# Patient Record
Sex: MALE | Race: WHITE | Employment: UNEMPLOYED | URBAN - METROPOLITAN AREA
[De-identification: names, ages, dates, MRNs, and addresses within clinical notes are randomized per-mention and may not be internally consistent; named-entity substitution may affect disease eponyms.]

---

## 2017-02-14 RX ORDER — METOLAZONE 5 MG/1
TABLET ORAL
Qty: 45 TABLET | Refills: 0 | Status: SHIPPED | OUTPATIENT
Start: 2017-02-14 | End: 2017-03-24 | Stop reason: SDUPTHER

## 2017-02-14 RX ORDER — HUMAN INSULIN 100 [IU]/ML
INJECTION, SUSPENSION SUBCUTANEOUS
Qty: 20 ML | Refills: 0 | Status: SHIPPED | OUTPATIENT
Start: 2017-02-14 | End: 2017-04-24 | Stop reason: SDUPTHER

## 2017-02-27 ENCOUNTER — TELEPHONE (OUTPATIENT)
Dept: INTERNAL MEDICINE | Age: 62
End: 2017-02-27

## 2017-03-24 RX ORDER — METOLAZONE 5 MG/1
TABLET ORAL
Qty: 45 TABLET | Refills: 0 | Status: SHIPPED | OUTPATIENT
Start: 2017-03-24 | End: 2017-07-16 | Stop reason: SDUPTHER

## 2017-04-24 RX ORDER — HUMAN INSULIN 100 [IU]/ML
INJECTION, SUSPENSION SUBCUTANEOUS
Qty: 20 ML | Refills: 0 | Status: SHIPPED | OUTPATIENT
Start: 2017-04-24 | End: 2017-05-22 | Stop reason: SDUPTHER

## 2017-04-26 RX ORDER — WARFARIN SODIUM 5 MG/1
TABLET ORAL
Qty: 360 TABLET | Refills: 0 | Status: SHIPPED | OUTPATIENT
Start: 2017-04-26 | End: 2017-08-17 | Stop reason: SDUPTHER

## 2017-05-11 ENCOUNTER — ANTI-COAG VISIT (OUTPATIENT)
Dept: INTERNAL MEDICINE | Age: 62
End: 2017-05-11

## 2017-05-23 RX ORDER — HUMAN INSULIN 100 [IU]/ML
INJECTION, SUSPENSION SUBCUTANEOUS
Qty: 20 ML | Refills: 0 | Status: SHIPPED | OUTPATIENT
Start: 2017-05-23 | End: 2017-06-19 | Stop reason: SDUPTHER

## 2017-05-26 ENCOUNTER — TELEPHONE (OUTPATIENT)
Dept: INTERNAL MEDICINE | Age: 62
End: 2017-05-26

## 2017-06-19 RX ORDER — HUMAN INSULIN 100 [IU]/ML
INJECTION, SUSPENSION SUBCUTANEOUS
Qty: 20 ML | Refills: 0 | Status: SHIPPED | OUTPATIENT
Start: 2017-06-19 | End: 2017-07-16 | Stop reason: SDUPTHER

## 2017-07-17 RX ORDER — METOLAZONE 5 MG/1
TABLET ORAL
Qty: 45 TABLET | Refills: 0 | Status: SHIPPED | OUTPATIENT
Start: 2017-07-17 | End: 2017-10-20 | Stop reason: SDUPTHER

## 2017-07-17 RX ORDER — HUMAN INSULIN 100 [IU]/ML
INJECTION, SUSPENSION SUBCUTANEOUS
Qty: 20 ML | Refills: 0 | Status: SHIPPED | OUTPATIENT
Start: 2017-07-17 | End: 2017-08-16 | Stop reason: SDUPTHER

## 2017-08-07 RX ORDER — POTASSIUM CHLORIDE 750 MG/1
TABLET, FILM COATED, EXTENDED RELEASE ORAL
Qty: 720 TABLET | Refills: 0 | Status: SHIPPED | OUTPATIENT
Start: 2017-08-07 | End: 2017-11-13 | Stop reason: SDUPTHER

## 2017-08-16 RX ORDER — HUMAN INSULIN 100 [IU]/ML
INJECTION, SUSPENSION SUBCUTANEOUS
Qty: 20 ML | Refills: 0 | Status: SHIPPED | OUTPATIENT
Start: 2017-08-16 | End: 2017-10-20 | Stop reason: SDUPTHER

## 2017-08-17 ENCOUNTER — TELEPHONE (OUTPATIENT)
Dept: INTERNAL MEDICINE | Age: 62
End: 2017-08-17

## 2017-08-17 DIAGNOSIS — G89.29 CHRONIC BILATERAL LOW BACK PAIN, WITH SCIATICA PRESENCE UNSPECIFIED: Primary | ICD-10-CM

## 2017-08-17 DIAGNOSIS — M54.5 CHRONIC BILATERAL LOW BACK PAIN, WITH SCIATICA PRESENCE UNSPECIFIED: Primary | ICD-10-CM

## 2017-08-17 RX ORDER — WARFARIN SODIUM 5 MG/1
TABLET ORAL
Qty: 228 TABLET | Refills: 0 | Status: SHIPPED | OUTPATIENT
Start: 2017-08-17 | End: 2017-11-13 | Stop reason: SDUPTHER

## 2017-10-18 ENCOUNTER — OFFICE VISIT (OUTPATIENT)
Dept: PAIN MANAGEMENT | Age: 62
End: 2017-10-18

## 2017-10-18 VITALS
SYSTOLIC BLOOD PRESSURE: 170 MMHG | BODY MASS INDEX: 39.17 KG/M2 | DIASTOLIC BLOOD PRESSURE: 123 MMHG | HEIGHT: 75 IN | WEIGHT: 315 LBS | HEART RATE: 108 BPM

## 2017-10-18 DIAGNOSIS — F11.20 UNCOMPLICATED OPIOID DEPENDENCE (HCC): ICD-10-CM

## 2017-10-18 DIAGNOSIS — M54.16 LUMBAR RADICULOPATHY: Primary | ICD-10-CM

## 2017-10-18 DIAGNOSIS — G89.4 CHRONIC PAIN SYNDROME: ICD-10-CM

## 2017-10-18 PROCEDURE — G8417 CALC BMI ABV UP PARAM F/U: HCPCS | Performed by: ANESTHESIOLOGY

## 2017-10-18 PROCEDURE — G8427 DOCREV CUR MEDS BY ELIG CLIN: HCPCS | Performed by: ANESTHESIOLOGY

## 2017-10-18 PROCEDURE — G8484 FLU IMMUNIZE NO ADMIN: HCPCS | Performed by: ANESTHESIOLOGY

## 2017-10-18 PROCEDURE — 3017F COLORECTAL CA SCREEN DOC REV: CPT | Performed by: ANESTHESIOLOGY

## 2017-10-18 PROCEDURE — 99203 OFFICE O/P NEW LOW 30 MIN: CPT | Performed by: ANESTHESIOLOGY

## 2017-10-18 PROCEDURE — 1036F TOBACCO NON-USER: CPT | Performed by: ANESTHESIOLOGY

## 2017-10-18 RX ORDER — METOCLOPRAMIDE 10 MG/1
10 TABLET ORAL 4 TIMES DAILY
COMMUNITY

## 2017-10-18 RX ORDER — VITAMIN E 268 MG
400 CAPSULE ORAL DAILY
COMMUNITY

## 2017-10-18 ASSESSMENT — PATIENT HEALTH QUESTIONNAIRE - PHQ9
SUM OF ALL RESPONSES TO PHQ9 QUESTIONS 1 & 2: 2
4. FEELING TIRED OR HAVING LITTLE ENERGY: 1
6. FEELING BAD ABOUT YOURSELF - OR THAT YOU ARE A FAILURE OR HAVE LET YOURSELF OR YOUR FAMILY DOWN: 0
7. TROUBLE CONCENTRATING ON THINGS, SUCH AS READING THE NEWSPAPER OR WATCHING TELEVISION: 0
3. TROUBLE FALLING OR STAYING ASLEEP: 1
1. LITTLE INTEREST OR PLEASURE IN DOING THINGS: 2
SUM OF ALL RESPONSES TO PHQ QUESTIONS 1-9: 5
9. THOUGHTS THAT YOU WOULD BE BETTER OFF DEAD, OR OF HURTING YOURSELF: 0
5. POOR APPETITE OR OVEREATING: 1
10. IF YOU CHECKED OFF ANY PROBLEMS, HOW DIFFICULT HAVE THESE PROBLEMS MADE IT FOR YOU TO DO YOUR WORK, TAKE CARE OF THINGS AT HOME, OR GET ALONG WITH OTHER PEOPLE: 1
2. FEELING DOWN, DEPRESSED OR HOPELESS: 0
8. MOVING OR SPEAKING SO SLOWLY THAT OTHER PEOPLE COULD HAVE NOTICED. OR THE OPPOSITE, BEING SO FIGETY OR RESTLESS THAT YOU HAVE BEEN MOVING AROUND A LOT MORE THAN USUAL: 0

## 2017-10-18 ASSESSMENT — ENCOUNTER SYMPTOMS
COUGH: 0
BACK PAIN: 1
HEARTBURN: 0
NAUSEA: 0
EYE REDNESS: 0
HEMOPTYSIS: 0
ORTHOPNEA: 0
EYE DISCHARGE: 0
SHORTNESS OF BREATH: 0
ABDOMINAL PAIN: 0
VOMITING: 0
WHEEZING: 0
CONSTIPATION: 0
EYE PAIN: 0
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
## 2017-10-18 NOTE — PATIENT INSTRUCTIONS
[email or web address] and sign in to your [employer] account. Enter L921 in the [place] box to learn more about \"Getting Back to Normal After Low Back Pain: Care Instructions. \"     If you do not have an account, please click on the \"Sign Up Now\" link. Current as of: March 21, 2017  Content Version: 11.3  © 1004-5147 [employer]. Care instructions adapted under license by [facility] ([facility]). If you have questions about a medical condition or this instruction, always ask your healthcare professional. [place] [address] any warranty or liability for your use of this information.

## 2017-10-20 ENCOUNTER — TELEPHONE (OUTPATIENT)
Dept: INTERNAL MEDICINE | Age: 62
End: 2017-10-20

## 2017-10-20 RX ORDER — METOLAZONE 5 MG/1
TABLET ORAL
Qty: 45 TABLET | Refills: 0 | Status: SHIPPED | OUTPATIENT
Start: 2017-10-20 | End: 2019-09-18 | Stop reason: SDUPTHER

## 2017-10-20 NOTE — TELEPHONE ENCOUNTER
Pt is asking for another recommendation on a pain management . Pt states who he was referred to did not work out for him. Pt aware DR is out of office. Pt states he really needs recommendation soon.  Please advise

## 2017-10-20 NOTE — TELEPHONE ENCOUNTER
Pt calling back to check status of NOVOLIN N 100 UNIT/ML injection vial has been pended for Mely Salazar MD   Aware that Mely Salazar MD out however message will be sent to PCP on duty   Pt states will be completely out  Pt can be reached at [phone]

## 2017-10-26 ENCOUNTER — TELEPHONE (OUTPATIENT)
Dept: INTERNAL MEDICINE | Age: 62
End: 2017-10-26

## 2017-10-26 NOTE — TELEPHONE ENCOUNTER
Pt was seen for the first time today with Dr. Tory Claros for pain management. The patient said that he will not agree to accept him as a new patient until he speaks directly to Dr. Hernandez Duncan. The patient is requesting that Dr. Hernandez Duncan call Dr. Renard Moya at [phone]. He is aware that  is not in the office this week. Please call.

## 2017-11-01 ENCOUNTER — HOSPITAL ENCOUNTER (OUTPATIENT)
Dept: OTHER | Age: 62
Discharge: HOME | End: 2017-11-30
Attending: INTERNAL MEDICINE | Admitting: INTERNAL MEDICINE

## 2017-11-02 ENCOUNTER — TELEPHONE (OUTPATIENT)
Dept: INTERNAL MEDICINE | Age: 62
End: 2017-11-02

## 2017-11-02 NOTE — TELEPHONE ENCOUNTER
Pt calling back again regarding seeing Dr. Altaf Sylvester for pain management     Pt said that this is time sensitive and he does not want to run out of his meds     Please advise

## 2017-11-03 NOTE — TELEPHONE ENCOUNTER
Spoke with Dr Trotter Sit office, he is not in the office until Tuesday 11/7/2017.  Information given to the office to return the call to Dr Dow President cell or office number

## 2017-11-09 ENCOUNTER — TELEPHONE (OUTPATIENT)
Dept: INTERNAL MEDICINE | Age: 62
End: 2017-11-09

## 2017-11-09 DIAGNOSIS — G89.29 OTHER CHRONIC PAIN: ICD-10-CM

## 2017-11-09 DIAGNOSIS — M54.50 CHRONIC BILATERAL LOW BACK PAIN WITHOUT SCIATICA: Primary | ICD-10-CM

## 2017-11-09 DIAGNOSIS — G89.29 CHRONIC BILATERAL LOW BACK PAIN WITHOUT SCIATICA: Primary | ICD-10-CM

## 2017-11-13 RX ORDER — POTASSIUM CHLORIDE 750 MG/1
TABLET, FILM COATED, EXTENDED RELEASE ORAL
Qty: 720 TABLET | Refills: 0 | Status: SHIPPED | OUTPATIENT
Start: 2017-11-13 | End: 2018-03-11 | Stop reason: SDUPTHER

## 2017-11-13 RX ORDER — WARFARIN SODIUM 5 MG/1
TABLET ORAL
Qty: 228 TABLET | Refills: 0 | Status: SHIPPED | OUTPATIENT
Start: 2017-11-13 | End: 2017-12-21 | Stop reason: SDUPTHER

## 2017-11-30 LAB
INR BLD: 2.33 (ref 0.85–1.15)
PROTHROMBIN TIME: 26.3 SEC (ref 9.6–13)

## 2017-12-16 DIAGNOSIS — Z79.4 TYPE 2 DIABETES MELLITUS WITH DIABETIC POLYNEUROPATHY, WITH LONG-TERM CURRENT USE OF INSULIN (HCC): Primary | ICD-10-CM

## 2017-12-16 DIAGNOSIS — E11.42 TYPE 2 DIABETES MELLITUS WITH DIABETIC POLYNEUROPATHY, WITH LONG-TERM CURRENT USE OF INSULIN (HCC): Primary | ICD-10-CM

## 2017-12-18 RX ORDER — HUMAN INSULIN 100 [IU]/ML
INJECTION, SUSPENSION SUBCUTANEOUS
Qty: 20 ML | Refills: 0 | Status: SHIPPED | OUTPATIENT
Start: 2017-12-18 | End: 2018-04-16

## 2017-12-21 RX ORDER — WARFARIN SODIUM 5 MG/1
TABLET ORAL
Qty: 228 TABLET | Refills: 0 | Status: SHIPPED | OUTPATIENT
Start: 2017-12-21 | End: 2018-02-04

## 2017-12-28 ENCOUNTER — TELEPHONE (OUTPATIENT)
Dept: INTERNAL MEDICINE | Age: 62
End: 2017-12-28

## 2017-12-28 ENCOUNTER — HOSPITAL ENCOUNTER (OUTPATIENT)
Dept: OTHER | Age: 62
Discharge: HOME | End: 2017-12-31
Attending: INTERNAL MEDICINE | Admitting: INTERNAL MEDICINE

## 2017-12-28 LAB
INR BLD: 2.66 (ref 0.85–1.15)
PROTHROMBIN TIME: 30.1 SEC (ref 9.6–13)

## 2017-12-29 NOTE — TELEPHONE ENCOUNTER
I know very little about pain pumps   If his pain is intractable to him I think he should get all details and possibly choose the pump if present pain is not being controlled and the pain doctors prescribe the medications.

## 2018-01-01 ENCOUNTER — HOSPITAL ENCOUNTER (OUTPATIENT)
Dept: OTHER | Age: 63
Discharge: HOME | End: 2018-01-31
Attending: INTERNAL MEDICINE | Admitting: INTERNAL MEDICINE

## 2018-01-25 LAB
INR BLD: 2.32 (ref 0.85–1.15)
PROTHROMBIN TIME: 26.2 SEC (ref 9.6–13)

## 2018-02-01 ENCOUNTER — HOSPITAL ENCOUNTER (OUTPATIENT)
Dept: OTHER | Age: 63
Discharge: HOME | End: 2018-02-28
Attending: INTERNAL MEDICINE | Admitting: INTERNAL MEDICINE

## 2018-02-04 PROBLEM — M62.81 MUSCLE WEAKNESS: Status: ACTIVE | Noted: 2018-02-04

## 2018-02-04 PROBLEM — R20.2 PARESTHESIA: Status: ACTIVE | Noted: 2018-02-04

## 2018-02-05 PROBLEM — R20.0 NUMBNESS AND TINGLING: Status: ACTIVE | Noted: 2018-02-04

## 2018-02-09 PROBLEM — I63.9 ACUTE ISCHEMIC STROKE (HCC): Status: ACTIVE | Noted: 2018-02-09

## 2018-02-09 PROBLEM — K85.10 GALLSTONE PANCREATITIS: Status: ACTIVE | Noted: 2018-02-09

## 2018-02-09 PROBLEM — R79.89 ELEVATED LFTS: Status: ACTIVE | Noted: 2018-02-09

## 2018-02-09 PROBLEM — K85.90 ACUTE PANCREATITIS: Status: ACTIVE | Noted: 2018-02-09

## 2018-02-09 PROBLEM — N39.0 UTI (URINARY TRACT INFECTION): Status: ACTIVE | Noted: 2018-02-09

## 2018-02-10 PROBLEM — K85.10 ACUTE GALLSTONE PANCREATITIS: Status: ACTIVE | Noted: 2018-02-09

## 2018-02-16 ENCOUNTER — TELEPHONE (OUTPATIENT)
Dept: INTERNAL MEDICINE | Age: 63
End: 2018-02-16

## 2018-02-16 NOTE — TELEPHONE ENCOUNTER
Pt stated had a stroke one week ago. Pt stated has gall stones. Pt stated needs DR. Luisa Winston approval to be off  warfarin (COUMADIN) 10 MG tablet 7 days before surgery. Pt has not schedule Gall stones surgery wants to talk to Dr. Luisa Winston.   Please call

## 2018-02-20 ENCOUNTER — TELEPHONE (OUTPATIENT)
Dept: INTERNAL MEDICINE | Age: 63
End: 2018-02-20

## 2018-02-20 NOTE — TELEPHONE ENCOUNTER
[address] would like to know if Dr. Marycarmen Elias will sign home care orders. In addition, when is the patient suppose to have his next PT INR is due? Please call.

## 2018-02-22 ENCOUNTER — TELEPHONE (OUTPATIENT)
Dept: INTERNAL MEDICINE | Age: 63
End: 2018-02-22

## 2018-02-26 ENCOUNTER — TELEPHONE (OUTPATIENT)
Dept: INTERNAL MEDICINE | Age: 63
End: 2018-02-26

## 2018-03-01 ENCOUNTER — TELEPHONE (OUTPATIENT)
Dept: SURGERY | Age: 63
End: 2018-03-01

## 2018-03-01 ENCOUNTER — TELEPHONE (OUTPATIENT)
Dept: INTERNAL MEDICINE | Age: 63
End: 2018-03-01

## 2018-03-01 ENCOUNTER — OFFICE VISIT (OUTPATIENT)
Dept: SURGERY | Age: 63
End: 2018-03-01

## 2018-03-01 VITALS — WEIGHT: 315 LBS | DIASTOLIC BLOOD PRESSURE: 98 MMHG | BODY MASS INDEX: 41.37 KG/M2 | SYSTOLIC BLOOD PRESSURE: 158 MMHG

## 2018-03-01 DIAGNOSIS — K85.10 GALLSTONE PANCREATITIS: Primary | ICD-10-CM

## 2018-03-01 PROCEDURE — G8598 ASA/ANTIPLAT THER USED: HCPCS | Performed by: SURGERY

## 2018-03-01 PROCEDURE — G8484 FLU IMMUNIZE NO ADMIN: HCPCS | Performed by: SURGERY

## 2018-03-01 PROCEDURE — G8417 CALC BMI ABV UP PARAM F/U: HCPCS | Performed by: SURGERY

## 2018-03-01 PROCEDURE — 3017F COLORECTAL CA SCREEN DOC REV: CPT | Performed by: SURGERY

## 2018-03-01 PROCEDURE — 1036F TOBACCO NON-USER: CPT | Performed by: SURGERY

## 2018-03-01 PROCEDURE — 1111F DSCHRG MED/CURRENT MED MERGE: CPT | Performed by: SURGERY

## 2018-03-01 PROCEDURE — G8427 DOCREV CUR MEDS BY ELIG CLIN: HCPCS | Performed by: SURGERY

## 2018-03-01 PROCEDURE — 99214 OFFICE O/P EST MOD 30 MIN: CPT | Performed by: SURGERY

## 2018-03-01 NOTE — TELEPHONE ENCOUNTER
His surgery is on the 9th so this is fine. If we have to reschedule his surgery per PCP then we will do that. Please call him back to let him know.  thanks

## 2018-03-01 NOTE — PROGRESS NOTES
benazepril (LOTENSIN) 10 MG tablet Take 1 tablet by mouth 2 times daily    metoprolol tartrate (LOPRESSOR) 50 MG tablet Take 1 tablet by mouth 2 times daily    metolazone (ZAROXOLYN) 5 MG tablet TAKE 1/2 TABLET BY MOUTH EVERY DAY    metoclopramide (REGLAN) 10 MG tablet Take 10 mg by mouth 4 times daily           Radiology Review  CT SCAN  . Findings most compatible with pancreatitis of the head of the pancreas. If this does not correlate with serum pancreatic enzyme levels, then   duodenitis would be a secondary consideration   2. Dilated gallbladder containing stones. Thaddeus Riling is no pericholecystic   stranding to indicate acute cholecystitis at this time.  Findings should be   correlated with any symptoms referable to the right upper quadrant.  There is   no evidence of biliary duct obstruction   3. Nonobstructing right nephrolithiasis   4. Chronic L4-L5 abnormality as discussed               Results for Ovidio Verdin (MRN [id number]) as of 3/1/2018 10:48   Ref.  Range 2/15/2018 06:54   Sodium Latest Ref Range: 136 - 145 mmol/L 137   Potassium Latest Ref Range: 3.5 - 5.1 mmol/L 3.6   Chloride Latest Ref Range: 99 - 110 mmol/L 102   CO2 Latest Ref Range: 21 - 32 mmol/L 24   BUN Latest Ref Range: 7 - 20 mg/dL 14   Creatinine Latest Ref Range: 0.8 - 1.3 mg/dL 0.6 (L)   Anion Gap Latest Ref Range: 3 - 16  11   GFR Non- Latest Ref Range: >60  >60   GFR  Latest Ref Range: >60  >60   Glucose Latest Ref Range: 70 - 99 mg/dL 99   Calcium Latest Ref Range: 8.3 - 10.6 mg/dL 8.1 (L)   Total Protein Latest Ref Range: 6.4 - 8.2 g/dL 6.0 (L)   Albumin Latest Ref Range: 3.4 - 5.0 g/dL 3.3 (L)   Globulin Latest Units: g/dL 2.7   Albumin/Globulin Ratio Latest Ref Range: 1.1 - 2.2  1.2   Alk Phos Latest Ref Range: 40 - 129 U/L 110   ALT Latest Ref Range: 10 - 40 U/L 70 (H)   AST Latest Ref Range: 15 - 37 U/L 29   Bilirubin Latest Ref Range: 0.0 - 1.0 mg/dL 0.7   Lipase Latest Ref Range: 13.0 - 60.0 U/L 45.0   WBC Latest Ref Range: 4.0 - 11.0 K/uL 9.5   RBC Latest Ref Range: 4.20 - 5.90 M/uL 4.32   Hemoglobin Quant Latest Ref Range: 13.5 - 17.5 g/dL 13.3 (L)   Hematocrit Latest Ref Range: 40.5 - 52.5 % 38.6 (L)   MCV Latest Ref Range: 80.0 - 100.0 fL 89.4   MCH Latest Ref Range: 26.0 - 34.0 pg 30.8   MCHC Latest Ref Range: 31.0 - 36.0 g/dL 34.4   MPV Latest Ref Range: 5.0 - 10.5 fL 7.8   RDW Latest Ref Range: 12.4 - 15.4 % 13.7   Platelet Count Latest Ref Range: 135 - 450 K/uL 294   Prothrombin Time Latest Ref Range: 9.6 - 13.0 sec 21.7 (H)   INR Latest Ref Range: 0.85 - 1.15  1.92 (H)     ASSESSMENT AND PLAN:  Gallstone pancreatitis    Plan LC, IOC  Able to hold Coumadin now and have surgery scheduled  Op, R/B/A reviewed, will proceed, all Q answered  Schedule at [facility]    Fifi Wilks

## 2018-03-02 ENCOUNTER — TELEPHONE (OUTPATIENT)
Dept: SURGERY | Age: 63
End: 2018-03-02

## 2018-03-07 ENCOUNTER — TELEPHONE (OUTPATIENT)
Dept: INTERNAL MEDICINE | Age: 63
End: 2018-03-07

## 2018-03-07 NOTE — TELEPHONE ENCOUNTER
lvm inquiring if the patient has any pre op paper work before coming intot he visit on tomorrow 3/8/2018

## 2018-03-08 ENCOUNTER — OFFICE VISIT (OUTPATIENT)
Dept: INTERNAL MEDICINE | Age: 63
End: 2018-03-08

## 2018-03-08 VITALS
SYSTOLIC BLOOD PRESSURE: 150 MMHG | HEIGHT: 75 IN | BODY MASS INDEX: 39.17 KG/M2 | DIASTOLIC BLOOD PRESSURE: 90 MMHG | WEIGHT: 315 LBS

## 2018-03-08 DIAGNOSIS — I63.9 ACUTE CEREBRAL INFARCTION (HCC): ICD-10-CM

## 2018-03-08 DIAGNOSIS — K80.64 CALCULUS OF GALLBLADDER AND BILE DUCT WITH CHRONIC CHOLECYSTITIS WITHOUT OBSTRUCTION: ICD-10-CM

## 2018-03-08 DIAGNOSIS — Z01.818 PREOP EXAM FOR INTERNAL MEDICINE: Primary | ICD-10-CM

## 2018-03-08 DIAGNOSIS — E11.42 TYPE 2 DIABETES MELLITUS WITH DIABETIC POLYNEUROPATHY, WITH LONG-TERM CURRENT USE OF INSULIN (HCC): ICD-10-CM

## 2018-03-08 DIAGNOSIS — Z79.4 TYPE 2 DIABETES MELLITUS WITH DIABETIC POLYNEUROPATHY, WITH LONG-TERM CURRENT USE OF INSULIN (HCC): ICD-10-CM

## 2018-03-08 PROCEDURE — 3044F HG A1C LEVEL LT 7.0%: CPT | Performed by: INTERNAL MEDICINE

## 2018-03-08 PROCEDURE — G8427 DOCREV CUR MEDS BY ELIG CLIN: HCPCS | Performed by: INTERNAL MEDICINE

## 2018-03-08 PROCEDURE — 1111F DSCHRG MED/CURRENT MED MERGE: CPT | Performed by: INTERNAL MEDICINE

## 2018-03-08 PROCEDURE — G8417 CALC BMI ABV UP PARAM F/U: HCPCS | Performed by: INTERNAL MEDICINE

## 2018-03-08 PROCEDURE — G8598 ASA/ANTIPLAT THER USED: HCPCS | Performed by: INTERNAL MEDICINE

## 2018-03-08 PROCEDURE — G8484 FLU IMMUNIZE NO ADMIN: HCPCS | Performed by: INTERNAL MEDICINE

## 2018-03-08 PROCEDURE — 99214 OFFICE O/P EST MOD 30 MIN: CPT | Performed by: INTERNAL MEDICINE

## 2018-03-08 PROCEDURE — 3017F COLORECTAL CA SCREEN DOC REV: CPT | Performed by: INTERNAL MEDICINE

## 2018-03-08 PROCEDURE — 1036F TOBACCO NON-USER: CPT | Performed by: INTERNAL MEDICINE

## 2018-03-08 NOTE — PROGRESS NOTES
Subjective:      Patient ID: Chauncey Tinsley is a 63 y.o. male.     HPI    Review of Systems    Objective:   Physical Exam    Assessment:      ***      Plan:      ***
by mouth daily      metoclopramide (REGLAN) 10 MG tablet Take 10 mg by mouth 4 times daily      ONE TOUCH ULTRA TEST strip USE TO TEST THREE TIMES DAILY 300 strip 3    Insulin Syringe-Needle U-100 (INSULIN SYRINGE .5CC/31GX5/16\") 31G X 5/16\" 0.5 ML MISC USE TWICE DAILY 300 Syringe 5    oxyCODONE (OXY-IR) 15 MG immediate release tablet Take 10 mg by mouth every 4 hours as needed for Pain .  ferrous sulfate 325 (65 FE) MG tablet TAKE 1 TABLET BY MOUTH TWICE DAILY 60 tablet 5    ONE TOUCH ULTRASOFT LANCETS MISC USE AS DIRECTED TO TEST THREE TIMES DAILY 272 each 1    Alcohol Swabs (B-D SINGLE USE SWABS REGULAR) PADS USE AS DIRECTED 200 each 0    ONE TOUCH ULTRASOFT LANCETS MISC FOLLOW PACKAGE DIRECTIONS TEST THREE TIMES DAILY 100 each 3     No current facility-administered medications for this visit. Allergies   Allergen Reactions    Adhesive Tape     Other      sts when he gets injectable steroids it makes everything worse       Social History   Substance Use Topics    Smoking status: Former Smoker    Smokeless tobacco: Never Used    Alcohol use No        Family History   Problem Relation Age of Onset    Cancer Father     Other Mother         Review Of Systems  No fever / chills / sweats. No weight loss with BMI of 41.25  No visual change, eye pain, eye discharge. No , sore throat, dysphagia. Denies cough / sputum. Denies chest pain, palpitations. Denies n / v / abd pain at present time but has had recent pain. Denies dysuria or change in urinary function. C/O joint swelling and pain with  myalgia, arthralgia. Denies focal weakness except for right lower leg weakness, with sensory change. Diabetes with some neuropathy No symptoms hematologic disease. Physical Exam  BP (!) 150/90 (Site: Right Arm)   Ht 6' 3\" (1.905 m)   Wt (!) 330 lb (149.7 kg)   BMI 41.25 kg/m²   GENERAL: No acute distress     Marked Obesity.   HEENT: Membranes moist, no conjunctival changes, no oral

## 2018-03-09 ENCOUNTER — HOSPITAL ENCOUNTER (OUTPATIENT)
Dept: SURGERY | Age: 63
Discharge: HOME | End: 2018-03-09
Attending: SURGERY | Admitting: SURGERY

## 2018-03-09 VITALS
TEMPERATURE: 97.6 F | OXYGEN SATURATION: 96 % | HEART RATE: 77 BPM | HEIGHT: 75 IN | BODY MASS INDEX: 39.17 KG/M2 | SYSTOLIC BLOOD PRESSURE: 133 MMHG | RESPIRATION RATE: 17 BRPM | WEIGHT: 315 LBS | DIASTOLIC BLOOD PRESSURE: 91 MMHG

## 2018-03-09 DIAGNOSIS — R52 PAIN: ICD-10-CM

## 2018-03-09 DIAGNOSIS — K85.10 GALLSTONE PANCREATITIS: Primary | ICD-10-CM

## 2018-03-09 DIAGNOSIS — G89.18 PAIN AT SURGICAL SITE: ICD-10-CM

## 2018-03-09 DIAGNOSIS — K81.1 CHRONIC CHOLECYSTITIS: ICD-10-CM

## 2018-03-09 LAB
ANION GAP SERPL CALCULATED.3IONS-SCNC: 12 MMOL/L (ref 3–16)
APTT: 30.6 SEC (ref 24.1–34.9)
BUN BLDV-MCNC: 11 MG/DL (ref 7–20)
CALCIUM SERPL-MCNC: 9.2 MG/DL (ref 8.3–10.6)
CHLORIDE BLD-SCNC: 98 MMOL/L (ref 99–110)
CO2: 25 MMOL/L (ref 21–32)
CREAT SERPL-MCNC: 0.5 MG/DL (ref 0.8–1.3)
GFR AFRICAN AMERICAN: >60
GFR NON-AFRICAN AMERICAN: >60
GLUCOSE BLD-MCNC: 153 MG/DL (ref 70–99)
GLUCOSE BLD-MCNC: 158 MG/DL (ref 70–99)
GLUCOSE BLD-MCNC: 168 MG/DL (ref 70–99)
INR BLD: 1.13 (ref 0.85–1.15)
PERFORMED ON: ABNORMAL
PERFORMED ON: ABNORMAL
POTASSIUM SERPL-SCNC: 5.1 MMOL/L (ref 3.5–5.1)
PROTHROMBIN TIME: 12.8 SEC (ref 9.6–13)
SODIUM BLD-SCNC: 135 MMOL/L (ref 136–145)

## 2018-03-09 PROCEDURE — 47563 LAPARO CHOLECYSTECTOMY/GRAPH: CPT | Performed by: SURGERY

## 2018-03-09 RX ORDER — ONDANSETRON 2 MG/ML
4 INJECTION INTRAMUSCULAR; INTRAVENOUS
Status: ACTIVE | OUTPATIENT
Start: 2018-03-09 | End: 2018-03-09

## 2018-03-09 RX ORDER — FENTANYL CITRATE 50 UG/ML
50 INJECTION, SOLUTION INTRAMUSCULAR; INTRAVENOUS EVERY 5 MIN PRN
Status: COMPLETED | OUTPATIENT
Start: 2018-03-09 | End: 2018-03-09

## 2018-03-09 RX ORDER — HYDROMORPHONE HCL 110MG/55ML
PATIENT CONTROLLED ANALGESIA SYRINGE INTRAVENOUS
Status: COMPLETED
Start: 2018-03-09 | End: 2018-03-09

## 2018-03-09 RX ORDER — OXYCODONE HYDROCHLORIDE AND ACETAMINOPHEN 5; 325 MG/1; MG/1
1 TABLET ORAL PRN
Status: COMPLETED | OUTPATIENT
Start: 2018-03-09 | End: 2018-03-09

## 2018-03-09 RX ORDER — LABETALOL HYDROCHLORIDE 5 MG/ML
5 INJECTION, SOLUTION INTRAVENOUS EVERY 10 MIN PRN
Status: DISCONTINUED | OUTPATIENT
Start: 2018-03-09 | End: 2018-03-10 | Stop reason: HOSPADM

## 2018-03-09 RX ORDER — OXYCODONE HYDROCHLORIDE AND ACETAMINOPHEN 5; 325 MG/1; MG/1
2 TABLET ORAL PRN
Status: COMPLETED | OUTPATIENT
Start: 2018-03-09 | End: 2018-03-09

## 2018-03-09 RX ORDER — HYDROCODONE BITARTRATE AND ACETAMINOPHEN 5; 325 MG/1; MG/1
2 TABLET ORAL EVERY 6 HOURS PRN
Qty: 20 TABLET | Refills: 0 | Status: SHIPPED | OUTPATIENT
Start: 2018-03-09 | End: 2018-03-14

## 2018-03-09 RX ORDER — HYDROMORPHONE HCL 110MG/55ML
0.5 PATIENT CONTROLLED ANALGESIA SYRINGE INTRAVENOUS EVERY 5 MIN PRN
Status: COMPLETED | OUTPATIENT
Start: 2018-03-09 | End: 2018-03-09

## 2018-03-09 RX ORDER — DIPHENHYDRAMINE HYDROCHLORIDE 50 MG/ML
12.5 INJECTION INTRAMUSCULAR; INTRAVENOUS
Status: ACTIVE | OUTPATIENT
Start: 2018-03-09 | End: 2018-03-09

## 2018-03-09 RX ORDER — SODIUM CHLORIDE 9 MG/ML
INJECTION, SOLUTION INTRAVENOUS
Status: COMPLETED
Start: 2018-03-09 | End: 2018-03-09

## 2018-03-09 RX ORDER — MEPERIDINE HYDROCHLORIDE 25 MG/ML
12.5 INJECTION INTRAMUSCULAR; INTRAVENOUS; SUBCUTANEOUS EVERY 5 MIN PRN
Status: DISCONTINUED | OUTPATIENT
Start: 2018-03-09 | End: 2018-03-10 | Stop reason: HOSPADM

## 2018-03-09 RX ORDER — FENTANYL CITRATE 50 UG/ML
25 INJECTION, SOLUTION INTRAMUSCULAR; INTRAVENOUS EVERY 5 MIN PRN
Status: DISCONTINUED | OUTPATIENT
Start: 2018-03-09 | End: 2018-03-10 | Stop reason: HOSPADM

## 2018-03-09 RX ORDER — SODIUM CHLORIDE 9 MG/ML
INJECTION, SOLUTION INTRAVENOUS CONTINUOUS
Status: DISCONTINUED | OUTPATIENT
Start: 2018-03-09 | End: 2018-03-10 | Stop reason: HOSPADM

## 2018-03-09 RX ADMIN — SODIUM CHLORIDE: 9 INJECTION, SOLUTION INTRAVENOUS at 12:16

## 2018-03-09 RX ADMIN — FENTANYL CITRATE 50 MCG: 50 INJECTION, SOLUTION INTRAMUSCULAR; INTRAVENOUS at 15:21

## 2018-03-09 RX ADMIN — Medication 0.5 MG: at 15:49

## 2018-03-09 RX ADMIN — FENTANYL CITRATE 50 MCG: 50 INJECTION, SOLUTION INTRAMUSCULAR; INTRAVENOUS at 15:34

## 2018-03-09 RX ADMIN — Medication 0.5 MG: at 15:40

## 2018-03-09 RX ADMIN — FENTANYL CITRATE 50 MCG: 50 INJECTION, SOLUTION INTRAMUSCULAR; INTRAVENOUS at 15:29

## 2018-03-09 RX ADMIN — FENTANYL CITRATE 50 MCG: 50 INJECTION, SOLUTION INTRAMUSCULAR; INTRAVENOUS at 15:16

## 2018-03-09 RX ADMIN — Medication 0.5 MG: at 16:01

## 2018-03-09 RX ADMIN — Medication 0.5 MG: at 15:54

## 2018-03-09 RX ADMIN — OXYCODONE HYDROCHLORIDE AND ACETAMINOPHEN 2 TABLET: 5; 325 TABLET ORAL at 16:15

## 2018-03-09 ASSESSMENT — PAIN SCALES - GENERAL
PAINLEVEL_OUTOF10: 8
PAINLEVEL_OUTOF10: 8
PAINLEVEL_OUTOF10: 7
PAINLEVEL_OUTOF10: 7
PAINLEVEL_OUTOF10: 8
PAINLEVEL_OUTOF10: 7
PAINLEVEL_OUTOF10: 8
PAINLEVEL_OUTOF10: 7
PAINLEVEL_OUTOF10: 8

## 2018-03-09 ASSESSMENT — PAIN - FUNCTIONAL ASSESSMENT: PAIN_FUNCTIONAL_ASSESSMENT: 0-10

## 2018-03-10 NOTE — OP NOTE
gallbladder bed appeared hemostatic. There was a lot of raw tissue surface area from the dissection and this was  covered with SURGICEL SNoW gauze. The Endoloops and clips were all in good  location with no bile leak or bleeding noted. The instruments and ports  were removed. The port sites appeared hemostatic. The fascia at the  epigastric 11-mm site was closed with a 0 Vicryl figure-of-eight suture. Skin at all sites were closed with 4-0 Monocryl suture. Skin Affix glue  was placed. The patient was then taken to the recovery room postop.         Della Spears MD    D: 03/09/2018 15:12:17       T: 03/09/2018 15:27:25     [email or web address]  Job#: [id number]     Doc#: [id number]    CC:  Rosa Maria Salinas MD

## 2018-03-12 RX ORDER — POTASSIUM CHLORIDE 750 MG/1
TABLET, FILM COATED, EXTENDED RELEASE ORAL
Qty: 720 TABLET | Refills: 0 | Status: SHIPPED | OUTPATIENT
Start: 2018-03-12 | End: 2018-06-25 | Stop reason: SDUPTHER

## 2018-03-14 RX ORDER — HUMAN INSULIN 100 [IU]/ML
INJECTION, SUSPENSION SUBCUTANEOUS
Qty: 20 ML | Refills: 0 | Status: SHIPPED | OUTPATIENT
Start: 2018-03-14 | End: 2018-04-16 | Stop reason: SDUPTHER

## 2018-03-15 ENCOUNTER — TELEPHONE (OUTPATIENT)
Dept: INTERNAL MEDICINE | Age: 63
End: 2018-03-15

## 2018-03-20 ENCOUNTER — OFFICE VISIT (OUTPATIENT)
Dept: SURGERY | Age: 63
End: 2018-03-20

## 2018-03-20 VITALS — WEIGHT: 315 LBS | DIASTOLIC BLOOD PRESSURE: 98 MMHG | BODY MASS INDEX: 41.25 KG/M2 | SYSTOLIC BLOOD PRESSURE: 152 MMHG

## 2018-03-20 DIAGNOSIS — K81.1 CHRONIC CHOLECYSTITIS: Primary | ICD-10-CM

## 2018-03-20 PROCEDURE — 99024 POSTOP FOLLOW-UP VISIT: CPT | Performed by: SURGERY

## 2018-03-22 ENCOUNTER — TELEPHONE (OUTPATIENT)
Dept: INTERNAL MEDICINE | Age: 63
End: 2018-03-22

## 2018-03-23 NOTE — TELEPHONE ENCOUNTER
Have him take 15 mg one day a week starting tomorrow and 10 mg on other six days .   He should check INR in one week,

## 2018-03-29 ENCOUNTER — TELEPHONE (OUTPATIENT)
Dept: INTERNAL MEDICINE | Age: 63
End: 2018-03-29

## 2018-03-29 NOTE — TELEPHONE ENCOUNTER
lvm with elisha with dr Kely Warren recommendations    INR in a good range continue with same dosage and recheck at normal interval

## 2018-04-06 ENCOUNTER — TELEPHONE (OUTPATIENT)
Dept: INTERNAL MEDICINE | Age: 63
End: 2018-04-06

## 2018-04-06 ENCOUNTER — ANTI-COAG VISIT (OUTPATIENT)
Dept: INTERNAL MEDICINE | Age: 63
End: 2018-04-06

## 2018-04-06 LAB — INR BLD: 2.2

## 2018-04-11 PROBLEM — N39.0 UTI (URINARY TRACT INFECTION): Status: RESOLVED | Noted: 2018-02-09 | Resolved: 2018-04-11

## 2018-04-16 RX ORDER — HUMAN INSULIN 100 [IU]/ML
INJECTION, SUSPENSION SUBCUTANEOUS
Qty: 20 ML | Refills: 5 | Status: SHIPPED | OUTPATIENT
Start: 2018-04-16 | End: 2018-10-06 | Stop reason: SDUPTHER

## 2018-04-19 ENCOUNTER — HOSPITAL ENCOUNTER (OUTPATIENT)
Dept: OTHER | Age: 63
Discharge: HOME | End: 2018-04-30
Attending: INTERNAL MEDICINE | Admitting: INTERNAL MEDICINE

## 2018-04-19 LAB
INR BLD: 2.21 (ref 0.85–1.15)
PROTHROMBIN TIME: 25 SEC (ref 9.6–13)

## 2018-04-19 RX ORDER — NAPROXEN SODIUM 220 MG
TABLET ORAL
Qty: 300 SYRINGE | Refills: 0 | Status: SHIPPED | OUTPATIENT
Start: 2018-04-19 | End: 2018-09-15 | Stop reason: SDUPTHER

## 2018-05-01 ENCOUNTER — HOSPITAL ENCOUNTER (OUTPATIENT)
Dept: OTHER | Age: 63
Discharge: HOME | End: 2018-05-31
Attending: INTERNAL MEDICINE | Admitting: INTERNAL MEDICINE

## 2018-05-19 ENCOUNTER — HOSPITAL ENCOUNTER (OUTPATIENT)
Dept: CT IMAGING | Age: 63
Discharge: HOME | End: 2018-05-19
Attending: NEUROLOGICAL SURGERY | Admitting: NEUROLOGICAL SURGERY

## 2018-05-19 DIAGNOSIS — M54.5 LOW BACK PAIN, UNSPECIFIED BACK PAIN LATERALITY, UNSPECIFIED CHRONICITY, WITH SCIATICA PRESENCE UNSPECIFIED: ICD-10-CM

## 2018-05-19 DIAGNOSIS — M54.50 LOW BACK PAIN: ICD-10-CM

## 2018-05-24 LAB
INR BLD: 2.65 (ref 0.85–1.15)
PROTHROMBIN TIME: 30 SEC (ref 9.6–13)

## 2018-06-01 ENCOUNTER — HOSPITAL ENCOUNTER (OUTPATIENT)
Dept: OTHER | Age: 63
Discharge: HOME | End: 2018-06-30
Attending: INTERNAL MEDICINE | Admitting: INTERNAL MEDICINE

## 2018-06-21 LAB
INR BLD: 2.43 (ref 0.86–1.14)
PROTHROMBIN TIME: 27.7 SEC (ref 9.8–13)

## 2018-06-25 ENCOUNTER — TELEPHONE (OUTPATIENT)
Dept: INTERNAL MEDICINE | Age: 63
End: 2018-06-25

## 2018-06-25 RX ORDER — POTASSIUM CHLORIDE 750 MG/1
TABLET, FILM COATED, EXTENDED RELEASE ORAL
Qty: 720 TABLET | Refills: 3 | Status: SHIPPED | OUTPATIENT
Start: 2018-06-25 | End: 2019-04-26 | Stop reason: SDUPTHER

## 2018-07-01 ENCOUNTER — HOSPITAL ENCOUNTER (OUTPATIENT)
Dept: OTHER | Age: 63
Discharge: HOME | End: 2018-07-31
Attending: INTERNAL MEDICINE | Admitting: INTERNAL MEDICINE

## 2018-07-07 ENCOUNTER — HOSPITAL ENCOUNTER (OUTPATIENT)
Dept: OTHER | Age: 63
Discharge: HOME | End: 2018-07-07
Attending: PHYSICIAN ASSISTANT | Admitting: PHYSICIAN ASSISTANT

## 2018-07-07 ENCOUNTER — HOSPITAL ENCOUNTER (OUTPATIENT)
Dept: OTHER | Age: 63
Discharge: HOME | End: 2018-07-07
Attending: NEUROLOGICAL SURGERY | Admitting: NEUROLOGICAL SURGERY

## 2018-07-07 DIAGNOSIS — M43.10 SPONDYLOLISTHESIS, UNSPECIFIED SPINAL REGION: ICD-10-CM

## 2018-07-08 LAB
EKG ATRIAL RATE: 86 BPM
EKG DIAGNOSIS: NORMAL
EKG P AXIS: 41 DEGREES
EKG P-R INTERVAL: 162 MS
EKG Q-T INTERVAL: 372 MS
EKG QRS DURATION: 102 MS
EKG QTC CALCULATION (BAZETT): 445 MS
EKG R AXIS: -27 DEGREES
EKG T AXIS: 15 DEGREES
EKG VENTRICULAR RATE: 86 BPM

## 2018-07-08 PROCEDURE — 93010 ELECTROCARDIOGRAM REPORT: CPT | Performed by: INTERNAL MEDICINE

## 2018-07-19 LAB
INR BLD: 2.5 (ref 0.86–1.14)
PROTHROMBIN TIME: 28.5 SEC (ref 9.8–13)

## 2018-07-24 PROBLEM — R20.2 PARESTHESIAS: Status: ACTIVE | Noted: 2018-07-24

## 2018-07-27 ENCOUNTER — CARE COORDINATION (OUTPATIENT)
Dept: CASE MANAGEMENT | Age: 63
End: 2018-07-27

## 2018-08-01 ENCOUNTER — HOSPITAL ENCOUNTER (OUTPATIENT)
Dept: OTHER | Age: 63
Discharge: HOME | End: 2018-08-31
Attending: INTERNAL MEDICINE | Admitting: INTERNAL MEDICINE

## 2018-08-06 RX ORDER — WARFARIN SODIUM 5 MG/1
TABLET ORAL
Qty: 228 TABLET | Refills: 0 | Status: SHIPPED | OUTPATIENT
Start: 2018-08-06 | End: 2018-08-07 | Stop reason: SDUPTHER

## 2018-08-07 RX ORDER — WARFARIN SODIUM 5 MG/1
TABLET ORAL
Qty: 394 TABLET | Refills: 0 | Status: SHIPPED | OUTPATIENT
Start: 2018-08-07 | End: 2018-11-05

## 2018-08-23 LAB
INR BLD: 3.41 (ref 0.86–1.14)
PROTHROMBIN TIME: 38.9 SEC (ref 9.8–13)

## 2018-08-24 ENCOUNTER — TELEPHONE (OUTPATIENT)
Dept: INTERNAL MEDICINE | Age: 63
End: 2018-08-24

## 2018-08-24 NOTE — TELEPHONE ENCOUNTER
Patient notified with Dr Sole Lopez recommendations. 10 mg of warfarin M,W,TH,Sat  5 mg on T,FR, Sun    Patient verbalized understanding.

## 2018-09-01 ENCOUNTER — HOSPITAL ENCOUNTER (OUTPATIENT)
Dept: OTHER | Age: 63
Discharge: HOME | End: 2018-09-01
Attending: INTERNAL MEDICINE | Admitting: INTERNAL MEDICINE

## 2018-09-17 RX ORDER — NAPROXEN SODIUM 220 MG
TABLET ORAL
Qty: 100 EACH | Refills: 0 | Status: SHIPPED | OUTPATIENT
Start: 2018-09-17 | End: 2018-11-05 | Stop reason: SDUPTHER

## 2018-09-20 LAB
INR BLD: 1.47 (ref 0.86–1.14)
PROTHROMBIN TIME: 16.8 SEC (ref 9.8–13)

## 2018-10-08 RX ORDER — HUMAN INSULIN 100 [IU]/ML
INJECTION, SUSPENSION SUBCUTANEOUS
Qty: 20 ML | Refills: 0 | Status: SHIPPED | OUTPATIENT
Start: 2018-10-08 | End: 2018-11-02 | Stop reason: SDUPTHER

## 2018-10-18 ENCOUNTER — HOSPITAL ENCOUNTER (OUTPATIENT)
Age: 63
Discharge: HOME | End: 2018-10-18
Payer: MEDICARE

## 2018-10-18 LAB
INR BLD: 1.46 (ref 0.86–1.14)
PROTHROMBIN TIME: 16.7 SEC (ref 9.8–13)

## 2018-10-18 PROCEDURE — 36415 COLL VENOUS BLD VENIPUNCTURE: CPT

## 2018-10-18 PROCEDURE — 85610 PROTHROMBIN TIME: CPT

## 2018-10-19 ENCOUNTER — TELEPHONE (OUTPATIENT)
Dept: INTERNAL MEDICINE CLINIC | Age: 63
End: 2018-10-19

## 2018-10-19 NOTE — TELEPHONE ENCOUNTER
Patient notified with Dr Moise Read recommendations.  12.5 mg coumadin daily x 10 days and recheck INR

## 2018-11-02 RX ORDER — HUMAN INSULIN 100 [IU]/ML
INJECTION, SUSPENSION SUBCUTANEOUS
Qty: 20 ML | Refills: 0 | Status: SHIPPED | OUTPATIENT
Start: 2018-11-02 | End: 2018-11-28 | Stop reason: SDUPTHER

## 2018-11-05 RX ORDER — NAPROXEN SODIUM 220 MG
TABLET ORAL
Qty: 100 EACH | Refills: 0 | Status: SHIPPED | OUTPATIENT
Start: 2018-11-05 | End: 2020-08-24 | Stop reason: SDUPTHER

## 2018-11-05 RX ORDER — WARFARIN SODIUM 5 MG/1
TABLET ORAL
Qty: 249 TABLET | Refills: 0 | Status: SHIPPED | OUTPATIENT
Start: 2018-11-05

## 2018-11-15 ENCOUNTER — HOSPITAL ENCOUNTER (OUTPATIENT)
Age: 63
Discharge: HOME | End: 2018-11-15
Payer: MEDICARE

## 2018-11-15 LAB
INR BLD: 1.46 (ref 0.86–1.14)
PROTHROMBIN TIME: 16.6 SEC (ref 9.8–13)

## 2018-11-15 PROCEDURE — 36415 COLL VENOUS BLD VENIPUNCTURE: CPT

## 2018-11-15 PROCEDURE — 85610 PROTHROMBIN TIME: CPT

## 2018-11-19 ENCOUNTER — TELEPHONE (OUTPATIENT)
Dept: INTERNAL MEDICINE CLINIC | Age: 63
End: 2018-11-19

## 2018-11-28 RX ORDER — HUMAN INSULIN 100 [IU]/ML
INJECTION, SUSPENSION SUBCUTANEOUS
Qty: 20 ML | Refills: 0 | Status: SHIPPED | OUTPATIENT
Start: 2018-11-28 | End: 2019-01-15 | Stop reason: SDUPTHER

## 2018-12-20 ENCOUNTER — HOSPITAL ENCOUNTER (OUTPATIENT)
Age: 63
Discharge: HOME | End: 2018-12-20
Payer: MEDICARE

## 2018-12-20 ENCOUNTER — ANTI-COAG VISIT (OUTPATIENT)
Dept: INTERNAL MEDICINE CLINIC | Age: 63
End: 2018-12-20

## 2018-12-20 ENCOUNTER — TELEPHONE (OUTPATIENT)
Dept: INTERNAL MEDICINE CLINIC | Age: 63
End: 2018-12-20

## 2018-12-20 LAB
INR BLD: 5.68 (ref 0.86–1.14)
PROTHROMBIN TIME: 64.7 SEC (ref 9.8–13)

## 2018-12-20 PROCEDURE — 36415 COLL VENOUS BLD VENIPUNCTURE: CPT

## 2018-12-20 PROCEDURE — 85610 PROTHROMBIN TIME: CPT

## 2018-12-21 ENCOUNTER — OFFICE VISIT (OUTPATIENT)
Dept: INTERNAL MEDICINE CLINIC | Age: 63
End: 2018-12-21
Payer: MEDICARE

## 2018-12-21 ENCOUNTER — TELEPHONE (OUTPATIENT)
Dept: INTERNAL MEDICINE CLINIC | Age: 63
End: 2018-12-21

## 2018-12-21 VITALS — SYSTOLIC BLOOD PRESSURE: 132 MMHG | DIASTOLIC BLOOD PRESSURE: 84 MMHG

## 2018-12-21 DIAGNOSIS — Z79.4 TYPE 2 DIABETES MELLITUS WITH DIABETIC POLYNEUROPATHY, WITH LONG-TERM CURRENT USE OF INSULIN (HCC): ICD-10-CM

## 2018-12-21 DIAGNOSIS — G89.29 CHRONIC BILATERAL LOW BACK PAIN, WITH SCIATICA PRESENCE UNSPECIFIED: ICD-10-CM

## 2018-12-21 DIAGNOSIS — Z01.818 PREOP EXAM FOR INTERNAL MEDICINE: Primary | ICD-10-CM

## 2018-12-21 DIAGNOSIS — E66.01 CLASS 3 SEVERE OBESITY DUE TO EXCESS CALORIES WITH SERIOUS COMORBIDITY AND BODY MASS INDEX (BMI) OF 40.0 TO 44.9 IN ADULT (HCC): ICD-10-CM

## 2018-12-21 DIAGNOSIS — E11.42 TYPE 2 DIABETES MELLITUS WITH DIABETIC POLYNEUROPATHY, WITH LONG-TERM CURRENT USE OF INSULIN (HCC): ICD-10-CM

## 2018-12-21 DIAGNOSIS — Z01.818 PREOPERATIVE EXAMINATION: ICD-10-CM

## 2018-12-21 DIAGNOSIS — E66.01 MORBID OBESITY WITH BMI OF 40.0-44.9, ADULT (HCC): ICD-10-CM

## 2018-12-21 DIAGNOSIS — M54.5 CHRONIC BILATERAL LOW BACK PAIN, WITH SCIATICA PRESENCE UNSPECIFIED: ICD-10-CM

## 2018-12-21 DIAGNOSIS — I82.532 CHRONIC DEEP VEIN THROMBOSIS (DVT) OF POPLITEAL VEIN OF LEFT LOWER EXTREMITY (HCC): ICD-10-CM

## 2018-12-21 DIAGNOSIS — E87.6 HYPOKALEMIA: Primary | ICD-10-CM

## 2018-12-21 DIAGNOSIS — Z01.818 PREOP EXAM FOR INTERNAL MEDICINE: ICD-10-CM

## 2018-12-21 LAB
A/G RATIO: 1.5 (ref 1.1–2.2)
ALBUMIN SERPL-MCNC: 4.3 G/DL (ref 3.4–5)
ALP BLD-CCNC: 119 U/L (ref 40–129)
ALT SERPL-CCNC: 23 U/L (ref 10–40)
ANION GAP SERPL CALCULATED.3IONS-SCNC: 14 MMOL/L (ref 3–16)
AST SERPL-CCNC: 29 U/L (ref 15–37)
BASOPHILS ABSOLUTE: 0 K/UL (ref 0–0.2)
BASOPHILS RELATIVE PERCENT: 0.4 %
BILIRUB SERPL-MCNC: 1.1 MG/DL (ref 0–1)
BUN BLDV-MCNC: 23 MG/DL (ref 7–20)
CALCIUM SERPL-MCNC: 9.2 MG/DL (ref 8.3–10.6)
CHLORIDE BLD-SCNC: 95 MMOL/L (ref 99–110)
CO2: 31 MMOL/L (ref 21–32)
CREAT SERPL-MCNC: 0.8 MG/DL (ref 0.8–1.3)
EOSINOPHILS ABSOLUTE: 0.2 K/UL (ref 0–0.6)
EOSINOPHILS RELATIVE PERCENT: 1.6 %
GFR AFRICAN AMERICAN: >60
GFR NON-AFRICAN AMERICAN: >60
GLOBULIN: 2.9 G/DL
GLUCOSE BLD-MCNC: 168 MG/DL (ref 70–99)
HCT VFR BLD CALC: 43.8 % (ref 40.5–52.5)
HEMOGLOBIN: 15 G/DL (ref 13.5–17.5)
LYMPHOCYTES ABSOLUTE: 2.4 K/UL (ref 1–5.1)
LYMPHOCYTES RELATIVE PERCENT: 18.2 %
MCH RBC QN AUTO: 31.1 PG (ref 26–34)
MCHC RBC AUTO-ENTMCNC: 34.3 G/DL (ref 31–36)
MCV RBC AUTO: 90.8 FL (ref 80–100)
MONOCYTES ABSOLUTE: 1.3 K/UL (ref 0–1.3)
MONOCYTES RELATIVE PERCENT: 9.4 %
NEUTROPHILS ABSOLUTE: 9.5 K/UL (ref 1.7–7.7)
NEUTROPHILS RELATIVE PERCENT: 70.4 %
PDW BLD-RTO: 13.6 % (ref 12.4–15.4)
PLATELET # BLD: 279 K/UL (ref 135–450)
PMV BLD AUTO: 9.7 FL (ref 5–10.5)
POTASSIUM SERPL-SCNC: 2.9 MMOL/L (ref 3.5–5.1)
RBC # BLD: 4.82 M/UL (ref 4.2–5.9)
SODIUM BLD-SCNC: 140 MMOL/L (ref 136–145)
TOTAL PROTEIN: 7.2 G/DL (ref 6.4–8.2)
WBC # BLD: 13.4 K/UL (ref 4–11)

## 2018-12-21 PROCEDURE — G8417 CALC BMI ABV UP PARAM F/U: HCPCS | Performed by: INTERNAL MEDICINE

## 2018-12-21 PROCEDURE — G8427 DOCREV CUR MEDS BY ELIG CLIN: HCPCS | Performed by: INTERNAL MEDICINE

## 2018-12-21 PROCEDURE — 2022F DILAT RTA XM EVC RTNOPTHY: CPT | Performed by: INTERNAL MEDICINE

## 2018-12-21 PROCEDURE — G8484 FLU IMMUNIZE NO ADMIN: HCPCS | Performed by: INTERNAL MEDICINE

## 2018-12-21 PROCEDURE — 3017F COLORECTAL CA SCREEN DOC REV: CPT | Performed by: INTERNAL MEDICINE

## 2018-12-21 PROCEDURE — 99214 OFFICE O/P EST MOD 30 MIN: CPT | Performed by: INTERNAL MEDICINE

## 2018-12-21 ASSESSMENT — PATIENT HEALTH QUESTIONNAIRE - PHQ9
SUM OF ALL RESPONSES TO PHQ9 QUESTIONS 1 & 2: 0
1. LITTLE INTEREST OR PLEASURE IN DOING THINGS: 0
SUM OF ALL RESPONSES TO PHQ QUESTIONS 1-9: 0
SUM OF ALL RESPONSES TO PHQ QUESTIONS 1-9: 0
2. FEELING DOWN, DEPRESSED OR HOPELESS: 0

## 2018-12-21 NOTE — TELEPHONE ENCOUNTER
Notified by lab that K 2.9. Called patient, he has been taking K 20meq 4x/day but has been taking diuretic more regularly. He will take 40meq tonight, starting tomorrow take 30meq 4x/day. Repeat BMP on Monday, I placed the order.

## 2018-12-24 RX ORDER — WARFARIN SODIUM 5 MG/1
TABLET ORAL
Qty: 394 TABLET | Refills: 2 | Status: SHIPPED | OUTPATIENT
Start: 2018-12-24 | End: 2018-12-24 | Stop reason: SDUPTHER

## 2018-12-26 RX ORDER — WARFARIN SODIUM 5 MG/1
TABLET ORAL
Qty: 669 TABLET | Refills: 0 | Status: SHIPPED | OUTPATIENT
Start: 2018-12-26 | End: 2020-03-17

## 2018-12-28 ENCOUNTER — HOSPITAL ENCOUNTER (OUTPATIENT)
Age: 63
Discharge: HOME | End: 2018-12-28
Payer: MEDICARE

## 2018-12-28 DIAGNOSIS — E87.6 HYPOKALEMIA: ICD-10-CM

## 2018-12-28 LAB
ANION GAP SERPL CALCULATED.3IONS-SCNC: 17 MMOL/L (ref 3–16)
BUN BLDV-MCNC: 20 MG/DL (ref 7–20)
CALCIUM SERPL-MCNC: 9.4 MG/DL (ref 8.3–10.6)
CHLORIDE BLD-SCNC: 96 MMOL/L (ref 99–110)
CO2: 25 MMOL/L (ref 21–32)
CREAT SERPL-MCNC: 0.7 MG/DL (ref 0.8–1.3)
GFR AFRICAN AMERICAN: >60
GFR NON-AFRICAN AMERICAN: >60
GLUCOSE BLD-MCNC: 169 MG/DL (ref 70–99)
INR BLD: 1.56 (ref 0.86–1.14)
POTASSIUM SERPL-SCNC: 4.4 MMOL/L (ref 3.5–5.1)
PROTHROMBIN TIME: 17.8 SEC (ref 9.8–13)
SODIUM BLD-SCNC: 138 MMOL/L (ref 136–145)

## 2018-12-28 PROCEDURE — 80048 BASIC METABOLIC PNL TOTAL CA: CPT

## 2018-12-28 PROCEDURE — 85610 PROTHROMBIN TIME: CPT

## 2018-12-28 PROCEDURE — 36415 COLL VENOUS BLD VENIPUNCTURE: CPT

## 2018-12-31 ENCOUNTER — TELEPHONE (OUTPATIENT)
Dept: INTERNAL MEDICINE CLINIC | Age: 63
End: 2018-12-31

## 2019-01-02 ENCOUNTER — TELEPHONE (OUTPATIENT)
Dept: INTERNAL MEDICINE CLINIC | Age: 64
End: 2019-01-02

## 2019-01-02 RX ORDER — FUROSEMIDE 40 MG/1
TABLET ORAL
Qty: 180 TABLET | Refills: 1 | Status: SHIPPED | OUTPATIENT
Start: 2019-01-02 | End: 2020-05-22 | Stop reason: DRUGHIGH

## 2019-01-15 ENCOUNTER — TELEPHONE (OUTPATIENT)
Dept: INTERNAL MEDICINE CLINIC | Age: 64
End: 2019-01-15

## 2019-01-15 RX ORDER — HUMAN INSULIN 100 [IU]/ML
INJECTION, SUSPENSION SUBCUTANEOUS
Qty: 20 ML | Refills: 0 | Status: SHIPPED | OUTPATIENT
Start: 2019-01-15 | End: 2019-05-09 | Stop reason: SDUPTHER

## 2019-01-25 ENCOUNTER — CARE COORDINATION (OUTPATIENT)
Dept: CASE MANAGEMENT | Age: 64
End: 2019-01-25

## 2019-02-11 ENCOUNTER — TELEPHONE (OUTPATIENT)
Dept: INTERNAL MEDICINE CLINIC | Age: 64
End: 2019-02-11

## 2019-02-12 ENCOUNTER — TELEPHONE (OUTPATIENT)
Dept: INTERNAL MEDICINE CLINIC | Age: 64
End: 2019-02-12

## 2019-02-13 ENCOUNTER — TELEPHONE (OUTPATIENT)
Dept: INTERNAL MEDICINE CLINIC | Age: 64
End: 2019-02-13

## 2019-02-14 RX ORDER — ATORVASTATIN CALCIUM 40 MG/1
40 TABLET, FILM COATED ORAL NIGHTLY
Qty: 90 TABLET | Refills: 1 | Status: SHIPPED | OUTPATIENT
Start: 2019-02-14 | End: 2019-08-12 | Stop reason: SDUPTHER

## 2019-02-15 ENCOUNTER — TELEPHONE (OUTPATIENT)
Dept: INTERNAL MEDICINE CLINIC | Age: 64
End: 2019-02-15

## 2019-02-18 ENCOUNTER — TELEPHONE (OUTPATIENT)
Dept: INTERNAL MEDICINE CLINIC | Age: 64
End: 2019-02-18

## 2019-02-18 ENCOUNTER — ANTI-COAG VISIT (OUTPATIENT)
Dept: INTERNAL MEDICINE CLINIC | Age: 64
End: 2019-02-18

## 2019-02-18 LAB — INR BLD: 2.4

## 2019-02-25 ENCOUNTER — TELEPHONE (OUTPATIENT)
Dept: INTERNAL MEDICINE CLINIC | Age: 64
End: 2019-02-25

## 2019-02-25 ENCOUNTER — ANTI-COAG VISIT (OUTPATIENT)
Dept: INTERNAL MEDICINE CLINIC | Age: 64
End: 2019-02-25

## 2019-02-25 LAB — INR BLD: 6.1

## 2019-02-27 ENCOUNTER — TELEPHONE (OUTPATIENT)
Dept: INTERNAL MEDICINE CLINIC | Age: 64
End: 2019-02-27

## 2019-02-27 ENCOUNTER — OFFICE VISIT (OUTPATIENT)
Dept: INTERNAL MEDICINE CLINIC | Age: 64
End: 2019-02-27
Payer: MEDICARE

## 2019-02-27 VITALS
DIASTOLIC BLOOD PRESSURE: 76 MMHG | WEIGHT: 313 LBS | HEIGHT: 75 IN | SYSTOLIC BLOOD PRESSURE: 128 MMHG | BODY MASS INDEX: 38.92 KG/M2

## 2019-02-27 DIAGNOSIS — E11.42 TYPE 2 DIABETES MELLITUS WITH DIABETIC POLYNEUROPATHY, WITH LONG-TERM CURRENT USE OF INSULIN (HCC): ICD-10-CM

## 2019-02-27 DIAGNOSIS — M54.5 CHRONIC BILATERAL LOW BACK PAIN, WITH SCIATICA PRESENCE UNSPECIFIED: ICD-10-CM

## 2019-02-27 DIAGNOSIS — I10 ESSENTIAL HYPERTENSION: ICD-10-CM

## 2019-02-27 DIAGNOSIS — G89.29 CHRONIC BILATERAL LOW BACK PAIN, WITH SCIATICA PRESENCE UNSPECIFIED: ICD-10-CM

## 2019-02-27 DIAGNOSIS — Z79.4 TYPE 2 DIABETES MELLITUS WITH DIABETIC POLYNEUROPATHY, WITH LONG-TERM CURRENT USE OF INSULIN (HCC): ICD-10-CM

## 2019-02-27 PROCEDURE — 99214 OFFICE O/P EST MOD 30 MIN: CPT | Performed by: INTERNAL MEDICINE

## 2019-02-27 PROCEDURE — G8484 FLU IMMUNIZE NO ADMIN: HCPCS | Performed by: INTERNAL MEDICINE

## 2019-02-27 PROCEDURE — G8427 DOCREV CUR MEDS BY ELIG CLIN: HCPCS | Performed by: INTERNAL MEDICINE

## 2019-02-27 PROCEDURE — 2022F DILAT RTA XM EVC RTNOPTHY: CPT | Performed by: INTERNAL MEDICINE

## 2019-02-27 PROCEDURE — 3046F HEMOGLOBIN A1C LEVEL >9.0%: CPT | Performed by: INTERNAL MEDICINE

## 2019-02-27 PROCEDURE — G8417 CALC BMI ABV UP PARAM F/U: HCPCS | Performed by: INTERNAL MEDICINE

## 2019-02-27 PROCEDURE — G8598 ASA/ANTIPLAT THER USED: HCPCS | Performed by: INTERNAL MEDICINE

## 2019-02-27 PROCEDURE — 1036F TOBACCO NON-USER: CPT | Performed by: INTERNAL MEDICINE

## 2019-02-27 PROCEDURE — 3017F COLORECTAL CA SCREEN DOC REV: CPT | Performed by: INTERNAL MEDICINE

## 2019-02-27 ASSESSMENT — PATIENT HEALTH QUESTIONNAIRE - PHQ9
2. FEELING DOWN, DEPRESSED OR HOPELESS: 0
SUM OF ALL RESPONSES TO PHQ QUESTIONS 1-9: 0
SUM OF ALL RESPONSES TO PHQ QUESTIONS 1-9: 0
SUM OF ALL RESPONSES TO PHQ9 QUESTIONS 1 & 2: 0
1. LITTLE INTEREST OR PLEASURE IN DOING THINGS: 0

## 2019-02-28 ENCOUNTER — TELEPHONE (OUTPATIENT)
Dept: INTERNAL MEDICINE CLINIC | Age: 64
End: 2019-02-28

## 2019-03-04 ENCOUNTER — TELEPHONE (OUTPATIENT)
Dept: INTERNAL MEDICINE CLINIC | Age: 64
End: 2019-03-04

## 2019-03-04 ENCOUNTER — ANTI-COAG VISIT (OUTPATIENT)
Dept: INTERNAL MEDICINE CLINIC | Age: 64
End: 2019-03-04

## 2019-03-04 LAB
A/G RATIO: 1.3 (ref 1.1–2.2)
ALBUMIN SERPL-MCNC: 4.1 G/DL (ref 3.4–5)
ALP BLD-CCNC: 129 U/L (ref 40–129)
ALT SERPL-CCNC: 12 U/L (ref 10–40)
ANION GAP SERPL CALCULATED.3IONS-SCNC: 17 MMOL/L (ref 3–16)
AST SERPL-CCNC: 18 U/L (ref 15–37)
BILIRUB SERPL-MCNC: 0.6 MG/DL (ref 0–1)
BUN BLDV-MCNC: 20 MG/DL (ref 7–20)
CALCIUM SERPL-MCNC: 9.3 MG/DL (ref 8.3–10.6)
CHLORIDE BLD-SCNC: 92 MMOL/L (ref 99–110)
CO2: 31 MMOL/L (ref 21–32)
CREAT SERPL-MCNC: 0.7 MG/DL (ref 0.8–1.3)
GFR AFRICAN AMERICAN: >60
GFR NON-AFRICAN AMERICAN: >60
GLOBULIN: 3.1 G/DL
GLUCOSE BLD-MCNC: 106 MG/DL (ref 70–99)
INR BLD: 3.2
POTASSIUM SERPL-SCNC: 3.9 MMOL/L (ref 3.5–5.1)
SODIUM BLD-SCNC: 140 MMOL/L (ref 136–145)
TOTAL PROTEIN: 7.2 G/DL (ref 6.4–8.2)

## 2019-03-05 LAB
ESTIMATED AVERAGE GLUCOSE: 134.1 MG/DL
HBA1C MFR BLD: 6.3 %

## 2019-03-11 ENCOUNTER — TELEPHONE (OUTPATIENT)
Dept: INTERNAL MEDICINE CLINIC | Age: 64
End: 2019-03-11

## 2019-03-11 ENCOUNTER — ANTI-COAG VISIT (OUTPATIENT)
Dept: INTERNAL MEDICINE CLINIC | Age: 64
End: 2019-03-11

## 2019-03-11 LAB — INR BLD: 3.4

## 2019-03-18 ENCOUNTER — TELEPHONE (OUTPATIENT)
Dept: INTERNAL MEDICINE CLINIC | Age: 64
End: 2019-03-18

## 2019-03-18 ENCOUNTER — ANTI-COAG VISIT (OUTPATIENT)
Dept: INTERNAL MEDICINE CLINIC | Age: 64
End: 2019-03-18

## 2019-03-18 LAB — INR BLD: 2.4

## 2019-04-09 ENCOUNTER — HOSPITAL ENCOUNTER (OUTPATIENT)
Dept: PHYSICAL THERAPY | Age: 64
Setting detail: THERAPIES SERIES
Discharge: HOME | End: 2019-04-09
Payer: MEDICARE

## 2019-04-09 PROCEDURE — 97163 PT EVAL HIGH COMPLEX 45 MIN: CPT

## 2019-04-09 PROCEDURE — 97530 THERAPEUTIC ACTIVITIES: CPT

## 2019-04-09 ASSESSMENT — PAIN DESCRIPTION - PROGRESSION: CLINICAL_PROGRESSION: GRADUALLY IMPROVING

## 2019-04-09 ASSESSMENT — PAIN DESCRIPTION - ONSET: ONSET: AWAKENED FROM SLEEP

## 2019-04-09 ASSESSMENT — PAIN - FUNCTIONAL ASSESSMENT: PAIN_FUNCTIONAL_ASSESSMENT: PREVENTS OR INTERFERES WITH ALL ACTIVE AND SOME PASSIVE ACTIVITIES

## 2019-04-09 ASSESSMENT — PAIN DESCRIPTION - FREQUENCY: FREQUENCY: CONTINUOUS

## 2019-04-09 ASSESSMENT — PAIN SCALES - GENERAL: PAINLEVEL_OUTOF10: 4

## 2019-04-09 ASSESSMENT — PAIN DESCRIPTION - DESCRIPTORS: DESCRIPTORS: ACHING

## 2019-04-09 ASSESSMENT — PAIN DESCRIPTION - LOCATION: LOCATION: BACK

## 2019-04-09 NOTE — PROGRESS NOTES
Physical Therapy  Initial Assessment  Date: 2019  Patient Name: Patricia Sanchez  MRN: [id number]  : 1955     Treatment Diagnosis: Decrease core strength, difficulty walking, imbalance     Outpatient fall risk assessment completed asking screening question if patient has fallen in the past 30 days:  [x] Yes  [] No    Based on screen for falls, patient demonstrates fall risk:  [x] Yes  [] No    Interventions based on fall risk status:  Updated Problem List within Medical History  [] Yes   [x] N/A    Asked family to assist with increased observation of the patient  [] Yes   [x] N/A    Patient kept in visible area when not closely supervised by therapist  [] Yes   [x] N/A    Repeatedly reinforce activity limits and safety needs with patient/family  [x] Yes   [] N/A    Increase frequency of rounding/monitoring patient  [] Yes   [x] N/A      Subjective   General  Chart Reviewed: Yes  Patient assessed for rehabilitation services?: Yes  Additional Pertinent Hx: CVA, Back pain , OA   Family / Caregiver Present: No  Referring Practitioner: Alexander Snyder MD   Referral Date : 19  Diagnosis: Z98.1 (ICD-10-CM) - Arthrodesis status, M43.17 (ICD-10-CM) - Spondylolisthesis, lumbosacral region  PT Visit Information  PT Insurance Information: Medicare  Subjective  Subjective: CLOF; Patient reports that he had long history chronic back starting in  pr[place] MRSA in the spine secondary to being hospitalized for pancreatitis . Says his pain progressed to the point of Surgery in 2019 having L3-S2 (Arthodesis ) Fused at Pelvis. He has extensive Medical hx with Diabetes Ty 2, CVA, pancreatitis, MRSA. He currently using Rolling walker for ambulation both for Household and short community distance. PLOF; Pt had been a limited ambulator since  with cane up to 50ft. Goal : To improve mobility.    Pain Screening  Patient Currently in Pain: Yes  Pain Assessment  Pain Assessment: 0-10(Since Surgery pain has been reduced )  Pain Level: 4  Pain Location: Back  Pain Descriptors: Aching  Pain Frequency: Continuous  Pain Onset: Awakened from sleep  Clinical Progression: Gradually improving  Functional Pain Assessment: Prevents or interferes with all active and some passive activities  Vital Signs  Patient Currently in Pain: Yes    Vision/Hearing  Vision  Vision: Within Functional Limits  Hearing  Hearing: Within functional limits    Orientation  Orientation  Overall Orientation Status: Within Normal Limits    Social/Functional History  Social/Functional History  Lives With: Spouse  Type of Home: House  Home Layout: One level  Bathroom Shower/Tub: Tub/Shower unit  Bathroom Equipment: Shower chair  Home Equipment: Rolling walker  ADL Assistance: Independent  Homemaking Assistance: Independent  Homemaking Responsibilities: Yes  Ambulation Assistance: Independent  Transfer Assistance: Independent  Active : No  Occupation: On disability    Objective     Observation/Palpation  Posture: Fair  Observation: Gait analysis: Foot Slap gait pattern on LLE; Forward trunk     PROM RLE (degrees)  RLE PROM: WFL  AROM RLE (degrees)  RLE AROM: WFL  PROM LLE (degrees)  LLE PROM: WFL  AROM LLE (degrees)  LLE General AROM: Limited ankle DF by 50%     Strength RLE  Strength RLE: Exception  R Hip Flexion: 4/5  R Hip Extension: 4/5  R Hip ABduction: 4/5  R Hip ADduction: 4/5  R Hip Internal Rotation: 4/5  R Hip External Rotation: 4/5  R Knee Flexion: 4+/5  R Knee Extension: 4+/5  R Ankle Dorsiflexion: 4+/5  R Ankle Plantar flexion: 4+/5  R Ankle Inversion: 4+/5  R Ankle Eversion: 4+/5  Strength LLE  Strength LLE: Exception  L Hip Flexion: 4/5  L Hip Extension: 4/5  L Hip ABduction: 4/5  L Hip ADduction: 4/5  L Hip Internal Rotation: 4-/5  L Hip External Rotation: 4-/5  L Knee Flexion: 4/5  L Knee Extension: 4/5  L Ankle Dorsiflexion: 2-/5  L Ankle Plantar Flexion: 2-/5  L Ankle Inversion: 2+/5  L Ankle Eversion: 2+/5  Tone RLE  RLE Tone: Normotonic  Tone LLE  LLE Tone: Normotonic  Motor Control  Gross Motor?: WFL  Coordination  Heel to Shin: Abnormal  Additional Measures  Flexibility: 90/90 L Hamstring length  lacking 25 deg   Sensation  Overall Sensation Status: Impaired  Additional Comments: parathesia to BLE's and Feet S/P surgeries and Neuropathy   Balance  Standing - Static: Fair  Standing - Dynamic: Poor;+    Assessment   Conditions Requiring Skilled Therapeutic Intervention  Body structures, Functions, Activity limitations: Decreased functional mobility ; Decreased high-level IADLs;Decreased ADL status; Increased Pain;Decreased balance;Decreased strength;Decreased coordination  Assessment: Patient is a 65yo male who has had long history of low back pain , MRSA to the spine and Arhrodesis to L3-S2. He presents with BLE weakness, difficulty walking( L foot drop), decrease core strength, and decrease activity tolerance. Patient to benefit from skilled PT services to improve mobility .    Treatment Diagnosis: Decrease core strength, difficulty walking, imbalance   Prognosis: Good  Decision Making: High Complexity  History: Chronic low back, DM TyII, CVA   Exam: Strength, Posture, Gait, AROM  Clinical Presentation: BLE and core muscle weakness, imbalnce   Patient Education: Pt educated on the role of PT and plan of care   REQUIRES PT FOLLOW UP: Yes         Plan   Plan  Times per week: 2x  Plan weeks: 6 weeks   Current Treatment Recommendations: Strengthening, ADL/Self-care Training, Gait Training, Manual Therapy - Joint Manipulation, Patient/Caregiver Education & Training, ROM, Aquatics, Stair training, IADL Training, Balance Training, Functional Mobility Training, Endurance Training, Manual Therapy - Soft Tissue Mobilization, Pain Management, Modalities, Positioning, Safety Education & Training, Transfer Training      OutComes Score  Oswestry CMS Modifier: CL  Oswestry Disability Scores %: 66.67    Goals  Short term goals  Time Frame for Short term

## 2019-04-09 NOTE — PLAN OF CARE
Outpatient Physical Therapy     Phone: [phone] Fax: [phone]     To: Referring Practitioner: Rex Mcclain MD       Patient: Carol Ann Cano   : 1955   MRN: [id number]  Evaluation Date: 2019      Diagnosis Information:  · Diagnosis: Z98.1 (ICD-10-CM) - Arthrodesis status, M43.17 (ICD-10-CM) - Spondylolisthesis, lumbosacral region   · Treatment Diagnosis: Decrease core strength, difficulty walking, imbalance      Physical Therapy Certification/Re-Certification Form  Dear Dr. Isidra Camacho,   The following patient has been evaluated for physical therapy services. Please review the attached evaluation and/or summary of the patient's plan of care, and verify that you agree therapy should continue by signing the attached document and sending it back to our office. Plan of Care/Treatment to date:  [x] Therapeutic Exercise      [x] Modalities:  [x] Therapeutic Activity        [] Ultrasound    [x] Gait Training        [] Cervical Traction   [x] Neuromuscular Re-education      [] Cold/hotpack    [x] Instruction in HEP        [] Lumbar Traction  [x] Manual Therapy        [] Electrical Stimulation            [x] Aquatic Therapy        [] Iontophoresis        ? [] Lymphedema management  [] Women's Health     Other:  [] Vestibular Rehab        []    []  Needed     Frequency/Duration:  # Days per week: [] 1 day # Weeks: [] 1 week [] 5 weeks     [x] 2 days? [] 2 weeks [x] 6 weeks     [] 3 days   [] 3 weeks [] 7 weeks     [] 4 days   [] 4 weeks [] 8 weeks    Rehab Potential: [] Excellent [x] Good [] Fair  [] Poor     Electronically signed by:  Dom Montano, PT , DPT     If you have any questions or concerns, please don't hesitate to call.   Thank you for your referral.      Physician Signature:________________________________Date:__________________  By signing above, therapists plan is approved by physician

## 2019-04-09 NOTE — FLOWSHEET NOTE
Physical Therapy Daily/Aquatic Flow Sheet   Date:  2019    Patient Name:  Drew Driver    :  1955  MRN: [id number]  Restrictions/Precautions:    Medical/Treatment Diagnosis Information:   · Diagnosis: Z98.1 (ICD-10-CM) - Arthrodesis status, M43.17 (ICD-10-CM) - Spondylolisthesis, lumbosacral region  · Treatment Diagnosis: Decrease core strength, difficulty walking, imbalance     Tracking Information:  Physician Information Referring Practitioner: Braxton Garg MD      Plan of Care Sent Date:  Signed Received:    Visit Count / Total Visits      Insurance Approved Visits  / Evetta Lowe Approved Dates:     Insurance Information PT Insurance Information: Medicare  *If only certain CPT codes approved use smart phrase CPT calculator . OPPTINSURANCECPTCODECALCULATOR   Progress Note/G-codes   []  Yes  []  No Next Due:      Pain level: 4/10    Subjective:  See eval     Objective:  See eval   Observation:   Test measurements:    Land-based Therapy Dates of Service:    Land-based Visits Exercises/Activities:  Exercise/Equipment Resistance/Repetitions Other comments   Nustep     IB/ HR  Seated HSS     Balance activities      // bars                                                             AquaticTherapy Dates of Service:   Aquatic Visits Exercises/Activities:   Transfers:          % Immersion:            Ambulation:   UE Exercises:       Forwards    Shoulder Shrugs      Lateral    Shoulder Circles      Retro    Scapular Retraction       Marching   Push Downs       Cariocas   Punching     Jog    Rowing     Multifidi walkouts with paddle   Elbow Flex/Ext       Shldr Flex/Ext       Shldr aBd/aDd    LE Exercises:  Shldr Horiz aBd/aDd    HR/TR  Shldr IR/ER    Marches  Arm Circles    Squats   PNF Diagonals    Hamstring Curls  Wall Push Ups    Hip Flexion (SLR)  Figure 8's    Hip aBduction (SLR)  Bilateral Pull Downs    Hip Extension (SLR)       Hip aDduction (SLR)      Hip Circles  Functional:    Hip IR/Er Step up forward    TrA Set   Step up lateral     Pelvic Tilts   Step down     Fig 8's   Lunges Forward    LE PNF  Lunges Retro      Lunges Lateral     Balance:   Lower ab curl with noodle     SLS        Tandem Stance       NBOS eyes open  Seated:     NBOS eyes closed  Ankle pumps     Hand to Opposite Knee  Ankle Circles     Fwd Step ups to SLS  Knee Flex/Ext    Lateral Step ups to SLS  Hip aBd/aDd    Stop/Go Gait   Bicycle       Ankle DF/PF      Ankle Inv/Ev    Stretching:       Gastroc/Soleus      Hamstring   Noodle:     Knee Flex Stretch  Leg Press    Piriformis   Noodle Hang at [employer]    Hip Flexor  Noodle Hang Deep Water    SKTC  Noodle Bicycle     DKTC       ITB      Quad  Deep Water:    Mid Back   Jog    UT  Jumping Jacks    Post Shoulder  Heel to Toe    Ladder Pull  Hand to Opposite Knee    Pec Stretch  Rocking Horse      [employer] Skier          Cervical:   Other:     AROM Flexion      AROM Extension      AROM Side Bending      AROM Rotation      Chin Tucks      Chin Nods        Aquatic Abbreviation Key  B= Belt DB= Dumbells T= Theratube   H= Hydrotone N= Noodles W= Weights   P= Paddles S= Speedo equipment K= Kickboard     Other Therapeutic Activities: 4/9/19 Pt was educated on PT POC, Diagnosis, Prognosis, pathomechanics as well as frequency and duration of scheduling future physical therapy appointments. Time was also taken on this day to answer all patient questions and participation in PT. Home Exercise Program:  4/9/19 The following exercises were performed and added to the pt's home program (educated on appropriate frequency, intensity and duration etc.): Encouraged patient to continue [facility].      Manual Treatments:      Modalities:      Timed Code Treatment Minutes:  24    Total Treatment Minutes:  50    Treatment/Activity Tolerance:  [x] Patient tolerated treatment well [] Patient limited by fatigue  [] Patient limited by pain  [] Patient limited by other medical complications  [] Other:     Prognosis: [x] Good [] Fair  [] Poor    Patient Requires Follow-up: [x] Yes  [] No    Plan:   [] Continue per plan of care [] Alter current plan (see comments)  [x] Plan of care initiated [] Hold pending MD visit [] Discharge  Plan for Next Session:  Begin BLE strengthening, balance,     Electronically signed by:  Vanesa Cedeno PT

## 2019-04-11 ENCOUNTER — HOSPITAL ENCOUNTER (OUTPATIENT)
Dept: PHYSICAL THERAPY | Age: 64
Setting detail: THERAPIES SERIES
Discharge: HOME | End: 2019-04-11
Payer: MEDICARE

## 2019-04-11 PROCEDURE — 97110 THERAPEUTIC EXERCISES: CPT

## 2019-04-11 NOTE — FLOWSHEET NOTE
Physical Therapy Daily/Aquatic Flow Sheet   Date:  2019    Patient Name:  Thao Curran    :  1955  MRN: [id number]  Restrictions/Precautions:    Medical/Treatment Diagnosis Information:   · Diagnosis: Z98.1 (ICD-10-CM) - Arthrodesis status, M43.17 (ICD-10-CM) - Spondylolisthesis, lumbosacral region  · Treatment Diagnosis: Decrease core strength, difficulty walking, imbalance     Tracking Information:  Physician Information Referring Practitioner: Marly Bocanegra MD      Plan of Care Sent Date:  Signed Received:    Visit Count / Total Visits      Insurance Approved Visits  / Luciano Elms Approved Dates:     Insurance Information PT Insurance Information: Medicare  *If only certain CPT codes approved use smart phrase CPT calculator . OPPTINSURANCECPTCODECALCULATOR   Progress Note/G-codes   []  Yes  []  No Next Due:      Pain level: 4/10    Subjective:  Patient reports that he was sore after the initial eval from sitting around so long prior to the eval.     Objective:     Observation:  Wears lumbar soft brace and Rolling walker (Bariatric)  Test measurements:    Land-based Therapy Dates of Service:    Land-based Visits Exercises/Activities:  Exercise/Equipment Resistance/Repetitions Other comments   Nustep Level 4, arm 11 seat 13  x5 min    IB/ HR  Seated HSS 2x30/2x10  2 x20\"     Balance activities      // bars           Mat Swiss Ball  x10 reps x 5s hold  TA Press Hooklying   Alt UE/ LE press Hooklying   DKTC Hooklying   Alt UE Hooklying   LTR                                                 AquaticTherapy Dates of Service:   Aquatic Visits Exercises/Activities:   Transfers:          % Immersion:            Ambulation:   UE Exercises:       Forwards    Shoulder Shrugs      Lateral    Shoulder Circles      Retro    Scapular Retraction       Marching   Push Downs       Cariocas   Punching     Jog    Rowing     Multifidi walkouts with paddle   Elbow Flex/Ext       Shldr Flex/Ext Shldr aBd/aDd    LE Exercises:  Shldr Horiz aBd/aDd    HR/TR  Shldr IR/ER    Marches  Arm Circles    Squats   PNF Diagonals    Hamstring Curls  Wall Push Ups    Hip Flexion (SLR)  Figure 8's    Hip aBduction (SLR)  Bilateral Pull Downs    Hip Extension (SLR)       Hip aDduction (SLR)      Hip Circles  Functional:    Hip IR/Er  Step up forward    TrA Set   Step up lateral     Pelvic Tilts   Step down     Fig 8's   Lunges Forward    LE PNF  Lunges Retro      Lunges Lateral     Balance:   Lower ab curl with noodle     SLS        Tandem Stance       NBOS eyes open  Seated:     NBOS eyes closed  Ankle pumps     Hand to Opposite Knee  Ankle Circles     Fwd Step ups to SLS  Knee Flex/Ext    Lateral Step ups to SLS  Hip aBd/aDd    Stop/Go Gait   Bicycle       Ankle DF/PF      Ankle Inv/Ev    Stretching:       Gastroc/Soleus      Hamstring   Noodle:     Knee Flex Stretch  Leg Press    Piriformis   Noodle Hang at [employer]    Hip Flexor  Noodle Hang Deep Water    SKTC  Noodle Bicycle     DKTC       ITB      Quad  Deep Water:    Mid Back   Jog    UT  Jumping Jacks    Post Shoulder  Heel to Toe    Ladder Pull  Hand to Opposite Knee    Pec Stretch  Rocking Horse      [employer] Skier          Cervical:   Other:     AROM Flexion      AROM Extension      AROM Side Bending      AROM Rotation      Chin Tucks      Chin Nods        Aquatic Abbreviation Key  B= Belt DB= Dumbells T= Theratube   H= Hydrotone N= Noodles W= Weights   P= Paddles S= Speedo equipment K= Kickboard     Other Therapeutic Activities: 4/9/19 Pt was educated on PT POC, Diagnosis, Prognosis, pathomechanics as well as frequency and duration of scheduling future physical therapy appointments. Time was also taken on this day to answer all patient questions and participation in PT.        Home Exercise Program:  4/9/19 The following exercises were performed and added to the pt's home program (educated on appropriate frequency, intensity and duration etc.): Encouraged patient to continue [address] [facility].      Manual Treatments:      Modalities: 4/11 MHP to low  Back with bolster underneath BLEs x 10 min     Timed Code Treatment Minutes:  26    Total Treatment Minutes:  37    Treatment/Activity Tolerance:  [x] Patient tolerated treatment well [] Patient limited by fatigue  [] Patient limited by pain  [] Patient limited by other medical complications  [] Other:     Prognosis: [x] Good [] Fair  [] Poor    Patient Requires Follow-up: [x] Yes  [] No    Plan:   [x] Continue per plan of care [] Alter current plan (see comments)  [] Plan of care initiated [] Hold pending MD visit [] Discharge  Plan for Next Session:  Begin BLE strengthening, balance,     Electronically signed by:  Jose Alfredo Radford PT

## 2019-04-16 ENCOUNTER — HOSPITAL ENCOUNTER (OUTPATIENT)
Dept: PHYSICAL THERAPY | Age: 64
Setting detail: THERAPIES SERIES
Discharge: HOME | End: 2019-04-16
Payer: MEDICARE

## 2019-04-16 PROCEDURE — 97110 THERAPEUTIC EXERCISES: CPT

## 2019-04-16 NOTE — FLOWSHEET NOTE
Physical Therapy Daily/Aquatic Flow Sheet   Date:  2019    Patient Name:  Mary Yu    :  1955  MRN: [id number]  Restrictions/Precautions:    Medical/Treatment Diagnosis Information:   · Diagnosis: Z98.1 (ICD-10-CM) - Arthrodesis status, M43.17 (ICD-10-CM) - Spondylolisthesis, lumbosacral region  · Treatment Diagnosis: Decrease core strength, difficulty walking, imbalance     Tracking Information:  Physician Information Referring Practitioner: Avni Butcher MD      Plan of Care Sent Date:  Signed Received:    Visit Count / Total Visits  3/12    Insurance Approved Visits  / Angela Hilton Approved Dates:     Insurance Information PT Insurance Information: Medicare  *If only certain CPT codes approved use smart phrase CPT calculator . OPPTINSURANCECPTCODECALCULATOR   Progress Note/G-codes   []  Yes  []  No Next Due:      Pain level: 4/10    Subjective:  Patient reports he was sore from the last session. Says that his body is just not use to doing those activities. Objective:     Observation:  Wears lumbar soft brace and Rolling walker (Bariatric)  Test measurements:    Land-based Therapy Dates of Service:  ,   Land-based Visits Exercises/Activities:  Exercise/Equipment Resistance/Repetitions Other comments   Nustep Level 4, arm 12 seat 13  x5 min    IB/ HR  Seated HSS 2x30/2x10  2 x20\"     Balance activities      // bars           Mat Swiss Ball  x10 reps x 5s hold  TA Press Hooklying   Alt UE/ LE press Hooklying   DKTC Hooklying   Alt UE Hooklying w/ ball underneath LEs  LTR                                                 AquaticTherapy Dates of Service:   Aquatic Visits Exercises/Activities:   Transfers:          % Immersion:            Ambulation:   UE Exercises:       Forwards    Shoulder Shrugs      Lateral    Shoulder Circles      Retro    Scapular Retraction       Marching   Push Downs       Cariocas   Punching     Jog    Rowing     Multifidi walkouts with paddle   Elbow Flex/Ext       Shldr Flex/Ext       Shldr aBd/aDd    LE Exercises:  [facility] aBd/aDd    HR/TR  Shldr IR/ER    Marches  Arm Circles    Squats   PNF Diagonals    Hamstring Curls  Wall Push Ups    Hip Flexion (SLR)  Figure 8's    Hip aBduction (SLR)  Bilateral Pull Downs    Hip Extension (SLR)       Hip aDduction (SLR)      Hip Circles  Functional:    Hip IR/Er  Step up forward    TrA Set   Step up lateral     Pelvic Tilts   Step down     Fig 8's   Lunges Forward    LE PNF  Lunges Retro      Lunges Lateral     Balance:   Lower ab curl with noodle     SLS        Tandem Stance       NBOS eyes open  Seated:     NBOS eyes closed  Ankle pumps     Hand to Opposite Knee  Ankle Circles     Fwd Step ups to SLS  Knee Flex/Ext    Lateral Step ups to SLS  Hip aBd/aDd    Stop/Go Gait   Bicycle       Ankle DF/PF      Ankle Inv/Ev    Stretching:       Gastroc/Soleus      Hamstring   Noodle:     Knee Flex Stretch  Leg Press    Piriformis   Noodle Hang at [employer]    Hip Flexor  Noodle Hang Deep Water    SKTC  Noodle Bicycle     DKTC       ITB      Quad  Deep Water:    Mid Back   Jog    UT  Jumping Jacks    Post Shoulder  Heel to Toe    Ladder Pull  Hand to Opposite Knee    Pec Stretch  Rocking Horse      [employer] Skier          Cervical:   Other:     AROM Flexion      AROM Extension      AROM Side Bending      AROM Rotation      Chin Tucks      Chin Nods        Aquatic Abbreviation Key  B= Belt DB= Dumbells T= Theratube   H= Hydrotone N= Noodles W= Weights   P= Paddles S= Speedo equipment K= Kickboard     Other Therapeutic Activities: 4/9/19 Pt was educated on PT POC, Diagnosis, Prognosis, pathomechanics as well as frequency and duration of scheduling future physical therapy appointments. Time was also taken on this day to answer all patient questions and participation in PT.        Home Exercise Program:    4/9/19 The following exercises were performed and added to the pt's home program (educated on appropriate frequency, intensity and duration etc.): Encouraged patient to continue [address] [facility].      Manual Treatments:      Modalities: 4/16 : MHP to low  Back with bolster underneath BLEs x 12 min  4/11 MHP to low  Back with bolster underneath BLEs x 10 min     Timed Code Treatment Minutes:  32    Total Treatment Minutes:  44    Treatment/Activity Tolerance:  [x] Patient tolerated treatment well [] Patient limited by fatigue  [] Patient limited by pain  [] Patient limited by other medical complications  [] Other:     Prognosis: [x] Good [] Fair  [] Poor    Patient Requires Follow-up: [x] Yes  [] No    Plan:   [x] Continue per plan of care [] Alter current plan (see comments)  [] Plan of care initiated [] Hold pending MD visit [] Discharge  Plan for Next Session:  Begin BLE strengthening, balance,     Electronically signed by:  Jeremie Tinsley PT , DPT , [address]

## 2019-04-18 ENCOUNTER — HOSPITAL ENCOUNTER (OUTPATIENT)
Age: 64
Discharge: HOME | End: 2019-04-18
Payer: MEDICARE

## 2019-04-18 ENCOUNTER — HOSPITAL ENCOUNTER (OUTPATIENT)
Dept: PHYSICAL THERAPY | Age: 64
Setting detail: THERAPIES SERIES
Discharge: HOME | End: 2019-04-18
Payer: MEDICARE

## 2019-04-18 PROCEDURE — 97110 THERAPEUTIC EXERCISES: CPT

## 2019-04-18 NOTE — FLOWSHEET NOTE
Physical Therapy Daily/Aquatic Flow Sheet   Date:  2019    Patient Name:  Everette Norton    :  1955  MRN: [id number]  Restrictions/Precautions:    Medical/Treatment Diagnosis Information:   · Diagnosis: Z98.1 (ICD-10-CM) - Arthrodesis status, M43.17 (ICD-10-CM) - Spondylolisthesis, lumbosacral region  · Treatment Diagnosis: Decrease core strength, difficulty walking, imbalance     Tracking Information:  Physician Information Referring Practitioner: Mariella Rodríguez MD      Plan of Care Sent Date:  Signed Received:    Visit Count / Total Visits      Insurance Approved Visits  / Gardenaskyler Ruelasant Approved Dates:     Insurance Information PT Insurance Information: Medicare  *If only certain CPT codes approved use smart phrase CPT calculator . OPPTINSURANCECPTCODECALCULATOR   Progress Note/G-codes   []  Yes  []  No Next Due:      Pain level: 4/10    Subjective:  Patient reports that he was a little sore after last session but not too bad . Patient reports he was sore from the last session. Says that his body is just not use to doing those activities. Objective:     Observation:  Wears lumbar soft brace and Rolling walker (Bariatric)  Test measurements:    Land-based Therapy Dates of Service:  , ,    Land-based Visits Exercises/Activities:  Exercise/Equipment Resistance/Repetitions Other comments   Nustep Level 4, arm 12 seat 13  x5 min    IB/ HR  Seated HSS 2x30/2x10  2 x20\"     Balance activities      // bars  Tandem stance 2x15\"  Airex:  Neutral RAFAT 2 x15\"         Mat Swiss Ball  x10 reps x 5s hold  TA Press Hooklying   Alt UE/ LE press Hooklying   DKTC Hooklying   Alt UE Hooklying w/ ball underneath LEs  LTR  Chest press w/ Hooklying ball x10                                                  AquaticTherapy Dates of Service:   Aquatic Visits Exercises/Activities:   Transfers:          % Immersion:            Ambulation:   UE Exercises:       Forwards    Shoulder Shrugs      Lateral Shoulder Circles      Retro    Scapular Retraction       Marching   Push Downs       Cariocas   Punching     Jog    Rowing     Multifidi walkouts with paddle   Elbow Flex/Ext       Shldr Flex/Ext       [employer] aBd/aDd    LE Exercises:  Shldr Horiz aBd/aDd    HR/TR  Shldr IR/ER    Marches  Arm Circles    Squats   PNF Diagonals    Hamstring Curls  Wall Push Ups    Hip Flexion (SLR)  Figure 8's    Hip aBduction (SLR)  Bilateral Pull Downs    Hip Extension (SLR)       Hip aDduction (SLR)      Hip Circles  Functional:    Hip IR/Er  Step up forward    TrA Set   Step up lateral     Pelvic Tilts   Step down     Fig 8's   Lunges Forward    LE PNF  Lunges Retro      Lunges Lateral     Balance:   Lower ab curl with noodle     SLS        Tandem Stance       NBOS eyes open  Seated:     NBOS eyes closed  Ankle pumps     Hand to Opposite Knee  Ankle Circles     Fwd Step ups to SLS  Knee Flex/Ext    Lateral Step ups to SLS  Hip aBd/aDd    Stop/Go Gait   Bicycle       Ankle DF/PF      Ankle Inv/Ev    Stretching:       Gastroc/Soleus      Hamstring   Noodle:     Knee Flex Stretch  Leg Press    Piriformis   Noodle Hang at [employer]    Hip Flexor  Noodle Hang Deep Water    SKTC  Noodle Bicycle     DKTC       ITB      Quad  Deep Water:    Mid Back   Jog    UT  Jumping Jacks    Post Shoulder  Heel to Toe    Ladder Pull  Hand to Opposite Knee    Pec Stretch  Rocking Horse      [employer] Skier          Cervical:   Other:     AROM Flexion      AROM Extension      AROM Side Bending      AROM Rotation      Chin Tucks      Chin Nods        Aquatic Abbreviation Key  B= Belt DB= Dumbells T= Theratube   H= Hydrotone N= Noodles W= Weights   P= Paddles S= Speedo equipment K= Kickboard     Other Therapeutic Activities: 4/9/19 Pt was educated on PT POC, Diagnosis, Prognosis, pathomechanics as well as frequency and duration of scheduling future physical therapy appointments.  Time was also taken on this day to answer all patient questions and participation in PT. Home Exercise Program:    4/9/19 The following exercises were performed and added to the pt's home program (educated on appropriate frequency, intensity and duration etc.): Encouraged patient to continue [facility].      Manual Treatments:      Modalities: 4/16 : MHP to low  Back with bolster underneath BLEs x 12 min  4/11 MHP to low  Back with bolster underneath BLEs x 10 min     Timed Code Treatment Minutes:  28    Total Treatment Minutes:  28    Treatment/Activity Tolerance:  [x] Patient tolerated treatment well [] Patient limited by fatigue  [] Patient limited by pain  [] Patient limited by other medical complications  [] Other:     Prognosis: [x] Good [] Fair  [] Poor    Patient Requires Follow-up: [x] Yes  [] No    Plan:   [x] Continue per plan of care [] Alter current plan (see comments)  [] Plan of care initiated [] Hold pending MD visit [] Discharge  Plan for Next Session:  Begin BLE strengthening, balance,     Electronically signed by:  Nicholas Montejo, PT , DPT , [address]

## 2019-04-20 ENCOUNTER — HOSPITAL ENCOUNTER (OUTPATIENT)
Age: 64
Discharge: HOME | End: 2019-04-20
Payer: MEDICARE

## 2019-04-20 ENCOUNTER — HOSPITAL ENCOUNTER (OUTPATIENT)
Dept: GENERAL RADIOLOGY | Age: 64
Discharge: HOME | End: 2019-04-20
Payer: MEDICARE

## 2019-04-20 DIAGNOSIS — Z98.1 ARTHRODESIS STATUS: ICD-10-CM

## 2019-04-20 LAB
A/G RATIO: 1 (ref 1.1–2.2)
ALBUMIN SERPL-MCNC: 4.1 G/DL (ref 3.4–5)
ALP BLD-CCNC: 119 U/L (ref 40–129)
ALT SERPL-CCNC: 16 U/L (ref 10–40)
ANION GAP SERPL CALCULATED.3IONS-SCNC: 18 MMOL/L (ref 3–16)
AST SERPL-CCNC: 16 U/L (ref 15–37)
BILIRUB SERPL-MCNC: 0.6 MG/DL (ref 0–1)
BUN BLDV-MCNC: 21 MG/DL (ref 7–20)
CALCIUM SERPL-MCNC: 9.3 MG/DL (ref 8.3–10.6)
CHLORIDE BLD-SCNC: 96 MMOL/L (ref 99–110)
CO2: 28 MMOL/L (ref 21–32)
CREAT SERPL-MCNC: 0.8 MG/DL (ref 0.8–1.3)
GFR AFRICAN AMERICAN: >60
GFR NON-AFRICAN AMERICAN: >60
GLOBULIN: 4 G/DL
GLUCOSE BLD-MCNC: 156 MG/DL (ref 70–99)
INR BLD: 1.34 (ref 0.86–1.14)
POTASSIUM SERPL-SCNC: 3.6 MMOL/L (ref 3.5–5.1)
PROTHROMBIN TIME: 15.3 SEC (ref 9.8–13)
SODIUM BLD-SCNC: 142 MMOL/L (ref 136–145)
TOTAL PROTEIN: 8.1 G/DL (ref 6.4–8.2)

## 2019-04-20 PROCEDURE — 85610 PROTHROMBIN TIME: CPT

## 2019-04-20 PROCEDURE — 83036 HEMOGLOBIN GLYCOSYLATED A1C: CPT

## 2019-04-20 PROCEDURE — 36415 COLL VENOUS BLD VENIPUNCTURE: CPT

## 2019-04-20 PROCEDURE — 80053 COMPREHEN METABOLIC PANEL: CPT

## 2019-04-20 PROCEDURE — 72100 X-RAY EXAM L-S SPINE 2/3 VWS: CPT

## 2019-04-21 LAB
ESTIMATED AVERAGE GLUCOSE: 148.5 MG/DL
HBA1C MFR BLD: 6.8 %

## 2019-04-23 ENCOUNTER — APPOINTMENT (OUTPATIENT)
Dept: PHYSICAL THERAPY | Age: 64
End: 2019-04-23
Payer: MEDICARE

## 2019-04-25 ENCOUNTER — HOSPITAL ENCOUNTER (OUTPATIENT)
Dept: PHYSICAL THERAPY | Age: 64
Setting detail: THERAPIES SERIES
Discharge: HOME | End: 2019-04-25
Payer: MEDICARE

## 2019-04-25 PROCEDURE — 97110 THERAPEUTIC EXERCISES: CPT

## 2019-04-25 NOTE — FLOWSHEET NOTE
physical therapy appointments. Time was also taken on this day to answer all patient questions and participation in PT. Home Exercise Program:    4/9/19 The following exercises were performed and added to the pt's home program (educated on appropriate frequency, intensity and duration etc.): Encouraged patient to continue [address] [facility].      Manual Treatments:      Modalities: 4/16 : MHP to low  Back with bolster underneath BLEs x 12 min  4/11 MHP to low  Back with bolster underneath BLEs x 10 min     Timed Code Treatment Minutes:  28    Total Treatment Minutes:  28    Treatment/Activity Tolerance:  [x] Patient tolerated treatment well [] Patient limited by fatigue  [] Patient limited by pain  [] Patient limited by other medical complications  [] Other:     Prognosis: [x] Good [] Fair  [] Poor    Patient Requires Follow-up: [x] Yes  [] No    Plan:   [x] Continue per plan of care [] Alter current plan (see comments)  [] Plan of care initiated [] Hold pending MD visit [] Discharge  Plan for Next Session:  Begin BLE strengthening, balance,     Electronically signed by:  Sara Benavidez, PT , DPT , [address] [place] [address]

## 2019-04-26 NOTE — TELEPHONE ENCOUNTER
potassium chloride (KLOR-CON) 10 MEQ extended release tablet [[id number]]     per pt stated he needs a 90 day supply pls advise      Pt stated he's taking 12 tablets instead of 8. [facility] [address] [phone] f [phone]

## 2019-04-29 RX ORDER — POTASSIUM CHLORIDE 750 MG/1
TABLET, FILM COATED, EXTENDED RELEASE ORAL
Qty: 1080 TABLET | Refills: 3 | Status: SHIPPED | OUTPATIENT
Start: 2019-04-29 | End: 2020-06-22

## 2019-04-30 ENCOUNTER — HOSPITAL ENCOUNTER (OUTPATIENT)
Dept: PHYSICAL THERAPY | Age: 64
Setting detail: THERAPIES SERIES
Discharge: HOME | End: 2019-04-30
Payer: MEDICARE

## 2019-04-30 PROCEDURE — 97110 THERAPEUTIC EXERCISES: CPT

## 2019-04-30 NOTE — FLOWSHEET NOTE
Physical Therapy Daily/Aquatic Flow Sheet   Date:  2019    Patient Name:  Patricia Sanchez    :  1955  MRN: [id number]  Restrictions/Precautions:    Medical/Treatment Diagnosis Information:   · Diagnosis: Z98.1 (ICD-10-CM) - Arthrodesis status, M43.17 (ICD-10-CM) - Spondylolisthesis, lumbosacral region  · Treatment Diagnosis: Decrease core strength, difficulty walking, imbalance     Tracking Information:  Physician Information Referring Practitioner: Alexander Snyder MD      Plan of Care Sent Date:  Signed Received:    Visit Count / Total Visits      Insurance Approved Visits  / Merdis Mary Approved Dates:     Insurance Information PT Insurance Information: Medicare  *If only certain CPT codes approved use smart phrase CPT calculator . OPPTINSURANCECPTCODECALCULATOR   Progress Note/G-codes   []  Yes  []  No Next Due:      Pain level: 4/10    Subjective:  Patient reports that he has been doing pretty good without the back brace for a week now. Patient reports that he a visit with the surgeon who advised to no longer to wear the back brace. Patient reports that he was a little sore after last session but not too bad . Patient reports he was sore from the last session. Says that his body is just not use to doing those activities.      Objective:     Observation:  Wears lumbar soft brace and Rolling walker (Bariatric)  Test measurements:    Land-based Therapy Dates of Service:  , , , ,   Land-based Visits Exercises/Activities:  Exercise/Equipment Resistance/Repetitions Other comments   Nustep Level 6, arm 12 seat 13  x5 min    IB/ HR  Seated HSS 2x30/2x20  2 x20\"   Hip flexor     HSS standing   Balance activities      // bars  Tandem stance 2x15\"  Airex:  Neutral RAFAT 2 x20\"  Oh lift x 10     Add lateral step ups   Had difficulty with standing on Airex         Mat  hold           Alt UE Hooklying w/ ball      Seated: 5 pl x15  LPD   High Row  Mid [address] Column Seated: anti-rotation press x 15 x 2 sec                                       AquaticTherapy Dates of Service:   Aquatic Visits Exercises/Activities:   Transfers:          % Immersion:            Ambulation:   UE Exercises:       Forwards    Shoulder Shrugs      Lateral    Shoulder Circles      Retro    Scapular Retraction       Marching   Push Downs       Cariocas   Punching     Jog    Rowing     Multifidi walkouts with paddle   Elbow Flex/Ext       Shldr Flex/Ext       [employer] aBd/aDd    LE Exercises:  Shldr Horiz aBd/aDd    HR/TR  Shldr IR/ER    Marches  Arm Circles    Squats   PNF Diagonals    Hamstring Curls  Wall Push Ups    Hip Flexion (SLR)  Figure 8's    Hip aBduction (SLR)  Bilateral Pull Downs    Hip Extension (SLR)       Hip aDduction (SLR)      Hip Circles  Functional:    Hip IR/Er  Step up forward    TrA Set   Step up lateral     Pelvic Tilts   Step down     Fig 8's   Lunges Forward    LE PNF  Lunges Retro      Lunges Lateral     Balance:   Lower ab curl with noodle     SLS        Tandem Stance       NBOS eyes open  Seated:     NBOS eyes closed  Ankle pumps     Hand to Opposite Knee  Ankle Circles     Fwd Step ups to SLS  Knee Flex/Ext    Lateral Step ups to SLS  Hip aBd/aDd    Stop/Go Gait   Bicycle       Ankle DF/PF      Ankle Inv/Ev    Stretching:       Gastroc/Soleus      Hamstring   Noodle:     Knee Flex Stretch  Leg Press    Piriformis   Noodle Hang at [employer]    Hip Flexor  Noodle Hang Deep Water    SKTC  Noodle Bicycle     DKTC       ITB      Quad  Deep Water:    Mid Back   Jog    UT  Jumping Jacks    Post Shoulder  Heel to Toe    Ladder Pull  Hand to Opposite Knee    Pec Stretch  Rocking Horse      [employer] Skier          Cervical:   Other:     AROM Flexion      AROM Extension      AROM Side Bending      AROM Rotation      Chin Tucks      Chin Nods        Aquatic Abbreviation Key  B= Belt DB= Dumbells T= Theratube   H= Hydrotone N= Noodles W= Weights   P= Paddles S= Speedo equipment K= Kickboard     Other Therapeutic Activities: 4/9/19 Pt was educated on PT POC, Diagnosis, Prognosis, pathomechanics as well as frequency and duration of scheduling future physical therapy appointments. Time was also taken on this day to answer all patient questions and participation in PT. Home Exercise Program:    4/9/19 The following exercises were performed and added to the pt's home program (educated on appropriate frequency, intensity and duration etc.): Encouraged patient to continue [facility].      Manual Treatments:      Modalities: 4/30, x10 min 4/16 : MHP to low  Back with bolster underneath BLEs x 12 min  4/11 MHP to low  Back with bolster underneath BLEs x 10 min     Timed Code Treatment Minutes:  30    Total Treatment Minutes:  40    Treatment/Activity Tolerance:  [x] Patient tolerated treatment well [] Patient limited by fatigue  [] Patient limited by pain  [] Patient limited by other medical complications  [] Other:     Prognosis: [x] Good [] Fair  [] Poor    Patient Requires Follow-up: [x] Yes  [] No    Plan:   [x] Continue per plan of care [] Alter current plan (see comments)  [] Plan of care initiated [] Hold pending MD visit [] Discharge  Plan for Next Session:  Begin BLE strengthening, balance,     Electronically signed by:  Maurice Bansal, PT , DPT , [address] [place] [address]

## 2019-05-02 ENCOUNTER — HOSPITAL ENCOUNTER (OUTPATIENT)
Dept: PHYSICAL THERAPY | Age: 64
Setting detail: THERAPIES SERIES
Discharge: HOME | End: 2019-05-02
Payer: MEDICARE

## 2019-05-02 PROCEDURE — 97110 THERAPEUTIC EXERCISES: CPT

## 2019-05-02 NOTE — FLOWSHEET NOTE
pl x15  LPD   High Row  Mid Row     Cable Column Seated: anti-rotation press x 15 x 2 sec                                       AquaticTherapy Dates of Service:   Aquatic Visits Exercises/Activities:   Transfers:          % Immersion:            Ambulation:   UE Exercises:       Forwards    Shoulder Shrugs      Lateral    Shoulder Circles      Retro    Scapular Retraction       Marching   Push Downs       Cariocas   Punching     Jog    Rowing     Multifidi walkouts with paddle   Elbow Flex/Ext       Shldr Flex/Ext       [employer] aBd/aDd    LE Exercises:  Shldr Horiz aBd/aDd    HR/TR  Shldr IR/ER    Marches  Arm Circles    Squats   PNF Diagonals    Hamstring Curls  Wall Push Ups    Hip Flexion (SLR)  Figure 8's    Hip aBduction (SLR)  Bilateral Pull Downs    Hip Extension (SLR)       Hip aDduction (SLR)      Hip Circles  Functional:    Hip IR/Er  Step up forward    TrA Set   Step up lateral     Pelvic Tilts   Step down     Fig 8's   Lunges Forward    LE PNF  Lunges Retro      Lunges Lateral     Balance:   Lower ab curl with noodle     SLS        Tandem Stance       NBOS eyes open  Seated:     NBOS eyes closed  Ankle pumps     Hand to Opposite Knee  Ankle Circles     Fwd Step ups to SLS  Knee Flex/Ext    Lateral Step ups to SLS  Hip aBd/aDd    Stop/Go Gait   Bicycle       Ankle DF/PF      Ankle Inv/Ev    Stretching:       Gastroc/Soleus      Hamstring   Noodle:     Knee Flex Stretch  Leg Press    Piriformis   Noodle Hang at [employer]    Hip Flexor  Noodle Hang Deep Water    SKTC  Noodle Bicycle     DKTC       ITB      Quad  Deep Water:    Mid Back   Jog    UT  Jumping Jacks    Post Shoulder  Heel to Toe    Ladder Pull  Hand to Opposite Knee    Pec Stretch  Rocking Horse      [employer] Skier          Cervical:   Other:     AROM Flexion      AROM Extension      AROM Side Bending      AROM Rotation      Chin Tucks      Chin Nods        Aquatic Abbreviation Key  B= Belt DB= Dumbells T= Theratube   H= Hydrotone N= Noodles W= Weights   P= Paddles S= Speedo equipment K= Kickboard     Other Therapeutic Activities: 4/9/19 Pt was educated on PT POC, Diagnosis, Prognosis, pathomechanics as well as frequency and duration of scheduling future physical therapy appointments. Time was also taken on this day to answer all patient questions and participation in PT. Home Exercise Program:    4/9/19 The following exercises were performed and added to the pt's home program (educated on appropriate frequency, intensity and duration etc.): Encouraged patient to continue [facility].      Manual Treatments:      Modalities: 5/1,4/30, x10 min 4/16 : MHP to low  Back with bolster underneath BLEs x 12 min  4/11 MHP to low  Back with bolster underneath BLEs x 10 min     Timed Code Treatment Minutes:  32    Total Treatment Minutes:  43    Treatment/Activity Tolerance:  [x] Patient tolerated treatment well [] Patient limited by fatigue  [] Patient limited by pain  [] Patient limited by other medical complications  [] Other:     Prognosis: [x] Good [] Fair  [] Poor    Patient Requires Follow-up: [x] Yes  [] No    Plan:   [x] Continue per plan of care [] Alter current plan (see comments)  [] Plan of care initiated [] Hold pending MD visit [] Discharge  Plan for Next Session:  Begin BLE strengthening, balance,     Electronically signed by:  Naseem Carbajal, PT , DPT , [address] [place] [address]

## 2019-05-07 ENCOUNTER — HOSPITAL ENCOUNTER (OUTPATIENT)
Dept: PHYSICAL THERAPY | Age: 64
Setting detail: THERAPIES SERIES
Discharge: HOME | End: 2019-05-07
Payer: MEDICARE

## 2019-05-07 PROCEDURE — 97110 THERAPEUTIC EXERCISES: CPT

## 2019-05-07 NOTE — FLOWSHEET NOTE
Physical Therapy Daily/Aquatic Flow Sheet   Date:  2019    Patient Name:  Thao Curran    :  1955  MRN: [id number]  Restrictions/Precautions:    Medical/Treatment Diagnosis Information:   · Diagnosis: Z98.1 (ICD-10-CM) - Arthrodesis status, M43.17 (ICD-10-CM) - Spondylolisthesis, lumbosacral region  · Treatment Diagnosis: Decrease core strength, difficulty walking, imbalance     Tracking Information:  Physician Information Referring Practitioner: Marly Bocanegra MD      Plan of Care Sent Date:  Signed Received:    Visit Count / Total Visits      Insurance Approved Visits  / Luciano Elms Approved Dates:     Insurance Information PT Insurance Information: Medicare  *If only certain CPT codes approved use smart phrase CPT calculator . OPPTINSURANCECPTCODECALCULATOR   Progress Note/G-codes   []  Yes  []  No Next Due:      Pain level: 4/10    Subjective:  Patient reports that he has been sore in the Back the last couple of days . Patient reports that he was able to janeth socks with B hands.      Objective:     Observation:  Wears lumbar soft brace and Rolling walker (Bariatric)  Test measurements:    Land-based Therapy Dates of Service:  , , , , , ,   Land-based Visits Exercises/Activities:  Exercise/Equipment Resistance/Repetitions Other comments   Nustep Level 6, arm 12 seat 13  x5 min    IB/ HR  Seated HSS 2x30/2x20  2 x30\" (standing)  \"    HSS standing   Balance activities      // bars  Tandem stance 2x15\"    Fwd step ups 4\" x10 B   lateral step ups 4\" x5 B   Used UE support    Had difficulty with standing on Airex     not performed          Mat  hold     Alt UE/ LE press Hooklying      Alt UE Hooklying w/ ball underneath LEs  LTR  Reverse crunch w/ swiss ball x10        Cable Column                                       AquaticTherapy Dates of Service:   Aquatic Visits Exercises/Activities:   Transfers:          % Immersion:            Ambulation: UE Exercises: Forwards    Shoulder Shrugs      Lateral    Shoulder Circles      Retro    Scapular Retraction       Marching   Push Downs       Cariocas   Punching     Jog    Rowing     Multifidi walkouts with paddle   Elbow Flex/Ext       Shldr Flex/Ext       [employer] aBd/aDd    LE Exercises:  Shldr Horiz aBd/aDd    HR/TR  Shldr IR/ER    Marches  Arm Circles    Squats   PNF Diagonals    Hamstring Curls  Wall Push Ups    Hip Flexion (SLR)  Figure 8's    Hip aBduction (SLR)  Bilateral Pull Downs    Hip Extension (SLR)       Hip aDduction (SLR)      Hip Circles  Functional:    Hip IR/Er  Step up forward    TrA Set   Step up lateral     Pelvic Tilts   Step down     Fig 8's   Lunges Forward    LE PNF  Lunges Retro      Lunges Lateral     Balance:   Lower ab curl with noodle     SLS        Tandem Stance       NBOS eyes open  Seated:     NBOS eyes closed  Ankle pumps     Hand to Opposite Knee  Ankle Circles     Fwd Step ups to SLS  Knee Flex/Ext    Lateral Step ups to SLS  Hip aBd/aDd    Stop/Go Gait   Bicycle       Ankle DF/PF      Ankle Inv/Ev    Stretching:       Gastroc/Soleus      Hamstring   Noodle:     Knee Flex Stretch  Leg Press    Piriformis   Noodle Hang at [employer] [place]    Hip Flexor  Noodle Hang Deep Water    SKTC  Noodle Bicycle     DKTC       ITB      Quad  Deep Water:    Mid Back   Jog    UT  Jumping Jacks    Post Shoulder  Heel to Toe    Ladder Pull  Hand to Opposite Knee    Pec Stretch  Rocking Horse      [employer] Skier          Cervical:   Other:     AROM Flexion      AROM Extension      AROM Side Bending      AROM Rotation      Chin Tucks      Chin Nods        Aquatic Abbreviation Key  B= Belt DB= Dumbells T= Theratube   H= Hydrotone N= Noodles W= Weights   P= Paddles S= Speedo equipment K= Kickboard     Other Therapeutic Activities: 4/9/19 Pt was educated on PT POC, Diagnosis, Prognosis, pathomechanics as well as frequency and duration of scheduling future physical therapy appointments.  Time was also

## 2019-05-09 ENCOUNTER — HOSPITAL ENCOUNTER (OUTPATIENT)
Dept: PHYSICAL THERAPY | Age: 64
Setting detail: THERAPIES SERIES
Discharge: HOME | End: 2019-05-09
Payer: MEDICARE

## 2019-05-09 PROCEDURE — 97110 THERAPEUTIC EXERCISES: CPT

## 2019-05-09 NOTE — FLOWSHEET NOTE
Physical Therapy Daily/Aquatic Flow Sheet   Date:  2019    Patient Name:  Cortez Schmidt    :  1955  MRN: [id number]  Restrictions/Precautions:    Medical/Treatment Diagnosis Information:   · Diagnosis: Z98.1 (ICD-10-CM) - Arthrodesis status, M43.17 (ICD-10-CM) - Spondylolisthesis, lumbosacral region  · Treatment Diagnosis: Decrease core strength, difficulty walking, imbalance     Tracking Information:  Physician Information Referring Practitioner: Lanette Leblanc MD      Plan of Care Sent Date:  Signed Received:    Visit Count / Total Visits      Insurance Approved Visits  / Leandro Gonzalez Approved Dates:     Insurance Information PT Insurance Information: Medicare  *If only certain CPT codes approved use smart phrase CPT calculator . OPPTINSURANCECPTCODECALCULATOR   Progress Note/G-codes   []  Yes  []  No Next Due:      Pain level: 4/10    Subjective:  Patient reports that he Knee's are bothering due to OA. Patient reports that he has been sore in the Back the last couple of days . Patient reports that he was able to janeth socks with B hands.      Objective:     Observation:  Wears lumbar soft brace and Rolling walker (Bariatric)  Test measurements:    Land-based Therapy Dates of Service:  , , , , , , ,   Land-based Visits Exercises/Activities:  Exercise/Equipment Resistance/Repetitions Other comments   Nustep Level 6, arm 12 seat 13  x5 min    IB/ HR  Seated HSS 2x30/2x20  1 x30\" (standing)  \"    HSS standing   Balance activities      // bars  Tandem stance 2x15\"    Fwd step ups 4\" x10 B   lateral step ups 4\" x5 B     Used UE support    Had difficulty with standing on Airex   not performed    not performed          Mat  hold     Alt UE/ LE press Hooklying           Seated: 5 pl x15  LPD   High Row  Mid Row   Cable Column Seated: anti-rotation press x 15 x 2 sec   TG  2x10                                  AquaticTherapy Dates of Service: Aquatic Visits Exercises/Activities:   Transfers:          % Immersion:            Ambulation:   UE Exercises:       Forwards    Shoulder Shrugs      Lateral    Shoulder Circles      Retro    Scapular Retraction       Marching   Push Downs       Cariocas   Punching     Jog    Rowing     Multifidi walkouts with paddle   Elbow Flex/Ext       Shldr Flex/Ext       [employer] aBd/aDd    LE Exercises:  Shldr Horiz aBd/aDd    HR/TR  Shldr IR/ER    Marches  Arm Circles    Squats   PNF Diagonals    Hamstring Curls  Wall Push Ups    Hip Flexion (SLR)  Figure 8's    Hip aBduction (SLR)  Bilateral Pull Downs    Hip Extension (SLR)       Hip aDduction (SLR)      Hip Circles  Functional:    Hip IR/Er  Step up forward    TrA Set   Step up lateral     Pelvic Tilts   Step down     Fig 8's   Lunges Forward    LE PNF  Lunges Retro      Lunges Lateral     Balance:   Lower ab curl with noodle     SLS        Tandem Stance       NBOS eyes open  Seated:     NBOS eyes closed  Ankle pumps     Hand to Opposite Knee  Ankle Circles     Fwd Step ups to SLS  Knee Flex/Ext    Lateral Step ups to SLS  Hip aBd/aDd    Stop/Go Gait   Bicycle       Ankle DF/PF      Ankle Inv/Ev    Stretching:       Gastroc/Soleus      Hamstring   Noodle:     Knee Flex Stretch  Leg Press    Piriformis   Noodle Hang at [employer] [place]    Hip Flexor  Noodle Hang Deep Water    SKTC  Noodle Bicycle     DKTC       ITB      Quad  Deep Water:    Mid Back   Jog    UT  Jumping Jacks    Post Shoulder  Heel to Toe    Ladder Pull  Hand to Opposite Knee    Pec Stretch  Rocking Horse      [employer] Skier          Cervical:   Other:     AROM Flexion      AROM Extension      AROM Side Bending      AROM Rotation      Chin Tucks      Chin Nods        Aquatic Abbreviation Key  B= Belt DB= Dumbells T= Theratube   H= Hydrotone N= Noodles W= Weights   P= Paddles S= Speedo equipment K= Kickboard     Other Therapeutic Activities: 4/9/19 Pt was educated on PT POC, Diagnosis, Prognosis, pathomechanics as well as frequency and duration of scheduling future physical therapy appointments. Time was also taken on this day to answer all patient questions and participation in PT. Home Exercise Program:    4/9/19 The following exercises were performed and added to the pt's home program (educated on appropriate frequency, intensity and duration etc.): Encouraged patient to continue [facility].      Manual Treatments:      Modalities: 5/9, 5/7,5/1,4/30, x10 min 4/16 : MHP to low  Back with bolster underneath BLEs x 12 min  4/11 MHP to low  Back with bolster underneath BLEs x 10 min     Timed Code Treatment Minutes:  33    Total Treatment Minutes: 45     Treatment/Activity Tolerance:  [x] Patient tolerated treatment well [] Patient limited by fatigue  [] Patient limited by pain  [] Patient limited by other medical complications  [] Other:     Prognosis: [x] Good [] Fair  [] Poor    Patient Requires Follow-up: [x] Yes  [] No    Plan:   [x] Continue per plan of care [] Alter current plan (see comments)  [] Plan of care initiated [] Hold pending MD visit [] Discharge  Plan for Next Session:  Begin BLE strengthening, balance,     Electronically signed by:  Maurice Bansal PT , DPT , [address] [place] [address]

## 2019-05-14 ENCOUNTER — HOSPITAL ENCOUNTER (OUTPATIENT)
Dept: PHYSICAL THERAPY | Age: 64
Setting detail: THERAPIES SERIES
Discharge: HOME | End: 2019-05-14
Payer: MEDICARE

## 2019-05-14 PROCEDURE — 97530 THERAPEUTIC ACTIVITIES: CPT

## 2019-05-14 NOTE — FLOWSHEET NOTE
Physical Therapy Daily/Aquatic Flow Sheet   Date:  2019    Patient Name:  Maria D Crowder    :  1955  MRN: [id number]  Restrictions/Precautions: Arthrodesis (reconstructive back surgery)   Medical/Treatment Diagnosis Information:   · Diagnosis: Z98.1 (ICD-10-CM) - Arthrodesis status, M43.17 (ICD-10-CM) - Spondylolisthesis, lumbosacral region  · Treatment Diagnosis: Decrease core strength, difficulty walking, imbalance     Tracking Information:  Physician Information Referring Practitioner: Tejal Rudd MD      Plan of Care Sent Date:  Signed Received:    Visit Count / Total Visits  10/12    Insurance Approved Visits  / Nava Squibb Approved Dates:     Insurance Information PT Insurance Information: Medicare  *If only certain CPT codes approved use smart phrase CPT calculator . OPPTINSURANCECPTCODECALCULATOR   Progress Note/G-codes   []  Yes  []  No Next Due:      Pain level: 4/10    Subjective:   SEE PROGRESS NOTE   Patient reports that he Knee's are bothering due to OA. Patient reports that he has been sore in the Back the last couple of days . Patient reports that he was able to janeth socks with B hands.      Objective:     Observation:  Wears lumbar soft brace and Rolling walker (Bariatric)  Test measurements:    Land-based Therapy Dates of Service:  , , , , , , , ,   Land-based Visits Exercises/Activities:  Exercise/Equipment Resistance/Repetitions Other comments   Nustep Level 6, arm 12 seat 13  x5 min    IB/ HR  Seated HSS 2x30/2x20  1 x30\" (standing)  \"  not performed   HSS standing   Balance activities      // bars  Tandem stance 2x15\"    Fwd step ups 4\" x10 B   lateral step ups 4\" x5 B     Used UE support    Had difficulty with standing on    not performed     Airex   not performed      not performed          Mat  hold     Alt UE/ LE press Hooklying           Seated: 5 pl x15  LPD   High Row  Mid Row   Cable Column Seated: anti-rotation press x 15 x 2 sec5/14 not performed    TG  2x10 5/14 not performed                                  AquaticTherapy Dates of Service:   Aquatic Visits Exercises/Activities:   Transfers:          % Immersion:            Ambulation:   UE Exercises:       Forwards    Shoulder Shrugs      Lateral    Shoulder Circles      Retro    Scapular Retraction       Marching   Push Downs       Cariocas   Punching     Jog    Rowing     Multifidi walkouts with paddle   Elbow Flex/Ext       Shldr Flex/Ext       [employer] aBd/aDd    LE Exercises:  Shldr Horiz aBd/aDd    HR/TR  Shldr IR/ER    Marches  Arm Circles    Squats   PNF Diagonals    Hamstring Curls  Wall Push Ups    Hip Flexion (SLR)  Figure 8's    Hip aBduction (SLR)  Bilateral Pull Downs    Hip Extension (SLR)       Hip aDduction (SLR)      Hip Circles  Functional:    Hip IR/Er  Step up forward    TrA Set   Step up lateral     Pelvic Tilts   Step down     Fig 8's   Lunges Forward    LE PNF  Lunges Retro      Lunges Lateral     Balance:   Lower ab curl with noodle     SLS        Tandem Stance       NBOS eyes open  Seated:     NBOS eyes closed  Ankle pumps     Hand to Opposite Knee  Ankle Circles     Fwd Step ups to SLS  Knee Flex/Ext    Lateral Step ups to SLS  Hip aBd/aDd    Stop/Go Gait   Bicycle       Ankle DF/PF      Ankle Inv/Ev    Stretching:       Gastroc/Soleus      Hamstring   Noodle:     Knee Flex Stretch  Leg Press    Piriformis   Noodle Hang at [employer] [place]    Hip Flexor  Noodle Hang Deep Water    SKTC  Noodle Bicycle     DKTC       ITB      Quad  Deep Water:    Mid Back   Jog    UT  Jumping Jacks    Post Shoulder  Heel to Toe    Ladder Pull  Hand to Opposite Knee    Pec Stretch  Rocking Horse      [employer] Skier          Cervical:   Other:     AROM Flexion      AROM Extension      AROM Side Bending      AROM Rotation      Chin Tucks      Chin Nods        Aquatic Abbreviation Key  B= Belt DB= Dumbells T= Theratube   H= Hydrotone N= Noodles W= Weights   P= Paddles S= Speedo equipment K= Kickboard     Other Therapeutic Activities: 4/9/19 Pt was educated on PT POC, Diagnosis, Prognosis, pathomechanics as well as frequency and duration of scheduling future physical therapy appointments. Time was also taken on this day to answer all patient questions and participation in PT. Home Exercise Program:    4/9/19 The following exercises were performed and added to the pt's home program (educated on appropriate frequency, intensity and duration etc.): Encouraged patient to continue [facility].      Manual Treatments:      Modalities: 5/9, 5/7,5/1,4/30, x10 min 4/16 : MHP to low  Back with bolster underneath BLEs x 12 min  4/11 MHP to low  Back with bolster underneath BLEs x 10 min     Timed Code Treatment Minutes:  24    Total Treatment Minutes:  24    Treatment/Activity Tolerance:  [x] Patient tolerated treatment well [] Patient limited by fatigue  [] Patient limited by pain  [] Patient limited by other medical complications  [] Other:     Prognosis: [x] Good [] Fair  [] Poor    Patient Requires Follow-up: [x] Yes  [] No    Plan:   [x] Continue per plan of care [] Alter current plan (see comments)  [] Plan of care initiated [] Hold pending MD visit [] Discharge  Plan for Next Session:  Begin BLE strengthening, balance,     Electronically signed by:  Richa Ritter PT , DPT , [address] [place] [address]

## 2019-05-14 NOTE — PROGRESS NOTES
Outpatient Physical Therapy    Phone: [phone] Fax: [phone]    Physical Therapy Progress Note  Date: 2019        Patient Name:  Cortez Schmidt    :  1955  MRN: [id number]  Restrictions/Precautions:      Medical/Treatment Diagnosis Information:  Diagnosis: Z98.1 (ICD-10-CM) - Arthrodesis status, M43.17 (ICD-10-CM) - Spondylolisthesis, lumbosacral region  Treatment Diagnosis: Decrease core strength, difficulty walking, imbalance   Insurance/Certification information:  PT Insurance Information: Medicare  Physician Information:  Referring Practitioner: Lanette Leblanc MD   Plan of care signed (Y/N): faxed   Visit# / total visits:  10  Pain level: 0/10     Time Period for Report: 19 to 19  Cancels/No-shows to date:  0    Plan of Care/Treatment to date:  x? Therapeutic Exercise      x? Modalities:  x? Therapeutic Activity        ? Ultrasound    x? Gait Training        ? Cervical Traction   x? Neuromuscular Re-education      ? Cold/hotpack    x? Instruction in HEP        ? Lumbar Traction  x Manual Therapy        ? Electrical Stimulation            ? Aquatic Therapy        ? Iontophoresis            ? Lymphedema management  ? Women's Health     Other:  ? Vestibular Rehab        ?    ?  Needed                        Significant Findings At Last Visit/Comments:    Subjective:  Subjective  Subjective: Patient reports that since starting therapy feels like he has been getting stronger in the abominal muscles.           Objective:  PROM RLE (degrees)  RLE PROM: WFL  PROM LLE (degrees)  LLE PROM: WFL  AROM LLE (degrees)  LLE General AROM: Limited ankle DF by 50%   AROM RLE (degrees)  RLE AROM: WFL  Strength RLE  R Hip Flexion: 4+/5  R Hip ABduction: 4+/5  R Hip ADduction: 4+/5  R Knee Flexion: 5/5  R Knee Extension: 5/5  Strength LLE  L Hip Flexion: 4+/5  L Hip Extension: 4+/5  L Hip ABduction: 4+/5  L Hip ADduction: 4+/5  L Hip Internal Rotation: 4+/5  L Hip External Rotation: 4+/5  L Knee Flexion: 4+/5  L Knee Extension: 4+/5  L Ankle Dorsiflexion: 2-/5  L Ankle Plantar Flexion: 2-/5  L Ankle Inversion: 2+/5  L Ankle Eversion: 2+/5  Tone RLE  RLE Tone: Normotonic  Tone LLE  LLE Tone: Normotonic     Sensation  Additional Comments: parathesia to BLE's and Feet S/P surgeries and Neuropathy           Assessment:  Conditions Requiring Skilled Therapeutic Intervention  Body structures, Functions, Activity limitations: Decreased functional mobility , Decreased high-level IADLs, Decreased balance, Decreased strength, Decreased coordination, Decreased sensation, Decreased endurance  Assessment: Patient has completed 10 treatment sessions of skilled PT services. Patient improved with noteable BLE strength gains. However, he continues have difficulty with dynamic balance and activity tolerance. Patient will continue to benefit from skilled PT services to further improve core strength and dynamic stability. Treatment Diagnosis: Decrease core strength, difficulty walking, imbalance     Plan:   Plan  Times per week: 2x  Plan weeks: 6 weeks     Progress towards goals:    Long term goals  Long term goal 1: Patient to improve standing tolerance to >=10 min to increase functional activities. -up 10 min with discomfort. -partially met   Long term goal 2: Patient improve B LE strength to +4/5 to assist with standing balance. -partially met  Long term goal 4: Patient to be able to sleep 5-6hrs a night w/o interruption - partially met     Current Frequency/Duration:  # Days per week: ? 1 day # Weeks: ? 1 week ? 4 weeks      x? 2 days   ? 2 weeks ? 5 weeks      ? 3 days   ? 3 weeks x? 6 weeks     Rehab Potential: ? Excellent x? Good ? Fair  ? Poor     Goal Status:  ? Achieved x? Partially Achieved  ? Not Achieved     Patient Status: ? Continue per initial plan of Care     ? Patient now discharged     x?  Additional visits requested, Please re-certify for additional visits:      Requested frequency/duration:

## 2019-05-16 ENCOUNTER — HOSPITAL ENCOUNTER (OUTPATIENT)
Dept: PHYSICAL THERAPY | Age: 64
Setting detail: THERAPIES SERIES
Discharge: HOME | End: 2019-05-16
Payer: MEDICARE

## 2019-05-16 ENCOUNTER — HOSPITAL ENCOUNTER (OUTPATIENT)
Age: 64
Discharge: HOME | End: 2019-05-16
Payer: MEDICARE

## 2019-05-16 LAB
INR BLD: 2.14 (ref 0.86–1.14)
PROTHROMBIN TIME: 24.4 SEC (ref 9.8–13)

## 2019-05-16 PROCEDURE — 97110 THERAPEUTIC EXERCISES: CPT

## 2019-05-16 PROCEDURE — 85610 PROTHROMBIN TIME: CPT

## 2019-05-16 PROCEDURE — 36415 COLL VENOUS BLD VENIPUNCTURE: CPT

## 2019-05-21 ENCOUNTER — HOSPITAL ENCOUNTER (OUTPATIENT)
Dept: PHYSICAL THERAPY | Age: 64
Setting detail: THERAPIES SERIES
Discharge: HOME | End: 2019-05-21
Payer: MEDICARE

## 2019-05-21 PROCEDURE — 97110 THERAPEUTIC EXERCISES: CPT

## 2019-05-21 NOTE — FLOWSHEET NOTE
10  LPD   High Row  Mid Row5/21 performed standing    Cable Column Seated: anti-rotation press x 15  3 pl 5/21 performed standing  5/14 not performed    TG   5/14 not performed      Add SLR next session                             AquaticTherapy Dates of Service:   Aquatic Visits Exercises/Activities:   Transfers:          % Immersion:            Ambulation:   UE Exercises:       Forwards    Shoulder Shrugs      Lateral    Shoulder Circles      Retro    Scapular Retraction       Marching   Push Downs       Cariocas   Punching     Jog    Rowing     Multifidi walkouts with paddle   Elbow Flex/Ext       Shldr Flex/Ext       [employer] aBd/aDd    LE Exercises:  Shldr Horiz aBd/aDd    HR/TR  Shldr IR/ER    Marches  Arm Circles    Squats   PNF Diagonals    Hamstring Curls  Wall Push Ups    Hip Flexion (SLR)  Figure 8's    Hip aBduction (SLR)  Bilateral Pull Downs    Hip Extension (SLR)       Hip aDduction (SLR)      Hip Circles  Functional:    Hip IR/Er  Step up forward    TrA Set   Step up lateral     Pelvic Tilts   Step down     Fig 8's   Lunges Forward    LE PNF  Lunges Retro      Lunges Lateral     Balance:   Lower ab curl with noodle     SLS        Tandem Stance       NBOS eyes open  Seated:     NBOS eyes closed  Ankle pumps     Hand to Opposite Knee  Ankle Circles     Fwd Step ups to SLS  Knee Flex/Ext    Lateral Step ups to SLS  Hip aBd/aDd    Stop/Go Gait   Bicycle       Ankle DF/PF      Ankle Inv/Ev    Stretching:       Gastroc/Soleus      Hamstring   Noodle:     Knee Flex Stretch  Leg Press    Piriformis   Noodle Hang at [employer] [place]    Hip Flexor  Noodle Hang Deep Water    SKTC  Noodle Bicycle     DKTC       ITB      Quad  Deep Water:    Mid Back   Jog    UT  Jumping Jacks    Post Shoulder  Heel to Toe    Ladder Pull  Hand to Opposite Knee    Pec Stretch  Rocking Horse      [employer] Skier          Cervical:   Other:     AROM Flexion      AROM Extension      AROM Side Bending      AROM Rotation      Chin Tucks      Chin Nods        Aquatic Abbreviation Key  B= Belt DB= Dumbells T= Theratube   H= Hydrotone N= Noodles W= Weights   P= Paddles S= Speedo equipment K= Kickboard     Other Therapeutic Activities: 4/9/19 Pt was educated on PT POC, Diagnosis, Prognosis, pathomechanics as well as frequency and duration of scheduling future physical therapy appointments. Time was also taken on this day to answer all patient questions and participation in PT. Home Exercise Program:    5/15: pt issued HO for mid row, high rows, and LPD - pt declined TB, states he already owns them   4/9/19 The following exercises were performed and added to the pt's home program (educated on appropriate frequency, intensity and duration etc.): Encouraged patient to continue [address] [facility].      Manual Treatments:      Modalities:   5/21 x 5 min 5/9, 5/7,5/1,4/30, x10 min 4/16 : MHP to low  Back with bolster underneath BLEs x 12 min  4/11 MHP to low  Back with bolster underneath BLEs x 10 min     Timed Code Treatment Minutes:  29    Total Treatment Minutes:  37    Treatment/Activity Tolerance:  [x] Patient tolerated treatment well [] Patient limited by fatigue  [] Patient limited by pain  [] Patient limited by other medical complications  [] Other:     Prognosis: [x] Good [] Fair  [] Poor    Patient Requires Follow-up: [x] Yes  [] No    Plan:   [x] Continue per plan of care [] Alter current plan (see comments)  [] Plan of care initiated [] Hold pending MD visit [] Discharge    Plan for Next Session:  Begin BLE strengthening, balance,     Electronically signed by:  Maurilio Ahumada, PT , DPT

## 2019-05-30 ENCOUNTER — HOSPITAL ENCOUNTER (OUTPATIENT)
Dept: PHYSICAL THERAPY | Age: 64
Setting detail: THERAPIES SERIES
Discharge: HOME | End: 2019-05-30
Payer: MEDICARE

## 2019-05-30 PROCEDURE — 97110 THERAPEUTIC EXERCISES: CPT
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
## 2019-06-04 ENCOUNTER — HOSPITAL ENCOUNTER (OUTPATIENT)
Dept: PHYSICAL THERAPY | Age: 64
Setting detail: THERAPIES SERIES
Discharge: HOME | End: 2019-06-04
Payer: MEDICARE

## 2019-06-04 PROCEDURE — 97110 THERAPEUTIC EXERCISES: CPT

## 2019-06-04 NOTE — FLOWSHEET NOTE
support   4/30 Had difficulty with standing on   5/14 not performed     Airex  5/9 not performed     5/7 not performed          Mat  hold               Seated: 5 pl 2 x 10  LPD   High Row  Mid Row6/3 performed seated   5/21 performed standing    Cable Column Seated: anti-rotation press x 20  3 pl 6/3  Performed seated  5/21 performed standing  5/14 not performed    TG  2x10 with OH Lift green weighted ball  5/14 not performed                                  AquaticTherapy Dates of Service:   Aquatic Visits Exercises/Activities:   Transfers:          % Immersion:            Ambulation:   UE Exercises:       Forwards    Shoulder Shrugs      Lateral    Shoulder Circles      Retro    Scapular Retraction       Marching   Push Downs       Cariocas   Punching     Jog    Rowing     Multifidi walkouts with paddle   Elbow Flex/Ext       Shldr Flex/Ext       [employer] aBd/aDd    LE Exercises:  Shldr Horiz aBd/aDd    HR/TR  Shldr IR/ER    Marches  Arm Circles    Squats   PNF Diagonals    Hamstring Curls  Wall Push Ups    Hip Flexion (SLR)  Figure 8's    Hip aBduction (SLR)  Bilateral Pull Downs    Hip Extension (SLR)       Hip aDduction (SLR)      Hip Circles  Functional:    Hip IR/Er  Step up forward    TrA Set   Step up lateral     Pelvic Tilts   Step down     Fig 8's   Lunges Forward    LE PNF  Lunges Retro      Lunges Lateral     Balance:   Lower ab curl with noodle     SLS        Tandem Stance       NBOS eyes open  Seated:     NBOS eyes closed  Ankle pumps     Hand to Opposite Knee  Ankle Circles     Fwd Step ups to SLS  Knee Flex/Ext    Lateral Step ups to SLS  Hip aBd/aDd    Stop/Go Gait   Bicycle       Ankle DF/PF      Ankle Inv/Ev    Stretching:       Gastroc/Soleus      Hamstring   Noodle:     Knee Flex Stretch  Leg Press    Piriformis   Noodle Hang at [employer]    Hip Flexor  Noodle Hang Deep Water    SKTC  Noodle Bicycle     DKTC       ITB      Quad  Deep Water:    Mid Back   Jog    UT  Jumping Jacks    Post Shoulder  Heel

## 2019-06-06 ENCOUNTER — HOSPITAL ENCOUNTER (OUTPATIENT)
Dept: PHYSICAL THERAPY | Age: 64
Setting detail: THERAPIES SERIES
Discharge: HOME | End: 2019-06-06
Payer: MEDICARE

## 2019-06-06 PROCEDURE — 97110 THERAPEUTIC EXERCISES: CPT

## 2019-06-06 NOTE — FLOWSHEET NOTE
Physical Therapy Daily/Aquatic Flow Sheet   Date:  2019    Patient Name:  Tash Amaya    :  1955  MRN: [id number]  Restrictions/Precautions: Arthrodesis (reconstructive back surgery) L4-5  Medical/Treatment Diagnosis Information:   · Diagnosis: Z98.1 (ICD-10-CM) - Arthrodesis status, M43.17 (ICD-10-CM) - Spondylolisthesis, lumbosacral region  · Treatment Diagnosis: Decrease core strength, difficulty walking, imbalance     Tracking Information:  Physician Information Referring Practitioner: Filomena Grace MD      Plan of Care Sent Date:  Signed Received: 4/10, PN 5/15   Visit Count / Total Visits   + 3/12    Insurance Approved Visits  / Rony Lee Approved Dates:     Insurance Information PT Insurance Information: Medicare  *If only certain CPT codes approved use smart phrase CPT calculator . OPPTINSURANCECPTCODECALCULATOR   Progress Note/G-codes   []  Yes  []  No Next Due:      Pain level: 3/10 low back ; 5/10 arms     Subjective:  Patient reports that his back soreness and pain are better today . Reports that did the appropriate amount of exercise. Patient reports that he soreness in low back muscles from  continued healing and from last session.          Objective:     Observation:  Wears lumbar soft brace and Rolling walker (Bariatric)  Test measurements:    Land-based Therapy Dates of Service:    Land-based Visits Exercises/Activities:  Exercise/Equipment Resistance/Repetitions Other comments   Nustep Level 6, arm 12 seat 13  X 5 min    IB/ HR  Seated HSS    1 x30\" (standing)  Hip flexor 1x30\"  not performed   HSS standing   Balance activities      // bars  Airex:  Neutral RAFAT 2 x 15s  1 set w/ Oh lift to 90 deg   x10            Used UE support    Had difficulty with standing on    not performed     Airex   not performed      not performed          Mat  hold               Seated: 5 pl 2 x 10  LPD   High Row  Mid Row6/3 performed seated    performed Rotation      Chin Tucks      Chin Nods        Aquatic Abbreviation Key  B= Belt DB= Dumbells T= Theratube   H= Hydrotone N= Noodles W= Weights   P= Paddles S= Speedo equipment K= Kickboard     Other Therapeutic Activities: 4/9/19 Pt was educated on PT POC, Diagnosis, Prognosis, pathomechanics as well as frequency and duration of scheduling future physical therapy appointments. Time was also taken on this day to answer all patient questions and participation in PT. Home Exercise Program:    5/15: pt issued HO for mid row, high rows, and LPD - pt declined TB, states he already owns them   4/9/19 The following exercises were performed and added to the pt's home program (educated on appropriate frequency, intensity and duration etc.): Encouraged patient to continue [address] [facility].      Manual Treatments:      Modalities: 6/3, 5/30: MHP to low  Back with bolster underneath BLEs  5/21 x 5 min 5/9, 5/7,5/1,4/30, x10 min 4/16 : MHP to low  Back with bolster underneath BLEs x 12 min  4/11 MHP to low  Back with bolster underneath BLEs x 10 min     Timed Code Treatment Minutes:  31    Total Treatment Minutes: 31    Treatment/Activity Tolerance:  [x] Patient tolerated treatment well [] Patient limited by fatigue  [] Patient limited by pain  [] Patient limited by other medical complications  [] Other:  Pt  Tolerated mixed of standing and seated with rest breaks     Prognosis: [x] Good [] Fair  [] Poor    Patient Requires Follow-up: [x] Yes  [] No    Plan:   [x] Continue per plan of care [] Alter current plan (see comments)  [] Plan of care initiated [] Hold pending MD visit [] Discharge    Plan for Next Session:  Begin BLE strengthening, balance,     Electronically signed by:  Jose Alfredo Radford, PT , DPT [id number]

## 2019-06-11 ENCOUNTER — APPOINTMENT (OUTPATIENT)
Dept: PHYSICAL THERAPY | Age: 64
End: 2019-06-11
Payer: MEDICARE

## 2019-06-13 ENCOUNTER — APPOINTMENT (OUTPATIENT)
Dept: PHYSICAL THERAPY | Age: 64
End: 2019-06-13
Payer: MEDICARE

## 2019-06-18 ENCOUNTER — HOSPITAL ENCOUNTER (OUTPATIENT)
Dept: PHYSICAL THERAPY | Age: 64
Setting detail: THERAPIES SERIES
Discharge: HOME | End: 2019-06-18
Payer: MEDICARE

## 2019-06-18 PROCEDURE — 97110 THERAPEUTIC EXERCISES: CPT

## 2019-06-18 NOTE — FLOWSHEET NOTE
Physical Therapy Daily/Aquatic Flow Sheet   Date:  2019    Patient Name:  Mary Yu    :  1955  MRN: [id number]  Restrictions/Precautions: Arthrodesis (reconstructive back surgery) L4-5  Medical/Treatment Diagnosis Information:   · Diagnosis: Z98.1 (ICD-10-CM) - Arthrodesis status, M43.17 (ICD-10-CM) - Spondylolisthesis, lumbosacral region  · Treatment Diagnosis: Decrease core strength, difficulty walking, imbalance     Tracking Information:  Physician Information Referring Practitioner: Umberto Rowe MD      Plan of Care Sent Date:  Signed Received: 4/10, PN 5/15   Visit Count / Total Visits   +     Insurance Approved Visits  / Angela Hilton Approved Dates:     Insurance Information PT Insurance Information: Medicare  *If only certain CPT codes approved use smart phrase CPT calculator . OPPTINSURANCECPTCODECALCULATOR   Progress Note/G-codes   []  Yes  [x]  No Next Due:      Pain level: 4/10 low back ; 5/10 arms     Subjective:  Patient reports that shoulders and back are hurting today. Thinks the weather is playing a role in the aches and over the weekend he was up in a chair for Father's Day and playing cards. Reports not being able to make next session due to seeing pain doctor.        Objective:     Observation:  Wears lumbar soft brace and Rolling walker (Bariatric)  Test measurements:    Land-based Therapy Dates of Service:    Land-based Visits Exercises/Activities:  Exercise/Equipment Resistance/Repetitions Other comments   Nustep Level 6, arm 12 seat 13  X 5 min    IB/ HR  Seated HSS    1 x30\" (standing)  Hip flexor 1x30\"  not performed   HSS standing   Balance activities      // bars  Tandem stance 2x15\"   Airex:  Neutral RAFAT 2 x 15s  1 set w/ Oh lift to 90 deg   x10               Used UE support    Had difficulty with standing on    not performed     Airex   not performed      not performed          Mat  hold               Seated: 5 pl 2 x 10  LPD   High Row  Mid Row  6/3 performed seated   5/21 performed standing   6/18: V/T for scap squeeze and not to bend at hips   Cable Column Seated: anti-rotation press 2 x 10, 5\" holds 3 pl 6/3  Performed seated  5/21 performed standing  5/14 not performed    TG  2x10 with OH Lift red weighted ball  5/14 not performed      Swiss ball Lumbar extension 3 x 10\" holds                            AquaticTherapy Dates of Service:   Aquatic Visits Exercises/Activities:   Transfers:          % Immersion:            Ambulation:   UE Exercises:       Forwards    Shoulder Shrugs      Lateral    Shoulder Circles      Retro    Scapular Retraction       Marching   Push Downs       Cariocas   Punching     Jog    Rowing     Multifidi walkouts with paddle   Elbow Flex/Ext       Shldr Flex/Ext       [employer] aBd/aDd    LE Exercises:  Shldr Horiz aBd/aDd    HR/TR  Shldr IR/ER    Marches  Arm Circles    Squats   PNF Diagonals    Hamstring Curls  Wall Push Ups    Hip Flexion (SLR)  Figure 8's    Hip aBduction (SLR)  Bilateral Pull Downs    Hip Extension (SLR)       Hip aDduction (SLR)      Hip Circles  Functional:    Hip IR/Er  Step up forward    TrA Set   Step up lateral     Pelvic Tilts   Step down     Fig 8's   Lunges Forward    LE PNF  Lunges Retro      Lunges Lateral     Balance:   Lower ab curl with noodle     SLS        Tandem Stance       NBOS eyes open  Seated:     NBOS eyes closed  Ankle pumps     Hand to Opposite Knee  Ankle Circles     Fwd Step ups to SLS  Knee Flex/Ext    Lateral Step ups to SLS  Hip aBd/aDd    Stop/Go Gait   Bicycle       Ankle DF/PF      Ankle Inv/Ev    Stretching:       Gastroc/Soleus      Hamstring   Noodle:     Knee Flex Stretch  Leg Press    Piriformis   Noodle Hang at [employer]    Hip Flexor  Noodle Hang Deep Water    SKTC  Noodle Bicycle     DKTC       ITB      Quad  Deep Water:    Mid Back   Jog    UT  Jumping Jacks    Post Shoulder  Heel to Toe    Ladder Pull  Hand to Opposite Knee    Pec Stretch Rocking Horse      [employer]          Cervical:   Other:     AROM Flexion      AROM Extension      AROM Side Bending      AROM Rotation      Chin Tucks      Chin Nods        Aquatic Abbreviation Key  B= Belt DB= Dumbells T= Theratube   H= Hydrotone N= Noodles W= Weights   P= Paddles S= Speedo equipment K= Kickboard     Other Therapeutic Activities: 4/9/19 Pt was educated on PT POC, Diagnosis, Prognosis, pathomechanics as well as frequency and duration of scheduling future physical therapy appointments. Time was also taken on this day to answer all patient questions and participation in PT. Home Exercise Program:    5/15: pt issued HO for mid row, high rows, and LPD - pt declined TB, states he already owns them   4/9/19 The following exercises were performed and added to the pt's home program (educated on appropriate frequency, intensity and duration etc.): Encouraged patient to continue [address]. Manual Treatments:      Modalities: 6/3, 5/30: MHP to low  Back with bolster underneath BLEs  5/21 x 5 min 5/9, 5/7,5/1,4/30, x10 min 4/16 : MHP to low  Back with bolster underneath BLEs x 12 min  4/11 MHP to low  Back with bolster underneath BLEs x 10 min     Timed Code Treatment Minutes:  40    Total Treatment Minutes: 40    Treatment/Activity Tolerance:  [x] Patient tolerated treatment well [] Patient limited by fatigue  [] Patient limited by pain  [] Patient limited by other medical complications  [] Other:  Pt tolerated incorporating standing into calble column exercises with rest break between sets. Legs began to fatigue and shake with standing. Pt requested performing balance training first next session so he is not fatigued after strenghtening.     Prognosis: [x] Good [] Fair  [] Poor    Patient Requires Follow-up: [x] Yes  [] No    Plan:   [x] Continue per plan of care [] Alter current plan (see comments)  [] Plan of care initiated [] Hold pending MD visit [] Discharge    Plan for Next

## 2019-06-20 ENCOUNTER — HOSPITAL ENCOUNTER (OUTPATIENT)
Age: 64
Discharge: HOME | End: 2019-06-20
Payer: MEDICARE

## 2019-06-20 ENCOUNTER — APPOINTMENT (OUTPATIENT)
Dept: PHYSICAL THERAPY | Age: 64
End: 2019-06-20
Payer: MEDICARE

## 2019-06-20 LAB
INR BLD: 1.46 (ref 0.86–1.14)
PROTHROMBIN TIME: 16.7 SEC (ref 9.8–13)

## 2019-06-20 PROCEDURE — 36415 COLL VENOUS BLD VENIPUNCTURE: CPT

## 2019-06-20 PROCEDURE — 85610 PROTHROMBIN TIME: CPT

## 2019-06-24 ENCOUNTER — ANTI-COAG VISIT (OUTPATIENT)
Dept: INTERNAL MEDICINE CLINIC | Age: 64
End: 2019-06-24

## 2019-06-25 ENCOUNTER — HOSPITAL ENCOUNTER (OUTPATIENT)
Dept: PHYSICAL THERAPY | Age: 64
Setting detail: THERAPIES SERIES
Discharge: HOME | End: 2019-06-25
Payer: MEDICARE

## 2019-06-25 PROCEDURE — 97110 THERAPEUTIC EXERCISES: CPT

## 2019-06-25 NOTE — FLOWSHEET NOTE
Physical Therapy Daily/Aquatic Flow Sheet   Date:  2019    Patient Name:  Tash Amaya    :  1955  MRN: [id number]  Restrictions/Precautions: Arthrodesis (reconstructive back surgery) L4-5  Medical/Treatment Diagnosis Information:   · Diagnosis: Z98.1 (ICD-10-CM) - Arthrodesis status, M43.17 (ICD-10-CM) - Spondylolisthesis, lumbosacral region  · Treatment Diagnosis: Decrease core strength, difficulty walking, imbalance     Tracking Information:  Physician Information Referring Practitioner: Filomena Grace MD      Plan of Care Sent Date:  Signed Received: 4/10, PN 5/15   Visit Count / Total Visits   +     Insurance Approved Visits  / bismark Gutierrez Approved Dates:     Insurance Information PT Insurance Information: Medicare  *If only certain CPT codes approved use smart phrase CPT calculator . OPPTINSURANCECPTCODECALCULATOR   Progress Note/G-codes   []  Yes  [x]  No Next Due:      Pain level: 4/10 low back ; 5/10 arms     Subjective:  Patient reports that his back has been a little sore but about the same . Patient reports that shoulders and back are hurting today. Thinks the weather is playing a role in the aches and over the weekend he was up in a chair for Father's Day and playing cards. Reports not being able to make next session due to seeing pain doctor.        Objective:     Observation:  Wears lumbar soft brace and Rolling walker (Bariatric)  Test measurements:    Land-based Therapy Dates of Service:    Land-based Visits Exercises/Activities:  Exercise/Equipment Resistance/Repetitions Other comments   Nustep Level 6, arm 12 seat 13  X 5 min    IB/ HR  Seated HSS    1 x30\" (standing)  Hip flexor 1x30\"  not performed   HSS standing   Balance activities      // bars  Tandem stance 2x15\"   Airex:  NBOS 2 x 15s  2 set w/ Oh lift to 90 deg   x10               Used UE support    Had difficulty with standing on    not performed     Airex   not performed      not performed          Mat  hold               Seated: 5 pl 2 x 10  LPD   High Row  Mid Row  6/3 performed seated   5/21 performed standing   6/18: V/T for scap squeeze and not to bend at hips   Cable Column Seated: anti-rotation press 2 x 10, 5\" holds 3 pl 6/3  Performed seated  5/21 performed standing  5/14 not performed    TG  2x10 with OH Lift red weighted ball  5/14 not performed      Swiss ball Lumbar extension 3 x 10\" holds 6/25 not performed                            AquaticTherapy Dates of Service:   Aquatic Visits Exercises/Activities:   Transfers:          % Immersion:            Ambulation:   UE Exercises:       Forwards    Shoulder Shrugs      Lateral    Shoulder Circles      Retro    Scapular Retraction       Marching   Push Downs       Cariocas   Punching     Jog    Rowing     Multifidi walkouts with paddle   Elbow Flex/Ext       Shldr Flex/Ext       [employer] aBd/aDd    LE Exercises:  Shldr Horiz aBd/aDd    HR/TR  Shldr IR/ER    Marches  Arm Circles    Squats   PNF Diagonals    Hamstring Curls  Wall Push Ups    Hip Flexion (SLR)  Figure 8's    Hip aBduction (SLR)  Bilateral Pull Downs    Hip Extension (SLR)       Hip aDduction (SLR)      Hip Circles  Functional:    Hip IR/Er  Step up forward    TrA Set   Step up lateral     Pelvic Tilts   Step down     Fig 8's   Lunges Forward    LE PNF  Lunges Retro      Lunges Lateral     Balance:   Lower ab curl with noodle     SLS        Tandem Stance       NBOS eyes open  Seated:     NBOS eyes closed  Ankle pumps     Hand to Opposite Knee  Ankle Circles     Fwd Step ups to SLS  Knee Flex/Ext    Lateral Step ups to SLS  Hip aBd/aDd    Stop/Go Gait   Bicycle       Ankle DF/PF      Ankle Inv/Ev    Stretching:       Gastroc/Soleus      Hamstring   Noodle:     Knee Flex Stretch  Leg Press    Piriformis   Noodle Hang at [employer]    Hip Flexor  Noodle Hang Deep Water    SKTC  Noodle Bicycle     DKTC       ITB      Quad  Deep Water:    Mid Back   Jog    UT  Jumping MARTTILA Post Shoulder  Heel to Toe    Ladder Pull  Hand to Opposite Knee    Pec Stretch  Rocking Horse      [employer] Skier          Cervical:   Other:     AROM Flexion      AROM Extension      AROM Side Bending      AROM Rotation      Chin Tucks      Chin Nods        Aquatic Abbreviation Key  B= Belt DB= Dumbells T= Theratube   H= Hydrotone N= Noodles W= Weights   P= Paddles S= Speedo equipment K= Kickboard     Other Therapeutic Activities: 4/9/19 Pt was educated on PT POC, Diagnosis, Prognosis, pathomechanics as well as frequency and duration of scheduling future physical therapy appointments. Time was also taken on this day to answer all patient questions and participation in PT. Home Exercise Program:    5/15: pt issued HO for mid row, high rows, and LPD - pt declined TB, states he already owns them   4/9/19 The following exercises were performed and added to the pt's home program (educated on appropriate frequency, intensity and duration etc.): Encouraged patient to continue [address] [facility]. Manual Treatments:      Modalities: 6/25, 6/3, 5/30: MHP to low  Back with bolster underneath BLEs  5/21 x 5 min 5/9, 5/7,5/1,4/30, x10 min 4/16 : MHP to low  Back with bolster underneath BLEs x 12 min  4/11 MHP to low  Back with bolster underneath BLEs x 10 min     Timed Code Treatment Minutes:  29    Total Treatment Minutes: 29    Treatment/Activity Tolerance:  [x] Patient tolerated treatment well [] Patient limited by fatigue  [] Patient limited by pain  [] Patient limited by other medical complications  [] Other:  Patient tolerated increased standing exercises with minimal back discomfort. Pt responded well to the order of exercise change.       Prognosis: [x] Good [] Fair  [] Poor    Patient Requires Follow-up: [x] Yes  [] No    Plan:   [x] Continue per plan of care [] Alter current plan (see comments)  [] Plan of care initiated [] Hold pending MD visit [] Discharge    Plan for Next Session:  Begin BLE strengthening, balance,     Electronically signed by:  Awa Sierra, PT , DPT  Therapist was present, directed the patient's care, made skilled judgement, and was responsible for assessment and treatment of the patient.

## 2019-06-27 ENCOUNTER — HOSPITAL ENCOUNTER (OUTPATIENT)
Dept: PHYSICAL THERAPY | Age: 64
Setting detail: THERAPIES SERIES
Discharge: HOME | End: 2019-06-27
Payer: MEDICARE

## 2019-06-27 NOTE — FLOWSHEET NOTE
Physical Therapy  Cancellation/No-show Note  Patient Name:  Cachorro Aggarwal  :  1955   Date:  2019  Cancelled visits to date: 1  No-shows to date: 0    Patient status for today's appointment patient:  [x]  Cancelled 19  []  Rescheduled appointment  []  No-show     Reason given by patient:  [x]  Patient ill  []  Conflicting appointment  []  No transportation    []  Conflict with work  []  No reason given  []  Other:     Comments:      Phone call information:   []  Phone call made today to patient at _ time at number provided:      []  Patient answered, conversation as follows:    []  Patient did not answer, message left as follows:  [x]  Phone call not made today    Electronically signed by:  Cami Loomis PT

## 2019-07-02 ENCOUNTER — HOSPITAL ENCOUNTER (OUTPATIENT)
Dept: PHYSICAL THERAPY | Age: 64
Setting detail: THERAPIES SERIES
Discharge: HOME | End: 2019-07-02
Payer: MEDICARE

## 2019-07-02 PROCEDURE — 97110 THERAPEUTIC EXERCISES: CPT

## 2019-07-02 NOTE — FLOWSHEET NOTE
Tucks      Chin Nods        Aquatic Abbreviation Key  B= Belt DB= Dumbells T= Theratube   H= Hydrotone N= Noodles W= Weights   P= Paddles S= Speedo equipment K= Kickboard     Other Therapeutic Activities: 4/9/19 Pt was educated on PT POC, Diagnosis, Prognosis, pathomechanics as well as frequency and duration of scheduling future physical therapy appointments. Time was also taken on this day to answer all patient questions and participation in PT. Home Exercise Program:    5/15: pt issued HO for mid row, high rows, and LPD - pt declined TB, states he already owns them   4/9/19 The following exercises were performed and added to the pt's home program (educated on appropriate frequency, intensity and duration etc.): Encouraged patient to continue [address]. Manual Treatments:      Modalities: 7/1 Deferred this date  6/25, 6/3, 5/30: MHP to low  Back with bolster underneath BLEs  5/21 x 5 min 5/9, 5/7,5/1,4/30, x10 min 4/16 : MHP to low  Back with bolster underneath BLEs x 12 min  4/11 MHP to low  Back with bolster underneath BLEs x 10 min     Timed Code Treatment Minutes:  30    Total Treatment Minutes: 30    Treatment/Activity Tolerance:  [x] Patient tolerated treatment well [] Patient limited by fatigue  [] Patient limited by pain  [] Patient limited by other medical complications  [] Other:  Patient tolerated increased standing exercises with minimal back discomfort. Pt responded well to the order of exercise change. Prognosis: [x] Good [] Fair  [] Poor    Patient Requires Follow-up: [x] Yes  [] No    Plan:   [x] Continue per plan of care [] Alter current plan (see comments)  [] Plan of care initiated [] Hold pending MD visit [] Discharge    Plan for Next Session:  Begin BLE strengthening, balance,     Electronically signed by:  Cecilia Gray, PT , DPT  Therapist was present, directed the patient's care, made skilled judgement, and was responsible for assessment and treatment of the patient.

## 2019-07-09 ENCOUNTER — HOSPITAL ENCOUNTER (OUTPATIENT)
Dept: PHYSICAL THERAPY | Age: 64
Setting detail: THERAPIES SERIES
Discharge: HOME | End: 2019-07-09
Payer: MEDICARE

## 2019-07-09 PROCEDURE — 97110 THERAPEUTIC EXERCISES: CPT

## 2019-07-09 NOTE — FLOWSHEET NOTE
Physical Therapy Daily/Aquatic Flow Sheet   Date:  2019    Patient Name:  Bisi Freedman    :  1955  MRN: [id number]  Restrictions/Precautions: Arthrodesis (reconstructive back surgery) L4-5  Medical/Treatment Diagnosis Information:   · Diagnosis: Z98.1 (ICD-10-CM) - Arthrodesis status, M43.17 (ICD-10-CM) - Spondylolisthesis, lumbosacral region  · Treatment Diagnosis: Decrease core strength, difficulty walking, imbalance     Tracking Information:  Physician Information Referring Practitioner: Neil Cosme MD      Plan of Care Sent Date:  Signed Received: 4/10, PN 5/15   Visit Count / Total Visits   +     Insurance Approved Visits  / Forest Guerrero Approved Dates:     Insurance Information PT Insurance Information: Medicare  *If only certain CPT codes approved use smart phrase CPT calculator . OPPTINSURANCECPTCODECALCULATOR   Progress Note/G-codes   []  Yes  [x]  No Next Due:      Pain level: 4/10 low back ; Subjective:  Pt reports that he didn't get much sleep last night . States that his back is not doing too bad.       Objective:     Observation:  Wears lumbar soft brace and Rolling walker (Bariatric)  Test measurements:    Land-based Therapy Dates of Service:    Land-based Visits Exercises/Activities:  Exercise/Equipment Resistance/Repetitions Other comments   Nustep Level 6, arm 12 seat 13  X 5 min    IB/ HR  Seated HSS    1 x30\" (standing)  Hip flexor 1x30\"   not performed   HSS standing   Balance activities      // bars    Tandem stance 2x15\"   Airex:  Half stance on R/L on Airex 2 x15\"     x10        Fwd step ups 6\" x 10 B       Used UE support    Had difficulty with standing on    not performed     Airex   not performed      not performed          Mat  hold               Seated: 5 pl 2 x 10  LPD   High Row  Mid Row not performed   6/3 performed seated    performed standing   : V/T for scap squeeze and not to bend at hips   Cable Column l Bending      AROM Rotation      Chin Tucks      Chin Nods        Aquatic Abbreviation Key  B= Belt DB= Dumbells T= Theratube   H= Hydrotone N= Noodles W= Weights   P= Paddles S= Speedo equipment K= Kickboard     Other Therapeutic Activities: 4/9/19 Pt was educated on PT POC, Diagnosis, Prognosis, pathomechanics as well as frequency and duration of scheduling future physical therapy appointments. Time was also taken on this day to answer all patient questions and participation in PT. Home Exercise Program:    5/15: pt issued HO for mid row, high rows, and LPD - pt declined TB, states he already owns them   4/9/19 The following exercises were performed and added to the pt's home program (educated on appropriate frequency, intensity and duration etc.): Encouraged patient to continue [address]. Manual Treatments:      Modalities: 7/1 Deferred this date  6/25, 6/3, 5/30: MHP to low  Back with bolster underneath BLEs  5/21 x 5 min 5/9, 5/7,5/1,4/30, x10 min 4/16 : MHP to low  Back with bolster underneath BLEs x 12 min  4/11 MHP to low  Back with bolster underneath BLEs x 10 min     Timed Code Treatment Minutes:  29    Total Treatment Minutes: 29    Treatment/Activity Tolerance:  [x] Patient tolerated treatment well [] Patient limited by fatigue  [] Patient limited by pain  [] Patient limited by other medical complications  [] Other:  Patient tolerated increased activity with rest breaks.  Still has limitations with low back due to low muscular  endurance     Prognosis: [x] Good [] Fair  [] Poor    Patient Requires Follow-up: [x] Yes  [] No    Plan:   [x] Continue per plan of care [] Alter current plan (see comments)  [] Plan of care initiated [] Hold pending MD visit [] Discharge    Plan for Next Session:  Begin BLE strengthening, balance,     Electronically signed by:  Gisele Hines, PT , DPT  Therapist was present, directed the patient's care, made skilled judgement, and was responsible for assessment and treatment of the patient.

## 2019-07-11 ENCOUNTER — HOSPITAL ENCOUNTER (OUTPATIENT)
Dept: PHYSICAL THERAPY | Age: 64
Setting detail: THERAPIES SERIES
Discharge: HOME | End: 2019-07-11
Payer: MEDICARE

## 2019-07-11 PROCEDURE — 97110 THERAPEUTIC EXERCISES: CPT

## 2019-07-11 NOTE — FLOWSHEET NOTE
not performed    TG   5/14 not performed       6/25 not performed    Swiss Ball  Seated:  x10  Alt UE  LAQ      Chest press   Hand ball hand to hand x 10 (Red med ball )    Venkatesh Hyman Mini-squats 2x10                   AquaticTherapy Dates of Service:   Aquatic Visits Exercises/Activities:   Transfers:          % Immersion:            Ambulation:   UE Exercises:       Forwards    Shoulder Shrugs      Lateral    Shoulder Circles      Retro    Scapular Retraction       Marching   Push Downs       Cariocas   Punching     Jog    Rowing     Multifidi walkouts with paddle   Elbow Flex/Ext       Shldr Flex/Ext       [employer] aBd/aDd    LE Exercises:  Shldr Horiz aBd/aDd    HR/TR  Shldr IR/ER    Marches  Arm Circles    Squats   PNF Diagonals    Hamstring Curls  Wall Push Ups    Hip Flexion (SLR)  Figure 8's    Hip aBduction (SLR)  Bilateral Pull Downs    Hip Extension (SLR)       Hip aDduction (SLR)      Hip Circles  Functional:    Hip IR/Er  Step up forward    TrA Set   Step up lateral     Pelvic Tilts   Step down     Fig 8's   Lunges Forward    LE PNF  Lunges Retro      Lunges Lateral     Balance:   Lower ab curl with noodle     SLS        Tandem Stance       NBOS eyes open  Seated:     NBOS eyes closed  Ankle pumps     Hand to Opposite Knee  Ankle Circles     Fwd Step ups to SLS  Knee Flex/Ext    Lateral Step ups to SLS  Hip aBd/aDd    Stop/Go Gait   Bicycle       Ankle DF/PF      Ankle Inv/Ev    Stretching:       Gastroc/Soleus      Hamstring   Noodle:     Knee Flex Stretch  Leg Press    Piriformis   Noodle Hang at [employer]    Hip Flexor  Noodle Hang Deep Water    SKTC  Noodle Bicycle     DKTC       ITB      Quad  Deep Water:    Mid Back   Jog    UT  Jumping Jacks    Post Shoulder  Heel to Toe    Ladder Pull  Hand to Opposite Knee    Pec Stretch  Rocking Horse      [employer] Skier          Cervical:   Other:     AROM Flexion      AROM Extension      AROM Side Bending      AROM Rotation      Chin Tucks      Chin Nods

## 2019-07-16 ENCOUNTER — HOSPITAL ENCOUNTER (OUTPATIENT)
Dept: PHYSICAL THERAPY | Age: 64
Setting detail: THERAPIES SERIES
Discharge: HOME | End: 2019-07-16
Payer: MEDICARE

## 2019-07-16 NOTE — FLOWSHEET NOTE
Physical Therapy  Cancellation/No-show Note  Patient Name:  Nguyen Lombardi  :  1955   Date:  2019  Cancelled visits to date: 2  No-shows to date: 0    Patient status for today's appointment patient:  [x]  Cancelled 19, 19  []  Rescheduled appointment  []  No-show     Reason given by patient:  []  Patient ill  []  Conflicting appointment  [x]  No transportation    []  Conflict with work  []  No reason given  []  Other:     Comments:      Phone call information:   []  Phone call made today to patient at _ time at number provided:      []  Patient answered, conversation as follows:    []  Patient did not answer, message left as follows:  [x]  Phone call not made today    Electronically signed by:  Shad Blanton, [facility]  Therapist was present, directed the patient's care, made skilled judgement, and was responsible for assessment and treatment of the patient.

## 2019-07-18 ENCOUNTER — HOSPITAL ENCOUNTER (OUTPATIENT)
Age: 64
Discharge: HOME | End: 2019-07-18
Payer: MEDICARE

## 2019-07-18 ENCOUNTER — HOSPITAL ENCOUNTER (OUTPATIENT)
Dept: PHYSICAL THERAPY | Age: 64
Setting detail: THERAPIES SERIES
Discharge: HOME | End: 2019-07-18
Payer: MEDICARE

## 2019-07-18 LAB
INR BLD: 2.14 (ref 0.86–1.14)
PROTHROMBIN TIME: 24.4 SEC (ref 9.8–13)

## 2019-07-18 PROCEDURE — 97110 THERAPEUTIC EXERCISES: CPT

## 2019-07-18 PROCEDURE — 36415 COLL VENOUS BLD VENIPUNCTURE: CPT

## 2019-07-18 PROCEDURE — 85610 PROTHROMBIN TIME: CPT

## 2019-07-23 ENCOUNTER — TELEPHONE (OUTPATIENT)
Dept: INTERNAL MEDICINE CLINIC | Age: 64
End: 2019-07-23

## 2019-07-23 ENCOUNTER — ANTI-COAG VISIT (OUTPATIENT)
Dept: INTERNAL MEDICINE CLINIC | Age: 64
End: 2019-07-23

## 2019-07-23 ENCOUNTER — HOSPITAL ENCOUNTER (OUTPATIENT)
Dept: PHYSICAL THERAPY | Age: 64
Setting detail: THERAPIES SERIES
Discharge: HOME | End: 2019-07-23
Payer: MEDICARE

## 2019-07-23 PROCEDURE — 97110 THERAPEUTIC EXERCISES: CPT

## 2019-07-23 PROCEDURE — 97530 THERAPEUTIC ACTIVITIES: CPT

## 2019-07-23 ASSESSMENT — PAIN DESCRIPTION - ORIENTATION: ORIENTATION: LOWER

## 2019-07-23 ASSESSMENT — PAIN SCALES - GENERAL: PAINLEVEL_OUTOF10: 4

## 2019-07-23 ASSESSMENT — PAIN DESCRIPTION - LOCATION: LOCATION: BACK

## 2019-07-23 ASSESSMENT — PAIN DESCRIPTION - PROGRESSION: CLINICAL_PROGRESSION: GRADUALLY IMPROVING

## 2019-07-23 ASSESSMENT — PAIN DESCRIPTION - DESCRIPTORS: DESCRIPTORS: SORE

## 2019-07-23 NOTE — FLOWSHEET NOTE
plan of care [] Alter current plan (see comments)  [] Plan of care initiated [] Hold pending MD visit [] Discharge    Plan for Next Session:  Begin BLE strengthening, balance, Administer Oswestry    Electronically signed by:  QUANG Munoz  Therapist was present, directed the patient's care, made skilled judgement, and was responsible for assessment and treatment of the patient.

## 2019-07-23 NOTE — PROGRESS NOTES
Gradually improving      Objective:   Strength RLE  R Hip Flexion: 4+/5  R Hip ABduction: 4+/5  R Hip Internal Rotation: 4+/5  R Hip External Rotation: 4+/5  R Knee Flexion: 5/5  R Knee Extension: 5/5  R Ankle Dorsiflexion: 4+/5  R Ankle Plantar flexion: 4+/5  R Ankle Inversion: 4+/5  R Ankle Eversion: 4+/5  Strength LLE  L Hip Flexion: 4+/5  L Hip ABduction: 4+/5  L Hip Internal Rotation: 4+/5  L Hip External Rotation: 4+/5  L Knee Flexion: 4+/5  L Knee Extension: 4+/5  L Ankle Dorsiflexion: 2-/5  L Ankle Plantar Flexion: 3+/5  L Ankle Inversion: 2+/5  L Ankle Eversion: 3-/5            Functional Outcome Measures:    *Admisinter Oswestry next session              Assessment:  Conditions Requiring Skilled Therapeutic Intervention  Body structures, Functions, Activity limitations: Decreased functional mobility , Decreased high-level IADLs, Decreased balance, Decreased strength, Decreased coordination, Decreased sensation, Decreased endurance  Assessment: Pt presents s/p arthrodesis at L3-S2 in April. Pt has had 22 PT sessions and has made improvements in trunk control on swiss ball and BLE strength to at least 4+/5 grossly, with the exception of L ankle weakness. Pt is now ambulating with walking stick, however continues to demonstrate gait deviations including forward trunk lean, L foot drop, and decreased dynamic balance. Pt continues to report difficulty sleeping and a decresaed activity tolerance due to increased LBP. Pt to benefit from further skilled PT to improve core and ankle strength, gait mechanics, and balance to promote fucntional mobility.   Treatment Diagnosis: Decrease core strength, difficulty walking, imbalance   Prognosis: Good  Decision Making: High Complexity  History: Chronic low back, DM TyII, CVA   Clinical Presentation: BLE and core muscle weakness, imbalance   REQUIRES PT FOLLOW UP: Yes    Plan:   Plan  Times per week: 2x  Plan weeks: 6 weeks   Current Treatment Recommendations: Strengthening, ADL/Self-care Training, Gait Training, Manual Therapy - Joint Manipulation, Patient/Caregiver Education & Training, ROM, Aquatics, Stair training, IADL Training, Balance Training, Functional Mobility Training, Endurance Training, Manual Therapy - Soft Tissue Mobilization, Pain Management, Modalities, Positioning, Safety Education & Training, Transfer Training     Timed Code Treatment Minutes: 30 Minutes    Progress towards goals:    Short term goals  Time Frame for Short term goals: 2 weeks   Short term goal 1: Patient to improve low back pain to 2-3/10 at worst with sitting/standing activities. PROGRESSING, 5-6/10 has to adjust to be comfortable  Long term goals  Time Frame for Long term goals : 6 weeks / DC   Long term goal 1: Patient to improve standing tolerance to >=10 min to increase functional activities. PROGRESSING, 5 min  Long term goal 2: Patient improve B LE strength to +4/5 to assist with standing balance. PROGRESSING, L ankle weakness  Long term goal 3: Patient to be independent with HEP. MET  Long term goal 4: Patient to be able to sleep 5-6hrs a night w/o interruption PROGRESSING, 4 hours    Current Frequency/Duration:  # Days per week: ? 1 day # Weeks: ? 1 week ? 4 weeks      x? 2 days   ? 2 weeks ? 5 weeks      ? 3 days   ? 3 weeks x? 6 weeks     Rehab Potential: ? Excellent x? Good ? Fair  ? Poor     Goal Status:  ? Achieved x? Partially Achieved  ? Not Achieved     Patient Status: ? Continue per initial plan of Care     ? Patient now discharged     x? Additional visits requested, Please re-certify for additional visits:      Requested frequency/duration:  2X/week for 6 weeks    Electronically signed by:  Iris Frances, [facility]  Therapist was present, directed the patient's care, made skilled judgement, and was responsible for assessment and treatment of the patient. If you have any questions or concerns, please don't hesitate to call.   Thank you for your

## 2019-07-25 ENCOUNTER — HOSPITAL ENCOUNTER (OUTPATIENT)
Dept: PHYSICAL THERAPY | Age: 64
Setting detail: THERAPIES SERIES
Discharge: HOME | End: 2019-07-25
Payer: MEDICARE

## 2019-07-25 PROCEDURE — 97110 THERAPEUTIC EXERCISES: CPT

## 2019-07-25 NOTE — FLOWSHEET NOTE
controlled   Cable Column l 6/3  Performed seated  5/21 performed standing  5/14 not performed    TG   5/14 not performed       6/25 not performed    Swiss Ball  Seated:  x10  LAQ   Marching   Chest press (red med ball) X 20  Hand ball hand to hand x 10 (Red med ball )            Bryan Curiel       Work on ambulation next session              AquaticTherapy Dates of Service:   Aquatic Visits Exercises/Activities:   Transfers:          % Immersion:            Ambulation:   UE Exercises:       Forwards    Shoulder Shrugs      Lateral    Shoulder Circles      Retro    Scapular Retraction       Marching   Push Downs       Cariocas   Punching     Jog    Rowing     Multifidi walkouts with paddle   Elbow Flex/Ext       Shldr Flex/Ext       [employer] aBd/aDd    LE Exercises:  Shldr Horiz aBd/aDd    HR/TR  Shldr IR/ER    Marches  Arm Circles    Squats   PNF Diagonals    Hamstring Curls  Wall Push Ups    Hip Flexion (SLR)  Figure 8's    Hip aBduction (SLR)  Bilateral Pull Downs    Hip Extension (SLR)       Hip aDduction (SLR)      Hip Circles  Functional:    Hip IR/Er  Step up forward    TrA Set   Step up lateral     Pelvic Tilts   Step down     Fig 8's   Lunges Forward    LE PNF  Lunges Retro      Lunges Lateral     Balance:   Lower ab curl with noodle     SLS        Tandem Stance       NBOS eyes open  Seated:     NBOS eyes closed  Ankle pumps     Hand to Opposite Knee  Ankle Circles     Fwd Step ups to SLS  Knee Flex/Ext    Lateral Step ups to SLS  Hip aBd/aDd    Stop/Go Gait   Bicycle       Ankle DF/PF      Ankle Inv/Ev    Stretching:       Gastroc/Soleus      Hamstring   Noodle:     Knee Flex Stretch  Leg Press    Piriformis   Noodle Hang at [employer]    Hip Flexor  Noodle Hang Deep Water    SKTC  Noodle Bicycle     DKTC       ITB      Quad  Deep Water:    Mid Back   Jog    UT  Jumping Jacks    Post Shoulder  Heel to Toe    Ladder Pull  Hand to Opposite Knee    Pec Stretch  Rocking Horse      [employer] Skier          Cervical:

## 2019-07-29 ENCOUNTER — HOSPITAL ENCOUNTER (OUTPATIENT)
Dept: GENERAL RADIOLOGY | Age: 64
Discharge: HOME | End: 2019-07-29
Payer: MEDICARE

## 2019-07-29 ENCOUNTER — HOSPITAL ENCOUNTER (OUTPATIENT)
Age: 64
Discharge: HOME | End: 2019-07-29
Payer: MEDICARE

## 2019-07-29 ENCOUNTER — HOSPITAL ENCOUNTER (OUTPATIENT)
Dept: MRI IMAGING | Age: 64
Discharge: HOME | End: 2019-07-29
Payer: MEDICARE

## 2019-07-29 DIAGNOSIS — M43.17 SPONDYLOLISTHESIS OF LUMBOSACRAL REGION: ICD-10-CM

## 2019-07-29 DIAGNOSIS — M54.12 CERVICAL RADICULOPATHY: ICD-10-CM

## 2019-07-29 DIAGNOSIS — M54.12 RADICULOPATHY, CERVICAL: ICD-10-CM

## 2019-07-29 PROCEDURE — 72050 X-RAY EXAM NECK SPINE 4/5VWS: CPT

## 2019-07-29 PROCEDURE — 72100 X-RAY EXAM L-S SPINE 2/3 VWS: CPT

## 2019-07-30 ENCOUNTER — HOSPITAL ENCOUNTER (OUTPATIENT)
Dept: MRI IMAGING | Age: 64
Discharge: HOME | End: 2019-07-30
Payer: MEDICARE

## 2019-07-30 ENCOUNTER — TELEPHONE (OUTPATIENT)
Dept: INTERNAL MEDICINE CLINIC | Age: 64
End: 2019-07-30

## 2019-07-30 DIAGNOSIS — M54.12 RADICULOPATHY OF CERVICAL SPINE: ICD-10-CM

## 2019-07-30 PROCEDURE — 72141 MRI NECK SPINE W/O DYE: CPT

## 2019-08-08 ENCOUNTER — HOSPITAL ENCOUNTER (OUTPATIENT)
Dept: PHYSICAL THERAPY | Age: 64
Setting detail: THERAPIES SERIES
Discharge: HOME | End: 2019-08-08
Payer: MEDICARE

## 2019-08-08 PROCEDURE — 97110 THERAPEUTIC EXERCISES: CPT

## 2019-08-08 NOTE — FLOWSHEET NOTE
Physical Therapy Daily/Aquatic Flow Sheet   Date:  2019    Patient Name:  Jasmine Nava    :  1955  MRN: [id number]  Restrictions/Precautions: Arthrodesis (reconstructive back surgery) L4-5  Medical/Treatment Diagnosis Information:   · Diagnosis: Z98.1 (ICD-10-CM) - Arthrodesis status, M43.17 (ICD-10-CM) - Spondylolisthesis, lumbosacral region  · Treatment Diagnosis: Decrease core strength, difficulty walking, imbalance     Tracking Information:  Physician Information Referring Practitioner: Sheryle Corona , MD      Plan of Care Sent Date:  Signed Received: 4/10, PN 5/15,    Visit Count / Total Visits   +  +     Insurance Approved Visits  / Alma Estes Approved Dates:     Insurance Information PT Insurance Information: Medicare     Progress Note/G-codes   []  Yes  [x]  No Next Due:       Pain level: 4/10 low back ; 5/10 neck     Subjective:   Pt ambulating with walking stick. Pt saw the MD this week and now has an order for his neck (from the same MD).      Objective:     Observation: : SEE PN   Wears lumbar soft brace and Rolling walker (Bariatric)  Test measurements: : SEE PN    Land-based Therapy Dates of Service:    Land-based Visits Exercises/Activities:  Exercise/Equipment Resistance/Repetitions Other comments   Nustep Level 6, arm 12 seat 13  X 5 min    IB/ HR  HSS  Hip flexor  2x30/2x20   2 x30\"   2 x30\"     HSS standing   Balance activities      // bars                        Used UE support    Had difficulty with standing on    not performed     Airex   not performed      not performed        Walking in // bars:   Tandem x 2 laps   Retro x 1 laps    Mat  hold               : instructed to make movements slow and controlled   Cable Column l 6/3  Performed seated   performed standing   not performed    TG    not performed        not performed    [id number] [employer] Dates of Service:   Aquatic Visits Exercises/Activities:   Transfers:          % Immersion:            Ambulation:   UE Exercises: Forwards    Shoulder Shrugs      Lateral    Shoulder Circles      Retro    Scapular Retraction       Marching   Push Downs       Cariocas   Punching     Jog    Rowing     Multifidi walkouts with paddle   Elbow Flex/Ext       Shldr Flex/Ext       [employer] aBd/aDd    LE Exercises:  Shldr Horiz aBd/aDd    HR/TR  Shldr IR/ER    Marches  Arm Circles    Squats   PNF Diagonals    Hamstring Curls  Wall Push Ups    Hip Flexion (SLR)  Figure 8's    Hip aBduction (SLR)  Bilateral Pull Downs    Hip Extension (SLR)       Hip aDduction (SLR)      Hip Circles  Functional:    Hip IR/Er  Step up forward    TrA Set   Step up lateral     Pelvic Tilts   Step down     Fig 8's   Lunges Forward    LE PNF  Lunges Retro      Lunges Lateral     Balance:   Lower ab curl with noodle     SLS        Tandem Stance       NBOS eyes open  Seated:     NBOS eyes closed  Ankle pumps     Hand to Opposite Knee  Ankle Circles     Fwd Step ups to SLS  Knee Flex/Ext    Lateral Step ups to SLS  Hip aBd/aDd    Stop/Go Gait   Bicycle       Ankle DF/PF      Ankle Inv/Ev    Stretching:       Gastroc/Soleus      Hamstring   Noodle:     Knee Flex Stretch  Leg Press    Piriformis   Noodle Hang at [employer]    Hip Flexor  Noodle Hang Deep Water    SKTC  Noodle Bicycle     DKTC       ITB      Quad  Deep Water:    Mid Back   Jog    UT  Jumping Jacks    Post Shoulder  Heel to Toe    Ladder Pull  Hand to Opposite Knee    Pec Stretch  Rocking Horse      [employer] Skier          Cervical:   Other:     AROM Flexion      AROM Extension      AROM Side Bending      AROM Rotation      Chin Tucks      Chin Nods        Aquatic Abbreviation Key  B= Belt DB= Dumbells T= Theratube   H= Hydrotone N= Noodles W= Weights   P= Paddles S= Speedo equipment K= Kickboard     Other Therapeutic Activities: 7/23: Re-assessment completed today.  Discussed progress pt has made and extending visits to continue improving areas of limitation. 4/9/19 Pt was educated on PT POC, Diagnosis, Prognosis, pathomechanics as well as frequency and duration of scheduling future physical therapy appointments. Time was also taken on this day to answer all patient questions and participation in PT. Home Exercise Program:    5/15: pt issued HO for mid row, high rows, and LPD - pt declined TB, states he already owns them   4/9/19 The following exercises were performed and added to the pt's home program (educated on appropriate frequency, intensity and duration etc.): Encouraged patient to continue [address] [facility]. Manual Treatments:      Modalities: 7/1 Deferred this date  6/25, 6/3, 5/30: MHP to low  Back with bolster underneath BLEs  5/21 x 5 min 5/9, 5/7,5/1,4/30, x10 min 4/16 : MHP to low  Back with bolster underneath BLEs x 12 min  4/11 MHP to low  Back with bolster underneath BLEs x 10 min     Timed Code Treatment Minutes:  29     Total Treatment Minutes: 29    Treatment/Activity Tolerance:  [] Patient tolerated treatment well [] Patient limited by fatigue  [x] Patient limited by pain  [] Patient limited by other medical complications  [] Other:  Pt continues to be limited by very poor endurance (standing tolerance limited to less than 10 minutes) and sharp increases in LBP with prolonged standing.      Prognosis: [x] Good [] Fair  [] Poor    Patient Requires Follow-up: [x] Yes  [] No    Plan:   [x] Continue per plan of care [] Alter current plan (see comments)  [] Plan of care initiated [] Hold pending MD visit [] Discharge    Plan for Next Session:  Begin BLE strengthening, balance, Administer Oswestry - Oswestry issued for pt to fill out at home and then bring back    Electronically signed by:  Blossom Ann, PT , DPT

## 2019-08-12 RX ORDER — ATORVASTATIN CALCIUM 40 MG/1
40 TABLET, FILM COATED ORAL NIGHTLY
Qty: 90 TABLET | Refills: 1 | Status: SHIPPED | OUTPATIENT
Start: 2019-08-12 | End: 2020-02-11 | Stop reason: SDUPTHER

## 2019-08-14 ENCOUNTER — HOSPITAL ENCOUNTER (OUTPATIENT)
Age: 64
Discharge: HOME | End: 2019-08-14
Payer: MEDICARE

## 2019-08-14 LAB
INR BLD: 2.2 (ref 0.86–1.14)
PROTHROMBIN TIME: 25.1 SEC (ref 9.8–13)

## 2019-08-14 PROCEDURE — 85610 PROTHROMBIN TIME: CPT

## 2019-08-14 PROCEDURE — 36415 COLL VENOUS BLD VENIPUNCTURE: CPT

## 2019-08-26 ENCOUNTER — TELEPHONE (OUTPATIENT)
Dept: INTERNAL MEDICINE CLINIC | Age: 64
End: 2019-08-26

## 2019-08-26 ENCOUNTER — ANTI-COAG VISIT (OUTPATIENT)
Dept: INTERNAL MEDICINE CLINIC | Age: 64
End: 2019-08-26

## 2019-08-27 ENCOUNTER — HOSPITAL ENCOUNTER (OUTPATIENT)
Dept: PHYSICAL THERAPY | Age: 64
Setting detail: THERAPIES SERIES
Discharge: HOME | End: 2019-08-27
Payer: MEDICARE

## 2019-08-27 PROCEDURE — 97110 THERAPEUTIC EXERCISES: CPT

## 2019-08-27 NOTE — FLOWSHEET NOTE
Key  B= Belt DB= Dumbells T= Theratube   H= Hydrotone N= Noodles W= Weights   P= Paddles S= Speedo equipment K= Kickboard     Other Therapeutic Activities: 7/23: Re-assessment completed today. Discussed progress pt has made and extending visits to continue improving areas of limitation. 4/9/19 Pt was educated on PT POC, Diagnosis, Prognosis, pathomechanics as well as frequency and duration of scheduling future physical therapy appointments. Time was also taken on this day to answer all patient questions and participation in PT. Home Exercise Program:    5/15: pt issued HO for mid row, high rows, and LPD - pt declined TB, states he already owns them   4/9/19 The following exercises were performed and added to the pt's home program (educated on appropriate frequency, intensity and duration etc.): Encouraged patient to continue [address] [facility]. Manual Treatments:      Modalities: 7/1 Deferred this date  6/25, 6/3, 5/30: MHP to low  Back with bolster underneath BLEs  5/21 x 5 min 5/9, 5/7,5/1,4/30, x10 min 4/16 : MHP to low  Back with bolster underneath BLEs x 12 min  4/11 MHP to low  Back with bolster underneath BLEs x 10 min     Timed Code Treatment Minutes: 30    Total Treatment Minutes: 30    Treatment/Activity Tolerance:  [] Patient tolerated treatment well [] Patient limited by fatigue  [x] Patient limited by pain  [] Patient limited by other medical complications  [] Other:  Next visit will be 60 min to evaluate the neck.      Prognosis: [x] Good [] Fair  [] Poor    Patient Requires Follow-up: [x] Yes  [] No    Plan:   [x] Continue per plan of care [] Alter current plan (see comments)  [] Plan of care initiated [] Hold pending MD visit [] Discharge    Plan for Next Session:  Begin BLE strengthening, balance, Administer Oswestry - Oswestry issued for pt to fill out at home and then bring back    Electronically signed by:  Taylor Boas, PT , DPT

## 2019-08-30 ENCOUNTER — HOSPITAL ENCOUNTER (OUTPATIENT)
Dept: PHYSICAL THERAPY | Age: 64
Setting detail: THERAPIES SERIES
Discharge: HOME | End: 2019-08-30
Payer: MEDICARE

## 2019-08-30 PROCEDURE — 97110 THERAPEUTIC EXERCISES: CPT

## 2019-08-30 PROCEDURE — 97012 MECHANICAL TRACTION THERAPY: CPT

## 2019-08-30 PROCEDURE — 97530 THERAPEUTIC ACTIVITIES: CPT

## 2019-08-30 ASSESSMENT — PAIN DESCRIPTION - DESCRIPTORS: DESCRIPTORS: BURNING;SHOOTING

## 2019-08-30 ASSESSMENT — PAIN DESCRIPTION - PAIN TYPE: TYPE: CHRONIC PAIN

## 2019-08-30 ASSESSMENT — PAIN SCALES - GENERAL: PAINLEVEL_OUTOF10: 4

## 2019-08-30 ASSESSMENT — PAIN DESCRIPTION - LOCATION: LOCATION: NECK

## 2019-08-30 ASSESSMENT — PAIN DESCRIPTION - ORIENTATION: ORIENTATION: RIGHT;LEFT

## 2019-08-30 ASSESSMENT — PAIN DESCRIPTION - FREQUENCY: FREQUENCY: CONTINUOUS

## 2019-09-04 ENCOUNTER — HOSPITAL ENCOUNTER (OUTPATIENT)
Dept: PHYSICAL THERAPY | Age: 64
Setting detail: THERAPIES SERIES
Discharge: HOME | End: 2019-09-04
Payer: MEDICARE

## 2019-09-04 PROCEDURE — 97012 MECHANICAL TRACTION THERAPY: CPT

## 2019-09-04 PROCEDURE — 97110 THERAPEUTIC EXERCISES: CPT

## 2019-09-04 NOTE — FLOWSHEET NOTE
6/25 not performed    [place] [employer]      Seated core  I    Scap squeezes X 20     Cervical exercises  Upper trap stretch 2 x15\"  Cervical rotation R/L 2 x 15\"  Levator stretch  R/L 2 x 15\"                    AquaticTherapy Dates of Service:   Aquatic Visits Exercises/Activities:   Transfers:          % Immersion:            Ambulation:   UE Exercises:       Forwards    Shoulder Shrugs      Lateral    Shoulder Circles      Retro    Scapular Retraction       Marching   Push Downs       Cariocas   Punching     Jog    Rowing     Multifidi walkouts with paddle   Elbow Flex/Ext       Shldr Flex/Ext       [employer] aBd/aDd    LE Exercises:  Shldr Horiz aBd/aDd    HR/TR  Shldr IR/ER    Marches  Arm Circles    Squats   PNF Diagonals    Hamstring Curls  Wall Push Ups    Hip Flexion (SLR)  Figure 8's    Hip aBduction (SLR)  Bilateral Pull Downs    Hip Extension (SLR)       Hip aDduction (SLR)      Hip Circles  Functional:    Hip IR/Er  Step up forward    TrA Set   Step up lateral     Pelvic Tilts   Step down     Fig 8's   Lunges Forward    LE PNF  Lunges Retro      Lunges Lateral     Balance:   Lower ab curl with noodle     SLS        Tandem Stance       NBOS eyes open  Seated:     NBOS eyes closed  Ankle pumps     Hand to Opposite Knee  Ankle Circles     Fwd Step ups to SLS  Knee Flex/Ext    Lateral Step ups to SLS  Hip aBd/aDd    Stop/Go Gait   Bicycle       Ankle DF/PF      Ankle Inv/Ev    Stretching:       Gastroc/Soleus      Hamstring   Noodle:     Knee Flex Stretch  Leg Press    Piriformis   Noodle Hang at [employer] [place]    Hip Flexor  Noodle Hang Deep Water    SKTC  Noodle Bicycle     DKTC       ITB      Quad  Deep Water:    Mid Back   Jog    UT  Jumping Jacks    Post Shoulder  Heel to Toe    Ladder Pull  Hand to Opposite Knee    Pec Stretch  Rocking Horse      [employer] Skier          Cervical:   Other:     AROM Flexion      AROM Extension      AROM Side Bending      AROM Rotation      Chin Tucks      Chin

## 2019-09-06 ENCOUNTER — HOSPITAL ENCOUNTER (OUTPATIENT)
Dept: PHYSICAL THERAPY | Age: 64
Setting detail: THERAPIES SERIES
Discharge: HOME | End: 2019-09-06
Payer: MEDICARE

## 2019-09-06 PROCEDURE — 97110 THERAPEUTIC EXERCISES: CPT

## 2019-09-06 PROCEDURE — 97012 MECHANICAL TRACTION THERAPY: CPT

## 2019-09-06 NOTE — FLOWSHEET NOTE
seated  5/21 performed standing  5/14 not performed    TG   5/14 not performed       6/25 not performed    [place] [employer]      Seated core  In black chair between // bars:  Red med ball chest press x 20  Red med ball OH lift x 20   Red med ball trunk twists x 20 (small ROM)    Scap squeezes X 20     Cervical exercises  Upper trap stretch 2 x20\"  Cervical rotation R/L 2 x 20\"  Levator stretch  R/L 2 x 20\"    Seated CT 5\" x 10  Seated CT + scap squeeze x 10    [employer] MId rows (seated) blue 2 x 10             AquaticTherapy Dates of Service:   Aquatic Visits Exercises/Activities:   Transfers:          % Immersion:            Ambulation:   UE Exercises:       Forwards    Shoulder Shrugs      Lateral    Shoulder Circles      Retro    Scapular Retraction       Marching   Push Downs       Cariocas   Punching     Jog    Rowing     Multifidi walkouts with paddle   Elbow Flex/Ext       Shldr Flex/Ext       [employer] aBd/aDd    LE Exercises:  Shldr Horiz aBd/aDd    HR/TR  Shldr IR/ER    Marches  Arm Circles    Squats   PNF Diagonals    Hamstring Curls  Wall Push Ups    Hip Flexion (SLR)  Figure 8's    Hip aBduction (SLR)  Bilateral Pull Downs    Hip Extension (SLR)       Hip aDduction (SLR)      Hip Circles  Functional:    Hip IR/Er  Step up forward    TrA Set   Step up lateral     Pelvic Tilts   Step down     Fig 8's   Lunges Forward    LE PNF  Lunges Retro      Lunges Lateral     Balance:   Lower ab curl with noodle     SLS        Tandem Stance       NBOS eyes open  Seated:     NBOS eyes closed  Ankle pumps     Hand to Opposite Knee  Ankle Circles     Fwd Step ups to SLS  Knee Flex/Ext    Lateral Step ups to SLS  Hip aBd/aDd    Stop/Go Gait   Bicycle       Ankle DF/PF      Ankle Inv/Ev    Stretching:       Gastroc/Soleus      Hamstring   Noodle:     Knee Flex Stretch  Leg Press    Piriformis   Noodle Hang at [employer] [place]    Hip Flexor  Noodle Hang Deep Water    SKTC  Noodle Bicycle     DKTC       [employer]      [employer]

## 2019-09-06 NOTE — PROGRESS NOTES
Ok then increase to 15mg one day per week, 10mg the rest POST-OP DIAGNOSIS:  Open wound of right lower extremity 06-Sep-2019 13:45:20 with two adjacent lesions Elvia Nguyen

## 2019-09-11 ENCOUNTER — HOSPITAL ENCOUNTER (OUTPATIENT)
Dept: PHYSICAL THERAPY | Age: 64
Setting detail: THERAPIES SERIES
Discharge: HOME | End: 2019-09-11
Payer: MEDICARE

## 2019-09-11 PROCEDURE — 97012 MECHANICAL TRACTION THERAPY: CPT

## 2019-09-11 PROCEDURE — 97110 THERAPEUTIC EXERCISES: CPT

## 2019-09-13 ENCOUNTER — APPOINTMENT (OUTPATIENT)
Dept: PHYSICAL THERAPY | Age: 64
End: 2019-09-13
Payer: MEDICARE

## 2019-09-17 ENCOUNTER — HOSPITAL ENCOUNTER (OUTPATIENT)
Dept: PHYSICAL THERAPY | Age: 64
Setting detail: THERAPIES SERIES
Discharge: HOME | End: 2019-09-17
Payer: MEDICARE

## 2019-09-17 PROCEDURE — 97110 THERAPEUTIC EXERCISES: CPT

## 2019-09-17 PROCEDURE — 97012 MECHANICAL TRACTION THERAPY: CPT

## 2019-09-17 NOTE — FLOWSHEET NOTE
5/14 not performed       6/25 not performed    [place] [employer]      Seated core  In black chair between // bars:  Red med ball chest press x 20  Red med ball OH lift x 20   Red med ball trunk twists x 20 (small ROM)  Cable :  Stir the pot  X 20 reps 2.5pl each direction    Scap squeezes X 20     Cervical exercises  Upper trap stretch 2 x20\"  Cervical rotation R/L 2 x 20\"  Levator stretch  R/L 2 x 20\"      Seated CT + scap squeeze x 10  Seated CT  ER 2 x10       [employer] MId rows (seated) blue 2 x 10  High rows   LPD             AquaticTherapy Dates of Service:   Aquatic Visits Exercises/Activities:   Transfers:          % Immersion:            Ambulation:   UE Exercises:       Forwards    Shoulder Shrugs      Lateral    Shoulder Circles      Retro    Scapular Retraction       Marching   Push Downs       Cariocas   Punching     Jog    Rowing     Multifidi walkouts with paddle   Elbow Flex/Ext       Shldr Flex/Ext       [employer] aBd/aDd    LE Exercises:  Shldr Horiz aBd/aDd    HR/TR  Shldr IR/ER    Marches  Arm Circles    Squats   PNF Diagonals    Hamstring Curls  Wall Push Ups    Hip Flexion (SLR)  Figure 8's    Hip aBduction (SLR)  Bilateral Pull Downs    Hip Extension (SLR)       Hip aDduction (SLR)      Hip Circles  Functional:    Hip IR/Er  Step up forward    TrA Set   Step up lateral     Pelvic Tilts   Step down     Fig 8's   Lunges Forward    LE PNF  Lunges Retro      Lunges Lateral     Balance:   Lower ab curl with noodle     SLS        Tandem Stance       NBOS eyes open  Seated:     NBOS eyes closed  Ankle pumps     Hand to Opposite Knee  Ankle Circles     Fwd Step ups to SLS  Knee Flex/Ext    Lateral Step ups to SLS  Hip aBd/aDd    Stop/Go Gait   Bicycle       Ankle DF/PF      Ankle Inv/Ev    Stretching:       Gastroc/Soleus      Hamstring   Noodle:     Knee Flex Stretch  Leg Press    Piriformis   Noodle Hang at [employer] [place]    Hip Flexor  Noodle Hang [place]    [facility]  Noodle Bicycle     [facility] ITB      Quad  Deep Water:    Mid Back   Jog    UT  Jumping Jacks    Post Shoulder  Heel to Toe    Ladder Pull  Hand to Opposite Knee    Pec Stretch  Rocking Horse      [employer]          Cervical:   Other:     AROM Flexion      AROM Extension      AROM Side Bending      AROM Rotation      Chin Tucks      Chin Nods        Aquatic Abbreviation Key  B= Belt DB= Dumbells T= Theratube   H= Hydrotone N= Noodles W= Weights   P= Paddles S= Speedo equipment K= Kickboard     Other Therapeutic Activities:   8/30: Pt reassessed for cervical stenosis today per new script from MD. Initiated cervical traction and will assess response next session. 7/23: Re-assessment completed today. Discussed progress pt has made and extending visits to continue improving areas of limitation. 4/9/19 Pt was educated on PT POC, Diagnosis, Prognosis, pathomechanics as well as frequency and duration of scheduling future physical therapy appointments. Time was also taken on this day to answer all patient questions and participation in PT. Home Exercise Program:  9/4: HO printed out for cervical rotation, UT stretch, and Levator stretch. 8/30: HO issued for scap squeezes  5/15: pt issued HO for mid row, high rows, and LPD - pt declined TB, states he already owns them   4/9/19 The following exercises were performed and added to the pt's home program (educated on appropriate frequency, intensity and duration etc.): Encouraged patient to continue [address] [facility]. Manual Treatments:      Modalities:    9/17,9/11: Patient was positioned supine on traction table with bolsters under bilateral knees with head/neck in traction device. Parameters were as follows: 18/12 max/min pounds with on/off ratio of 30/10, for a total of 10 minutes. Patient was given panic button as well as instructed how to use it if experiencing pain. Patient was also given bell to call if anything is needed.   9/6/19 Patient was positioned supine on traction table with bolsters under bilateral knees with head/neck in traction device. Parameters were as follows: 15/10 max/min pounds with on/off ratio of 30/10, for a total of 12 minutes. Patient was given panic button as well as instructed how to use it if experiencing pain. Patient was also given bell to call if anything is needed. 9/4, 8/30/19 Patient was positioned supine on traction table with bolsters under bilateral knees with head/neck in traction device. Parameters were as follows: 15/12 max/min pounds with on/off ratio of 30/10, for a total of 10 minutes. Patient was given panic button as well as instructed how to use it if experiencing pain. Patient was also given bell to call if anything is needed. 7/1 Deferred this date  6/25, 6/3, 5/30: MHP to low  Back with bolster underneath BLEs  5/21 x 5 min 5/9, 5/7,5/1,4/30, x10 min 4/16 : MHP to low  Back with bolster underneath BLEs x 12 min  4/11 MHP to low  Back with bolster underneath BLEs x 10 min     Timed Code Treatment Minutes: 25    Total Treatment Minutes:  35    Treatment/Activity Tolerance:  [] Patient tolerated treatment well [] Patient limited by fatigue  [x] Patient limited by pain  [] Patient limited by other medical complications  [x] Other:  Patient tolerated cervical stabilization with any issues.    Prognosis: [x] Good [] Fair  [] Poor    Patient Requires Follow-up: [x] Yes  [] No    Plan:   [x] Continue per plan of care [] Alter current plan (see comments)  [] Plan of care initiated [] Hold pending MD visit [] Discharge    Plan for Next Session:  Begin BLE strengthening, balance, cervical stability and flexibility, posture, UE strength     Electronically signed by:  Renny Hendricks, PT , DPT

## 2019-09-18 RX ORDER — METOLAZONE 5 MG/1
TABLET ORAL
Qty: 45 TABLET | Refills: 0 | Status: SHIPPED | OUTPATIENT
Start: 2019-09-18

## 2019-09-19 ENCOUNTER — HOSPITAL ENCOUNTER (OUTPATIENT)
Dept: PHYSICAL THERAPY | Age: 64
Setting detail: THERAPIES SERIES
Discharge: HOME | End: 2019-09-19
Payer: MEDICARE

## 2019-09-19 ENCOUNTER — HOSPITAL ENCOUNTER (OUTPATIENT)
Age: 64
Discharge: HOME | End: 2019-09-19
Payer: MEDICARE

## 2019-09-19 LAB
INR BLD: 1.54 (ref 0.86–1.14)
PROTHROMBIN TIME: 17.6 SEC (ref 9.8–13)

## 2019-09-19 PROCEDURE — 36415 COLL VENOUS BLD VENIPUNCTURE: CPT

## 2019-09-19 PROCEDURE — 97110 THERAPEUTIC EXERCISES: CPT

## 2019-09-19 PROCEDURE — 85610 PROTHROMBIN TIME: CPT

## 2019-09-19 NOTE — FLOWSHEET NOTE
5/14 not performed       6/25 not performed    [employer]    Hip abd/ extension 2 x10    Seated core  In black chair between // bars:  Red med ball chest press x 20  Red med ball OH lift x 20   Red med ball trunk twists x 20 (small ROM)  Cable :  Stir the pot  X 20 reps 2.5pl each direction    Scap squeezes X 20     Cervical exercises  Upper trap stretch 2 x20\"  Cervical rotation R/L 2 x 20\"  Levator stretch  R/L 2 x 20\"      Seated CT + scap squeeze x 10  Seated CT  ER 2 x10       Theraband MId rows (seated) blue 2 x 10  High rows   LPD 9/19 not performed             AquaticTherapy Dates of Service:   Aquatic Visits Exercises/Activities:   Transfers:          % Immersion:            Ambulation:   UE Exercises:       Forwards    Shoulder Shrugs      Lateral    Shoulder Circles      Retro    Scapular Retraction       Marching   Push Downs       Cariocas   Punching     Jog    Rowing     Multifidi walkouts with paddle   Elbow Flex/Ext       Shldr Flex/Ext       [employer] aBd/aDd    LE Exercises:  Shldr Horiz aBd/aDd    HR/TR  Shldr IR/ER    Marches  Arm Circles    Squats   PNF Diagonals    Hamstring Curls  Wall Push Ups    Hip Flexion (SLR)  Figure 8's    Hip aBduction (SLR)  Bilateral Pull Downs    Hip Extension (SLR)       Hip aDduction (SLR)      Hip Circles  Functional:    Hip IR/Er  Step up forward    TrA Set   Step up lateral     Pelvic Tilts   Step down     Fig 8's   Lunges Forward    LE PNF  Lunges Retro      Lunges Lateral     Balance:   Lower ab curl with noodle     SLS        Tandem Stance       NBOS eyes open  Seated:     NBOS eyes closed  Ankle pumps     Hand to Opposite Knee  Ankle Circles     Fwd Step ups to SLS  Knee Flex/Ext    Lateral Step ups to SLS  Hip aBd/aDd    Stop/Go Gait   Bicycle       Ankle DF/PF      Ankle Inv/Ev    Stretching:       Gastroc/Soleus      Hamstring   Noodle:     Knee Flex Stretch  Leg Press    Piriformis   Noodle Hang at [address] [facility] Deep Water    SKTC  Noodle Bicycle     DKTC       ITB      Quad  Deep Water:    Mid Back   Jog    UT  Jumping Jacks    Post Shoulder  Heel to Toe    Ladder Pull  Hand to Opposite Knee    Pec Stretch  Rocking Horse      [employer] Skier          Cervical:   Other:     AROM Flexion      AROM Extension      AROM Side Bending      AROM Rotation      Chin Tucks      Chin Nods        Aquatic Abbreviation Key  B= Belt DB= Dumbells T= Theratube   H= Hydrotone N= Noodles W= Weights   P= Paddles S= Speedo equipment K= Kickboard     Other Therapeutic Activities:   8/30: Pt reassessed for cervical stenosis today per new script from MD. Initiated cervical traction and will assess response next session. 7/23: Re-assessment completed today. Discussed progress pt has made and extending visits to continue improving areas of limitation. 4/9/19 Pt was educated on PT POC, Diagnosis, Prognosis, pathomechanics as well as frequency and duration of scheduling future physical therapy appointments. Time was also taken on this day to answer all patient questions and participation in PT. Home Exercise Program:  9/4: HO printed out for cervical rotation, UT stretch, and Levator stretch. 8/30: HO issued for scap squeezes  5/15: pt issued HO for mid row, high rows, and LPD - pt declined TB, states he already owns them   4/9/19 The following exercises were performed and added to the pt's home program (educated on appropriate frequency, intensity and duration etc.): Encouraged patient to continue [address] [facility]. Manual Treatments:      Modalities:    9/17,9/11: Patient was positioned supine on traction table with bolsters under bilateral knees with head/neck in traction device. Parameters were as follows: 18/12 max/min pounds with on/off ratio of 30/10, for a total of 10 minutes. Patient was given panic button as well as instructed how to use it if experiencing pain.  Patient was also given bell to call if anything is

## 2019-09-24 ENCOUNTER — HOSPITAL ENCOUNTER (OUTPATIENT)
Dept: PHYSICAL THERAPY | Age: 64
Setting detail: THERAPIES SERIES
Discharge: HOME | End: 2019-09-24
Payer: MEDICARE

## 2019-09-24 PROCEDURE — 97110 THERAPEUTIC EXERCISES: CPT

## 2019-09-24 PROCEDURE — 97012 MECHANICAL TRACTION THERAPY: CPT

## 2019-09-24 NOTE — FLOWSHEET NOTE
Wall    Hip Flexor  Noodle Hang Deep Water    SKTC  Noodle Bicycle     DKTC       ITB      Quad  Deep Water:    Mid Back   Jog    UT  Jumping Jacks    Post Shoulder  Heel to Toe    Ladder Pull  Hand to Opposite Knee    Pec Stretch  Rocking Horse      [employer] Skier          Cervical:   Other:     AROM Flexion      AROM Extension      AROM Side Bending      AROM Rotation      Chin Tucks      Chin Nods        Aquatic Abbreviation Key  B= Belt DB= Dumbells T= Theratube   H= Hydrotone N= Noodles W= Weights   P= Paddles S= Speedo equipment K= Kickboard     Other Therapeutic Activities:   8/30: Pt reassessed for cervical stenosis today per new script from MD. Initiated cervical traction and will assess response next session. 7/23: Re-assessment completed today. Discussed progress pt has made and extending visits to continue improving areas of limitation. 4/9/19 Pt was educated on PT POC, Diagnosis, Prognosis, pathomechanics as well as frequency and duration of scheduling future physical therapy appointments. Time was also taken on this day to answer all patient questions and participation in PT. Home Exercise Program:  9/4: HO printed out for cervical rotation, UT stretch, and Levator stretch. 8/30: HO issued for scap squeezes  5/15: pt issued HO for mid row, high rows, and LPD - pt declined TB, states he already owns them   4/9/19 The following exercises were performed and added to the pt's home program (educated on appropriate frequency, intensity and duration etc.): Encouraged patient to continue [address] [facility]. Manual Treatments:      Modalities:  9/24:  Patient was positioned supine on traction table with bolsters under bilateral knees with head/neck in traction device. Parameters were as follows: 20/12 max/min pounds with on/off ratio of 30/10, for a total of 10 minutes. Patient was given panic button as well as instructed how to use it if experiencing pain.  Patient was also given bell to call if anything is needed   9/17,9/11: Patient was positioned supine on traction table with bolsters under bilateral knees with head/neck in traction device. Parameters were as follows: 18/12 max/min pounds with on/off ratio of 30/10, for a total of 10 minutes. Patient was given panic button as well as instructed how to use it if experiencing pain. Patient was also given bell to call if anything is needed. 9/6/19 Patient was positioned supine on traction table with bolsters under bilateral knees with head/neck in traction device. Parameters were as follows: 15/10 max/min pounds with on/off ratio of 30/10, for a total of 12 minutes. Patient was given panic button as well as instructed how to use it if experiencing pain. Patient was also given bell to call if anything is needed. 9/4, 8/30/19 Patient was positioned supine on traction table with bolsters under bilateral knees with head/neck in traction device. Parameters were as follows: 15/12 max/min pounds with on/off ratio of 30/10, for a total of 10 minutes. Patient was given panic button as well as instructed how to use it if experiencing pain. Patient was also given bell to call if anything is needed. 7/1 Deferred this date  6/25, 6/3, 5/30: MHP to low  Back with bolster underneath BLEs  5/21 x 5 min 5/9, 5/7,5/1,4/30, x10 min 4/16 : MHP to low  Back with bolster underneath BLEs x 12 min  4/11 MHP to low  Back with bolster underneath BLEs x 10 min     Timed Code Treatment Minutes: 27    Total Treatment Minutes:  37    Treatment/Activity Tolerance:  [] Patient tolerated treatment well [] Patient limited by fatigue  [x] Patient limited by pain  [] Patient limited by other medical complications  [x] Other:  Patient tolerated standing exercises this date without any issues.    Prognosis: [x] Good [] Fair  [] Poor    Patient Requires Follow-up: [x] Yes  [] No    Plan:   [x] Continue per plan of care [] Alter current plan (see comments)  [] Plan of care initiated []

## 2019-09-26 ENCOUNTER — HOSPITAL ENCOUNTER (OUTPATIENT)
Dept: PHYSICAL THERAPY | Age: 64
Setting detail: THERAPIES SERIES
Discharge: HOME | End: 2019-09-26
Payer: MEDICARE

## 2019-09-26 PROCEDURE — 97110 THERAPEUTIC EXERCISES: CPT

## 2019-10-01 ENCOUNTER — HOSPITAL ENCOUNTER (OUTPATIENT)
Dept: PHYSICAL THERAPY | Age: 64
Setting detail: THERAPIES SERIES
Discharge: HOME | End: 2019-10-01
Payer: MEDICARE

## 2019-10-01 PROCEDURE — 97110 THERAPEUTIC EXERCISES: CPT

## 2019-10-01 PROCEDURE — 97012 MECHANICAL TRACTION THERAPY: CPT

## 2019-10-03 ENCOUNTER — HOSPITAL ENCOUNTER (OUTPATIENT)
Dept: PHYSICAL THERAPY | Age: 64
Setting detail: THERAPIES SERIES
Discharge: HOME | End: 2019-10-03
Payer: MEDICARE

## 2019-10-03 PROCEDURE — 97110 THERAPEUTIC EXERCISES: CPT

## 2019-10-08 ENCOUNTER — HOSPITAL ENCOUNTER (OUTPATIENT)
Dept: PHYSICAL THERAPY | Age: 64
Setting detail: THERAPIES SERIES
Discharge: HOME | End: 2019-10-08
Payer: MEDICARE

## 2019-10-08 PROCEDURE — 97012 MECHANICAL TRACTION THERAPY: CPT

## 2019-10-08 PROCEDURE — 97110 THERAPEUTIC EXERCISES: CPT

## 2019-10-10 ENCOUNTER — APPOINTMENT (OUTPATIENT)
Dept: PHYSICAL THERAPY | Age: 64
End: 2019-10-10
Payer: MEDICARE

## 2019-10-15 ENCOUNTER — HOSPITAL ENCOUNTER (OUTPATIENT)
Dept: PHYSICAL THERAPY | Age: 64
Setting detail: THERAPIES SERIES
Discharge: HOME | End: 2019-10-15
Payer: MEDICARE

## 2019-10-15 PROCEDURE — 97012 MECHANICAL TRACTION THERAPY: CPT

## 2019-10-15 PROCEDURE — 97110 THERAPEUTIC EXERCISES: CPT

## 2019-10-17 ENCOUNTER — ANTI-COAG VISIT (OUTPATIENT)
Dept: INTERNAL MEDICINE CLINIC | Age: 64
End: 2019-10-17

## 2019-10-17 ENCOUNTER — HOSPITAL ENCOUNTER (OUTPATIENT)
Age: 64
Discharge: HOME | End: 2019-10-17
Payer: MEDICARE

## 2019-10-17 ENCOUNTER — TELEPHONE (OUTPATIENT)
Dept: INTERNAL MEDICINE CLINIC | Age: 64
End: 2019-10-17

## 2019-10-17 ENCOUNTER — HOSPITAL ENCOUNTER (OUTPATIENT)
Dept: PHYSICAL THERAPY | Age: 64
Setting detail: THERAPIES SERIES
Discharge: HOME | End: 2019-10-17
Payer: MEDICARE

## 2019-10-17 LAB
INR BLD: 1.92 (ref 0.86–1.14)
PROTHROMBIN TIME: 21.9 SEC (ref 9.8–13)

## 2019-10-17 PROCEDURE — 85610 PROTHROMBIN TIME: CPT

## 2019-10-17 PROCEDURE — 97110 THERAPEUTIC EXERCISES: CPT

## 2019-10-17 PROCEDURE — 36415 COLL VENOUS BLD VENIPUNCTURE: CPT

## 2019-10-17 PROCEDURE — 97012 MECHANICAL TRACTION THERAPY: CPT

## 2019-10-22 ENCOUNTER — HOSPITAL ENCOUNTER (OUTPATIENT)
Dept: PHYSICAL THERAPY | Age: 64
Setting detail: THERAPIES SERIES
Discharge: HOME | End: 2019-10-22
Payer: MEDICARE

## 2019-10-22 PROCEDURE — 97110 THERAPEUTIC EXERCISES: CPT

## 2019-10-22 PROCEDURE — 97012 MECHANICAL TRACTION THERAPY: CPT

## 2019-10-24 ENCOUNTER — HOSPITAL ENCOUNTER (OUTPATIENT)
Dept: PHYSICAL THERAPY | Age: 64
Setting detail: THERAPIES SERIES
Discharge: HOME | End: 2019-10-24
Payer: MEDICARE

## 2019-10-29 ENCOUNTER — HOSPITAL ENCOUNTER (OUTPATIENT)
Dept: PHYSICAL THERAPY | Age: 64
Setting detail: THERAPIES SERIES
Discharge: HOME | End: 2019-10-29
Payer: MEDICARE

## 2019-10-29 PROCEDURE — 97012 MECHANICAL TRACTION THERAPY: CPT

## 2019-10-29 PROCEDURE — 97110 THERAPEUTIC EXERCISES: CPT

## 2019-10-31 ENCOUNTER — HOSPITAL ENCOUNTER (OUTPATIENT)
Dept: PHYSICAL THERAPY | Age: 64
Setting detail: THERAPIES SERIES
Discharge: HOME | End: 2019-10-31
Payer: MEDICARE

## 2019-10-31 PROCEDURE — 97110 THERAPEUTIC EXERCISES: CPT

## 2019-11-12 ENCOUNTER — HOSPITAL ENCOUNTER (OUTPATIENT)
Dept: PHYSICAL THERAPY | Age: 64
Setting detail: THERAPIES SERIES
Discharge: HOME | End: 2019-11-12
Payer: MEDICARE

## 2019-11-14 ENCOUNTER — HOSPITAL ENCOUNTER (OUTPATIENT)
Age: 64
Discharge: HOME | End: 2019-11-14
Payer: MEDICARE

## 2019-11-14 ENCOUNTER — HOSPITAL ENCOUNTER (OUTPATIENT)
Dept: PHYSICAL THERAPY | Age: 64
Setting detail: THERAPIES SERIES
Discharge: HOME | End: 2019-11-14
Payer: MEDICARE

## 2019-11-14 LAB
INR BLD: 2.32 (ref 0.86–1.14)
PROTHROMBIN TIME: 26.5 SEC (ref 9.8–13)

## 2019-11-14 PROCEDURE — 85610 PROTHROMBIN TIME: CPT

## 2019-11-14 PROCEDURE — 97110 THERAPEUTIC EXERCISES: CPT

## 2019-11-14 PROCEDURE — 36415 COLL VENOUS BLD VENIPUNCTURE: CPT

## 2019-11-14 PROCEDURE — 97012 MECHANICAL TRACTION THERAPY: CPT

## 2019-11-19 ENCOUNTER — HOSPITAL ENCOUNTER (OUTPATIENT)
Dept: PHYSICAL THERAPY | Age: 64
Setting detail: THERAPIES SERIES
Discharge: HOME | End: 2019-11-19
Payer: MEDICARE

## 2019-11-19 PROCEDURE — 97110 THERAPEUTIC EXERCISES: CPT

## 2019-11-19 PROCEDURE — 97012 MECHANICAL TRACTION THERAPY: CPT

## 2019-11-21 ENCOUNTER — TELEPHONE (OUTPATIENT)
Dept: INTERNAL MEDICINE CLINIC | Age: 64
End: 2019-11-21

## 2019-11-21 ENCOUNTER — HOSPITAL ENCOUNTER (OUTPATIENT)
Dept: PHYSICAL THERAPY | Age: 64
Setting detail: THERAPIES SERIES
Discharge: HOME | End: 2019-11-21
Payer: MEDICARE

## 2019-11-21 ENCOUNTER — ANTI-COAG VISIT (OUTPATIENT)
Dept: INTERNAL MEDICINE CLINIC | Age: 64
End: 2019-11-21

## 2019-11-21 PROCEDURE — 97110 THERAPEUTIC EXERCISES: CPT

## 2019-11-26 ENCOUNTER — APPOINTMENT (OUTPATIENT)
Dept: PHYSICAL THERAPY | Age: 64
End: 2019-11-26
Payer: MEDICARE

## 2019-12-03 ENCOUNTER — HOSPITAL ENCOUNTER (OUTPATIENT)
Dept: PHYSICAL THERAPY | Age: 64
Setting detail: THERAPIES SERIES
Discharge: HOME | End: 2019-12-03
Payer: MEDICARE

## 2019-12-03 PROCEDURE — 97140 MANUAL THERAPY 1/> REGIONS: CPT

## 2019-12-03 PROCEDURE — 97110 THERAPEUTIC EXERCISES: CPT

## 2019-12-05 ENCOUNTER — HOSPITAL ENCOUNTER (OUTPATIENT)
Dept: PHYSICAL THERAPY | Age: 64
Setting detail: THERAPIES SERIES
Discharge: HOME | End: 2019-12-05
Payer: MEDICARE

## 2019-12-05 PROCEDURE — 97110 THERAPEUTIC EXERCISES: CPT

## 2019-12-05 PROCEDURE — 97530 THERAPEUTIC ACTIVITIES: CPT

## 2019-12-12 ENCOUNTER — ANTI-COAG VISIT (OUTPATIENT)
Dept: INTERNAL MEDICINE CLINIC | Age: 64
End: 2019-12-12

## 2019-12-12 ENCOUNTER — HOSPITAL ENCOUNTER (OUTPATIENT)
Age: 64
Discharge: HOME | End: 2019-12-12
Payer: MEDICARE

## 2019-12-12 DIAGNOSIS — I82.532 CHRONIC DEEP VEIN THROMBOSIS (DVT) OF POPLITEAL VEIN OF LEFT LOWER EXTREMITY (HCC): ICD-10-CM

## 2019-12-12 DIAGNOSIS — I82.532 CHRONIC DEEP VEIN THROMBOSIS (DVT) OF POPLITEAL VEIN OF LEFT LOWER EXTREMITY (HCC): Primary | ICD-10-CM

## 2019-12-12 LAB
INR BLD: 1.73 (ref 0.86–1.14)
PROTHROMBIN TIME: 20.2 SEC (ref 10–13.2)

## 2019-12-12 PROCEDURE — 85610 PROTHROMBIN TIME: CPT

## 2019-12-12 PROCEDURE — 36415 COLL VENOUS BLD VENIPUNCTURE: CPT

## 2019-12-19 ENCOUNTER — HOSPITAL ENCOUNTER (OUTPATIENT)
Dept: PHYSICAL THERAPY | Age: 64
Setting detail: THERAPIES SERIES
Discharge: HOME | End: 2019-12-19
Payer: MEDICARE

## 2019-12-19 PROCEDURE — 97012 MECHANICAL TRACTION THERAPY: CPT

## 2019-12-19 PROCEDURE — 97110 THERAPEUTIC EXERCISES: CPT

## 2019-12-27 ENCOUNTER — HOSPITAL ENCOUNTER (OUTPATIENT)
Dept: PHYSICAL THERAPY | Age: 64
Setting detail: THERAPIES SERIES
Discharge: HOME | End: 2019-12-27
Payer: MEDICARE

## 2019-12-27 PROCEDURE — 97012 MECHANICAL TRACTION THERAPY: CPT

## 2019-12-27 PROCEDURE — 97110 THERAPEUTIC EXERCISES: CPT

## 2019-12-31 ENCOUNTER — APPOINTMENT (OUTPATIENT)
Dept: PHYSICAL THERAPY | Age: 64
End: 2019-12-31
Payer: MEDICARE

## 2020-01-02 ENCOUNTER — HOSPITAL ENCOUNTER (OUTPATIENT)
Dept: PHYSICAL THERAPY | Age: 65
Setting detail: THERAPIES SERIES
Discharge: HOME | End: 2020-01-02
Payer: MEDICARE

## 2020-01-02 NOTE — FLOWSHEET NOTE
Physical Therapy  Cancellation/No-show Note  Patient Name:  Fredy Paul  :  1955   Date:  2020  Cancelled visits to date: 4  No-shows to date: 0    Patient status for today's appointment patient:  [x]  Cancelled 19, 10/24, , 20  []  Rescheduled appointment  []  No-show     Reason given by patient:  [x]  Patient ill -fever 30  []  Conflicting appointment  []  No transportation    []  Conflict with work  []  No reason given  [x]  Other:    weather   Comments:      Phone call information:   []  Phone call made today to patient at _ time at number provided:      []  Patient answered, conversation as follows:    []  Patient did not answer, message left as follows:  [x]  Phone call not made today    Electronically signed by:  Carol Scott PT

## 2020-01-16 ENCOUNTER — HOSPITAL ENCOUNTER (OUTPATIENT)
Age: 65
Discharge: HOME | End: 2020-01-16
Payer: MEDICARE

## 2020-01-16 ENCOUNTER — ANTI-COAG VISIT (OUTPATIENT)
Dept: INTERNAL MEDICINE CLINIC | Age: 65
End: 2020-01-16

## 2020-01-16 LAB
INR BLD: 1.69 (ref 0.86–1.14)
PROTHROMBIN TIME: 19.7 SEC (ref 10–13.2)

## 2020-01-16 PROCEDURE — 36415 COLL VENOUS BLD VENIPUNCTURE: CPT

## 2020-01-16 PROCEDURE — 85610 PROTHROMBIN TIME: CPT

## 2020-01-21 ENCOUNTER — HOSPITAL ENCOUNTER (OUTPATIENT)
Dept: PHYSICAL THERAPY | Age: 65
Setting detail: THERAPIES SERIES
Discharge: HOME | End: 2020-01-21
Payer: MEDICARE

## 2020-01-21 PROCEDURE — 97012 MECHANICAL TRACTION THERAPY: CPT

## 2020-01-21 PROCEDURE — 97530 THERAPEUTIC ACTIVITIES: CPT

## 2020-01-21 NOTE — PROGRESS NOTES
WFL  AROM RUE (degrees)  RUE AROM : WFL    Assessment:  Conditions Requiring Skilled Therapeutic Intervention  Body structures, Functions, Activity limitations: Decreased functional mobility , Decreased high-level IADLs, Decreased balance, Decreased strength, Decreased coordination, Decreased sensation, Decreased endurance  Assessment: Patient has made improvement with cervical AROM overall being able to turn head without pain. 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 Patient will continue to benefit from modalities and cervical stabilization and  AROM to reduce pain. [place] , pt will continue to benefit from core stabillization to assist with standing tolerance and ambulation . Treatment Diagnosis: Decrease core strength, difficulty walking, imbalance, decreased cervical ROM, gross UE weakness, poor posture, radiating pain into UEs    Plan:   Plan  Current Treatment Recommendations: Strengthening, ADL/Self-care Training, Gait Training, Manual Therapy - Joint Manipulation, Patient/Caregiver Education & Training, ROM, Stair training, IADL Training, Balance Training, Functional Mobility Training, Endurance Training, Manual Therapy - Soft Tissue Mobilization, Pain Management, Modalities, Positioning, Safety Education & Training, Transfer Training  Plan Comment: Received new orders for Neck and Back Pain           Progress towards goals:    Short term goals  Short term goal 1: Patient to improve low back pain to 2-3/10 at worst with sitting/standing activities. PROGRESSING- MET  Long term goals  Long term goal 1: Patient to improve standing tolerance to >=10 min to increase functional activities. -MET-   Long term goal 4: Patient to be able to sleep 5-6hrs a night w/o interruption PROGRESSING, close to  3 to 4 hours but limited due to neck pain.    Long term goal 6: Pt to increase gross UE strength to 4+/5 to assist with ADLs. progressing   Long term goal 7: Pt to report a decrease in burning sensation to 75% or less in order to improve ability to complete ADLs. -partially met - progressing 25 % improvement with a least once a day. Current Frequency/Duration:  # Days per week: ? 1 day # Weeks: ? 1 week ? 4 weeks      x? 2 days   ? 2 weeks ? 5 weeks      ? 3 days   ? 3 weeks x? 6 weeks     Rehab Potential: ? Excellent x? Good ? Fair  ? Poor     Goal Status:  ? Achieved x? Partially Achieved  ? Not Achieved     Patient Status: ? Continue per initial plan of Care     ? Patient now discharged     x? Additional visits requested, Please re-certify for additional visits:      Requested frequency/duration:  2X/week for  6 weeks    Electronically signed by:  Asaf Sierra PT    If you have any questions or concerns, please don't hesitate to call.   Thank you for your referral.    Physician Signature:________________________________Date:__________________  By signing above, therapists plan is approved by physician

## 2020-01-21 NOTE — FLOWSHEET NOTE
Physical Therapy Daily/Aquatic Flow Sheet   Date:  2020    Patient Name:  Brittany Orlando    :  1955  MRN: [id number]  Restrictions/Precautions: Arthrodesis (reconstructive back surgery) L4-5  Medical/Treatment Diagnosis Information:   · Diagnosis: Z98.1 (ICD-10-CM) - Arthrodesis status, M43.17 (ICD-10-CM) - Spondylolisthesis, lumbosacral region; Cervical stenosis  · Treatment Diagnosis: Decrease core strength, difficulty walking, imbalance, decreased cervical ROM, gross UE weakness, poor posture, radiating pain into UEs      Tracking Information:  Physician Information Referring Practitioner: Jose Alfredo Costa MD  Fax [phone]   Plan of Care Sent Date:  Signed Received: 4/10, PN 5/15, , , PN signed 19   Visit Count / Total Visits   +  + ,     Insurance Approved Visits  / [facility] Kike casi Approved Dates:     Insurance Information PT Insurance Information: Medicare     Progress Note/G-codes   []  Yes  [x]  No Next Due:       Pain level: 3/10 low back ; 4/10 neck , 6/10 arms    Subjective:   SEE PROGRESS NOTE . No exercises performed other than the Nustep . Pt reports his arms feel very weak because of the pinched nerves in his neck.      Patient reports that he has a scheduled appointment with Ortho dr on 2020   Observation:   : Ambulating with walking stick this date   : SEE PN   Wears lumbar soft brace and Rolling walker (Bariatric)  Test measurements:   : SEE PN/CERVICAL ASSESSMENT   : SEE PN    Land-based Therapy Dates of Service:    Land-based Visits Exercises/Activities:  Exercise/Equipment Resistance/Repetitions Other comments      Nustep Sci-fit  Level 5, arm 12 seat 13  X 5 min    IB/ HR  HSS  Hip flexor        HSS standing   Balance activities      // bars                        Used UE support    Had difficulty with standing on    not performed     Airex   not performed      not performed        Walking in // bars:         Mat  hold               7/25: instructed to make movements slow and controlled   Cable Column l 6/3  Performed seated  5/21 performed standing  5/14 not performed    TG   5/14 not performed       6/25 not performed    Swiss Ball    Chest press (red med ball) X 20  Trunk rotation            Ballet Odin/  // bars   laps10/8 not performed   Seated core  9/24 not performed    Scap squeezes   9/24 not performed   Cervical exercises  \"       10/8  Not performed    Theraband / FM   Seated:Mid rows gray 2x 10  LPD gray 2x10  High rows gray 2 x 10 10/22   9/24, 9/19 not performed    [address] [place] 10/22 not performed    Cervical stab against wall  Nods/ turns x20 reps w/ nerf ball :   Nods/ turns ea  ER w/ lime  T-band    Scapula retraction with 5 sec hold    Horizontal abd Lime TB    Bicep Curl with Lime TB          AquaticTherapy Dates of Service:   Aquatic Visits Exercises/Activities:   Transfers:          % Immersion:            Ambulation:   UE Exercises:       Forwards    Shoulder Shrugs      Lateral    Shoulder Circles      Retro    Scapular Retraction       Marching   Push Downs       Cariocas   Punching     Jog    Rowing     Multifidi walkouts with paddle   Elbow Flex/Ext       Shldr Flex/Ext       [employer] aBd/aDd    LE Exercises:  Shldr Horiz aBd/aDd    HR/TR  Shldr IR/ER    Marches  Arm Circles    Squats   PNF Diagonals    Hamstring Curls  Wall Push Ups    Hip Flexion (SLR)  Figure 8's    Hip aBduction (SLR)  Bilateral Pull Downs    Hip Extension (SLR)       Hip aDduction (SLR)      Hip Circles  Functional:    Hip IR/Er  Step up forward    TrA Set   Step up lateral     Pelvic Tilts   Step down     Fig 8's   Lunges Forward    LE PNF  Lunges Retro      Lunges Lateral     Balance:   Lower ab curl with noodle     SLS        Tandem Stance       NBOS eyes open  Seated:     NBOS eyes closed  Ankle pumps     Hand to Opposite Knee  Ankle Circles     Fwd Step ups to SLS  Knee Flex/Ext    Lateral Step minutes. Modalities:  1/21/20 Patient was positioned supine on traction table with bolsters under bilateral knees with head/neck in traction device. Parameters were as follows: 27/15max/min pounds with on/off ratio of 30/10, for a total of 10 minutes. Patient was given panic button as well as instructed how to use it if experiencing pain. Patient was also given bell to call if anything is needed. 8/30/19 Patient was positioned supine on traction table with bolsters under bilateral knees with head/neck in traction device. Parameters were as follows: 15/12 max/min pounds with on/off ratio of 30/10, for a total of 10 minutes. Patient was given panic button as well as instructed how to use it if experiencing pain. Patient was also given bell to call if anything is needed. 7/1 Deferred this date  6/25, 6/3, 5/30: MHP to low  Back with bolster underneath BLEs  5/21 x 5 min 5/9, 5/7,5/1,4/30, x10 min 4/16 : MHP to low  Back with bolster underneath BLEs x 12 min  4/11 MHP to low  Back with bolster underneath BLEs x 10 min     Timed Code Treatment Minutes:  21    Total Treatment Minutes: 32    Treatment/Activity Tolerance:  [] Patient tolerated treatment well [] Patient limited by fatigue  [x] Patient limited by pain  [] Patient limited by other medical complications  [x] Other:   Pt to finish therapy with remaining 2 visits to solidify HEP. Prognosis: [x] Good [] Fair  [] Poor    Patient Requires Follow-up: [x] Yes  [] No    Plan:    [x] Continue per plan of care [] Alter current plan (see comments)  [] Plan of care initiated [] Hold pending MD visit [] Discharge    Plan for Next Session:  Will continue BLE and core strengthening, balance, cervical stability and flexibility, posture, UE strength . Will consider challenging patient in standing on alternating days.      Electronically signed by:  Darlene White, PT , DPT

## 2020-01-30 ENCOUNTER — HOSPITAL ENCOUNTER (OUTPATIENT)
Dept: VASCULAR LAB | Age: 65
Discharge: HOME | End: 2020-01-30
Payer: MEDICARE

## 2020-01-30 ENCOUNTER — HOSPITAL ENCOUNTER (OUTPATIENT)
Age: 65
Discharge: HOME | End: 2020-01-30
Payer: MEDICARE

## 2020-01-30 ENCOUNTER — HOSPITAL ENCOUNTER (OUTPATIENT)
Dept: GENERAL RADIOLOGY | Age: 65
Discharge: HOME | End: 2020-01-30
Payer: MEDICARE

## 2020-01-30 PROCEDURE — 93970 EXTREMITY STUDY: CPT

## 2020-01-30 PROCEDURE — 72100 X-RAY EXAM L-S SPINE 2/3 VWS: CPT

## 2020-02-04 ENCOUNTER — HOSPITAL ENCOUNTER (OUTPATIENT)
Dept: PHYSICAL THERAPY | Age: 65
Setting detail: THERAPIES SERIES
Discharge: HOME | End: 2020-02-04
Payer: MEDICARE

## 2020-02-04 PROCEDURE — 97012 MECHANICAL TRACTION THERAPY: CPT

## 2020-02-04 PROCEDURE — 97110 THERAPEUTIC EXERCISES: CPT

## 2020-02-04 NOTE — FLOWSHEET NOTE
Physical Therapy Daily/Aquatic Flow Sheet   Date:  2020    Patient Name:  Christiano Galvan    :  1955  MRN: [id number]  Restrictions/Precautions: Arthrodesis (reconstructive back surgery) L4-5  Medical/Treatment Diagnosis Information:   · Diagnosis: Z98.1 (ICD-10-CM) - Arthrodesis status, M43.17 (ICD-10-CM) - Spondylolisthesis, lumbosacral region; Cervical stenosis  · Treatment Diagnosis: Decrease core strength, difficulty walking, imbalance, decreased cervical ROM, gross UE weakness, poor posture, radiating pain into UEs      Tracking Information:  Physician Information Referring Practitioner: Nedra Ramos MD  Fax [phone]   Plan of Care Sent Date:  Signed Received: 4/10, PN 5/15, , , PN signed 19   Visit Count / Total Visits   +  + ,     Insurance Approved Visits  / bismark Byrd Approved Dates:     Insurance Information PT Insurance Information: Medicare     Progress Note/G-codes   []  Yes  [x]  No Next Due:       Pain level: 3/10 low back ; 4/10 neck , 6/10 arms    Subjective:   Patient reports that over the weekend he slipped while sitting on the shower jerking his neck some.  Since the incident he has been sore with     Patient reports that he has a scheduled appointment with Ortho dr on 2020   Observation:   : Ambulating with walking stick this date   : SEE PN   Wears lumbar soft brace and Rolling walker (Bariatric)  Test measurements:   : SEE PN/CERVICAL ASSESSMENT   : SEE PN    Land-based Therapy Dates of Service:    Land-based Visits Exercises/Activities:  Exercise/Equipment Resistance/Repetitions Other comments      Nustep Sci-fit  Level 5, arm 12 seat 13  X 5 min    IB/ HR  HSS  Hip flexor        HSS standing   Balance activities      // bars                        Used UE support    Had difficulty with standing on    not performed     Airex   not performed      not performed        Walking in // positioned supine with head elevated on pillow and B LE elevated on bolsters. Manual traction was performed for 10 minutes. Modalities: 2/4/20 Patient was positioned supine on traction table with bolsters under bilateral knees with head/neck in traction device. Parameters were as follows: 27/15 max/min pounds with on/off ratio of 30/10, for a total of 10 minutes. Patient was given panic button as well as instructed how to use it if experiencing pain. Patient was also given bell to call if anything is needed. 1/21/20 Patient was positioned supine on traction table with bolsters under bilateral knees with head/neck in traction device. Parameters were as follows: 27/15max/min pounds with on/off ratio of 30/10, for a total of 10 minutes. Patient was given panic button as well as instructed how to use it if experiencing pain. Patient was also given bell to call if anything is needed. 8/30/19 Patient was positioned supine on traction table with bolsters under bilateral knees with head/neck in traction device. Parameters were as follows: 15/12 max/min pounds with on/off ratio of 30/10, for a total of 10 minutes. Patient was given panic button as well as instructed how to use it if experiencing pain. Patient was also given bell to call if anything is needed.      7/1 Deferred this date  6/25, 6/3, 5/30: MHP to low  Back with bolster underneath BLEs  5/21 x 5 min 5/9, 5/7,5/1,4/30, x10 min 4/16 : MHP to low  Back with bolster underneath BLEs x 12 min  4/11 MHP to low  Back with bolster underneath BLEs x 10 min     Timed Code Treatment Minutes:  21    Total Treatment Minutes: 32    Treatment/Activity Tolerance:  [] Patient tolerated treatment well [] Patient limited by fatigue  [x] Patient limited by pain  [] Patient limited by other medical complications  [x] Other:   Patient continues to have burning pain down right arm from cervical radiculopathy      Prognosis: [x] Good [] Fair  [] Poor    Patient Requires Follow-up: [x] Yes  [] No    Plan:    [x] Continue per plan of care [] Alter current plan (see comments)  [] Plan of care initiated [] Hold pending MD visit [] Discharge    Plan for Next Session:  Will continue BLE and core strengthening, balance, cervical stability and flexibility, posture, UE strength . Will consider challenging patient in standing on alternating days.      Electronically signed by:  Mala Carrasco, PT , DPT

## 2020-02-11 ENCOUNTER — APPOINTMENT (OUTPATIENT)
Dept: PHYSICAL THERAPY | Age: 65
End: 2020-02-11
Payer: MEDICARE

## 2020-02-11 RX ORDER — ATORVASTATIN CALCIUM 40 MG/1
40 TABLET, FILM COATED ORAL NIGHTLY
Qty: 90 TABLET | Refills: 0 | Status: SHIPPED | OUTPATIENT
Start: 2020-02-11 | End: 2020-05-11

## 2020-02-13 ENCOUNTER — TELEPHONE (OUTPATIENT)
Dept: INTERNAL MEDICINE CLINIC | Age: 65
End: 2020-02-13

## 2020-02-13 ENCOUNTER — ANTI-COAG VISIT (OUTPATIENT)
Dept: INTERNAL MEDICINE CLINIC | Age: 65
End: 2020-02-13

## 2020-02-13 ENCOUNTER — HOSPITAL ENCOUNTER (OUTPATIENT)
Age: 65
Discharge: HOME | End: 2020-02-13
Payer: MEDICARE

## 2020-02-13 ENCOUNTER — HOSPITAL ENCOUNTER (OUTPATIENT)
Dept: PHYSICAL THERAPY | Age: 65
Setting detail: THERAPIES SERIES
Discharge: HOME | End: 2020-02-13
Payer: MEDICARE

## 2020-02-13 LAB
INR BLD: 5.17 (ref 0.86–1.14)
PROTHROMBIN TIME: 61 SEC (ref 10–13.2)

## 2020-02-13 PROCEDURE — 85610 PROTHROMBIN TIME: CPT

## 2020-02-13 PROCEDURE — 97110 THERAPEUTIC EXERCISES: CPT

## 2020-02-13 PROCEDURE — 36415 COLL VENOUS BLD VENIPUNCTURE: CPT

## 2020-02-13 PROCEDURE — 97012 MECHANICAL TRACTION THERAPY: CPT

## 2020-02-13 NOTE — FLOWSHEET NOTE
Physical Therapy Daily/Aquatic Flow Sheet   Date:  2020    Patient Name:  Chapin Garza    :  1955  MRN: [id number]  Restrictions/Precautions: Arthrodesis (reconstructive back surgery) L4-5  Medical/Treatment Diagnosis Information:   · Diagnosis: Z98.1 (ICD-10-CM) - Arthrodesis status, M43.17 (ICD-10-CM) - Spondylolisthesis, lumbosacral region; Cervical stenosis  · Treatment Diagnosis: Decrease core strength, difficulty walking, imbalance, decreased cervical ROM, gross UE weakness, poor posture, radiating pain into UEs      Tracking Information:  Physician Information Referring Practitioner: Kalyn Vazquez MD  Fax [phone]   Plan of Care Sent Date:  Signed Received: 4/10, PN 5/15, , , PN signed 19   Visit Count / Total Visits   +  + ,  +     Insurance Approved Visits  / [facility] Martín Gardiner Approved Dates:     Insurance Information PT Insurance Information: Medicare     Progress Note/G-codes   []  Yes  [x]  No Next Due:       Pain level: 3/10 low back ; 5/10 neck , 5/10 arms    Subjective:   Pt reports he continues to have burning down into his arms due to the compression of nerves and blood vessels in his neck.      Patient reports that he has a scheduled appointment with Ortho dr on 2020   Observation:   : Ambulating with walking stick this date   : SEE PN   Wears lumbar soft brace and Rolling walker (Bariatric)  Test measurements:   : SEE PN/CERVICAL ASSESSMENT   : SEE PN    Land-based Therapy Dates of Service:    Land-based Visits Exercises/Activities:  Exercise/Equipment Resistance/Repetitions Other comments      Nustep Sci-fit  Level 5, arm 12 seat 13  X 5 min    IB/ HR  HSS  Hip flexor        HSS standing   Balance activities      // bars                        Used UE support    Had difficulty with standing on    not performed     Airex   not performed      not performed        Walking in // bars: Mat  hold               Seated on Swiss ball : 5 pl 2 x 10  LPD   High Row  Mid Row Reverse chopping  B 3pl x 20  (low>high, weight reduced to 3pl)  Leg extension x20 B7/25: instructed to make movements slow and controlled   Cable Column l 6/3  Performed seated  5/21 performed standing  5/14 not performed    TG   5/14 not performed       6/25 not performed    Swiss Ball    Chest press (red med ball) X 20  Trunk rotation            Ballet [place]/  // bars   laps10/8 not performed   Seated core  9/24 not performed    Scap squeezes   9/24 not performed   Cervical exercises  \"       10/8  Not performed    Theraband / FM    10/22   9/24, 9/19 not performed    [id number] [place] 10/22 not performed    Cervical stab against wall  Nods/ turns x20 reps w/ nerf ball :   Nods/ turns ea      Scapula retraction with 5 sec hold              AquaticTherapy Dates of Service:   Aquatic Visits Exercises/Activities:   Transfers:          % Immersion:            Ambulation:   UE Exercises:       Forwards    Shoulder Shrugs      Lateral    Shoulder Circles      Retro    Scapular Retraction       Marching   Push Downs       [place]   Punching     Jog    Rowing     Multifidi walkouts with paddle   Elbow Flex/Ext       Shldr Flex/Ext       [employer] aBd/aDd    LE Exercises:  Shldr Horiz aBd/aDd    HR/TR  Shldr IR/ER    Marches  Arm Circles    Squats   PNF Diagonals    Hamstring Curls  Wall Push Ups    Hip Flexion (SLR)  Figure 8's    Hip aBduction (SLR)  Bilateral Pull Downs    Hip Extension (SLR)       Hip aDduction (SLR)      Hip Circles  Functional:    Hip IR/Er  Step up forward    TrA Set   Step up lateral     Pelvic Tilts   Step down     Fig 8's   Lunges Forward    LE PNF  Lunges Retro      Lunges Lateral     Balance:   Lower ab curl with noodle     SLS        Tandem Stance       NBOS eyes open  Seated:     NBOS eyes closed  Ankle pumps     Hand to Opposite Knee  Ankle Circles     Fwd Step ups to SLS  Knee Flex/Ext    Lateral Step ups Will continue BLE and core strengthening, balance, cervical stability and flexibility, posture, UE strength . Will consider challenging patient in standing on alternating days.      Electronically signed by:  Paddy Mcleod PT , DPT

## 2020-02-18 ENCOUNTER — HOSPITAL ENCOUNTER (OUTPATIENT)
Dept: PHYSICAL THERAPY | Age: 65
Setting detail: THERAPIES SERIES
Discharge: HOME | End: 2020-02-18
Payer: MEDICARE

## 2020-02-18 PROCEDURE — 97012 MECHANICAL TRACTION THERAPY: CPT

## 2020-02-18 PROCEDURE — 97110 THERAPEUTIC EXERCISES: CPT

## 2020-02-18 NOTE — FLOWSHEET NOTE
ball : 5 pl 2 x 10  LPD   High Row  Mid Row Reverse chopping  B 3pl x 20  (low>high, weight reduced to 3pl)  Leg extension x20 B 7/25: instructed to make movements slow and controlled   Cable Column l 6/3  Performed seated  5/21 performed standing  5/14 not performed    TG   5/14 not performed       6/25 not performed    Swiss Ball    Chest press (red med ball) X 20  Trunk rotation            Ballet [place]/  // bars   laps10/8 not performed   Seated core  9/24 not performed    Scap squeezes   9/24 not performed   Cervical exercises  \"       10/8  Not performed    Theraband / FM    10/22   9/24, 9/19 not performed    [id number] [place] 10/22 not performed    Cervical stab against wall  Nods/ turns x20 reps w/ nerf ball :   Nods/ turns ea      Scapula retraction with 5 sec hold                  Other Therapeutic Activities:   8/30: Pt reassessed for cervical stenosis today per new script from MD. Initiated cervical traction and will assess response next session. 7/23: Re-assessment completed today. Discussed progress pt has made and extending visits to continue improving areas of limitation. 4/9/19 Pt was educated on PT POC, Diagnosis, Prognosis, pathomechanics as well as frequency and duration of scheduling future physical therapy appointments. Time was also taken on this day to answer all patient questions and participation in PT. Home Exercise Program:    12/27: issued lime TB   9/4: HO printed out for cervical rotation, UT stretch, and Levator stretch. 8/30: HO issued for scap squeezes  5/15: pt issued HO for mid row, high rows, and LPD - pt declined TB, states he already owns them   4/9/19 The following exercises were performed and added to the pt's home program (educated on appropriate frequency, intensity and duration etc.): Encouraged patient to continue [facility]. Manual Treatments: 12/3: Patient was positioned supine with head elevated on pillow and B LE elevated on bolsters.  Manual

## 2020-02-20 ENCOUNTER — HOSPITAL ENCOUNTER (OUTPATIENT)
Dept: PHYSICAL THERAPY | Age: 65
Setting detail: THERAPIES SERIES
Discharge: HOME | End: 2020-02-20
Payer: MEDICARE

## 2020-02-20 PROCEDURE — 97110 THERAPEUTIC EXERCISES: CPT

## 2020-02-20 PROCEDURE — 97012 MECHANICAL TRACTION THERAPY: CPT

## 2020-02-20 NOTE — FLOWSHEET NOTE
ball : 5.5 pl 2 x 10  LPD   High Row  Mid Row  Chopping  B 3pl x 20  (high>low, weight reduced to 3.5pl)  OH Lift w/ soccer ball x20  Leg extension x20 B 7/25: instructed to make movements slow and controlled   Cable Column l 6/3  Performed seated  5/21 performed standing  5/14 not performed    TG   5/14 not performed       6/25 not performed    Swiss Ball    Chest press (red med ball) X 20  Trunk rotation            Ballet [place]/  // bars   laps10/8 not performed   Seated core  9/24 not performed    Scap squeezes   9/24 not performed   Cervical exercises  \"       10/8  Not performed    Theraband / FM    10/22   9/24, 9/19 not performed    [id number] [place] 10/22 not performed    Cervical stab against wall  Nods/ turns x20 reps w/ nerf ball :   Nods/ turns ea      Scapula retraction with 5 sec hold                  Other Therapeutic Activities:   8/30: Pt reassessed for cervical stenosis today per new script from MD. Initiated cervical traction and will assess response next session. 7/23: Re-assessment completed today. Discussed progress pt has made and extending visits to continue improving areas of limitation. 4/9/19 Pt was educated on PT POC, Diagnosis, Prognosis, pathomechanics as well as frequency and duration of scheduling future physical therapy appointments. Time was also taken on this day to answer all patient questions and participation in PT. Home Exercise Program:    12/27: issued lime TB   9/4: HO printed out for cervical rotation, UT stretch, and Levator stretch. 8/30: HO issued for scap squeezes  5/15: pt issued HO for mid row, high rows, and LPD - pt declined TB, states he already owns them   4/9/19 The following exercises were performed and added to the pt's home program (educated on appropriate frequency, intensity and duration etc.): Encouraged patient to continue [facility].      Manual Treatments: 12/3: Patient was positioned supine with head elevated on pillow and B MARTA elevated on bolsters. Manual traction was performed for 10 minutes. Modalities: 2/20/20 Pt was set up on lumbar traction in supine with bolsters under B knees with parameters of 30/15 lbs with on/off time of 30/10, for a total time of 10 minutes. Pt was given panic button as well as instructed how to use it if experiencing pain as well as given bell to call for needs. 2/18,2/4/20, 2/13:  Patient was positioned supine on traction table with bolsters under bilateral knees with head/neck in traction device. Parameters were as follows: 27/15 max/min pounds with on/off ratio of 30/10, for a total of 10 minutes. Patient was given panic button as well as instructed how to use it if experiencing pain. Patient was also given bell to call if anything is needed. 1/21/20 Patient was positioned supine on traction table with bolsters under bilateral knees with head/neck in traction device. Parameters were as follows: 27/15max/min pounds with on/off ratio of 30/10, for a total of 10 minutes. Patient was given panic button as well as instructed how to use it if experiencing pain. Patient was also given bell to call if anything is needed. 8/30/19 Patient was positioned supine on traction table with bolsters under bilateral knees with head/neck in traction device. Parameters were as follows: 15/12 max/min pounds with on/off ratio of 30/10, for a total of 10 minutes. Patient was given panic button as well as instructed how to use it if experiencing pain. Patient was also given bell to call if anything is needed.      7/1 Deferred this date  6/25, 6/3, 5/30: MHP to low  Back with bolster underneath BLEs  5/21 x 5 min 5/9, 5/7,5/1,4/30, x10 min 4/16 : MHP to low  Back with bolster underneath BLEs x 12 min  4/11 MHP to low  Back with bolster underneath BLEs x 10 min     Timed Code Treatment Minutes: 23    Total Treatment Minutes: 35    Treatment/Activity Tolerance:  [x] Patient tolerated treatment well [] Patient limited by fatigue  [] Patient limited by pain  [] Patient limited by other medical complications  [] Other:   2/18  Patient continues to perform core training seated on SB . Discussed coming up with home program with patient upon discharge to encourage independence and confidence with overall mobility. Prognosis: [x] Good [] Fair  [] Poor    Patient Requires Follow-up: [x] Yes  [] No    Plan:    [x] Continue per plan of care [] Alter current plan (see comments)  [] Plan of care initiated [] Hold pending MD visit [] Discharge    Plan for Next Session:  Will continue BLE and core strengthening, balance, cervical stability and flexibility, posture, UE strength .    Electronically signed by:  Art Paniagua, PT , DPT

## 2020-02-25 ENCOUNTER — HOSPITAL ENCOUNTER (OUTPATIENT)
Dept: PHYSICAL THERAPY | Age: 65
Setting detail: THERAPIES SERIES
Discharge: HOME | End: 2020-02-25
Payer: MEDICARE

## 2020-02-25 PROCEDURE — 97012 MECHANICAL TRACTION THERAPY: CPT

## 2020-02-25 PROCEDURE — 97110 THERAPEUTIC EXERCISES: CPT

## 2020-02-25 NOTE — FLOWSHEET NOTE
Physical Therapy Daily/Aquatic Flow Sheet   Date:  2020    Patient Name:  Mitch Fitzgerald    :  1955  MRN: [id number]  Restrictions/Precautions: Arthrodesis (reconstructive back surgery) L4-5  Medical/Treatment Diagnosis Information:   · Diagnosis: Z98.1 (ICD-10-CM) - Arthrodesis status, M43.17 (ICD-10-CM) - Spondylolisthesis, lumbosacral region; Cervical stenosis  · Treatment Diagnosis: Decrease core strength, difficulty walking, imbalance, decreased cervical ROM, gross UE weakness, poor posture, radiating pain into UEs      Tracking Information:  Physician Information Referring Practitioner: Nicole Chakraborty MD  Fax [phone]   Plan of Care Sent Date:  Signed Received: 4/10, PN 5/15, , , PN signed 19   Visit Count / Total Visits   +  + ,  +     Insurance Approved Visits  / [facility] Priyank Stovall Approved Dates:     Insurance Information PT Insurance Information: Medicare     Progress Note/G-codes   []  Yes  [x]  No Next Due:       Pain level: 3/10 low back ; 5/10 neck , 5/10 arms    Subjective:   Pt reports that his neck is stiff but the numbness along the right side of his face intermittently . Says some periods its worst than others.     Observation:       Land-based Therapy Dates of Service:    Land-based Visits Exercises/Activities:  Exercise/Equipment Resistance/Repetitions Other comments      Nustep Sci-fit  Level 5, arm 12 seat 13  X 5 min    IB/ HR  HSS  Hip flexor        HSS standing   Balance activities      // bars                        Used UE support    Had difficulty with standing on    not performed     Airex   not performed      not performed        Walking in // bars:         Mat  hold               Seated on Swiss ball : 5.5 pl 2 x 10  LPD   High Row  Mid Row  Chopping  B 3pl x 20  (high>low, weight reduced to 3.5pl)  OH Lift w/ soccer ball x20  Heel tap     Bicep curl  # 4pl x207/25: instructed to make movements slow and controlled            Added 2/25   Cable Column l 6/3  Performed seated  5/21 performed standing  5/14 not performed    TG   5/14 not performed       6/25 not performed    Swiss Ball    Chest press (red med ball) X 20  Trunk rotation            Ballet [place]/  // bars   laps10/8 not performed   Seated core  9/24 not performed    Scap squeezes   9/24 not performed   Cervical exercises  \"       10/8  Not performed    Theraband / FM    10/22   9/24, 9/19 not performed    [id number] [place] 10/22 not performed    Cervical stab against wall  Nods/ turns x20 reps w/ nerf ball :   Nods/ turns ea      Scapula retraction with 5 sec hold                  Other Therapeutic Activities:   8/30: Pt reassessed for cervical stenosis today per new script from MD. Initiated cervical traction and will assess response next session. 7/23: Re-assessment completed today. Discussed progress pt has made and extending visits to continue improving areas of limitation. 4/9/19 Pt was educated on PT POC, Diagnosis, Prognosis, pathomechanics as well as frequency and duration of scheduling future physical therapy appointments. Time was also taken on this day to answer all patient questions and participation in PT. Home Exercise Program:    12/27: issued lime TB   9/4: HO printed out for cervical rotation, UT stretch, and Levator stretch. 8/30: HO issued for scap squeezes  5/15: pt issued HO for mid row, high rows, and LPD - pt declined TB, states he already owns them   4/9/19 The following exercises were performed and added to the pt's home program (educated on appropriate frequency, intensity and duration etc.): Encouraged patient to continue [address]. Manual Treatments: 12/3: Patient was positioned supine with head elevated on pillow and B LE elevated on bolsters. Manual traction was performed for 10 minutes.      Modalities: 2/24,2/20/20 Pt was set up on lumbar traction in supine with bolsters under B knees with

## 2020-02-27 ENCOUNTER — HOSPITAL ENCOUNTER (OUTPATIENT)
Dept: PHYSICAL THERAPY | Age: 65
Setting detail: THERAPIES SERIES
Discharge: HOME | End: 2020-02-27
Payer: MEDICARE

## 2020-03-03 ENCOUNTER — HOSPITAL ENCOUNTER (OUTPATIENT)
Dept: PHYSICAL THERAPY | Age: 65
Setting detail: THERAPIES SERIES
Discharge: HOME | End: 2020-03-03
Payer: MEDICARE

## 2020-03-03 PROCEDURE — 97110 THERAPEUTIC EXERCISES: CPT

## 2020-03-03 PROCEDURE — 97012 MECHANICAL TRACTION THERAPY: CPT

## 2020-03-03 NOTE — FLOWSHEET NOTE
Physical Therapy Daily/Aquatic Flow Sheet   Date:  3/3/2020    Patient Name:  Melissa Lion    :  1955  MRN: [id number]  Restrictions/Precautions: Arthrodesis (reconstructive back surgery) L4-5  Medical/Treatment Diagnosis Information:   · Diagnosis: Z98.1 (ICD-10-CM) - Arthrodesis status, M43.17 (ICD-10-CM) - Spondylolisthesis, lumbosacral region; Cervical stenosis  · Treatment Diagnosis: Decrease core strength, difficulty walking, imbalance, decreased cervical ROM, gross UE weakness, poor posture, radiating pain into UEs      Tracking Information:  Physician Information Referring Practitioner: Ramesh Tolentino MD  Fax [phone]   Plan of Care Sent Date:  Signed Received: 4/10, PN 5/15, , , PN signed 19   Visit Count / Total Visits   +  + ,  +     Insurance Approved Visits  / med Philipo Paget Approved Dates:     Insurance Information PT Insurance Information: Medicare     Progress Note/G-codes   []  Yes  [x]  No Next Due:       Pain level: 310 low back ; 5/10 neck , 5/10 arms    Subjective:   Pt reports that his neck is feeling pretty good today , joints not aching as much .     Observation:       Land-based Therapy Dates of Service:    Land-based Visits Exercises/Activities:  Exercise/Equipment Resistance/Repetitions Other comments      Nustep Sci-fit  Level 5, arm 12 seat 13  X 5 min    IB/ HR  HSS  Hip flexor        HSS standing   Balance activities      // bars                        Used UE support    Had difficulty with standing on    not performed     Airex   not performed      not performed        Walking in // bars:         Mat  hold               Seated on Swiss ball : 5.5 pl 2 x 10  LPD   High Row  Mid Row  Chopping  B 3pl x 20  (high>low, weight reduced to 3.5pl)  OH Lift w/ soccer ball x20  Heel tap     Bicep curl  # 4pl x20  Median Nerve Flossing : instructed to make movements slow and controlled            Added    [facility] Column l 6/3  Performed seated  5/21 performed standing  5/14 not performed    TG   5/14 not performed       6/25 not performed    Swiss Ball    Chest press (red med ball) X 20  Trunk rotation            Ballet [place]/  // bars   laps10/8 not performed   Seated core  9/24 not performed    Scap squeezes   9/24 not performed   Cervical exercises  \"       10/8  Not performed    Theraband / FM    10/22   9/24, 9/19 not performed    [address] [place] 10/22 not performed    Cervical stab against wall  Nods/ turns x20 reps w/ nerf ball :   Nods/ turns ea      Scapula retraction with 5 sec hold                  Other Therapeutic Activities:   8/30: Pt reassessed for cervical stenosis today per new script from MD. Initiated cervical traction and will assess response next session. 7/23: Re-assessment completed today. Discussed progress pt has made and extending visits to continue improving areas of limitation. 4/9/19 Pt was educated on PT POC, Diagnosis, Prognosis, pathomechanics as well as frequency and duration of scheduling future physical therapy appointments. Time was also taken on this day to answer all patient questions and participation in PT. Home Exercise Program:    12/27: issued lime TB   9/4: HO printed out for cervical rotation, UT stretch, and Levator stretch. 8/30: HO issued for scap squeezes  5/15: pt issued HO for mid row, high rows, and LPD - pt declined TB, states he already owns them   4/9/19 The following exercises were performed and added to the pt's home program (educated on appropriate frequency, intensity and duration etc.): Encouraged patient to continue [facility]. Manual Treatments: 12/3: Patient was positioned supine with head elevated on pillow and B LE elevated on bolsters. Manual traction was performed for 10 minutes.      Modalities: 2/24,2/20/20 Pt was set up on lumbar traction in supine with bolsters under B knees with parameters of 30/15 lbs with on/off time of

## 2020-03-05 ENCOUNTER — HOSPITAL ENCOUNTER (OUTPATIENT)
Dept: PHYSICAL THERAPY | Age: 65
Setting detail: THERAPIES SERIES
Discharge: HOME | End: 2020-03-05
Payer: MEDICARE

## 2020-03-05 PROCEDURE — 97012 MECHANICAL TRACTION THERAPY: CPT

## 2020-03-05 PROCEDURE — 97110 THERAPEUTIC EXERCISES: CPT

## 2020-03-05 NOTE — FLOWSHEET NOTE
Physical Therapy Daily/Aquatic Flow Sheet   Date:  3/5/2020    Patient Name:  Leonard Hassan    :  1955  MRN: [id number]  Restrictions/Precautions: Arthrodesis (reconstructive back surgery) L4-5  Medical/Treatment Diagnosis Information:   · Diagnosis: Z98.1 (ICD-10-CM) - Arthrodesis status, M43.17 (ICD-10-CM) - Spondylolisthesis, lumbosacral region; Cervical stenosis  · Treatment Diagnosis: Decrease core strength, difficulty walking, imbalance, decreased cervical ROM, gross UE weakness, poor posture, radiating pain into UEs      Tracking Information:  Physician Information Referring Practitioner: Barbara Chin MD  Fax [phone]   Plan of Care Sent Date:  Signed Received: 4/10, PN 5/15, , , PN signed 19   Visit Count / Total Visits   +  + ,  +     Insurance Approved Visits  / med Richmond Skiff Approved Dates:     Insurance Information PT Insurance Information: Medicare     Progress Note/G-codes   []  Yes  [x]  No Next Due:       Pain level: 3/10 low back ; 3/10 neck , 5/10 arms    Subjective:     Pt reports his nerves and blood vessels are being pinched in his neck and his arms are feeling numb today.    Observation:       Land-based Therapy Dates of Service:    Land-based Visits Exercises/Activities:  Exercise/Equipment Resistance/Repetitions Other comments      Nustep Sci-fit  Level 5, arm 12 seat 13  X 5 min    IB/ HR  HSS  Hip flexor        HSS standing   Balance activities      // bars                        Used UE support    Had difficulty with standing on    not performed     Airex   not performed      not performed        Walking in // bars:         Mat  hold               : 5.5 pl 2 x 10  LPD   High Row  Mid Row  Chopping  B 3pl x 20    Seated Heel tap/knee ext x 20 B    Bicep curl 4pl x20  : instructed to make movements slow and controlled    3/5: CC exercises done sitting in bariatric chair due to pt reports of increased parameters of 30/15 lbs with on/off time of 30/10, for a total time of 10 minutes. Pt was given panic button as well as instructed how to use it if experiencing pain as well as given bell to call for needs. 2/18,2/4/20, 2/13:  Patient was positioned supine on traction table with bolsters under bilateral knees with head/neck in traction device. Parameters were as follows: 27/15 max/min pounds with on/off ratio of 30/10, for a total of 10 minutes. Patient was given panic button as well as instructed how to use it if experiencing pain. Patient was also given bell to call if anything is needed. 1/21/20 Patient was positioned supine on traction table with bolsters under bilateral knees with head/neck in traction device. Parameters were as follows: 27/15max/min pounds with on/off ratio of 30/10, for a total of 10 minutes. Patient was given panic button as well as instructed how to use it if experiencing pain. Patient was also given bell to call if anything is needed. Timed Code Treatment Minutes: 26    Total Treatment Minutes:  36    Treatment/Activity Tolerance:  [x] Patient tolerated treatment well [] Patient limited by fatigue  [] Patient limited by pain  [] Patient limited by other medical complications  [] Other: Pt continues to have very limited mobility overall. Pt may need to decrease weight on cervical traction next session as the neck cradle tends to become insecure and the patient's head slips out. 2/18  Patient continues to perform core training seated on SB . Discussed coming up with home program with patient upon discharge to encourage independence and confidence with overall mobility.      Prognosis: [x] Good [] Fair  [] Poor    Patient Requires Follow-up: [x] Yes  [] No    Plan:    [x] Continue per plan of care [] Alter current plan (see comments)  [] Plan of care initiated [] Hold pending MD visit [] Discharge    Plan for Next Session:  Will continue BLE and core strengthening, balance, cervical stability and flexibility, posture, UE strength .    Electronically signed by:  Addie Falcon, PT , DPT

## 2020-03-10 ENCOUNTER — HOSPITAL ENCOUNTER (OUTPATIENT)
Dept: PHYSICAL THERAPY | Age: 65
Setting detail: THERAPIES SERIES
Discharge: HOME | End: 2020-03-10
Payer: MEDICARE

## 2020-03-10 PROCEDURE — 97012 MECHANICAL TRACTION THERAPY: CPT

## 2020-03-10 PROCEDURE — 97110 THERAPEUTIC EXERCISES: CPT

## 2020-03-10 NOTE — FLOWSHEET NOTE
Physical Therapy Daily/Aquatic Flow Sheet   Date:  3/10/2020    Patient Name:  Christiano Galvan    :  1955  MRN: [id number]  Restrictions/Precautions: Arthrodesis (reconstructive back surgery) L4-5  Medical/Treatment Diagnosis Information:   · Diagnosis: Z98.1 (ICD-10-CM) - Arthrodesis status, M43.17 (ICD-10-CM) - Spondylolisthesis, lumbosacral region; Cervical stenosis  · Treatment Diagnosis: Decrease core strength, difficulty walking, imbalance, decreased cervical ROM, gross UE weakness, poor posture, radiating pain into UEs      Tracking Information:  Physician Information Referring Practitioner: Nedra Ramos MD  Fax [phone]   Plan of Care Sent Date:  Signed Received: 4/10, PN 5/15, , , PN signed 19   Visit Count / Total Visits   +  + ,  +     Insurance Approved Visits  / bismark Byrd Approved Dates:     Insurance Information PT Insurance Information: Medicare     Progress Note/G-codes   []  Yes  [x]  No Next Due:       Pain level: 3/10 low back ; 3/10 neck , 5/10 arms    Subjective:     Pt reports that neck and back are hurting due to   Observation:       Land-based Therapy Dates of Service:    Land-based Visits Exercises/Activities:  Exercise/Equipment Resistance/Repetitions Other comments      Nustep Sci-fit  Level 5, arm 12 seat 13  X 5 min    IB/ HR  HSS  Hip flexor        HSS standing   Balance activities      // bars                        Used UE support    Had difficulty with standing on    not performed     Airex   not performed      not performed        Walking in // bars:         Mat  hold               : 5.5 pl 2 x 10  LPD   High Row  Mid Row  Chopping  B 3pl x 20    Seated Heel tap/knee ext x 20 B    Bicep curl 4pl x20  : instructed to make movements slow and controlled    3/10, 3/5: CC exercises done sitting in bariatric chair due to pt reports of increased unsteadiness        Added    Cable Column Seated: anti-rotation press 2 x 10, 5\" holds 3 pl     TG   5/14 not performed       6/25 not performed    Swiss Ball    Chest press (red med ball) X 20  Trunk rotation            Ballet Rowley/  // bars   lapsHip abd/ extension x10 ea B10/8 not performed   Seated core  9/24 not performed    Scap squeezes   9/24 not performed   Cervical exercises  \"       10/8  Not performed    Theraband / FM    10/22   9/24, 9/19 not performed    [address] [address] 10/22 not performed    Cervical stab against wall  Nods/ turns               Other Therapeutic Activities:   8/30: Pt reassessed for cervical stenosis today per new script from MD. Initiated cervical traction and will assess response next session. 7/23: Re-assessment completed today. Discussed progress pt has made and extending visits to continue improving areas of limitation. 4/9/19 Pt was educated on PT POC, Diagnosis, Prognosis, pathomechanics as well as frequency and duration of scheduling future physical therapy appointments. Time was also taken on this day to answer all patient questions and participation in PT. Home Exercise Program:    3/2 Added Median Nerve Flossing for Nerve irritation   12/27: issued lime TB   9/4: HO printed out for cervical rotation, UT stretch, and Levator stretch. 8/30: HO issued for scap squeezes  5/15: pt issued HO for mid row, high rows, and LPD - pt declined TB, states he already owns them   4/9/19 The following exercises were performed and added to the pt's home program (educated on appropriate frequency, intensity and duration etc.): Encouraged patient to continue [facility]. Manual Treatments: 12/3: Patient was positioned supine with head elevated on pillow and B LE elevated on bolsters. Manual traction was performed for 10 minutes.      Modalities:     3/10, 3/5, 2/24,2/20/20 Pt was set up on lumbar traction in supine with bolsters under B knees with parameters of 30/15 lbs with on/off time of 30/10, for a total time

## 2020-03-12 ENCOUNTER — HOSPITAL ENCOUNTER (OUTPATIENT)
Age: 65
Discharge: HOME | End: 2020-03-12
Payer: MEDICARE

## 2020-03-12 ENCOUNTER — ANTI-COAG VISIT (OUTPATIENT)
Dept: INTERNAL MEDICINE CLINIC | Age: 65
End: 2020-03-12

## 2020-03-12 ENCOUNTER — HOSPITAL ENCOUNTER (OUTPATIENT)
Dept: PHYSICAL THERAPY | Age: 65
Setting detail: THERAPIES SERIES
Discharge: HOME | End: 2020-03-12
Payer: MEDICARE

## 2020-03-12 LAB
INR BLD: 3.25 (ref 0.86–1.14)
PROTHROMBIN TIME: 38.2 SEC (ref 10–13.2)

## 2020-03-12 PROCEDURE — 97012 MECHANICAL TRACTION THERAPY: CPT

## 2020-03-12 PROCEDURE — 36415 COLL VENOUS BLD VENIPUNCTURE: CPT

## 2020-03-12 PROCEDURE — 97110 THERAPEUTIC EXERCISES: CPT

## 2020-03-12 PROCEDURE — 85610 PROTHROMBIN TIME: CPT

## 2020-03-17 ENCOUNTER — APPOINTMENT (OUTPATIENT)
Dept: PHYSICAL THERAPY | Age: 65
End: 2020-03-17
Payer: MEDICARE

## 2020-03-17 RX ORDER — WARFARIN SODIUM 5 MG/1
TABLET ORAL
Qty: 669 TABLET | Refills: 0 | Status: SHIPPED | OUTPATIENT
Start: 2020-03-17 | End: 2020-09-21

## 2020-03-19 ENCOUNTER — APPOINTMENT (OUTPATIENT)
Dept: PHYSICAL THERAPY | Age: 65
End: 2020-03-19
Payer: MEDICARE

## 2020-03-24 ENCOUNTER — APPOINTMENT (OUTPATIENT)
Dept: PHYSICAL THERAPY | Age: 65
End: 2020-03-24
Payer: MEDICARE

## 2020-03-26 ENCOUNTER — APPOINTMENT (OUTPATIENT)
Dept: PHYSICAL THERAPY | Age: 65
End: 2020-03-26
Payer: MEDICARE

## 2020-05-22 RX ORDER — FUROSEMIDE 40 MG/1
TABLET ORAL
Qty: 30 TABLET | Refills: 0 | Status: SHIPPED | OUTPATIENT
Start: 2020-05-22 | End: 2020-06-22

## 2020-05-22 RX ORDER — FUROSEMIDE 40 MG/1
TABLET ORAL
Qty: 180 TABLET | Refills: 1 | OUTPATIENT
Start: 2020-05-22

## 2020-06-11 ENCOUNTER — HOSPITAL ENCOUNTER (OUTPATIENT)
Age: 65
Discharge: HOME | End: 2020-06-11
Payer: MEDICARE

## 2020-06-11 LAB
INR BLD: 2.15 (ref 0.86–1.14)
PROTHROMBIN TIME: 25.1 SEC (ref 10–13.2)

## 2020-06-11 PROCEDURE — 36415 COLL VENOUS BLD VENIPUNCTURE: CPT

## 2020-06-11 PROCEDURE — 85610 PROTHROMBIN TIME: CPT

## 2020-06-17 ENCOUNTER — TELEPHONE (OUTPATIENT)
Dept: INTERNAL MEDICINE CLINIC | Age: 65
End: 2020-06-17

## 2020-06-17 NOTE — TELEPHONE ENCOUNTER
Patient called wanting to know his pro-time results. If no one is there, you can leave a message.      Please call to advise

## 2020-06-18 NOTE — TELEPHONE ENCOUNTER
Spoke with patient and advised of results and advised to recheck in 1 month. Patient verbalized understanding.

## 2020-07-09 ENCOUNTER — HOSPITAL ENCOUNTER (OUTPATIENT)
Age: 65
Discharge: HOME | End: 2020-07-09
Payer: MEDICARE

## 2020-07-09 LAB
INR BLD: 2.38 (ref 0.86–1.14)
PROTHROMBIN TIME: 27.9 SEC (ref 10–13.2)

## 2020-07-09 PROCEDURE — 85610 PROTHROMBIN TIME: CPT

## 2020-07-09 PROCEDURE — 36415 COLL VENOUS BLD VENIPUNCTURE: CPT

## 2020-07-10 ENCOUNTER — ANTI-COAG VISIT (OUTPATIENT)
Dept: INTERNAL MEDICINE CLINIC | Age: 65
End: 2020-07-10

## 2020-08-06 ENCOUNTER — HOSPITAL ENCOUNTER (OUTPATIENT)
Age: 65
Discharge: HOME | End: 2020-08-06
Payer: MEDICARE

## 2020-08-06 LAB
INR BLD: 1.59 (ref 0.86–1.14)
PROTHROMBIN TIME: 18.5 SEC (ref 10–13.2)

## 2020-08-06 PROCEDURE — 85610 PROTHROMBIN TIME: CPT

## 2020-08-06 PROCEDURE — 36415 COLL VENOUS BLD VENIPUNCTURE: CPT

## 2020-08-18 RX ORDER — ATORVASTATIN CALCIUM 40 MG/1
TABLET, FILM COATED ORAL
Qty: 90 TABLET | Refills: 0 | OUTPATIENT
Start: 2020-08-18

## 2020-08-18 RX ORDER — PEN NEEDLE, DIABETIC 29 G X1/2"
NEEDLE, DISPOSABLE MISCELLANEOUS
Qty: 100 EACH | OUTPATIENT
Start: 2020-08-18

## 2020-08-21 ENCOUNTER — TELEPHONE (OUTPATIENT)
Dept: INTERNAL MEDICINE CLINIC | Age: 65
End: 2020-08-21

## 2020-08-21 NOTE — TELEPHONE ENCOUNTER
patient schedule appt 8/27 for med f/u but he still need refills      insulin NPH (NOVOLIN N) 100 UNIT/ML injection vial     Insulin Syringe-Needle U-100 (INSULIN SYRINGE .5CC/31GX5/16\") 31G X 5/16\" 0.5 ML MISC     atorvastatin (LIPITOR) 40 MG tablet       [facility] [address] [place] [address], [address] [place] [address] - F [phone]

## 2020-08-24 RX ORDER — NAPROXEN SODIUM 220 MG
TABLET ORAL
Qty: 100 EACH | Refills: 0 | Status: SHIPPED | OUTPATIENT
Start: 2020-08-24 | End: 2020-09-17

## 2020-08-24 RX ORDER — ATORVASTATIN CALCIUM 40 MG/1
40 TABLET, FILM COATED ORAL NIGHTLY
Qty: 90 TABLET | Refills: 0 | Status: SHIPPED | OUTPATIENT
Start: 2020-08-24 | End: 2020-11-12

## 2020-08-27 ENCOUNTER — VIRTUAL VISIT (OUTPATIENT)
Dept: INTERNAL MEDICINE CLINIC | Age: 65
End: 2020-08-27
Payer: MEDICARE

## 2020-08-27 PROCEDURE — G2012 BRIEF CHECK IN BY MD/QHP: HCPCS | Performed by: INTERNAL MEDICINE

## 2020-08-27 RX ORDER — HYDROCODONE BITARTRATE AND ACETAMINOPHEN 5; 325 MG/1; MG/1
1 TABLET ORAL DAILY PRN
COMMUNITY

## 2020-08-27 SDOH — ECONOMIC STABILITY: TRANSPORTATION INSECURITY
IN THE PAST 12 MONTHS, HAS LACK OF TRANSPORTATION KEPT YOU FROM MEETINGS, WORK, OR FROM GETTING THINGS NEEDED FOR DAILY LIVING?: NO

## 2020-08-27 SDOH — ECONOMIC STABILITY: FOOD INSECURITY: WITHIN THE PAST 12 MONTHS, THE FOOD YOU BOUGHT JUST DIDN'T LAST AND YOU DIDN'T HAVE MONEY TO GET MORE.: NEVER TRUE

## 2020-08-27 SDOH — ECONOMIC STABILITY: INCOME INSECURITY: HOW HARD IS IT FOR YOU TO PAY FOR THE VERY BASICS LIKE FOOD, HOUSING, MEDICAL CARE, AND HEATING?: NOT HARD AT ALL

## 2020-08-27 SDOH — ECONOMIC STABILITY: TRANSPORTATION INSECURITY
IN THE PAST 12 MONTHS, HAS THE LACK OF TRANSPORTATION KEPT YOU FROM MEDICAL APPOINTMENTS OR FROM GETTING MEDICATIONS?: NO

## 2020-08-27 SDOH — ECONOMIC STABILITY: FOOD INSECURITY: WITHIN THE PAST 12 MONTHS, YOU WORRIED THAT YOUR FOOD WOULD RUN OUT BEFORE YOU GOT MONEY TO BUY MORE.: NEVER TRUE

## 2020-08-27 ASSESSMENT — PATIENT HEALTH QUESTIONNAIRE - PHQ9
SUM OF ALL RESPONSES TO PHQ QUESTIONS 1-9: 0
SUM OF ALL RESPONSES TO PHQ9 QUESTIONS 1 & 2: 0
SUM OF ALL RESPONSES TO PHQ QUESTIONS 1-9: 0
1. LITTLE INTEREST OR PLEASURE IN DOING THINGS: 0
2. FEELING DOWN, DEPRESSED OR HOPELESS: 0

## 2020-08-27 NOTE — PROGRESS NOTES
2020    TELEHEALTH EVALUATION -- Audio/Visual (During NNVKJ-97 public health emergency)    HPI:    Jesus June (:  1955) has requested an audio/video evaluation for the following concern(s):    Had an illness back in March - not sure if it was COVID but possibly was. Type 2 diabetes -sugars have been in a good range. He denies hypoglycemia or hyperglycemia. He is using the insulin as directed. Hypertension - BP has been well controlled, today the blood pressure was 118/75, pulse 67. Adherent to medications. Denies chest pain or shortness of breath. Leg swelling has been reasonably controlled on diuretic. Doesn't use metolazone daily but occasionally    Back is still a big problem - pain is better when sitting but walking any significant distance is difficult. He has been seeing pain management, they are slowly weaning some of his medication down. Review of Systems   Respiratory: Negative for shortness of breath. Cardiovascular: Positive for leg swelling. Negative for chest pain. Musculoskeletal: Positive for back pain. Prior to Visit Medications    Medication Sig Taking? Authorizing Provider   HYDROcodone-acetaminophen (NORCO) 5-325 MG per tablet Take 1 tablet by mouth 2 times daily as needed for Pain.  Yes Historical Provider, MD   atorvastatin (LIPITOR) 40 MG tablet Take 1 tablet by mouth nightly Yes Lashawn Madsen MD   insulin NPH (NOVOLIN N) 100 UNIT/ML injection vial INJECT 10 TO 40 UNITS PER SLIDING SCALE AS DIRECTED Yes Lashawn Madsen MD   Insulin Syringe-Needle U-100 (INSULIN SYRINGE .5CC/31GX5/16\") 31G X 5/16\" 0.5 ML MISC USE TWICE DAILY AS DIRECTED Yes Lashawn Madsen MD   potassium chloride (KLOR-CON) 10 MEQ extended release tablet TAKE 2 TABLETS BY MOUTH SIX TIMES DAILY Yes Lashawn Madsen MD   furosemide (LASIX) 40 MG tablet TAKE 1 TO 2 TABLETS BY MOUTH DAILY Yes Lashawn Madsen MD   warfarin (COUMADIN) 5 MG tablet TAKE 3 TABLETS BY MOUTH 5 DAYS A WEEK, AND 2 TABLETS BY MOUTH 2 DAYS A WEEK Yes Juana Webber MD   metOLazone (ZAROXOLYN) 5 MG tablet TAKE 1/2 TABLET BY MOUTH EVERY DAY Yes Rozina Leggett MD   blood glucose test strips (ONE TOUCH ULTRA TEST) strip USE TO TEST THREE TIMES DAILY Yes Rozina Leggett MD   Insulin Syringe-Needle U-100 (INSULIN SYRINGE .5CC/31GX5/16\") 31G X 5/16\" 0.5 ML MISC USE TWICE DAILY AS DIRECTED Yes Rozina Leggett MD   warfarin (COUMADIN) 5 MG tablet TAKE 3 TABLETS BY MOUTH 5 DAYS A WEEK, AND 2 TABLETS BY MOUTH 2 DAYS A WEEK Yes Uvaldo Jaeger MD   methadone (DOLOPHINE) 10 MG tablet Take 10 mg by mouth 3 times daily. Yes Historical Provider, MD   cloNIDine (CATAPRES) 0.2 MG/24HR Place 1 patch onto the skin once a week Yes Historical Provider, MD   gabapentin (NEURONTIN) 600 MG tablet Take 600 mg by mouth 3 times daily. Yes Historical Provider, MD   vitamin E 400 UNIT capsule Take 400 Units by mouth daily Yes Historical Provider, MD   metoclopramide (REGLAN) 10 MG tablet Take 10 mg by mouth 4 times daily Yes Historical Provider, MD   ferrous sulfate 325 (65 FE) MG tablet TAKE 1 TABLET BY MOUTH TWICE DAILY Yes Uvaldo Jaeger MD   ONE TOUCH ULTRASOFT LANCETS MISC USE AS DIRECTED TO TEST THREE TIMES DAILY Yes Uvaldo Jaeger MD   Alcohol Swabs (B-D SINGLE USE SWABS REGULAR) PADS USE AS DIRECTED Yes Uvaldo Jaeger MD   ONE TOUCH ULTRASOFT LANCETS [facility] PACKAGE DIRECTIONS TEST THREE TIMES DAILY Yes Uvaldo Jaeger MD   potassium chloride SA (K-DUR;KLOR-CON M) 10 MEQ tablet TAKE 2 TABLETS BY MOUTH FOUR TIMES DAILY  Uvaldo Jaeger MD       Social History     Tobacco Use    Smoking status: Former Smoker     Packs/day: 0.50     Years: 3.00     Pack years: 1.50     Last attempt to quit: 1977     Years since quittin.7    Smokeless tobacco: Never Used   Substance Use Topics    Alcohol use: No    Drug use:  No            PHYSICAL EXAMINATION:  [ INSTRUCTIONS:  \"[x]\" Indicates a positive item  \"[]\" Indicates a negative item  -- DELETE ALL ITEMS NOT EXAMINED]  Vital Signs: (As obtained by patient/caregiver or practitioner observation)    Blood pressure-  Heart rate-    Respiratory rate-    Temperature-  Pulse oximetry-     Constitutional: [] Appears well-developed and well-nourished [] No apparent distress      [] Abnormal-   Mental status  [] Alert and awake  [] Oriented to person/place/time []Able to follow commands      Eyes:  EOM    []  Normal  [] Abnormal-  Sclera  []  Normal  [] Abnormal -         Discharge []  None visible  [] Abnormal -    HENT:   [] Normocephalic, atraumatic. [] Abnormal   [] Mouth/Throat: Mucous membranes are moist.     External Ears [] Normal  [] Abnormal-     Neck: [] No visualized mass     Pulmonary/Chest: [] Respiratory effort normal.  [] No visualized signs of difficulty breathing or respiratory distress        [] Abnormal-      Musculoskeletal:   [] Normal gait with no signs of ataxia         [] Normal range of motion of neck        [] Abnormal-       Neurological:        [] No Facial Asymmetry (Cranial nerve 7 motor function) (limited exam to video visit)          [] No gaze palsy        [] Abnormal-         Skin:        [] No significant exanthematous lesions or discoloration noted on facial skin         [] Abnormal-            Psychiatric:       [] Normal Affect [] No Hallucinations        [] Abnormal-     Other pertinent observable physical exam findings-     ASSESSMENT/PLAN:  1. Type 2 diabetes mellitus with diabetic polyneuropathy, with long-term current use of insulin (HCC)  - stable, overdue for A1C  - continue current medications  - Comprehensive Metabolic Panel; Future  - Lipid Panel; Future  - CBC Auto Differential; Future  - Hemoglobin A1C; Future  - Microalbumin / Creatinine Urine Ratio; Future    2. Chronic deep vein thrombosis (DVT) of popliteal vein of left lower extremity (HCC)  - maintained on wafarin    3. Morbid obesity with BMI of 40.0-44.9, adult ([facility] [address]  - encourage weight loss    4.  Essential hypertension  - stable, continue current medication    5. Chronic bilateral low back pain, unspecified whether sciatica present  - followed by pain management    6. Leg swelling  - stable, continue furosemide and prn metolazone, due for labs      Return in about 6 months (around 2/27/2021). Bhargav Ibrahim is a 60 y.o. male being evaluated by a Virtual Visit (video visit) encounter to address concerns as mentioned above. A caregiver was present when appropriate. Due to this being a TeleHealth encounter (During [facility]-08 public health emergency), evaluation of the following organ systems was limited: Vitals/Constitutional/EENT/Resp/CV/GI//MS/Neuro/Skin/Heme-Lymph-Imm. Pursuant to the emergency declaration under the [address] [place] [address], [address] [place] [address] authority and the [employer] and [employer] Act, this Virtual Visit was conducted with patient's (and/or legal guardian's) consent, to reduce the patient's risk of exposure to COVID-19 and provide necessary medical care. The patient (and/or legal guardian) has also been advised to contact this office for worsening conditions or problems, and seek emergency medical treatment and/or call 911 if deemed necessary. Patient identification was verified at the start of the visit: Yes    Total time spent on this encounter: 10 min    Services were provided through a video synchronous discussion virtually to substitute for in-person clinic visit. Patient and provider were located at their individual homes. --Víctor Allen MD on 8/28/2020 at 5:34 AM    An electronic signature was used to authenticate this note.

## 2020-08-28 ASSESSMENT — ENCOUNTER SYMPTOMS
SHORTNESS OF BREATH: 0
BACK PAIN: 1

## 2020-09-03 ENCOUNTER — HOSPITAL ENCOUNTER (OUTPATIENT)
Age: 65
Discharge: HOME | End: 2020-09-03
Payer: MEDICARE

## 2020-09-03 LAB
A/G RATIO: 1.2 (ref 1.1–2.2)
ALBUMIN SERPL-MCNC: 4.1 G/DL (ref 3.4–5)
ALP BLD-CCNC: 119 U/L (ref 40–129)
ALT SERPL-CCNC: 13 U/L (ref 10–40)
ANION GAP SERPL CALCULATED.3IONS-SCNC: 11 MMOL/L (ref 3–16)
AST SERPL-CCNC: 15 U/L (ref 15–37)
BASOPHILS ABSOLUTE: 0.1 K/UL (ref 0–0.2)
BASOPHILS RELATIVE PERCENT: 0.7 %
BILIRUB SERPL-MCNC: 0.5 MG/DL (ref 0–1)
BUN BLDV-MCNC: 13 MG/DL (ref 7–20)
CALCIUM SERPL-MCNC: 9 MG/DL (ref 8.3–10.6)
CHLORIDE BLD-SCNC: 101 MMOL/L (ref 99–110)
CHOLESTEROL, TOTAL: 134 MG/DL (ref 0–199)
CO2: 24 MMOL/L (ref 21–32)
CREAT SERPL-MCNC: 0.7 MG/DL (ref 0.8–1.3)
EKG ATRIAL RATE: 90 BPM
EKG DIAGNOSIS: NORMAL
EKG P AXIS: 49 DEGREES
EKG P-R INTERVAL: 182 MS
EKG Q-T INTERVAL: 362 MS
EKG QRS DURATION: 112 MS
EKG QTC CALCULATION (BAZETT): 442 MS
EKG R AXIS: -15 DEGREES
EKG T AXIS: 28 DEGREES
EKG VENTRICULAR RATE: 90 BPM
EOSINOPHILS ABSOLUTE: 0.2 K/UL (ref 0–0.6)
EOSINOPHILS RELATIVE PERCENT: 1.7 %
GFR AFRICAN AMERICAN: >60
GFR NON-AFRICAN AMERICAN: >60
GLOBULIN: 3.4 G/DL
GLUCOSE BLD-MCNC: 128 MG/DL (ref 70–99)
HCT VFR BLD CALC: 42.7 % (ref 40.5–52.5)
HDLC SERPL-MCNC: 40 MG/DL (ref 40–60)
HEMOGLOBIN: 14.5 G/DL (ref 13.5–17.5)
INR BLD: 2.35 (ref 0.86–1.14)
LDL CHOLESTEROL CALCULATED: 68 MG/DL
LYMPHOCYTES ABSOLUTE: 2.3 K/UL (ref 1–5.1)
LYMPHOCYTES RELATIVE PERCENT: 17.5 %
MCH RBC QN AUTO: 30.9 PG (ref 26–34)
MCHC RBC AUTO-ENTMCNC: 34 G/DL (ref 31–36)
MCV RBC AUTO: 90.9 FL (ref 80–100)
MONOCYTES ABSOLUTE: 1 K/UL (ref 0–1.3)
MONOCYTES RELATIVE PERCENT: 7.6 %
NEUTROPHILS ABSOLUTE: 9.5 K/UL (ref 1.7–7.7)
NEUTROPHILS RELATIVE PERCENT: 72.5 %
PDW BLD-RTO: 14.2 % (ref 12.4–15.4)
PLATELET # BLD: 279 K/UL (ref 135–450)
PMV BLD AUTO: 8.7 FL (ref 5–10.5)
POTASSIUM SERPL-SCNC: 4.5 MMOL/L (ref 3.5–5.1)
PROTHROMBIN TIME: 27.5 SEC (ref 10–13.2)
RBC # BLD: 4.7 M/UL (ref 4.2–5.9)
SODIUM BLD-SCNC: 136 MMOL/L (ref 136–145)
TOTAL PROTEIN: 7.5 G/DL (ref 6.4–8.2)
TRIGL SERPL-MCNC: 129 MG/DL (ref 0–150)
VLDLC SERPL CALC-MCNC: 26 MG/DL
WBC # BLD: 13 K/UL (ref 4–11)

## 2020-09-03 PROCEDURE — 93005 ELECTROCARDIOGRAM TRACING: CPT | Performed by: PHYSICIAN ASSISTANT

## 2020-09-03 PROCEDURE — 36415 COLL VENOUS BLD VENIPUNCTURE: CPT

## 2020-09-03 PROCEDURE — 85025 COMPLETE CBC W/AUTO DIFF WBC: CPT

## 2020-09-03 PROCEDURE — 85610 PROTHROMBIN TIME: CPT

## 2020-09-03 PROCEDURE — 93010 ELECTROCARDIOGRAM REPORT: CPT | Performed by: INTERNAL MEDICINE

## 2020-09-03 PROCEDURE — 80061 LIPID PANEL: CPT

## 2020-09-03 PROCEDURE — 80053 COMPREHEN METABOLIC PANEL: CPT

## 2020-09-03 PROCEDURE — 83036 HEMOGLOBIN GLYCOSYLATED A1C: CPT

## 2020-09-04 ENCOUNTER — HOSPITAL ENCOUNTER (OUTPATIENT)
Age: 65
Setting detail: SPECIMEN
Discharge: HOME | End: 2020-09-04
Payer: MEDICARE

## 2020-09-04 LAB
CREATININE URINE: 39.2 MG/DL (ref 39–259)
ESTIMATED AVERAGE GLUCOSE: 154.2 MG/DL
HBA1C MFR BLD: 7 %
MICROALBUMIN UR-MCNC: <1.2 MG/DL
MICROALBUMIN/CREAT UR-RTO: NORMAL MG/G (ref 0–30)

## 2020-09-04 PROCEDURE — 82570 ASSAY OF URINE CREATININE: CPT

## 2020-09-04 PROCEDURE — 82043 UR ALBUMIN QUANTITATIVE: CPT

## 2020-09-22 RX ORDER — WARFARIN SODIUM 5 MG/1
TABLET ORAL
Qty: 246 TABLET | Refills: 1 | Status: SHIPPED | OUTPATIENT
Start: 2020-09-22 | End: 2021-05-25

## 2020-10-01 ENCOUNTER — HOSPITAL ENCOUNTER (OUTPATIENT)
Age: 65
Discharge: HOME | End: 2020-10-01
Payer: MEDICARE

## 2020-10-01 LAB
INR BLD: 1.44 (ref 0.86–1.14)
PROTHROMBIN TIME: 16.8 SEC (ref 10–13.2)

## 2020-10-01 PROCEDURE — 85610 PROTHROMBIN TIME: CPT

## 2020-10-01 PROCEDURE — 36415 COLL VENOUS BLD VENIPUNCTURE: CPT

## 2020-11-17 ENCOUNTER — TELEPHONE (OUTPATIENT)
Dept: INTERNAL MEDICINE CLINIC | Age: 65
End: 2020-11-17

## 2020-12-23 ENCOUNTER — HOSPITAL ENCOUNTER (OUTPATIENT)
Dept: GENERAL RADIOLOGY | Age: 65
Discharge: HOME | End: 2020-12-23
Payer: MEDICARE

## 2020-12-23 ENCOUNTER — HOSPITAL ENCOUNTER (OUTPATIENT)
Age: 65
Discharge: HOME | End: 2020-12-23
Payer: MEDICARE

## 2020-12-23 LAB
EKG ATRIAL RATE: 96 BPM
EKG DIAGNOSIS: NORMAL
EKG P AXIS: 48 DEGREES
EKG P-R INTERVAL: 182 MS
EKG Q-T INTERVAL: 366 MS
EKG QRS DURATION: 104 MS
EKG QTC CALCULATION (BAZETT): 462 MS
EKG R AXIS: -24 DEGREES
EKG T AXIS: 48 DEGREES
EKG VENTRICULAR RATE: 96 BPM
INR BLD: 1.79 (ref 0.86–1.14)
PROTHROMBIN TIME: 20.9 SEC (ref 10–13.2)

## 2020-12-23 PROCEDURE — 85610 PROTHROMBIN TIME: CPT

## 2020-12-23 PROCEDURE — 72110 X-RAY EXAM L-2 SPINE 4/>VWS: CPT

## 2020-12-23 PROCEDURE — 93010 ELECTROCARDIOGRAM REPORT: CPT | Performed by: INTERNAL MEDICINE

## 2020-12-23 PROCEDURE — 93005 ELECTROCARDIOGRAM TRACING: CPT | Performed by: PHYSICIAN ASSISTANT

## 2020-12-23 PROCEDURE — 36415 COLL VENOUS BLD VENIPUNCTURE: CPT

## 2021-01-27 ENCOUNTER — HOSPITAL ENCOUNTER (OUTPATIENT)
Age: 66
Discharge: HOME | End: 2021-01-27
Payer: MEDICARE

## 2021-01-27 LAB
INR BLD: 2.46 (ref 0.86–1.14)
PROTHROMBIN TIME: 28.8 SEC (ref 10–13.2)

## 2021-01-27 PROCEDURE — 36415 COLL VENOUS BLD VENIPUNCTURE: CPT

## 2021-01-27 PROCEDURE — 85610 PROTHROMBIN TIME: CPT

## 2021-01-28 ENCOUNTER — ANTI-COAG VISIT (OUTPATIENT)
Dept: INTERNAL MEDICINE CLINIC | Age: 66
End: 2021-01-28

## 2021-02-22 RX ORDER — BLOOD SUGAR DIAGNOSTIC
STRIP MISCELLANEOUS
Qty: 300 STRIP | Refills: 5 | Status: SHIPPED | OUTPATIENT
Start: 2021-02-22 | End: 2022-03-31

## 2021-09-17 RX ORDER — POTASSIUM CHLORIDE 750 MG/1
TABLET, FILM COATED, EXTENDED RELEASE ORAL
Qty: 360 TABLET | Refills: 3 | Status: SHIPPED | OUTPATIENT
Start: 2021-09-17 | End: 2022-05-13

## 2021-09-22 RX ORDER — BLOOD SUGAR DIAGNOSTIC
STRIP MISCELLANEOUS
Qty: 100 EACH | Refills: 5 | Status: SHIPPED | OUTPATIENT
Start: 2021-09-22 | End: 2022-06-16

## 2021-12-09 DIAGNOSIS — Z79.4 TYPE 2 DIABETES MELLITUS WITH DIABETIC POLYNEUROPATHY, WITH LONG-TERM CURRENT USE OF INSULIN (HCC): ICD-10-CM

## 2021-12-09 DIAGNOSIS — E11.42 TYPE 2 DIABETES MELLITUS WITH DIABETIC POLYNEUROPATHY, WITH LONG-TERM CURRENT USE OF INSULIN (HCC): ICD-10-CM

## 2021-12-15 DIAGNOSIS — Z79.4 TYPE 2 DIABETES MELLITUS WITH DIABETIC POLYNEUROPATHY, WITH LONG-TERM CURRENT USE OF INSULIN (HCC): ICD-10-CM

## 2021-12-15 DIAGNOSIS — E11.42 TYPE 2 DIABETES MELLITUS WITH DIABETIC POLYNEUROPATHY, WITH LONG-TERM CURRENT USE OF INSULIN (HCC): ICD-10-CM

## 2022-01-20 ENCOUNTER — VIRTUAL VISIT (OUTPATIENT)
Dept: INTERNAL MEDICINE CLINIC | Age: 67
End: 2022-01-20
Payer: MEDICARE

## 2022-01-20 VITALS
SYSTOLIC BLOOD PRESSURE: 120 MMHG | HEIGHT: 75 IN | BODY MASS INDEX: 39.17 KG/M2 | DIASTOLIC BLOOD PRESSURE: 75 MMHG | HEART RATE: 65 BPM | WEIGHT: 315 LBS

## 2022-01-20 DIAGNOSIS — Z00.00 ROUTINE GENERAL MEDICAL EXAMINATION AT A HEALTH CARE FACILITY: Primary | ICD-10-CM

## 2022-01-20 DIAGNOSIS — E66.01 OBESITY, CLASS III, BMI 40-49.9 (MORBID OBESITY) (HCC): ICD-10-CM

## 2022-01-20 DIAGNOSIS — Z79.4 TYPE 2 DIABETES MELLITUS WITH DIABETIC POLYNEUROPATHY, WITH LONG-TERM CURRENT USE OF INSULIN (HCC): ICD-10-CM

## 2022-01-20 DIAGNOSIS — E11.42 TYPE 2 DIABETES MELLITUS WITH DIABETIC POLYNEUROPATHY, WITH LONG-TERM CURRENT USE OF INSULIN (HCC): ICD-10-CM

## 2022-01-20 DIAGNOSIS — I82.532 CHRONIC DEEP VEIN THROMBOSIS (DVT) OF POPLITEAL VEIN OF LEFT LOWER EXTREMITY (HCC): ICD-10-CM

## 2022-01-20 DIAGNOSIS — I10 ESSENTIAL HYPERTENSION: ICD-10-CM

## 2022-01-20 PROCEDURE — G8484 FLU IMMUNIZE NO ADMIN: HCPCS | Performed by: INTERNAL MEDICINE

## 2022-01-20 PROCEDURE — G0439 PPPS, SUBSEQ VISIT: HCPCS | Performed by: INTERNAL MEDICINE

## 2022-01-20 PROCEDURE — 3017F COLORECTAL CA SCREEN DOC REV: CPT | Performed by: INTERNAL MEDICINE

## 2022-01-20 PROCEDURE — 3046F HEMOGLOBIN A1C LEVEL >9.0%: CPT | Performed by: INTERNAL MEDICINE

## 2022-01-20 SDOH — ECONOMIC STABILITY: FOOD INSECURITY: WITHIN THE PAST 12 MONTHS, THE FOOD YOU BOUGHT JUST DIDN'T LAST AND YOU DIDN'T HAVE MONEY TO GET MORE.: NEVER TRUE

## 2022-01-20 SDOH — ECONOMIC STABILITY: FOOD INSECURITY: WITHIN THE PAST 12 MONTHS, YOU WORRIED THAT YOUR FOOD WOULD RUN OUT BEFORE YOU GOT MONEY TO BUY MORE.: NEVER TRUE

## 2022-01-20 ASSESSMENT — PATIENT HEALTH QUESTIONNAIRE - PHQ9
SUM OF ALL RESPONSES TO PHQ QUESTIONS 1-9: 0
1. LITTLE INTEREST OR PLEASURE IN DOING THINGS: 0
SUM OF ALL RESPONSES TO PHQ QUESTIONS 1-9: 0
SUM OF ALL RESPONSES TO PHQ9 QUESTIONS 1 & 2: 0
SUM OF ALL RESPONSES TO PHQ QUESTIONS 1-9: 0
2. FEELING DOWN, DEPRESSED OR HOPELESS: 0
SUM OF ALL RESPONSES TO PHQ QUESTIONS 1-9: 0

## 2022-01-20 ASSESSMENT — SOCIAL DETERMINANTS OF HEALTH (SDOH): HOW HARD IS IT FOR YOU TO PAY FOR THE VERY BASICS LIKE FOOD, HOUSING, MEDICAL CARE, AND HEATING?: NOT HARD AT ALL

## 2022-01-20 ASSESSMENT — LIFESTYLE VARIABLES: HOW OFTEN DO YOU HAVE A DRINK CONTAINING ALCOHOL: 0

## 2022-01-20 NOTE — PROGRESS NOTES
Medicare Annual Wellness Visit  Are Name: Cole Ross Date: 2022   MRN: [id number] Sex: Male   Age: 61 y.o. Ethnicity: Non- / Non    : 1955 Race: White (non-)      Rose Mary Joaquin is here for Medicare AWV    Screenings for behavioral, psychosocial and functional/safety risks, and cognitive dysfunction are all negative except as indicated below. These results, as well as other patient data from the [address] form, are documented in Flowsheets linked to this Encounter. Allergies   Allergen Reactions    Adhesive Tape     Other      sts when he gets injectable steroids it makes everything worse         Prior to Visit Medications    Medication Sig Taking? Authorizing Provider   insulin NPH (NOVOLIN N) 100 UNIT/ML injection vial INJECT 10 TO 40 UNITS three times a day per sliding scale Yes Odilia Noriega MD   Insulin Syringe-Needle U-100 (BD INSULIN SYRINGE U/F) 31G X \" 0.5 ML MISC USE TWICE DAILY AS DIRCTED Yes Odilia Noriega MD   furosemide (LASIX) 40 MG tablet TAKE 1 TO 2 TABLETS BY MOUTH DAILY Yes Osei Webber MD   potassium chloride (KLOR-CON) 10 MEQ extended release tablet TAKE 2 TABLETS BY MOUTH 6 TIMES DAILY Yes Odilia Noriega MD   warfarin (COUMADIN) 5 MG tablet TAKE 3 TABLETS BY MOUTH 5 DAYS A WEEK AND 2 TABLETS 2 DAYS A WEEK Yes Odilia Noriega MD   ONETOUCH ULTRA strip USE AS DIRECTED THREE TIMES DAILY Yes Odilia Noriega MD   atorvastatin (LIPITOR) 40 MG tablet TAKE 1 TABLET BY MOUTH EVERY NIGHT AT BEDTIME Yes Odilia Noriega MD   HYDROcodone-acetaminophen (NORCO) 5-325 MG per tablet Take 1 tablet by mouth daily as needed for Pain.   Yes Historical Provider, MD   metOLazone (ZAROXOLYN) 5 MG tablet TAKE 1/2 TABLET BY MOUTH EVERY DAY Yes Osei Webber MD   Insulin Syringe-Needle U-100 (INSULIN SYRINGE .5CC/31G\") 31G X 516\" 0.5 ML MISC USE TWICE DAILY AS DIRECTED Yes Odilia Noriega MD   warfarin (COUMADIN) 5 MG tablet TAKE 3 TABLETS BY MOUTH 5 DAYS A WEEK, AND 2 TABLETS BY MOUTH 2 DAYS A WEEK Yes Dara Begum MD   methadone (DOLOPHINE) 10 MG tablet Take 10 mg by mouth 2 times daily. Yes Historical Provider, MD   cloNIDine (CATAPRES) 0.2 MG/24HR Place 1 patch onto the skin once a week Yes Historical Provider, MD   gabapentin (NEURONTIN) 600 MG tablet Take 600 mg by mouth 3 times daily.  Yes Historical Provider, MD   vitamin E 400 UNIT capsule Take 400 Units by mouth daily Yes Historical Provider, MD   metoclopramide (REGLAN) 10 MG tablet Take 10 mg by mouth 4 times daily Yes Historical Provider, MD   ferrous sulfate 325 (65 FE) MG tablet TAKE 1 TABLET BY MOUTH TWICE DAILY Yes Dara Begum MD   ONE TOUCH ULTRASOFT LANCETS MISC USE AS DIRECTED TO TEST THREE TIMES DAILY Yes Dara Begum MD   Alcohol Swabs (B-D SINGLE USE SWABS REGULAR) PADS USE AS DIRECTED Yes Dara Begum MD   ONE TOUCH ULTRASOFT LANCETS MISC FOLLOW PACKAGE DIRECTIONS TEST THREE TIMES DAILY Yes Dara Begum MD   potassium chloride SA (K-DUR;KLOR-CON M) 10 MEQ tablet TAKE 2 TABLETS BY MOUTH FOUR TIMES DAILY  Dara Begum MD         Past Medical History:   Diagnosis Date    Arthritis     Cerebral artery occlusion with cerebral infarction ([facility] [address]     Chronic back pain     DVT     Hearing loss     Hx of blood clots     dvt    Hypertension     Movement disorder     Obesity     Type II or unspecified type diabetes mellitus without mention of complication, not stated as uncontrolled        Past Surgical History:   Procedure Laterality Date    CHOLECYSTECTOMY, LAPAROSCOPIC  03/09/2018    TONSILLECTOMY           Family History   Problem Relation Age of Onset    Cancer Father     Other Mother        CareTeam (Including outside providers/suppliers regularly involved in providing care):   Patient Care Team:  Elizabeth Le MD as PCP - General (Internal Medicine)  Elizabeth Le MD as PCP - [facility] Empaneled Provider  Wayne Dinh MD as Surgeon (General Surgery)    Wt Readings from Last 3 Encounters:   01/20/22 (!) 335 lb (152 kg)   02/27/19 (!) 313 lb (142 kg)   07/25/18 (!) 334 lb 1.6 oz (151.5 kg)      Patient-Reported Vitals 8/27/2020   Patient-Reported Weight 328 lb   Patient-Reported Height 6 4   Patient-Reported Systolic 235   Patient-Reported Diastolic 75   Patient-Reported Temperature 97.5      Body mass index is 41.87 kg/m². Based upon direct observation of the patient, evaluation of cognition reveals recent and remote memory intact. Patient's complete Health Risk Assessment and screening values have been reviewed and are found in Flowsheets. The following problems were reviewed today and where indicated follow up appointments were made and/or referrals ordered. Positive Risk Factor Screenings with Interventions:              General Health and ACP:  General  In general, how would you say your health is?: Very Good  In the past 7 days, have you experienced any of the following?  New or Increased Pain, New or Increased Fatigue, Loneliness, Social Isolation, Stress or Anger?: None of These  Do you get the social and emotional support that you need?: Yes  Do you have a Living Will?: Yes  Advance Directives     Power of [address] [place] [address] Street Will ACP-Advance Directive ACP-Power of     Not on File Not on File Not on File Not on File      General Health Risk Interventions:  · No Living Will: ACP documents already completed- patient asked to provide copy to the office    Health Habits/Nutrition:  Health Habits/Nutrition  Do you exercise for at least 20 minutes 2-3 times per week?: Yes  Have you lost any weight without trying in the past 3 months?: No  Do you eat only one meal per day?: No  Have you seen the dentist within the past year?: Yes  Body mass index: (!) 41.87  Health Habits/Nutrition Interventions:  · Nutritional issues:  educational materials for healthy, well-balanced diet provided         Personalized Preventive Plan   Current Health Maintenance Status  Immunization History   Administered Date(s) Administered    Influenza 11/08/2010    Pneumococcal Conjugate 13-valent ([employer] [place]) 05/29/2015        Health Maintenance   Topic Date Due    COVID-19 Vaccine (1) Never done    DTaP/Tdap/Td vaccine (1 - Tdap) Never done    Colon cancer screen colonoscopy  Never done    Shingles Vaccine (1 of 2) Never done    PSA counseling  11/08/2011    Pneumococcal 0-64 years Vaccine (1 of 2 - PPSV23) 07/24/2015    Diabetic retinal exam  05/29/2016    Diabetic foot exam  12/22/2019    Flu vaccine (1) 09/01/2021    A1C test (Diabetic or Prediabetic)  09/03/2021    Lipid screen  09/03/2021    Potassium monitoring  09/03/2021    Creatinine monitoring  09/03/2021    Diabetic microalbuminuria test  09/04/2021    Annual Wellness Visit (AWV)  08/27/2022 (Originally 5/29/2019)    Depression Screen  01/20/2023    Hepatitis C screen  Addressed    HIV screen  Addressed    Hepatitis A vaccine  Aged Out    Hib vaccine  Aged Out    Meningococcal (ACWY) vaccine  Aged Out     Recommendations for [employer] Due: see orders and patient instructions/AVS.  . Recommended screening schedule for the next 5-10 years is provided to the patient in written form: see Patient Instructions/AVS.    Gonzalo Zendejas was seen today for medicare awv. Diagnoses and all orders for this visit:    Routine general medical examination at a health care facility    Obesity, Class III, BMI 40-49.9 (morbid obesity) (HCC)    Type 2 diabetes mellitus with diabetic polyneuropathy, with long-term current use of insulin ([address]  - controlled, continue insulin, overdue for blood work  -     Comprehensive Metabolic Panel; Future  -     CBC Auto Differential; Future  -     Hemoglobin A1C; Future  -     Lipid Panel; Future    Chronic deep vein thrombosis (DVT) of popliteal vein of left lower extremity ([address]  - long overdue for INR, important to get regular INRs for safety reasons.  Offered anticoagulation clinic in [place], he will consider  - Protime-INR; Standing    Essential hypertension  - per report controlled, continue current medication  -     Comprehensive Metabolic Panel; Future  -     CBC Auto Differential; Future  -     Lipid Panel; Future               Alexia Malcolm is a 61 y.o. male being evaluated by a Virtual Visit (phone) encounter to address concerns as mentioned above. A caregiver was present when appropriate. Due to this being a TeleHealth encounter (During PXRZU-38 public health emergency), evaluation of the following organ systems was limited: Vitals/Constitutional/EENT/Resp/CV/GI//MS/Neuro/Skin/Heme-Lymph-Imm. Pursuant to the emergency declaration under the [address] [place] [address], [address] [place] [address] authority and the "[employer] and [employer] Act, this Virtual Visit was conducted with patient's (and/or legal guardian's) consent, to reduce the patient's risk of exposure to COVID-19 and provide necessary medical care. The patient (and/or legal guardian) has also been advised to contact this office for worsening conditions or problems, and seek emergency medical treatment and/or call 911 if deemed necessary. Patient identification was verified at the start of the visit: Yes    Services were provided through phone to substitute for in-person clinic visit. Patient and provider were located at their individual homes. --Elda Morrison MD on 1/21/2022 at 6:31 AM    An electronic signature was used to authenticate this note.

## 2022-01-20 NOTE — PATIENT INSTRUCTIONS
Personalized Preventive Plan for Janice Nguyen - 1/20/2022  Medicare offers a range of preventive health benefits. Some of the tests and screenings are paid in full while other may be subject to a deductible, co-insurance, and/or copay. Some of these benefits include a comprehensive review of your medical history including lifestyle, illnesses that may run in your family, and various assessments and screenings as appropriate. After reviewing your medical record and screening and assessments performed today your provider may have ordered immunizations, labs, imaging, and/or referrals for you. A list of these orders (if applicable) as well as your Preventive Care list are included within your After Visit Summary for your review. Other Preventive Recommendations:    · A preventive eye exam performed by an eye specialist is recommended every 1-2 years to screen for glaucoma; cataracts, macular degeneration, and other eye disorders. · A preventive dental visit is recommended every 6 months. · Try to get at least 150 minutes of exercise per week or 10,000 steps per day on a pedometer . · Order or download the FREE \"Exercise & Physical Activity: Your Everyday Guide\" from [employer]. Call 1-803.457.4721 or search [employer] online. · You need 5967-9054 mg of calcium and 4037-6388 IU of vitamin D per day. It is possible to meet your calcium requirement with diet alone, but a vitamin D supplement is usually necessary to meet this goal.  · When exposed to the sun, use a sunscreen that protects against both UVA and UVB radiation with an SPF of 30 or greater. Reapply every 2 to 3 hours or after sweating, drying off with a towel, or swimming. · Always wear a seat belt when traveling in a car. Always wear a helmet when riding a bicycle or motorcycle.

## 2022-01-28 RX ORDER — ATORVASTATIN CALCIUM 40 MG/1
TABLET, FILM COATED ORAL
Qty: 90 TABLET | Refills: 3 | Status: SHIPPED | OUTPATIENT
Start: 2022-01-28

## 2022-02-14 ENCOUNTER — TELEPHONE (OUTPATIENT)
Dept: INTERNAL MEDICINE CLINIC | Age: 67
End: 2022-02-14

## 2022-02-14 ENCOUNTER — ANTI-COAG VISIT (OUTPATIENT)
Dept: INTERNAL MEDICINE CLINIC | Age: 67
End: 2022-02-14

## 2022-02-14 ENCOUNTER — HOSPITAL ENCOUNTER (OUTPATIENT)
Age: 67
Discharge: HOME | End: 2022-02-14
Payer: MEDICARE

## 2022-02-14 DIAGNOSIS — E11.42 TYPE 2 DIABETES MELLITUS WITH DIABETIC POLYNEUROPATHY, WITH LONG-TERM CURRENT USE OF INSULIN (HCC): ICD-10-CM

## 2022-02-14 DIAGNOSIS — I10 ESSENTIAL HYPERTENSION: ICD-10-CM

## 2022-02-14 DIAGNOSIS — Z79.4 TYPE 2 DIABETES MELLITUS WITH DIABETIC POLYNEUROPATHY, WITH LONG-TERM CURRENT USE OF INSULIN (HCC): ICD-10-CM

## 2022-02-14 DIAGNOSIS — I82.532 CHRONIC DEEP VEIN THROMBOSIS (DVT) OF POPLITEAL VEIN OF LEFT LOWER EXTREMITY (HCC): ICD-10-CM

## 2022-02-14 LAB
A/G RATIO: 1.4 (ref 1.1–2.2)
ALBUMIN SERPL-MCNC: 4.7 G/DL (ref 3.4–5)
ALP BLD-CCNC: 180 U/L (ref 40–129)
ALT SERPL-CCNC: 16 U/L (ref 10–40)
ANION GAP SERPL CALCULATED.3IONS-SCNC: 25 MMOL/L (ref 3–16)
AST SERPL-CCNC: 18 U/L (ref 15–37)
BASOPHILS ABSOLUTE: 0.1 K/UL (ref 0–0.2)
BASOPHILS RELATIVE PERCENT: 0.6 %
BILIRUB SERPL-MCNC: 0.8 MG/DL (ref 0–1)
BUN BLDV-MCNC: 25 MG/DL (ref 7–20)
CALCIUM SERPL-MCNC: 9.7 MG/DL (ref 8.3–10.6)
CHLORIDE BLD-SCNC: 96 MMOL/L (ref 99–110)
CHOLESTEROL, TOTAL: 130 MG/DL (ref 0–199)
CO2: 17 MMOL/L (ref 21–32)
CREAT SERPL-MCNC: 1.1 MG/DL (ref 0.8–1.3)
EOSINOPHILS ABSOLUTE: 0.1 K/UL (ref 0–0.6)
EOSINOPHILS RELATIVE PERCENT: 0.8 %
GFR AFRICAN AMERICAN: >60
GFR NON-AFRICAN AMERICAN: >60
GLUCOSE BLD-MCNC: 174 MG/DL (ref 70–99)
HCT VFR BLD CALC: 47.4 % (ref 40.5–52.5)
HDLC SERPL-MCNC: 48 MG/DL (ref 40–60)
HEMOGLOBIN: 16 G/DL (ref 13.5–17.5)
INR BLD: 1.17 (ref 0.88–1.12)
LDL CHOLESTEROL CALCULATED: 63 MG/DL
LYMPHOCYTES ABSOLUTE: 2.8 K/UL (ref 1–5.1)
LYMPHOCYTES RELATIVE PERCENT: 15.8 %
MCH RBC QN AUTO: 30.1 PG (ref 26–34)
MCHC RBC AUTO-ENTMCNC: 33.7 G/DL (ref 31–36)
MCV RBC AUTO: 89.2 FL (ref 80–100)
MONOCYTES ABSOLUTE: 1.3 K/UL (ref 0–1.3)
MONOCYTES RELATIVE PERCENT: 7.5 %
NEUTROPHILS ABSOLUTE: 13.4 K/UL (ref 1.7–7.7)
NEUTROPHILS RELATIVE PERCENT: 75.3 %
PDW BLD-RTO: 13.5 % (ref 12.4–15.4)
PLATELET # BLD: 345 K/UL (ref 135–450)
PMV BLD AUTO: 8.7 FL (ref 5–10.5)
POTASSIUM SERPL-SCNC: 4.4 MMOL/L (ref 3.5–5.1)
PROTHROMBIN TIME: 13.3 SEC (ref 9.9–12.7)
RBC # BLD: 5.32 M/UL (ref 4.2–5.9)
SODIUM BLD-SCNC: 138 MMOL/L (ref 136–145)
TOTAL PROTEIN: 8 G/DL (ref 6.4–8.2)
TRIGL SERPL-MCNC: 96 MG/DL (ref 0–150)
VLDLC SERPL CALC-MCNC: 19 MG/DL
WBC # BLD: 17.8 K/UL (ref 4–11)

## 2022-02-14 PROCEDURE — 36415 COLL VENOUS BLD VENIPUNCTURE: CPT

## 2022-02-14 PROCEDURE — 80061 LIPID PANEL: CPT

## 2022-02-14 PROCEDURE — 83036 HEMOGLOBIN GLYCOSYLATED A1C: CPT

## 2022-02-14 PROCEDURE — 80053 COMPREHEN METABOLIC PANEL: CPT

## 2022-02-14 PROCEDURE — 85610 PROTHROMBIN TIME: CPT

## 2022-02-14 PROCEDURE — 85025 COMPLETE CBC W/AUTO DIFF WBC: CPT

## 2022-02-14 NOTE — TELEPHONE ENCOUNTER
Would see a urologist re the urinary issue    Would have to assess the issue with numbness to give a more informed answer, but if it was thought to be from the back and hasn't improved after surgery then there may not be much more that can be done. Would certainly have it looked at if it is worsening or if new symptoms have arisen.

## 2022-02-14 NOTE — TELEPHONE ENCOUNTER
Pt calling because since his operation he has had trouble urinating standing up. He has to lay down and go in a urinal. Still has problems with numbness on the left side. Pt wants to know if there is something to do and what it would be?

## 2022-02-15 LAB
ESTIMATED AVERAGE GLUCOSE: 165.7 MG/DL
HBA1C MFR BLD: 7.4 %

## 2022-02-15 NOTE — TELEPHONE ENCOUNTER
Patient called in stating that he needs a refill for the following:        warfarin (COUMADIN) 5 MG tablet    Patient is completley out of [address], [place] [address] [phone] - F [phone]     Pls advise.

## 2022-02-17 RX ORDER — WARFARIN SODIUM 5 MG/1
TABLET ORAL
Qty: 246 TABLET | Refills: 2 | Status: SHIPPED | OUTPATIENT
Start: 2022-02-17 | End: 2022-02-18

## 2022-02-18 RX ORDER — WARFARIN SODIUM 5 MG/1
TABLET ORAL
Qty: 266 TABLET | Refills: 2 | Status: SHIPPED | OUTPATIENT
Start: 2022-02-18

## 2022-03-14 ENCOUNTER — HOSPITAL ENCOUNTER (OUTPATIENT)
Age: 67
Discharge: HOME | End: 2022-03-14
Payer: MEDICARE

## 2022-03-14 DIAGNOSIS — I82.532 CHRONIC DEEP VEIN THROMBOSIS (DVT) OF POPLITEAL VEIN OF LEFT LOWER EXTREMITY (HCC): ICD-10-CM

## 2022-03-14 LAB
INR BLD: 2.29 (ref 0.88–1.12)
PROTHROMBIN TIME: 26.8 SEC (ref 9.9–12.7)

## 2022-03-14 PROCEDURE — 36415 COLL VENOUS BLD VENIPUNCTURE: CPT

## 2022-03-14 PROCEDURE — 85610 PROTHROMBIN TIME: CPT

## 2022-03-15 ENCOUNTER — TELEPHONE (OUTPATIENT)
Dept: INTERNAL MEDICINE CLINIC | Age: 67
End: 2022-03-15

## 2022-03-15 DIAGNOSIS — N39.9 URINARY DISORDER: Primary | ICD-10-CM

## 2022-03-30 DIAGNOSIS — E11.42 TYPE 2 DIABETES MELLITUS WITH DIABETIC POLYNEUROPATHY, WITH LONG-TERM CURRENT USE OF INSULIN (HCC): ICD-10-CM

## 2022-03-30 DIAGNOSIS — Z79.4 TYPE 2 DIABETES MELLITUS WITH DIABETIC POLYNEUROPATHY, WITH LONG-TERM CURRENT USE OF INSULIN (HCC): ICD-10-CM

## 2022-03-31 RX ORDER — BLOOD SUGAR DIAGNOSTIC
STRIP MISCELLANEOUS
Qty: 300 STRIP | Refills: 5 | Status: SHIPPED | OUTPATIENT
Start: 2022-03-31 | End: 2022-08-02 | Stop reason: SDUPTHER

## 2022-05-13 RX ORDER — POTASSIUM CHLORIDE 750 MG/1
TABLET, FILM COATED, EXTENDED RELEASE ORAL
Qty: 360 TABLET | Refills: 3 | Status: SHIPPED | OUTPATIENT
Start: 2022-05-13

## 2022-06-16 RX ORDER — BLOOD SUGAR DIAGNOSTIC
STRIP MISCELLANEOUS
Qty: 100 EACH | Refills: 5 | Status: SHIPPED | OUTPATIENT
Start: 2022-06-16

## 2022-07-08 ENCOUNTER — TELEPHONE (OUTPATIENT)
Dept: INTERNAL MEDICINE CLINIC | Age: 67
End: 2022-07-08

## 2022-07-08 DIAGNOSIS — E11.42 TYPE 2 DIABETES MELLITUS WITH DIABETIC POLYNEUROPATHY, WITH LONG-TERM CURRENT USE OF INSULIN (HCC): ICD-10-CM

## 2022-07-08 DIAGNOSIS — Z79.4 TYPE 2 DIABETES MELLITUS WITH DIABETIC POLYNEUROPATHY, WITH LONG-TERM CURRENT USE OF INSULIN (HCC): ICD-10-CM

## 2022-07-08 RX ORDER — HUMAN INSULIN 100 [IU]/ML
INJECTION, SUSPENSION SUBCUTANEOUS
Qty: 20 ML | Refills: 3 | Status: CANCELLED | OUTPATIENT
Start: 2022-07-08

## 2022-07-08 NOTE — TELEPHONE ENCOUNTER
Pharmacy called in stating that the patient is requesting 4 vials instead of 2 vials of insulin NPH (NOVOLIN N) 100 UNIT/ML injection vial    Pharmacy wants to know if he is supposed to get 4 vials. PLs call and advise.

## 2022-07-13 ENCOUNTER — TELEPHONE (OUTPATIENT)
Dept: INTERNAL MEDICINE CLINIC | Age: 67
End: 2022-07-13

## 2022-07-14 NOTE — TELEPHONE ENCOUNTER
Submitted PA for OneTouch Ultra strips  [facility]ia [facility] Key: [id number] STATUS: DENIED under your Medicare Part D benefit; however, it may be covered under Medicare Part B. The Medicare rule in Chapter 6, Section 10.5 of the Prescription Drug Manual says Medicare Part B pays for supplies not associated with insulin delivery, such as testing supplies (for example: blood sugar meters, control solution, test strips and lancets). Please advise pharmacy to bill Medicare Part B. Thank you!

## 2022-07-20 ENCOUNTER — TELEPHONE (OUTPATIENT)
Dept: INTERNAL MEDICINE CLINIC | Age: 67
End: 2022-07-20

## 2022-07-20 DIAGNOSIS — Z79.4 TYPE 2 DIABETES MELLITUS WITH DIABETIC POLYNEUROPATHY, WITH LONG-TERM CURRENT USE OF INSULIN (HCC): ICD-10-CM

## 2022-07-20 DIAGNOSIS — E11.42 TYPE 2 DIABETES MELLITUS WITH DIABETIC POLYNEUROPATHY, WITH LONG-TERM CURRENT USE OF INSULIN (HCC): ICD-10-CM

## 2022-07-20 NOTE — TELEPHONE ENCOUNTER
Patient called in requesting a prescription to be resent to the pharmacy. Pharmacy stated that did not receive updated prescription for 4 vials on insulin. insulin NPH (NOVOLIN N) 100 UNIT/ML injection vial    [address], [place] [address] [address] [facility] [phone]      Pls advise.

## 2022-07-20 NOTE — TELEPHONE ENCOUNTER
Patient called in stating that pharmacy needs a CME or CMI form filled out so he can get his insulin. Patient had a PA that was denied. Pharmacy also tried billing under medicare Part B and it did not work. Pls call and advise.

## 2022-07-22 ENCOUNTER — TELEPHONE (OUTPATIENT)
Dept: INTERNAL MEDICINE CLINIC | Age: 67
End: 2022-07-22

## 2022-07-22 NOTE — TELEPHONE ENCOUNTER
PA for OneTouch Ultra test strips was already submitted and denied under Medicare Part D, reference 07/13/22 Telephone Encounter. However, the test strips may be covered under Medicare Part B. The Medicare rule in Chapter 6, Section 10.5 of the Prescription Drug Manual says Medicare Part B pays for supplies not associated with insulin delivery, such as testing supplies (for example: blood sugar meters, control solution, test strips and lancets). Please advise pharmacy to bill Medicare Part B. Thank you!

## 2022-08-02 ENCOUNTER — TELEPHONE (OUTPATIENT)
Dept: INTERNAL MEDICINE CLINIC | Age: 67
End: 2022-08-02

## 2022-08-02 RX ORDER — BLOOD SUGAR DIAGNOSTIC
STRIP MISCELLANEOUS
Qty: 300 STRIP | Refills: 5 | Status: SHIPPED | OUTPATIENT
Start: 2022-08-02 | End: 2022-08-19 | Stop reason: SDUPTHER

## 2022-08-02 NOTE — TELEPHONE ENCOUNTER
Dr. Lacey Comment please send a new script over for the pharmacy for the following     Milus Blamer strip [[id number]]     Order Details  Dose, Route, Frequency: As Directed   Dispense Quantity: 300 strip Refills: 5          Sig: USE AS DIRECTED THREE TIMES DAILY         Start Date: 03/31/22 End Date: --   Written Date: 03/31/22 Expiration Date: 03/31/23   Original Order:  Milus Blamer strip [[id number]]

## 2022-08-19 RX ORDER — BLOOD SUGAR DIAGNOSTIC
STRIP MISCELLANEOUS
Qty: 300 STRIP | Refills: 5 | Status: SHIPPED | OUTPATIENT
Start: 2022-08-19 | End: 2022-10-19 | Stop reason: SDUPTHER

## 2022-08-24 ENCOUNTER — TELEPHONE (OUTPATIENT)
Dept: INTERNAL MEDICINE CLINIC | Age: 67
End: 2022-08-24

## 2022-08-24 NOTE — TELEPHONE ENCOUNTER
Patient called in wanting to know the status of the paperwork for testing strips from [email or web address]. Patient will be running out of strips soon. Pls call and advise. If patient does not answer leave voicemail.

## 2022-08-25 NOTE — TELEPHONE ENCOUNTER
[facility] called again saying they didn't receive anything zoran said can we call before we send it so she can stand by fax machine

## 2022-08-25 NOTE — TELEPHONE ENCOUNTER
Pharmacy is calling to say they still have not received the paper work      Fax# [phone]      Please refax

## 2022-10-19 RX ORDER — BLOOD SUGAR DIAGNOSTIC
STRIP MISCELLANEOUS
Qty: 300 STRIP | Refills: 5 | Status: SHIPPED | OUTPATIENT
Start: 2022-10-19

## 2022-10-19 NOTE — TELEPHONE ENCOUNTER
Pt called stated that he is almost out of test strips again and they need a form filled out because it was only good until 10/6 and now insurance needs more information. Explained I will call pharmacy       Spoke to Constance Camacho at the pharmacy and she stated that he needed a CME filled out and that we should have received it. She was unable to tell me where to get the form they stated once the new script is sent then they will fax us the form to be filled out again.     Sent script to MD as 3 times daily

## 2022-10-25 ENCOUNTER — TELEPHONE (OUTPATIENT)
Dept: INTERNAL MEDICINE CLINIC | Age: 67
End: 2022-10-25

## 2022-10-25 NOTE — TELEPHONE ENCOUNTER
Pharmacy called needing CME filled out, stated they never received it back .  Sent 10/19 Needs it filled out for test strips         Please advise

## 2023-07-31 RX ORDER — SYRINGE-NEEDLE,INSULIN,0.5 ML 27GX1/2"
SYRINGE, EMPTY DISPOSABLE MISCELLANEOUS
Qty: 100 EACH | Refills: 0 | Status: SHIPPED | OUTPATIENT
Start: 2023-07-31

## 2023-07-31 NOTE — TELEPHONE ENCOUNTER
Pt is requesting a refill for the following:    Insulin Syringe-Needle U-100 (BD INSULIN SYRINGE U/F) 31G X 5/16\" 0.5  [address] [address]   Phone:  [phone]  Fax:  [phone]     Upcoming appt scheduled on 8/16/23     Please advise

## 2023-08-12 DIAGNOSIS — Z79.4 TYPE 2 DIABETES MELLITUS WITH DIABETIC POLYNEUROPATHY, WITH LONG-TERM CURRENT USE OF INSULIN (HCC): ICD-10-CM

## 2023-08-12 DIAGNOSIS — E11.42 TYPE 2 DIABETES MELLITUS WITH DIABETIC POLYNEUROPATHY, WITH LONG-TERM CURRENT USE OF INSULIN (HCC): ICD-10-CM

## 2023-08-13 DIAGNOSIS — E11.42 TYPE 2 DIABETES MELLITUS WITH DIABETIC POLYNEUROPATHY, WITH LONG-TERM CURRENT USE OF INSULIN (HCC): ICD-10-CM

## 2023-08-13 DIAGNOSIS — Z79.4 TYPE 2 DIABETES MELLITUS WITH DIABETIC POLYNEUROPATHY, WITH LONG-TERM CURRENT USE OF INSULIN (HCC): ICD-10-CM

## 2023-08-16 ENCOUNTER — TELEPHONE (OUTPATIENT)
Dept: INTERNAL MEDICINE CLINIC | Age: 68
End: 2023-08-16

## 2023-08-16 ENCOUNTER — TELEMEDICINE (OUTPATIENT)
Dept: INTERNAL MEDICINE CLINIC | Age: 68
End: 2023-08-16
Payer: MEDICARE

## 2023-08-16 DIAGNOSIS — I10 ESSENTIAL HYPERTENSION: ICD-10-CM

## 2023-08-16 DIAGNOSIS — I82.532 CHRONIC DEEP VEIN THROMBOSIS (DVT) OF POPLITEAL VEIN OF LEFT LOWER EXTREMITY (HCC): ICD-10-CM

## 2023-08-16 DIAGNOSIS — E78.5 DYSLIPIDEMIA: ICD-10-CM

## 2023-08-16 DIAGNOSIS — Z79.4 TYPE 2 DIABETES MELLITUS WITH DIABETIC POLYNEUROPATHY, WITH LONG-TERM CURRENT USE OF INSULIN (HCC): Primary | ICD-10-CM

## 2023-08-16 DIAGNOSIS — E11.42 TYPE 2 DIABETES MELLITUS WITH DIABETIC POLYNEUROPATHY, WITH LONG-TERM CURRENT USE OF INSULIN (HCC): Primary | ICD-10-CM

## 2023-08-16 DIAGNOSIS — Z12.5 ENCOUNTER FOR SCREENING FOR MALIGNANT NEOPLASM OF PROSTATE: ICD-10-CM

## 2023-08-16 DIAGNOSIS — Z86.718 HISTORY OF DVT (DEEP VEIN THROMBOSIS): ICD-10-CM

## 2023-08-16 DIAGNOSIS — I63.9 ACUTE ISCHEMIC STROKE (HCC): ICD-10-CM

## 2023-08-16 PROBLEM — K85.10 GALLSTONE PANCREATITIS: Status: RESOLVED | Noted: 2018-02-09 | Resolved: 2023-08-16

## 2023-08-16 PROCEDURE — 1123F ACP DISCUSS/DSCN MKR DOCD: CPT | Performed by: INTERNAL MEDICINE

## 2023-08-16 PROCEDURE — 99214 OFFICE O/P EST MOD 30 MIN: CPT | Performed by: INTERNAL MEDICINE

## 2023-08-16 PROCEDURE — 2022F DILAT RTA XM EVC RTNOPTHY: CPT | Performed by: INTERNAL MEDICINE

## 2023-08-16 PROCEDURE — 3046F HEMOGLOBIN A1C LEVEL >9.0%: CPT | Performed by: INTERNAL MEDICINE

## 2023-08-16 PROCEDURE — G8427 DOCREV CUR MEDS BY ELIG CLIN: HCPCS | Performed by: INTERNAL MEDICINE

## 2023-08-16 PROCEDURE — 3017F COLORECTAL CA SCREEN DOC REV: CPT | Performed by: INTERNAL MEDICINE

## 2023-08-16 RX ORDER — OXYCODONE AND ACETAMINOPHEN 10; 325 MG/1; MG/1
1 TABLET ORAL EVERY 6 HOURS PRN
COMMUNITY
Start: 2023-07-28

## 2023-08-16 SDOH — ECONOMIC STABILITY: FOOD INSECURITY: WITHIN THE PAST 12 MONTHS, THE FOOD YOU BOUGHT JUST DIDN'T LAST AND YOU DIDN'T HAVE MONEY TO GET MORE.: NEVER TRUE

## 2023-08-16 SDOH — ECONOMIC STABILITY: FOOD INSECURITY: WITHIN THE PAST 12 MONTHS, YOU WORRIED THAT YOUR FOOD WOULD RUN OUT BEFORE YOU GOT MONEY TO BUY MORE.: NEVER TRUE

## 2023-08-16 SDOH — ECONOMIC STABILITY: INCOME INSECURITY: HOW HARD IS IT FOR YOU TO PAY FOR THE VERY BASICS LIKE FOOD, HOUSING, MEDICAL CARE, AND HEATING?: NOT HARD AT ALL

## 2023-08-16 SDOH — ECONOMIC STABILITY: HOUSING INSECURITY
IN THE LAST 12 MONTHS, WAS THERE A TIME WHEN YOU DID NOT HAVE A STEADY PLACE TO SLEEP OR SLEPT IN A SHELTER (INCLUDING NOW)?: NO

## 2023-08-16 ASSESSMENT — PATIENT HEALTH QUESTIONNAIRE - PHQ9
1. LITTLE INTEREST OR PLEASURE IN DOING THINGS: 1
SUM OF ALL RESPONSES TO PHQ9 QUESTIONS 1 & 2: 2
SUM OF ALL RESPONSES TO PHQ QUESTIONS 1-9: 2
2. FEELING DOWN, DEPRESSED OR HOPELESS: 1

## 2023-08-16 NOTE — PROGRESS NOTES
Victorino Snider (:  1955) is a Established patient, presenting virtually for evaluation of the following:    Assessment & Plan   Below is the assessment and plan developed based on review of pertinent history, physical exam, labs, studies, and medications. 1. Type 2 diabetes mellitus with diabetic polyneuropathy, with long-term current use of insulin ([address])  - due for labs, reassess, continue NPH insulin  -     Comprehensive Metabolic Panel; Future  -     Lipid Panel; Future  -     CBC with Auto Differential; Future  -     Hemoglobin A1C; Future  2. Essential hypertension   - on clonidine  3. Dyslipidemia   - continue atorvastatin  4. History of DVT (deep vein thrombosis)   - has decided to stop anticoagulation  5. Acute ischemic stroke ([address])   - years ago, small residual deficit  6. Chronic deep vein thrombosis (DVT) of popliteal vein of left lower extremity (HCC)  - he has decided to stop anticoagulation as above  7. Encounter for screening for malignant neoplasm of prostate  -     PSA Screening; Future    Return in about 6 months (around 2024) for AWV. Subjective   HPI    Had been getting RFA for spine, but Medicare stopped paying for it in April. Seeing pain management - on oxycodone 10 mg 4 times a day. Diabetes - stable, checks sugars regularly, using the insulin, does not miss doses    Uses lasix as needed, not very often    Saw urology due to difficulty urinating when standing up - was put on flomax but didn't notice a huge difference. He decided to stop the coumadin. Had clots in year , nothing prior or since. Has had colonoscopy - [facility]    Declines vaccinations    Review of Systems       Objective   Patient-Reported Vitals  No data recorded       Physical Exam  Vitals reviewed. Constitutional:       General: He is not in acute distress. Appearance: Normal appearance. He is obese.    Pulmonary:      Effort: Pulmonary effort is normal. No respiratory

## 2023-08-16 NOTE — TELEPHONE ENCOUNTER
----- Message from [facility] sent at 8/16/2023 11:15 AM EDT -----  Subject: Message to Provider    QUESTIONS  Information for Provider? Tamslosin . 4 mg capsule is the name of the   medication he was taking. Patient said the provider needed this   information/patient does not need a call back. ---------------------------------------------------------------------------  --------------  Marc Hamilton [facility]  [phone]; OK to leave message on voicemail,OK to respond with electronic   message via [employer] portal (only for patients who have registered [employer]   account)  ---------------------------------------------------------------------------  --------------  SCRIPT ANSWERS  Relationship to Patient?  Self

## 2023-08-18 PROBLEM — K85.10 ACUTE GALLSTONE PANCREATITIS: Status: RESOLVED | Noted: 2018-02-09 | Resolved: 2023-08-18

## 2023-09-22 RX ORDER — SYRINGE-NEEDLE,INSULIN,0.5 ML 27GX1/2"
SYRINGE, EMPTY DISPOSABLE MISCELLANEOUS
Qty: 100 EACH | Refills: 3 | Status: SHIPPED | OUTPATIENT
Start: 2023-09-22

## 2023-09-22 NOTE — TELEPHONE ENCOUNTER
Patient needs refill:    Insulin Syringe-Needle U-100 (BD INSULIN SYRINGE U/F) 31G X 5/16\" 0.5 ML [address], [address] [facility] [phone]      Pls advise

## 2023-09-25 ENCOUNTER — HOSPITAL ENCOUNTER (OUTPATIENT)
Dept: GENERAL RADIOLOGY | Age: 68
Discharge: HOME | End: 2023-09-25
Payer: MEDICARE

## 2023-09-25 ENCOUNTER — HOSPITAL ENCOUNTER (OUTPATIENT)
Age: 68
Discharge: HOME | End: 2023-09-25
Payer: MEDICARE

## 2023-09-25 DIAGNOSIS — M46.1 SACROILIITIS (HCC): ICD-10-CM

## 2023-09-25 DIAGNOSIS — Z12.5 ENCOUNTER FOR SCREENING FOR MALIGNANT NEOPLASM OF PROSTATE: ICD-10-CM

## 2023-09-25 DIAGNOSIS — E11.42 TYPE 2 DIABETES MELLITUS WITH DIABETIC POLYNEUROPATHY, WITH LONG-TERM CURRENT USE OF INSULIN (HCC): ICD-10-CM

## 2023-09-25 DIAGNOSIS — Z79.4 TYPE 2 DIABETES MELLITUS WITH DIABETIC POLYNEUROPATHY, WITH LONG-TERM CURRENT USE OF INSULIN (HCC): ICD-10-CM

## 2023-09-25 LAB
ALBUMIN SERPL-MCNC: 4.4 G/DL (ref 3.4–5)
ALBUMIN/GLOB SERPL: 1.3 {RATIO} (ref 1.1–2.2)
ALP SERPL-CCNC: 139 U/L (ref 40–129)
ALT SERPL-CCNC: 21 U/L (ref 10–40)
ANION GAP SERPL CALCULATED.3IONS-SCNC: 16 MMOL/L (ref 3–16)
AST SERPL-CCNC: 19 U/L (ref 15–37)
BASOPHILS # BLD: 0.1 K/UL (ref 0–0.2)
BASOPHILS NFR BLD: 0.3 %
BILIRUB SERPL-MCNC: 1 MG/DL (ref 0–1)
BUN SERPL-MCNC: 14 MG/DL (ref 7–20)
CALCIUM SERPL-MCNC: 9.1 MG/DL (ref 8.3–10.6)
CHLORIDE SERPL-SCNC: 100 MMOL/L (ref 99–110)
CHOLEST SERPL-MCNC: 208 MG/DL (ref 0–199)
CO2 SERPL-SCNC: 22 MMOL/L (ref 21–32)
CREAT SERPL-MCNC: 0.8 MG/DL (ref 0.8–1.3)
DEPRECATED RDW RBC AUTO: 13.5 % (ref 12.4–15.4)
EOSINOPHIL # BLD: 0.1 K/UL (ref 0–0.6)
EOSINOPHIL NFR BLD: 0.6 %
EST. AVERAGE GLUCOSE BLD GHB EST-MCNC: 157.1 MG/DL
GFR SERPLBLD CREATININE-BSD FMLA CKD-EPI: >60 ML/MIN/{1.73_M2}
GLUCOSE SERPL-MCNC: 144 MG/DL (ref 70–99)
HBA1C MFR BLD: 7.1 %
HCT VFR BLD AUTO: 49 % (ref 40.5–52.5)
HDLC SERPL-MCNC: 43 MG/DL (ref 40–60)
HGB BLD-MCNC: 16.9 G/DL (ref 13.5–17.5)
LDLC SERPL CALC-MCNC: 142 MG/DL
LYMPHOCYTES # BLD: 2.2 K/UL (ref 1–5.1)
LYMPHOCYTES NFR BLD: 13.7 %
MCH RBC QN AUTO: 30.6 PG (ref 26–34)
MCHC RBC AUTO-ENTMCNC: 34.4 G/DL (ref 31–36)
MCV RBC AUTO: 89 FL (ref 80–100)
MONOCYTES # BLD: 1.1 K/UL (ref 0–1.3)
MONOCYTES NFR BLD: 6.8 %
NEUTROPHILS # BLD: 12.5 K/UL (ref 1.7–7.7)
NEUTROPHILS NFR BLD: 78.6 %
PLATELET # BLD AUTO: 308 K/UL (ref 135–450)
PMV BLD AUTO: 9.1 FL (ref 5–10.5)
POTASSIUM SERPL-SCNC: 3.7 MMOL/L (ref 3.5–5.1)
PROT SERPL-MCNC: 7.8 G/DL (ref 6.4–8.2)
PSA SERPL DL<=0.01 NG/ML-MCNC: 0.52 NG/ML (ref 0–4)
RBC # BLD AUTO: 5.5 M/UL (ref 4.2–5.9)
SODIUM SERPL-SCNC: 138 MMOL/L (ref 136–145)
TRIGL SERPL-MCNC: 113 MG/DL (ref 0–150)
VLDLC SERPL CALC-MCNC: 23 MG/DL
WBC # BLD AUTO: 15.9 K/UL (ref 4–11)

## 2023-09-25 PROCEDURE — 80053 COMPREHEN METABOLIC PANEL: CPT

## 2023-09-25 PROCEDURE — 72110 X-RAY EXAM L-2 SPINE 4/>VWS: CPT

## 2023-09-25 PROCEDURE — 85025 COMPLETE CBC W/AUTO DIFF WBC: CPT

## 2023-09-25 PROCEDURE — 80061 LIPID PANEL: CPT

## 2023-09-25 PROCEDURE — 73521 X-RAY EXAM HIPS BI 2 VIEWS: CPT

## 2023-09-25 PROCEDURE — 36415 COLL VENOUS BLD VENIPUNCTURE: CPT

## 2023-09-25 PROCEDURE — 83036 HEMOGLOBIN GLYCOSYLATED A1C: CPT

## 2023-09-25 PROCEDURE — 84153 ASSAY OF PSA TOTAL: CPT

## 2023-10-23 RX ORDER — SYRINGE-NEEDLE,INSULIN,0.5 ML 27GX1/2"
SYRINGE, EMPTY DISPOSABLE MISCELLANEOUS
Qty: 300 EACH | Refills: 3 | Status: SHIPPED | OUTPATIENT
Start: 2023-10-23

## 2023-10-23 NOTE — TELEPHONE ENCOUNTER
Patient states he needs a new script for the insulin needles due to the directions being wrong they need to be for 3x a day. States pharmacy wont fill because they say it is to early and now needs the needles changed from twice a day to 3x a day.           Insulin Syringe-Needle U-100 (BD INSULIN SYRINGE U/F) 31G X 5/16\" 0.5 ML MISC [[id number]]    [facility] [address] [place] [address], [address] [facility] [phone]

## 2023-10-25 ENCOUNTER — TELEPHONE (OUTPATIENT)
Dept: INTERNAL MEDICINE CLINIC | Age: 68
End: 2023-10-25

## 2023-10-25 NOTE — TELEPHONE ENCOUNTER
Dr Ila Houser recently started him on a new BP medicine:    olmesartan (BENICAR) 20 MG tablet    He has seen a difference:  150/82. How long should be watching this to see how it affects him. Appears to have stalled at this mid point for several days. Please call and advise if he should take  more medicine, a different type? What should he do now?     Please call and advise

## 2023-10-26 NOTE — TELEPHONE ENCOUNTER
There is room to increase but need blood work prior to any changes. The order has already been placed.

## 2023-10-27 ENCOUNTER — TELEPHONE (OUTPATIENT)
Dept: INTERNAL MEDICINE CLINIC | Age: 68
End: 2023-10-27

## 2023-10-27 ENCOUNTER — HOSPITAL ENCOUNTER (OUTPATIENT)
Age: 68
Discharge: HOME | End: 2023-10-27
Payer: MEDICARE

## 2023-10-27 DIAGNOSIS — I10 ESSENTIAL HYPERTENSION: ICD-10-CM

## 2023-10-27 DIAGNOSIS — I10 ESSENTIAL HYPERTENSION: Primary | ICD-10-CM

## 2023-10-27 LAB
ANION GAP SERPL CALCULATED.3IONS-SCNC: 15 MMOL/L (ref 3–16)
BUN SERPL-MCNC: 12 MG/DL (ref 7–20)
CALCIUM SERPL-MCNC: 9.2 MG/DL (ref 8.3–10.6)
CHLORIDE SERPL-SCNC: 100 MMOL/L (ref 99–110)
CO2 SERPL-SCNC: 23 MMOL/L (ref 21–32)
CREAT SERPL-MCNC: 0.9 MG/DL (ref 0.8–1.3)
GFR SERPLBLD CREATININE-BSD FMLA CKD-EPI: >60 ML/MIN/{1.73_M2}
GLUCOSE SERPL-MCNC: 250 MG/DL (ref 70–99)
POTASSIUM SERPL-SCNC: 4 MMOL/L (ref 3.5–5.1)
SODIUM SERPL-SCNC: 138 MMOL/L (ref 136–145)

## 2023-10-27 PROCEDURE — 36415 COLL VENOUS BLD VENIPUNCTURE: CPT

## 2023-10-27 PROCEDURE — 80048 BASIC METABOLIC PNL TOTAL CA: CPT

## 2023-10-27 NOTE — TELEPHONE ENCOUNTER
[address] [facility] as long the kidney function and potassium are normal when that results we will plan to double the dose of the olmesartan Called and spoke with patient, clarified results. Script sent to pharmacy.

## 2023-10-30 RX ORDER — OLMESARTAN MEDOXOMIL 40 MG/1
40 TABLET ORAL DAILY
Qty: 30 TABLET | Refills: 1 | Status: SHIPPED | OUTPATIENT
Start: 2023-10-30 | End: 2023-12-04 | Stop reason: SDUPTHER

## 2023-10-30 RX ORDER — OLMESARTAN MEDOXOMIL 20 MG/1
40 TABLET ORAL DAILY
Qty: 60 TABLET | Refills: 1 | Status: CANCELLED | OUTPATIENT
Start: 2023-10-30

## 2023-10-30 NOTE — TELEPHONE ENCOUNTER
Spoke to pt gave advise   He is currently taking 12 tablets a day of potassium chloride. Last month he didn't take as much because he did have some stomach issues but is back to taking it 6 tabs BID    Pt requests new script.

## 2023-10-30 NOTE — TELEPHONE ENCOUNTER
I would like him to cut the potassium in half, the olmesartan helps his body keep potassium and I think the higher dose will help even more. The blood work 1-2 weeks after the change will check to see if this is the correct dose.

## 2023-10-30 NOTE — TELEPHONE ENCOUNTER
Yes we can double the olmesartan   How much of the potassium supplement is he taking?   Will want to recheck another BMP in 1-2 weeks

## 2023-11-01 ENCOUNTER — TELEPHONE (OUTPATIENT)
Dept: INTERNAL MEDICINE CLINIC | Age: 68
End: 2023-11-01

## 2023-11-01 NOTE — TELEPHONE ENCOUNTER
Patient states his BP medication was doubled and would like to know how long does it take to start working. States his BP has been staying the same and he has been on the medication for two days. BP was 145/88 with heart rate of 60. How long does he need to wait before to know wether or not the medication is working.     Please call and advise [phone]

## 2023-12-04 ENCOUNTER — OFFICE VISIT (OUTPATIENT)
Dept: INTERNAL MEDICINE CLINIC | Age: 68
End: 2023-12-04
Payer: MEDICARE

## 2023-12-04 VITALS
DIASTOLIC BLOOD PRESSURE: 80 MMHG | TEMPERATURE: 96.9 F | OXYGEN SATURATION: 96 % | HEART RATE: 99 BPM | SYSTOLIC BLOOD PRESSURE: 134 MMHG

## 2023-12-04 DIAGNOSIS — Z79.4 TYPE 2 DIABETES MELLITUS WITH DIABETIC POLYNEUROPATHY, WITH LONG-TERM CURRENT USE OF INSULIN (HCC): ICD-10-CM

## 2023-12-04 DIAGNOSIS — I10 ESSENTIAL HYPERTENSION: Primary | ICD-10-CM

## 2023-12-04 DIAGNOSIS — E11.42 TYPE 2 DIABETES MELLITUS WITH DIABETIC POLYNEUROPATHY, WITH LONG-TERM CURRENT USE OF INSULIN (HCC): ICD-10-CM

## 2023-12-04 PROCEDURE — 3017F COLORECTAL CA SCREEN DOC REV: CPT | Performed by: INTERNAL MEDICINE

## 2023-12-04 PROCEDURE — 2022F DILAT RTA XM EVC RTNOPTHY: CPT | Performed by: INTERNAL MEDICINE

## 2023-12-04 PROCEDURE — 1036F TOBACCO NON-USER: CPT | Performed by: INTERNAL MEDICINE

## 2023-12-04 PROCEDURE — 1123F ACP DISCUSS/DSCN MKR DOCD: CPT | Performed by: INTERNAL MEDICINE

## 2023-12-04 PROCEDURE — G8427 DOCREV CUR MEDS BY ELIG CLIN: HCPCS | Performed by: INTERNAL MEDICINE

## 2023-12-04 PROCEDURE — G8421 BMI NOT CALCULATED: HCPCS | Performed by: INTERNAL MEDICINE

## 2023-12-04 PROCEDURE — 3078F DIAST BP <80 MM HG: CPT | Performed by: INTERNAL MEDICINE

## 2023-12-04 PROCEDURE — 3074F SYST BP LT 130 MM HG: CPT | Performed by: INTERNAL MEDICINE

## 2023-12-04 PROCEDURE — 99213 OFFICE O/P EST LOW 20 MIN: CPT | Performed by: INTERNAL MEDICINE

## 2023-12-04 PROCEDURE — 3051F HG A1C>EQUAL 7.0%<8.0%: CPT | Performed by: INTERNAL MEDICINE

## 2023-12-04 PROCEDURE — G8484 FLU IMMUNIZE NO ADMIN: HCPCS | Performed by: INTERNAL MEDICINE

## 2023-12-04 RX ORDER — OLMESARTAN MEDOXOMIL 40 MG/1
40 TABLET ORAL DAILY
Qty: 90 TABLET | Refills: 1 | Status: SHIPPED | OUTPATIENT
Start: 2023-12-04

## 2023-12-04 RX ORDER — FERROUS SULFATE 325(65) MG
325 TABLET ORAL
COMMUNITY

## 2023-12-04 NOTE — PROGRESS NOTES
Janett Cabral (:  1955) is a 79 y.o. male,Established patient, here for evaluation of the following chief complaint(s):  Diabetes and Hypertension         ASSESSMENT/PLAN:  1. Essential hypertension  - improved, continue amlodipine 5mg daily, olmesartan 40mg daily  -     Comprehensive Metabolic Panel; Future  -     Lipid Panel; Future  -     CBC with Auto Differential; Future  -     Hemoglobin A1C; Future  -     Microalbumin / Creatinine Urine Ratio; Future  2. Type 2 diabetes mellitus with diabetic polyneuropathy, with long-term current use of insulin ([address])  - labs ordered for next visit  -     Comprehensive Metabolic Panel; Future  -     Lipid Panel; Future  -     CBC with Auto Differential; Future  -     Hemoglobin A1C; Future  -     Microalbumin / Creatinine Urine Ratio; Future      Return in about 6 months (around 2024) for HTN follow up, DM follow up. Subjective   SUBJECTIVE/OBJECTIVE:  HPI    Blood pressure has been doing better with the higher dose of olmesartan. He is checking his blood pressure at home as well. He is having a lot of back pain, his pain doctor wants to do another ablation but they are having trouble getting it approved by insurance. Review of Systems       Objective   Physical Exam  Vitals reviewed. Constitutional:       General: He is not in acute distress. Appearance: Normal appearance. He is well-developed. HENT:      Head: Normocephalic and atraumatic. Cardiovascular:      Rate and Rhythm: Normal rate and regular rhythm. Heart sounds: Normal heart sounds. Pulmonary:      Effort: Pulmonary effort is normal. No respiratory distress. Breath sounds: Normal breath sounds. Skin:     General: Skin is warm and dry. Neurological:      Mental Status: He is alert. Psychiatric:         Behavior: Behavior normal.         Thought Content:  Thought content normal.         Judgment: Judgment normal.                  An electronic signature was used

## 2023-12-12 RX ORDER — POTASSIUM CHLORIDE 750 MG/1
TABLET, FILM COATED, EXTENDED RELEASE ORAL
Qty: 1080 TABLET | Refills: 3 | Status: SHIPPED | OUTPATIENT
Start: 2023-12-12

## 2023-12-12 RX ORDER — POTASSIUM CHLORIDE 750 MG/1
TABLET, FILM COATED, EXTENDED RELEASE ORAL
Qty: 360 TABLET | Refills: 1 | Status: SHIPPED | OUTPATIENT
Start: 2023-12-12 | End: 2023-12-12

## 2024-01-16 RX ORDER — BLOOD SUGAR DIAGNOSTIC
STRIP MISCELLANEOUS
Qty: 300 STRIP | Refills: 5 | Status: SHIPPED | OUTPATIENT
Start: 2024-01-16

## 2024-03-15 DIAGNOSIS — E11.42 TYPE 2 DIABETES MELLITUS WITH DIABETIC POLYNEUROPATHY, WITH LONG-TERM CURRENT USE OF INSULIN (HCC): ICD-10-CM

## 2024-03-15 DIAGNOSIS — Z79.4 TYPE 2 DIABETES MELLITUS WITH DIABETIC POLYNEUROPATHY, WITH LONG-TERM CURRENT USE OF INSULIN (HCC): ICD-10-CM

## 2024-04-04 ENCOUNTER — APPOINTMENT (OUTPATIENT)
Dept: CT IMAGING | Age: 69
DRG: 064 | End: 2024-04-04
Payer: MEDICARE

## 2024-04-04 ENCOUNTER — APPOINTMENT (OUTPATIENT)
Dept: GENERAL RADIOLOGY | Age: 69
DRG: 064 | End: 2024-04-04
Payer: MEDICARE

## 2024-04-04 ENCOUNTER — HOSPITAL ENCOUNTER (INPATIENT)
Age: 69
LOS: 20 days | Discharge: NURSING, REHAB OR LONG-TERM CARE | DRG: 064 | End: 2024-04-24
Attending: EMERGENCY MEDICINE | Admitting: INTERNAL MEDICINE
Payer: MEDICARE

## 2024-04-04 ENCOUNTER — APPOINTMENT (OUTPATIENT)
Dept: MRI IMAGING | Age: 69
DRG: 064 | End: 2024-04-04
Payer: MEDICARE

## 2024-04-04 DIAGNOSIS — R11.2 NAUSEA AND VOMITING, UNSPECIFIED VOMITING TYPE: ICD-10-CM

## 2024-04-04 DIAGNOSIS — I65.02 VERTEBRAL ARTERY OCCLUSION, LEFT: Primary | ICD-10-CM

## 2024-04-04 DIAGNOSIS — G89.29 CHRONIC LOW BACK PAIN, UNSPECIFIED BACK PAIN LATERALITY, UNSPECIFIED WHETHER SCIATICA PRESENT: ICD-10-CM

## 2024-04-04 DIAGNOSIS — R51.9 ACUTE NONINTRACTABLE HEADACHE, UNSPECIFIED HEADACHE TYPE: ICD-10-CM

## 2024-04-04 DIAGNOSIS — M54.50 CHRONIC LOW BACK PAIN, UNSPECIFIED BACK PAIN LATERALITY, UNSPECIFIED WHETHER SCIATICA PRESENT: ICD-10-CM

## 2024-04-04 DIAGNOSIS — K92.0 HEMATEMESIS WITH NAUSEA: ICD-10-CM

## 2024-04-04 PROBLEM — I65.09 VERTEBRAL ARTERY STENOSIS/OCCLUSION: Status: ACTIVE | Noted: 2024-04-04

## 2024-04-04 PROBLEM — R27.8 TRUNCAL ATAXIA: Status: ACTIVE | Noted: 2024-04-04

## 2024-04-04 LAB
ABO + RH BLD: NORMAL
ALBUMIN SERPL-MCNC: 4.4 G/DL (ref 3.4–5)
ALBUMIN/GLOB SERPL: 1.3 {RATIO} (ref 1.1–2.2)
ALP SERPL-CCNC: 143 U/L (ref 40–129)
ALT SERPL-CCNC: 14 U/L (ref 10–40)
ANION GAP SERPL CALCULATED.3IONS-SCNC: 17 MMOL/L (ref 3–16)
AST SERPL-CCNC: 13 U/L (ref 15–37)
BASE EXCESS BLDV CALC-SCNC: 1.2 MMOL/L (ref -2–3)
BASOPHILS # BLD: 0 K/UL (ref 0–0.2)
BASOPHILS NFR BLD: 0.2 %
BILIRUB SERPL-MCNC: 1.3 MG/DL (ref 0–1)
BLD GP AB SCN SERPL QL: NORMAL
BUN SERPL-MCNC: 15 MG/DL (ref 7–20)
CALCIUM SERPL-MCNC: 9.1 MG/DL (ref 8.3–10.6)
CHLORIDE SERPL-SCNC: 90 MMOL/L (ref 99–110)
CO2 BLDV-SCNC: 28 MMOL/L
CO2 SERPL-SCNC: 24 MMOL/L (ref 21–32)
COHGB MFR BLDV: 0.3 % (ref 0–1.5)
CREAT SERPL-MCNC: 0.7 MG/DL (ref 0.8–1.3)
DEPRECATED RDW RBC AUTO: 13.7 % (ref 12.4–15.4)
DO-HGB MFR BLDV: 19.1 %
EKG ATRIAL RATE: 71 BPM
EKG DIAGNOSIS: NORMAL
EKG P AXIS: 39 DEGREES
EKG P-R INTERVAL: 180 MS
EKG Q-T INTERVAL: 446 MS
EKG QRS DURATION: 112 MS
EKG QTC CALCULATION (BAZETT): 484 MS
EKG R AXIS: -37 DEGREES
EKG T AXIS: 21 DEGREES
EKG VENTRICULAR RATE: 71 BPM
EOSINOPHIL # BLD: 0 K/UL (ref 0–0.6)
EOSINOPHIL NFR BLD: 0.1 %
ETHANOLAMINE SERPL-MCNC: NORMAL MG/DL (ref 0–0.08)
FLUAV RNA RESP QL NAA+PROBE: NOT DETECTED
FLUBV RNA RESP QL NAA+PROBE: NOT DETECTED
GFR SERPLBLD CREATININE-BSD FMLA CKD-EPI: >90 ML/MIN/{1.73_M2}
GLUCOSE BLD-MCNC: 227 MG/DL (ref 70–99)
GLUCOSE BLD-MCNC: 232 MG/DL (ref 70–99)
GLUCOSE BLD-MCNC: 245 MG/DL (ref 70–99)
GLUCOSE SERPL-MCNC: 268 MG/DL (ref 70–99)
HCO3 BLDV-SCNC: 26.9 MMOL/L (ref 24–28)
HCT VFR BLD AUTO: 45.5 % (ref 40.5–52.5)
HGB BLD-MCNC: 15.6 G/DL (ref 13.5–17.5)
INR PPP: 1.05 (ref 0.85–1.15)
LACTATE BLDV-SCNC: 2 MMOL/L (ref 0.4–2)
LACTATE BLDV-SCNC: 2.5 MMOL/L (ref 0.4–2)
LIPASE SERPL-CCNC: 11 U/L (ref 13–60)
LYMPHOCYTES # BLD: 1 K/UL (ref 1–5.1)
LYMPHOCYTES NFR BLD: 5.9 %
MCH RBC QN AUTO: 30.7 PG (ref 26–34)
MCHC RBC AUTO-ENTMCNC: 34.2 G/DL (ref 31–36)
MCV RBC AUTO: 89.6 FL (ref 80–100)
METHGB MFR BLDV: 0.3 % (ref 0–1.5)
MONOCYTES # BLD: 0.3 K/UL (ref 0–1.3)
MONOCYTES NFR BLD: 1.9 %
NEUTROPHILS # BLD: 15.4 K/UL (ref 1.7–7.7)
NEUTROPHILS NFR BLD: 91.9 %
PCO2 BLDV: 45.3 MMHG (ref 41–51)
PERFORMED ON: ABNORMAL
PH BLDV: 7.38 [PH] (ref 7.35–7.45)
PLATELET # BLD AUTO: 307 K/UL (ref 135–450)
PMV BLD AUTO: 8.3 FL (ref 5–10.5)
PO2 BLDV: 49.1 MMHG (ref 25–40)
POTASSIUM SERPL-SCNC: 4 MMOL/L (ref 3.5–5.1)
PROT SERPL-MCNC: 7.8 G/DL (ref 6.4–8.2)
PROTHROMBIN TIME: 13.9 SEC (ref 11.9–14.9)
RBC # BLD AUTO: 5.07 M/UL (ref 4.2–5.9)
SAO2 % BLDV: 81 %
SARS-COV-2 RNA RESP QL NAA+PROBE: NOT DETECTED
SODIUM SERPL-SCNC: 131 MMOL/L (ref 136–145)
TROPONIN, HIGH SENSITIVITY: 15 NG/L (ref 0–22)
TROPONIN, HIGH SENSITIVITY: 16 NG/L (ref 0–22)
WBC # BLD AUTO: 16.8 K/UL (ref 4–11)

## 2024-04-04 PROCEDURE — 71045 X-RAY EXAM CHEST 1 VIEW: CPT

## 2024-04-04 PROCEDURE — 82077 ASSAY SPEC XCP UR&BREATH IA: CPT

## 2024-04-04 PROCEDURE — 6370000000 HC RX 637 (ALT 250 FOR IP)

## 2024-04-04 PROCEDURE — 86901 BLOOD TYPING SEROLOGIC RH(D): CPT

## 2024-04-04 PROCEDURE — 6360000004 HC RX CONTRAST MEDICATION

## 2024-04-04 PROCEDURE — 83690 ASSAY OF LIPASE: CPT

## 2024-04-04 PROCEDURE — 36415 COLL VENOUS BLD VENIPUNCTURE: CPT

## 2024-04-04 PROCEDURE — 83605 ASSAY OF LACTIC ACID: CPT

## 2024-04-04 PROCEDURE — 70551 MRI BRAIN STEM W/O DYE: CPT

## 2024-04-04 PROCEDURE — 93005 ELECTROCARDIOGRAM TRACING: CPT | Performed by: EMERGENCY MEDICINE

## 2024-04-04 PROCEDURE — 70450 CT HEAD/BRAIN W/O DYE: CPT

## 2024-04-04 PROCEDURE — 96375 TX/PRO/DX INJ NEW DRUG ADDON: CPT

## 2024-04-04 PROCEDURE — 94761 N-INVAS EAR/PLS OXIMETRY MLT: CPT

## 2024-04-04 PROCEDURE — 70498 CT ANGIOGRAPHY NECK: CPT

## 2024-04-04 PROCEDURE — 2580000003 HC RX 258

## 2024-04-04 PROCEDURE — 85610 PROTHROMBIN TIME: CPT

## 2024-04-04 PROCEDURE — 6360000002 HC RX W HCPCS

## 2024-04-04 PROCEDURE — 87040 BLOOD CULTURE FOR BACTERIA: CPT

## 2024-04-04 PROCEDURE — 96374 THER/PROPH/DIAG INJ IV PUSH: CPT

## 2024-04-04 PROCEDURE — 99222 1ST HOSP IP/OBS MODERATE 55: CPT | Performed by: HOSPITALIST

## 2024-04-04 PROCEDURE — 99291 CRITICAL CARE FIRST HOUR: CPT | Performed by: PSYCHIATRY & NEUROLOGY

## 2024-04-04 PROCEDURE — 2700000000 HC OXYGEN THERAPY PER DAY

## 2024-04-04 PROCEDURE — 99285 EMERGENCY DEPT VISIT HI MDM: CPT

## 2024-04-04 PROCEDURE — 86900 BLOOD TYPING SEROLOGIC ABO: CPT

## 2024-04-04 PROCEDURE — 86850 RBC ANTIBODY SCREEN: CPT

## 2024-04-04 PROCEDURE — 87636 SARSCOV2 & INF A&B AMP PRB: CPT

## 2024-04-04 PROCEDURE — 51798 US URINE CAPACITY MEASURE: CPT

## 2024-04-04 PROCEDURE — 74177 CT ABD & PELVIS W/CONTRAST: CPT

## 2024-04-04 PROCEDURE — APPNB30 APP NON BILLABLE TIME 0-30 MINS

## 2024-04-04 PROCEDURE — 82803 BLOOD GASES ANY COMBINATION: CPT

## 2024-04-04 PROCEDURE — 84484 ASSAY OF TROPONIN QUANT: CPT

## 2024-04-04 PROCEDURE — 85025 COMPLETE CBC W/AUTO DIFF WBC: CPT

## 2024-04-04 PROCEDURE — 80053 COMPREHEN METABOLIC PANEL: CPT

## 2024-04-04 PROCEDURE — 2000000000 HC ICU R&B

## 2024-04-04 RX ORDER — AMLODIPINE BESYLATE 5 MG/1
5 TABLET ORAL DAILY
Status: DISCONTINUED | OUTPATIENT
Start: 2024-04-04 | End: 2024-04-16

## 2024-04-04 RX ORDER — INSULIN GLARGINE 100 [IU]/ML
10 INJECTION, SOLUTION SUBCUTANEOUS NIGHTLY
Status: DISCONTINUED | OUTPATIENT
Start: 2024-04-04 | End: 2024-04-06

## 2024-04-04 RX ORDER — SODIUM CHLORIDE, SODIUM LACTATE, POTASSIUM CHLORIDE, AND CALCIUM CHLORIDE .6; .31; .03; .02 G/100ML; G/100ML; G/100ML; G/100ML
1000 INJECTION, SOLUTION INTRAVENOUS ONCE
Status: COMPLETED | OUTPATIENT
Start: 2024-04-04 | End: 2024-04-04

## 2024-04-04 RX ORDER — SODIUM CHLORIDE 0.9 % (FLUSH) 0.9 %
5-40 SYRINGE (ML) INJECTION EVERY 12 HOURS SCHEDULED
Status: DISCONTINUED | OUTPATIENT
Start: 2024-04-04 | End: 2024-04-24 | Stop reason: HOSPADM

## 2024-04-04 RX ORDER — ATORVASTATIN CALCIUM 80 MG/1
80 TABLET, FILM COATED ORAL NIGHTLY
Status: DISCONTINUED | OUTPATIENT
Start: 2024-04-04 | End: 2024-04-18

## 2024-04-04 RX ORDER — CLONIDINE HYDROCHLORIDE 0.1 MG/1
0.1 TABLET ORAL 2 TIMES DAILY
Status: DISCONTINUED | OUTPATIENT
Start: 2024-04-04 | End: 2024-04-12

## 2024-04-04 RX ORDER — CLOPIDOGREL BISULFATE 75 MG/1
75 TABLET ORAL DAILY
Status: DISCONTINUED | OUTPATIENT
Start: 2024-04-04 | End: 2024-04-04

## 2024-04-04 RX ORDER — SODIUM CHLORIDE 0.9 % (FLUSH) 0.9 %
5-40 SYRINGE (ML) INJECTION PRN
Status: DISCONTINUED | OUTPATIENT
Start: 2024-04-04 | End: 2024-04-24 | Stop reason: HOSPADM

## 2024-04-04 RX ORDER — HYDROMORPHONE HYDROCHLORIDE 1 MG/ML
0.5 INJECTION, SOLUTION INTRAMUSCULAR; INTRAVENOUS; SUBCUTANEOUS ONCE
Status: COMPLETED | OUTPATIENT
Start: 2024-04-04 | End: 2024-04-04

## 2024-04-04 RX ORDER — ONDANSETRON 4 MG/1
4 TABLET, ORALLY DISINTEGRATING ORAL EVERY 8 HOURS PRN
Status: DISCONTINUED | OUTPATIENT
Start: 2024-04-04 | End: 2024-04-24 | Stop reason: HOSPADM

## 2024-04-04 RX ORDER — INSULIN LISPRO 100 [IU]/ML
0-4 INJECTION, SOLUTION INTRAVENOUS; SUBCUTANEOUS NIGHTLY
Status: DISCONTINUED | OUTPATIENT
Start: 2024-04-04 | End: 2024-04-05

## 2024-04-04 RX ORDER — OXYCODONE AND ACETAMINOPHEN 10; 325 MG/1; MG/1
1 TABLET ORAL EVERY 6 HOURS PRN
Status: DISCONTINUED | OUTPATIENT
Start: 2024-04-04 | End: 2024-04-12

## 2024-04-04 RX ORDER — INSULIN LISPRO 100 [IU]/ML
0-4 INJECTION, SOLUTION INTRAVENOUS; SUBCUTANEOUS
Status: DISCONTINUED | OUTPATIENT
Start: 2024-04-04 | End: 2024-04-05

## 2024-04-04 RX ORDER — ONDANSETRON 2 MG/ML
4 INJECTION INTRAMUSCULAR; INTRAVENOUS ONCE
Status: COMPLETED | OUTPATIENT
Start: 2024-04-04 | End: 2024-04-04

## 2024-04-04 RX ORDER — GLUCAGON 1 MG/ML
1 KIT INJECTION PRN
Status: DISCONTINUED | OUTPATIENT
Start: 2024-04-04 | End: 2024-04-24 | Stop reason: HOSPADM

## 2024-04-04 RX ORDER — GABAPENTIN 600 MG/1
600 TABLET ORAL 3 TIMES DAILY
Status: DISCONTINUED | OUTPATIENT
Start: 2024-04-04 | End: 2024-04-18

## 2024-04-04 RX ORDER — ASPIRIN 81 MG/1
81 TABLET ORAL DAILY
Status: DISCONTINUED | OUTPATIENT
Start: 2024-04-04 | End: 2024-04-16

## 2024-04-04 RX ORDER — DEXTROSE MONOHYDRATE 100 MG/ML
INJECTION, SOLUTION INTRAVENOUS CONTINUOUS PRN
Status: DISCONTINUED | OUTPATIENT
Start: 2024-04-04 | End: 2024-04-24 | Stop reason: HOSPADM

## 2024-04-04 RX ORDER — ENOXAPARIN SODIUM 100 MG/ML
40 INJECTION SUBCUTANEOUS 2 TIMES DAILY
Status: DISCONTINUED | OUTPATIENT
Start: 2024-04-04 | End: 2024-04-05

## 2024-04-04 RX ORDER — POLYETHYLENE GLYCOL 3350 17 G/17G
17 POWDER, FOR SOLUTION ORAL DAILY PRN
Status: DISCONTINUED | OUTPATIENT
Start: 2024-04-04 | End: 2024-04-19

## 2024-04-04 RX ORDER — ONDANSETRON 2 MG/ML
4 INJECTION INTRAMUSCULAR; INTRAVENOUS EVERY 6 HOURS PRN
Status: DISCONTINUED | OUTPATIENT
Start: 2024-04-04 | End: 2024-04-24 | Stop reason: HOSPADM

## 2024-04-04 RX ORDER — SODIUM CHLORIDE 9 MG/ML
INJECTION, SOLUTION INTRAVENOUS PRN
Status: DISCONTINUED | OUTPATIENT
Start: 2024-04-04 | End: 2024-04-24 | Stop reason: HOSPADM

## 2024-04-04 RX ORDER — CETIRIZINE HYDROCHLORIDE 10 MG/1
10 TABLET ORAL DAILY PRN
Status: DISCONTINUED | OUTPATIENT
Start: 2024-04-05 | End: 2024-04-22 | Stop reason: ALTCHOICE

## 2024-04-04 RX ADMIN — GABAPENTIN 600 MG: 600 TABLET, FILM COATED ORAL at 14:46

## 2024-04-04 RX ADMIN — INSULIN LISPRO 1 UNITS: 100 INJECTION, SOLUTION INTRAVENOUS; SUBCUTANEOUS at 18:25

## 2024-04-04 RX ADMIN — ASPIRIN 81 MG: 81 TABLET, COATED ORAL at 14:58

## 2024-04-04 RX ADMIN — ENOXAPARIN SODIUM 40 MG: 100 INJECTION SUBCUTANEOUS at 20:58

## 2024-04-04 RX ADMIN — AMLODIPINE BESYLATE 5 MG: 5 TABLET ORAL at 14:46

## 2024-04-04 RX ADMIN — INSULIN GLARGINE 10 UNITS: 100 INJECTION, SOLUTION SUBCUTANEOUS at 20:58

## 2024-04-04 RX ADMIN — ONDANSETRON 4 MG: 2 INJECTION INTRAMUSCULAR; INTRAVENOUS at 10:26

## 2024-04-04 RX ADMIN — HYDROMORPHONE HYDROCHLORIDE 0.5 MG: 1 INJECTION, SOLUTION INTRAMUSCULAR; INTRAVENOUS; SUBCUTANEOUS at 10:26

## 2024-04-04 RX ADMIN — OXYCODONE AND ACETAMINOPHEN 1 TABLET: 10; 325 TABLET ORAL at 14:46

## 2024-04-04 RX ADMIN — CLONIDINE HYDROCHLORIDE 0.1 MG: 0.1 TABLET ORAL at 14:46

## 2024-04-04 RX ADMIN — IOPAMIDOL 75 ML: 755 INJECTION, SOLUTION INTRAVENOUS at 11:16

## 2024-04-04 RX ADMIN — SODIUM CHLORIDE, POTASSIUM CHLORIDE, SODIUM LACTATE AND CALCIUM CHLORIDE 1000 ML: 600; 310; 30; 20 INJECTION, SOLUTION INTRAVENOUS at 10:26

## 2024-04-04 RX ADMIN — CETIRIZINE HYDROCHLORIDE 10 MG: 10 TABLET, FILM COATED ORAL at 23:08

## 2024-04-04 ASSESSMENT — PAIN SCALES - GENERAL
PAINLEVEL_OUTOF10: 7
PAINLEVEL_OUTOF10: 0
PAINLEVEL_OUTOF10: 0
PAINLEVEL_OUTOF10: 6

## 2024-04-04 ASSESSMENT — PAIN DESCRIPTION - ORIENTATION: ORIENTATION: LOWER

## 2024-04-04 ASSESSMENT — PAIN DESCRIPTION - LOCATION: LOCATION: BACK

## 2024-04-04 ASSESSMENT — PAIN DESCRIPTION - DESCRIPTORS: DESCRIPTORS: ACHING

## 2024-04-04 NOTE — H&P
right kidney.      XR CHEST PORTABLE   Final Result      Mild patchy consolidation left perihilar lung.      Elevated right hemidiaphragm with mild right basilar atelectasis.      Cardiomegaly.      MRI BRAIN WO CONTRAST    (Results Pending)         ASSESSMENT AND PLAN:   Kulwant Lamb is a 68 y.o. male     Neuro  Vertebrobasilar stroke  Sudden onset weakness, lightheadedness, and dizziness followed by a single episode of dark black vomit.  CTA w/contrast of head/neck: Complete occlusion of the left vertebral artery V3 and V4 segments   - f/u MRI  - q1 hour neuro checks  - cont ASA 81 Mg daily  - cont lipitor 80 MG nightly  - goal SBP < 220 and DBP <110  - neurology consulted  - neurosurgery consulted    GI  Patient reports a single bout of dark emesis and tenderness in LLQ. Hemoglobin stable. Patient with 1650 mL bladder retention.  - CT abdomen/pelvis unremarkable  - straight cath  - monitor    Chronic Conditions:  HTN  - amlodipine 5 MG daily  - clonidine 0.1 BID  Polyneuropathy 2/2 to DM  - gabapentin   Chronic low back pain  - percocet  MG q6h PRN    Code Status: Full Code   PPX: lovenox  DISPO: icu    Jonathan Blumberg, MD, PGY-1  Internal Medicine Resident  Contact via vozero  04/04/24  1:58 PM    This patient has been staffed and discussed with Fabricio Vega MD.     Addendum to Resident H& P/Progress note:  I have personally seen,examined and evaluated the patient at 5 pm on 4/4/24. I have reviewed the current history, physical findings, labs and assessment and plan and agree with note as documented by resident MD ( Dr.Blumberg)  Discussed the patient's condition with his son and RN.  + urinary retention. [place] cath will be placed.  Will await for the results of MRI of the brain    Fabricio Vega MD, FACP

## 2024-04-04 NOTE — ED PROVIDER NOTES
[facility]  EMERGENCY DEPARTMENT ENCOUNTER          EM RESIDENT NOTE       Date of evaluation: 4/4/2024    Chief Complaint     Hematemesis (Patient comes to the ED today for hematemesis. ) and Fatigue      History of Present Illness     Kulwant Lamb is a 68 y.o. male with a past medical history morbid obesity, type 2 diabetes, pancreatitis, CVA, DVT (not on anticoagulation) who presents with hematemesis.  Patient reports that he was in his usual state of health until approximately this morning.  He denies any antecedent chest pain, shortness of breath, fevers, chills, sick contacts or recent travel or hospitalizations.  He reports that when he awoke to shower this morning he felt a sudden sensation of weakness and lightheadedness but denied room spinning dizziness.  He was without chest pain but felt very fatigued and needed to sit down.  Following the onset this sensation, he had a single episode of vomiting that was reportedly dark black.  He denies any recent vomiting, changes to his bowel habits including bright red blood or dark tarry stools.  He denies any significant alcohol use and endorses only occasional recreational alcohol and none recently.  No recent abdominal procedures.  Reports that has weakness on the right side of his body from prior stroke that waxes and wanes with his health and that he feels weak on the side at this time but denies any falls, head trauma.    Currently denies any chest pain, shortness of breath but arrives on 2 L of oxygen by EMS.  EMS provided Pepcid and Zofran.    Other than stated above, no additional associated symptoms or aggravating/alleviating factors are noted.    MEDICAL DECISION MAKING / ASSESSMENT / PLAN     INITIAL VITALS: BP: (!) 162/108, Temp: 97.9 °F (36.6 °C), Pulse: 71, Respirations: 17, SpO2: 94 %    Nursing Notes, Past Medical Hx, Past Surgical Hx, Social Hx, Allergies, and Family Hx were reviewed.    Upon presentation, the patient was

## 2024-04-04 NOTE — ED PROVIDER NOTES
ED Attending Attestation Note     Date of evaluation: 4/4/2024    This patient was seen by the resident.  I have seen and examined the patient, agree with the workup, evaluation, management and diagnosis. The care plan has been discussed.  I have reviewed the ECG and concur with the resident's interpretation.  My assessment reveals presents with hematemesis.  Started this morning.  No melena.  Also with retro-orbital headache with this.  Was noted to be somewhat hypoxic to 89% on room air up to 91-92 on 2 L.  No history of varices or ulcers that he knows of.  He is not on anticoagulation.  He is awake alert abdomen soft with tenderness in the mid lower abdomen.   Patient underwent CT CTA which showed complete occlusion of the left vertebral artery V3 V4 segments which were new from prior also a new high-grade stenosis without occlusion within the bilateral posterior cerebral artery segments and new high-grade stenosis of the left posterior communicating artery.  Head CT was negative.  Discussion was made with the stroke team.  Last known normal was 10 PM last night.  No acute intervention per stroke team.  Will discuss with neurology and intensive care medicine due to the location of this stroke      Critical Care:  Due to the immediate potential for life-threatening deterioration due to concern is vomiting/ posterior stroke  This is essentially, I spent 35 minutes providing critical care.  This time excludes time spent performing procedures but includes time spent on direct patient care, history retrieval, review of the chart, and discussions with patient, family, and consultant(s).   Roberto Garner MD  04/04/24 [id number]       Roberto Garner MD  04/04/24 1227

## 2024-04-04 NOTE — NURSE NAVIGATOR
Concern for acute posterior circulation stroke   - CTA notable for complete occlusion of the left vertebral artery V3 and V4 segments     Patients personal risk factors specific to stroke/TIA include: Prior stroke, h/o blood clots (DVT), hypertension, diabetes, obesity (BMI 48)     Patient's chart reviewed for Stroke Core Measures and additional needs:    [x]   VTE prophylaxis - SCDs    [x]   Antithrombotic (if applicable) - PO aspirin administered, see eMAR    [x]   Swallow screen prior to PO intake - Completed    [x]   Lipids / A1C ordered or resulted - Ordered, awaiting collection    [x]   Therapy ordered - PT/OT/SLP ordered    [x]   Care plan and Education template - Added    - MRI Brain ordered STAT, in process now   - Per Dr. Jacobsen (Nsx), consideration of neuro protocol heparin gtt pending MRI   - Q1 hr neuro checks     Navigator to continue to follow patient while admitted, to assist with follow up and discharge planning as needed.     Nurse eSignature: Electronically signed by Shannan Tineo RN on 4/4/24 at 3:39 PM EDT - Neuroscience Navigator

## 2024-04-05 ENCOUNTER — APPOINTMENT (OUTPATIENT)
Dept: CT IMAGING | Age: 69
DRG: 064 | End: 2024-04-05
Payer: MEDICARE

## 2024-04-05 ENCOUNTER — APPOINTMENT (OUTPATIENT)
Dept: MRI IMAGING | Age: 69
DRG: 064 | End: 2024-04-05
Payer: MEDICARE

## 2024-04-05 PROBLEM — J18.9 PNEUMONIA OF LEFT LOWER LOBE DUE TO INFECTIOUS ORGANISM: Status: ACTIVE | Noted: 2024-04-05

## 2024-04-05 PROBLEM — R33.9 URINARY RETENTION: Status: ACTIVE | Noted: 2024-04-05

## 2024-04-05 PROBLEM — K92.0 HEMATEMESIS WITH NAUSEA: Status: ACTIVE | Noted: 2024-04-05

## 2024-04-05 PROBLEM — R26.0 ATAXIC GAIT: Status: ACTIVE | Noted: 2024-04-05

## 2024-04-05 PROBLEM — R51.9 ACUTE NONINTRACTABLE HEADACHE: Status: ACTIVE | Noted: 2024-04-05

## 2024-04-05 LAB
ANION GAP SERPL CALCULATED.3IONS-SCNC: 15 MMOL/L (ref 3–16)
APTT BLD: 33.9 SEC (ref 22.1–36.4)
BUN SERPL-MCNC: 22 MG/DL (ref 7–20)
CALCIUM SERPL-MCNC: 9 MG/DL (ref 8.3–10.6)
CHLORIDE SERPL-SCNC: 91 MMOL/L (ref 99–110)
CHOLEST SERPL-MCNC: 156 MG/DL (ref 0–199)
CO2 SERPL-SCNC: 23 MMOL/L (ref 21–32)
CREAT SERPL-MCNC: 0.7 MG/DL (ref 0.8–1.3)
DEPRECATED RDW RBC AUTO: 13.5 % (ref 12.4–15.4)
DEPRECATED RDW RBC AUTO: 13.7 % (ref 12.4–15.4)
EST. AVERAGE GLUCOSE BLD GHB EST-MCNC: 151.3 MG/DL
GFR SERPLBLD CREATININE-BSD FMLA CKD-EPI: >90 ML/MIN/{1.73_M2}
GLUCOSE BLD-MCNC: 224 MG/DL (ref 70–99)
GLUCOSE BLD-MCNC: 235 MG/DL (ref 70–99)
GLUCOSE BLD-MCNC: 235 MG/DL (ref 70–99)
GLUCOSE BLD-MCNC: 249 MG/DL (ref 70–99)
GLUCOSE SERPL-MCNC: 228 MG/DL (ref 70–99)
HBA1C MFR BLD: 6.9 %
HCT VFR BLD AUTO: 48 % (ref 40.5–52.5)
HCT VFR BLD AUTO: 49 % (ref 40.5–52.5)
HDLC SERPL-MCNC: 52 MG/DL (ref 40–60)
HGB BLD-MCNC: 16.5 G/DL (ref 13.5–17.5)
HGB BLD-MCNC: 16.5 G/DL (ref 13.5–17.5)
INR PPP: 1.16 (ref 0.85–1.15)
LDLC SERPL CALC-MCNC: 84 MG/DL
MAGNESIUM SERPL-MCNC: 1.9 MG/DL (ref 1.8–2.4)
MCH RBC QN AUTO: 31 PG (ref 26–34)
MCH RBC QN AUTO: 31.1 PG (ref 26–34)
MCHC RBC AUTO-ENTMCNC: 33.7 G/DL (ref 31–36)
MCHC RBC AUTO-ENTMCNC: 34.3 G/DL (ref 31–36)
MCV RBC AUTO: 90.5 FL (ref 80–100)
MCV RBC AUTO: 92.3 FL (ref 80–100)
PERFORMED ON: ABNORMAL
PLATELET # BLD AUTO: 290 K/UL (ref 135–450)
PLATELET # BLD AUTO: 321 K/UL (ref 135–450)
PMV BLD AUTO: 7.8 FL (ref 5–10.5)
PMV BLD AUTO: 7.8 FL (ref 5–10.5)
POTASSIUM SERPL-SCNC: 4.6 MMOL/L (ref 3.5–5.1)
PROCALCITONIN SERPL IA-MCNC: 0.06 NG/ML (ref 0–0.15)
PROTHROMBIN TIME: 15.1 SEC (ref 11.9–14.9)
RBC # BLD AUTO: 5.31 M/UL (ref 4.2–5.9)
RBC # BLD AUTO: 5.31 M/UL (ref 4.2–5.9)
SODIUM SERPL-SCNC: 129 MMOL/L (ref 136–145)
TRIGL SERPL-MCNC: 101 MG/DL (ref 0–150)
VLDLC SERPL CALC-MCNC: 20 MG/DL
WBC # BLD AUTO: 17.4 K/UL (ref 4–11)
WBC # BLD AUTO: 18.9 K/UL (ref 4–11)

## 2024-04-05 PROCEDURE — 92610 EVALUATE SWALLOWING FUNCTION: CPT

## 2024-04-05 PROCEDURE — 36415 COLL VENOUS BLD VENIPUNCTURE: CPT

## 2024-04-05 PROCEDURE — 4A03X5D MEASUREMENT OF ARTERIAL FLOW, INTRACRANIAL, EXTERNAL APPROACH: ICD-10-PCS | Performed by: RADIOLOGY

## 2024-04-05 PROCEDURE — 2580000003 HC RX 258

## 2024-04-05 PROCEDURE — 94640 AIRWAY INHALATION TREATMENT: CPT

## 2024-04-05 PROCEDURE — APPNB45 APP NON BILLABLE 31-45 MINUTES

## 2024-04-05 PROCEDURE — 92523 SPEECH SOUND LANG COMPREHEN: CPT

## 2024-04-05 PROCEDURE — 6360000002 HC RX W HCPCS

## 2024-04-05 PROCEDURE — 70450 CT HEAD/BRAIN W/O DYE: CPT

## 2024-04-05 PROCEDURE — 83735 ASSAY OF MAGNESIUM: CPT

## 2024-04-05 PROCEDURE — 6360000002 HC RX W HCPCS: Performed by: INTERNAL MEDICINE

## 2024-04-05 PROCEDURE — 2000000000 HC ICU R&B

## 2024-04-05 PROCEDURE — 94761 N-INVAS EAR/PLS OXIMETRY MLT: CPT

## 2024-04-05 PROCEDURE — 80048 BASIC METABOLIC PNL TOTAL CA: CPT

## 2024-04-05 PROCEDURE — 85730 THROMBOPLASTIN TIME PARTIAL: CPT

## 2024-04-05 PROCEDURE — 6370000000 HC RX 637 (ALT 250 FOR IP)

## 2024-04-05 PROCEDURE — 85027 COMPLETE CBC AUTOMATED: CPT

## 2024-04-05 PROCEDURE — 85610 PROTHROMBIN TIME: CPT

## 2024-04-05 PROCEDURE — 2580000003 HC RX 258: Performed by: INTERNAL MEDICINE

## 2024-04-05 PROCEDURE — 70551 MRI BRAIN STEM W/O DYE: CPT

## 2024-04-05 PROCEDURE — 81240 F2 GENE: CPT

## 2024-04-05 PROCEDURE — 99291 CRITICAL CARE FIRST HOUR: CPT

## 2024-04-05 PROCEDURE — 80061 LIPID PANEL: CPT

## 2024-04-05 PROCEDURE — 85520 HEPARIN ASSAY: CPT

## 2024-04-05 PROCEDURE — 97530 THERAPEUTIC ACTIVITIES: CPT

## 2024-04-05 PROCEDURE — 2700000000 HC OXYGEN THERAPY PER DAY

## 2024-04-05 PROCEDURE — 6360000004 HC RX CONTRAST MEDICATION: Performed by: PHYSICIAN ASSISTANT

## 2024-04-05 PROCEDURE — 70498 CT ANGIOGRAPHY NECK: CPT

## 2024-04-05 PROCEDURE — 97166 OT EVAL MOD COMPLEX 45 MIN: CPT

## 2024-04-05 PROCEDURE — 97535 SELF CARE MNGMENT TRAINING: CPT

## 2024-04-05 PROCEDURE — 83036 HEMOGLOBIN GLYCOSYLATED A1C: CPT

## 2024-04-05 PROCEDURE — 99223 1ST HOSP IP/OBS HIGH 75: CPT | Performed by: INTERNAL MEDICINE

## 2024-04-05 PROCEDURE — 84145 PROCALCITONIN (PCT): CPT

## 2024-04-05 PROCEDURE — 97162 PT EVAL MOD COMPLEX 30 MIN: CPT

## 2024-04-05 RX ORDER — LEVOFLOXACIN 5 MG/ML
750 INJECTION, SOLUTION INTRAVENOUS EVERY 24 HOURS
Status: COMPLETED | OUTPATIENT
Start: 2024-04-06 | End: 2024-04-09

## 2024-04-05 RX ORDER — LEVOFLOXACIN 750 MG/1
750 TABLET, FILM COATED ORAL DAILY
Status: DISCONTINUED | OUTPATIENT
Start: 2024-04-05 | End: 2024-04-05

## 2024-04-05 RX ORDER — HYDROMORPHONE HYDROCHLORIDE 1 MG/ML
0.25 INJECTION, SOLUTION INTRAMUSCULAR; INTRAVENOUS; SUBCUTANEOUS EVERY 4 HOURS PRN
Status: DISCONTINUED | OUTPATIENT
Start: 2024-04-05 | End: 2024-04-12

## 2024-04-05 RX ORDER — INSULIN LISPRO 100 [IU]/ML
5 INJECTION, SOLUTION INTRAVENOUS; SUBCUTANEOUS
Status: DISCONTINUED | OUTPATIENT
Start: 2024-04-05 | End: 2024-04-06

## 2024-04-05 RX ORDER — FUROSEMIDE 10 MG/ML
40 INJECTION INTRAMUSCULAR; INTRAVENOUS ONCE
Status: COMPLETED | OUTPATIENT
Start: 2024-04-05 | End: 2024-04-05

## 2024-04-05 RX ORDER — ALBUTEROL SULFATE 2.5 MG/3ML
2.5 SOLUTION RESPIRATORY (INHALATION) EVERY 6 HOURS PRN
Status: DISCONTINUED | OUTPATIENT
Start: 2024-04-05 | End: 2024-04-05

## 2024-04-05 RX ORDER — INSULIN LISPRO 100 [IU]/ML
0-4 INJECTION, SOLUTION INTRAVENOUS; SUBCUTANEOUS NIGHTLY
Status: DISCONTINUED | OUTPATIENT
Start: 2024-04-05 | End: 2024-04-08

## 2024-04-05 RX ORDER — INSULIN LISPRO 100 [IU]/ML
0-16 INJECTION, SOLUTION INTRAVENOUS; SUBCUTANEOUS
Status: DISCONTINUED | OUTPATIENT
Start: 2024-04-05 | End: 2024-04-08

## 2024-04-05 RX ORDER — ALBUTEROL SULFATE 2.5 MG/3ML
2.5 SOLUTION RESPIRATORY (INHALATION)
Status: DISCONTINUED | OUTPATIENT
Start: 2024-04-05 | End: 2024-04-06

## 2024-04-05 RX ORDER — HYDROMORPHONE HYDROCHLORIDE 1 MG/ML
0.5 INJECTION, SOLUTION INTRAMUSCULAR; INTRAVENOUS; SUBCUTANEOUS EVERY 4 HOURS PRN
Status: DISCONTINUED | OUTPATIENT
Start: 2024-04-05 | End: 2024-04-12

## 2024-04-05 RX ORDER — HEPARIN SODIUM 10000 [USP'U]/100ML
5-30 INJECTION, SOLUTION INTRAVENOUS CONTINUOUS
Status: DISCONTINUED | OUTPATIENT
Start: 2024-04-05 | End: 2024-04-12

## 2024-04-05 RX ORDER — HYDROMORPHONE HYDROCHLORIDE 1 MG/ML
0.5 INJECTION, SOLUTION INTRAMUSCULAR; INTRAVENOUS; SUBCUTANEOUS EVERY 6 HOURS PRN
Status: DISCONTINUED | OUTPATIENT
Start: 2024-04-05 | End: 2024-04-05

## 2024-04-05 RX ORDER — INSULIN LISPRO 100 [IU]/ML
0-8 INJECTION, SOLUTION INTRAVENOUS; SUBCUTANEOUS
Status: DISCONTINUED | OUTPATIENT
Start: 2024-04-05 | End: 2024-04-05

## 2024-04-05 RX ORDER — HYDROMORPHONE HYDROCHLORIDE 1 MG/ML
0.25 INJECTION, SOLUTION INTRAMUSCULAR; INTRAVENOUS; SUBCUTANEOUS EVERY 6 HOURS PRN
Status: DISCONTINUED | OUTPATIENT
Start: 2024-04-05 | End: 2024-04-05

## 2024-04-05 RX ORDER — ACETAMINOPHEN 650 MG/1
650 SUPPOSITORY RECTAL EVERY 4 HOURS PRN
Status: DISCONTINUED | OUTPATIENT
Start: 2024-04-05 | End: 2024-04-24 | Stop reason: HOSPADM

## 2024-04-05 RX ORDER — INSULIN LISPRO 100 [IU]/ML
0-4 INJECTION, SOLUTION INTRAVENOUS; SUBCUTANEOUS NIGHTLY
Status: DISCONTINUED | OUTPATIENT
Start: 2024-04-05 | End: 2024-04-05

## 2024-04-05 RX ADMIN — GABAPENTIN 600 MG: 600 TABLET, FILM COATED ORAL at 07:42

## 2024-04-05 RX ADMIN — INSULIN LISPRO 2 UNITS: 100 INJECTION, SOLUTION INTRAVENOUS; SUBCUTANEOUS at 12:02

## 2024-04-05 RX ADMIN — SODIUM CHLORIDE, PRESERVATIVE FREE 10 ML: 5 INJECTION INTRAVENOUS at 10:03

## 2024-04-05 RX ADMIN — LEVOFLOXACIN 750 MG: 750 TABLET, FILM COATED ORAL at 10:48

## 2024-04-05 RX ADMIN — ENOXAPARIN SODIUM 40 MG: 100 INJECTION SUBCUTANEOUS at 20:27

## 2024-04-05 RX ADMIN — HYDROMORPHONE HYDROCHLORIDE 0.5 MG: 1 INJECTION, SOLUTION INTRAMUSCULAR; INTRAVENOUS; SUBCUTANEOUS at 15:43

## 2024-04-05 RX ADMIN — HEPARIN SODIUM 6 UNITS/KG/HR: 10000 INJECTION, SOLUTION INTRAVENOUS at 23:45

## 2024-04-05 RX ADMIN — ASPIRIN 81 MG: 81 TABLET, COATED ORAL at 07:42

## 2024-04-05 RX ADMIN — INSULIN LISPRO 4 UNITS: 100 INJECTION, SOLUTION INTRAVENOUS; SUBCUTANEOUS at 18:01

## 2024-04-05 RX ADMIN — INSULIN GLARGINE 10 UNITS: 100 INJECTION, SOLUTION SUBCUTANEOUS at 20:30

## 2024-04-05 RX ADMIN — ALBUTEROL SULFATE 2.5 MG: 2.5 SOLUTION RESPIRATORY (INHALATION) at 20:19

## 2024-04-05 RX ADMIN — OXYCODONE AND ACETAMINOPHEN 1 TABLET: 10; 325 TABLET ORAL at 01:35

## 2024-04-05 RX ADMIN — OXYCODONE AND ACETAMINOPHEN 1 TABLET: 10; 325 TABLET ORAL at 07:42

## 2024-04-05 RX ADMIN — HYDROMORPHONE HYDROCHLORIDE 0.5 MG: 1 INJECTION, SOLUTION INTRAMUSCULAR; INTRAVENOUS; SUBCUTANEOUS at 20:25

## 2024-04-05 RX ADMIN — ALBUTEROL SULFATE 2.5 MG: 2.5 SOLUTION RESPIRATORY (INHALATION) at 12:23

## 2024-04-05 RX ADMIN — WATER 40 MG: 1 INJECTION INTRAMUSCULAR; INTRAVENOUS; SUBCUTANEOUS at 14:25

## 2024-04-05 RX ADMIN — FUROSEMIDE 40 MG: 10 INJECTION, SOLUTION INTRAMUSCULAR; INTRAVENOUS at 13:02

## 2024-04-05 RX ADMIN — AMLODIPINE BESYLATE 5 MG: 5 TABLET ORAL at 07:43

## 2024-04-05 RX ADMIN — INSULIN LISPRO 5 UNITS: 100 INJECTION, SOLUTION INTRAVENOUS; SUBCUTANEOUS at 18:02

## 2024-04-05 RX ADMIN — IOPAMIDOL 75 ML: 755 INJECTION, SOLUTION INTRAVENOUS at 13:26

## 2024-04-05 RX ADMIN — SODIUM CHLORIDE, PRESERVATIVE FREE 10 ML: 5 INJECTION INTRAVENOUS at 21:14

## 2024-04-05 RX ADMIN — ENOXAPARIN SODIUM 40 MG: 100 INJECTION SUBCUTANEOUS at 09:57

## 2024-04-05 RX ADMIN — CLONIDINE HYDROCHLORIDE 0.1 MG: 0.1 TABLET ORAL at 07:43

## 2024-04-05 ASSESSMENT — PAIN SCALES - GENERAL
PAINLEVEL_OUTOF10: 6
PAINLEVEL_OUTOF10: 5
PAINLEVEL_OUTOF10: 3
PAINLEVEL_OUTOF10: 7
PAINLEVEL_OUTOF10: 4
PAINLEVEL_OUTOF10: 7
PAINLEVEL_OUTOF10: 0
PAINLEVEL_OUTOF10: 4
PAINLEVEL_OUTOF10: 0
PAINLEVEL_OUTOF10: 7

## 2024-04-05 ASSESSMENT — PAIN - FUNCTIONAL ASSESSMENT
PAIN_FUNCTIONAL_ASSESSMENT: ACTIVITIES ARE NOT PREVENTED
PAIN_FUNCTIONAL_ASSESSMENT: PREVENTS OR INTERFERES SOME ACTIVE ACTIVITIES AND ADLS

## 2024-04-05 ASSESSMENT — PAIN DESCRIPTION - LOCATION
LOCATION: BACK
LOCATION: BACK

## 2024-04-05 ASSESSMENT — PAIN SCALES - WONG BAKER: WONGBAKER_NUMERICALRESPONSE: NO HURT

## 2024-04-05 ASSESSMENT — PAIN DESCRIPTION - DESCRIPTORS
DESCRIPTORS: ACHING
DESCRIPTORS: SORE

## 2024-04-05 ASSESSMENT — PAIN DESCRIPTION - ORIENTATION
ORIENTATION: LEFT
ORIENTATION: POSTERIOR;MID

## 2024-04-05 NOTE — DISCHARGE INSTRUCTIONS
You are able to shower after 24 hours, however have majority of water hitting back and not directly on monitor. Do not submerge in bath.  No Swimming while wearing the monitor.         If you experience any symptoms while wearing monitor push button and record in booklet.      For questions about monitor call: Customer Service [phone]

## 2024-04-06 ENCOUNTER — APPOINTMENT (OUTPATIENT)
Dept: GENERAL RADIOLOGY | Age: 69
DRG: 064 | End: 2024-04-06
Payer: MEDICARE

## 2024-04-06 LAB
ANION GAP SERPL CALCULATED.3IONS-SCNC: 18 MMOL/L (ref 3–16)
ANTI-XA UNFRAC HEPARIN: 0.17 IU/ML (ref 0.3–0.7)
ANTI-XA UNFRAC HEPARIN: 0.28 IU/ML (ref 0.3–0.7)
ANTI-XA UNFRAC HEPARIN: 0.28 IU/ML (ref 0.3–0.7)
ANTI-XA UNFRAC HEPARIN: 0.29 IU/ML (ref 0.3–0.7)
BASOPHILS # BLD: 0 K/UL (ref 0–0.2)
BASOPHILS NFR BLD: 0.1 %
BUN SERPL-MCNC: 26 MG/DL (ref 7–20)
CALCIUM SERPL-MCNC: 9.3 MG/DL (ref 8.3–10.6)
CHLORIDE SERPL-SCNC: 88 MMOL/L (ref 99–110)
CO2 SERPL-SCNC: 24 MMOL/L (ref 21–32)
CREAT SERPL-MCNC: 0.8 MG/DL (ref 0.8–1.3)
DEPRECATED RDW RBC AUTO: 13.6 % (ref 12.4–15.4)
EOSINOPHIL # BLD: 0 K/UL (ref 0–0.6)
EOSINOPHIL NFR BLD: 0 %
GFR SERPLBLD CREATININE-BSD FMLA CKD-EPI: >90 ML/MIN/{1.73_M2}
GLUCOSE BLD-MCNC: 205 MG/DL (ref 70–99)
GLUCOSE BLD-MCNC: 270 MG/DL (ref 70–99)
GLUCOSE BLD-MCNC: 271 MG/DL (ref 70–99)
GLUCOSE BLD-MCNC: 308 MG/DL (ref 70–99)
GLUCOSE SERPL-MCNC: 273 MG/DL (ref 70–99)
HCT VFR BLD AUTO: 49.3 % (ref 40.5–52.5)
HGB BLD-MCNC: 17 G/DL (ref 13.5–17.5)
LYMPHOCYTES # BLD: 1.4 K/UL (ref 1–5.1)
LYMPHOCYTES NFR BLD: 8.5 %
MCH RBC QN AUTO: 31.3 PG (ref 26–34)
MCHC RBC AUTO-ENTMCNC: 34.4 G/DL (ref 31–36)
MCV RBC AUTO: 91 FL (ref 80–100)
MONOCYTES # BLD: 0.6 K/UL (ref 0–1.3)
MONOCYTES NFR BLD: 3.8 %
NEUTROPHILS # BLD: 14.1 K/UL (ref 1.7–7.7)
NEUTROPHILS NFR BLD: 87.6 %
PERFORMED ON: ABNORMAL
PLATELET # BLD AUTO: 305 K/UL (ref 135–450)
PMV BLD AUTO: 7.8 FL (ref 5–10.5)
POTASSIUM SERPL-SCNC: 4.8 MMOL/L (ref 3.5–5.1)
RBC # BLD AUTO: 5.42 M/UL (ref 4.2–5.9)
REASON FOR REJECTION: NORMAL
REJECTED TEST: NORMAL
SODIUM SERPL-SCNC: 130 MMOL/L (ref 136–145)
WBC # BLD AUTO: 16.1 K/UL (ref 4–11)

## 2024-04-06 PROCEDURE — 6360000002 HC RX W HCPCS

## 2024-04-06 PROCEDURE — 6360000002 HC RX W HCPCS: Performed by: INTERNAL MEDICINE

## 2024-04-06 PROCEDURE — 85520 HEPARIN ASSAY: CPT

## 2024-04-06 PROCEDURE — 94761 N-INVAS EAR/PLS OXIMETRY MLT: CPT

## 2024-04-06 PROCEDURE — 94640 AIRWAY INHALATION TREATMENT: CPT

## 2024-04-06 PROCEDURE — 2000000000 HC ICU R&B

## 2024-04-06 PROCEDURE — 6370000000 HC RX 637 (ALT 250 FOR IP)

## 2024-04-06 PROCEDURE — 80048 BASIC METABOLIC PNL TOTAL CA: CPT

## 2024-04-06 PROCEDURE — 2700000000 HC OXYGEN THERAPY PER DAY

## 2024-04-06 PROCEDURE — 2580000003 HC RX 258

## 2024-04-06 PROCEDURE — 2580000003 HC RX 258: Performed by: INTERNAL MEDICINE

## 2024-04-06 PROCEDURE — 36415 COLL VENOUS BLD VENIPUNCTURE: CPT

## 2024-04-06 PROCEDURE — 92526 ORAL FUNCTION THERAPY: CPT

## 2024-04-06 PROCEDURE — 99233 SBSQ HOSP IP/OBS HIGH 50: CPT | Performed by: INTERNAL MEDICINE

## 2024-04-06 PROCEDURE — 85025 COMPLETE CBC W/AUTO DIFF WBC: CPT

## 2024-04-06 PROCEDURE — 87040 BLOOD CULTURE FOR BACTERIA: CPT

## 2024-04-06 PROCEDURE — 99291 CRITICAL CARE FIRST HOUR: CPT | Performed by: NURSE PRACTITIONER

## 2024-04-06 RX ORDER — ALBUTEROL SULFATE 2.5 MG/3ML
2.5 SOLUTION RESPIRATORY (INHALATION) EVERY 4 HOURS PRN
Status: DISCONTINUED | OUTPATIENT
Start: 2024-04-06 | End: 2024-04-18 | Stop reason: SDUPTHER

## 2024-04-06 RX ORDER — INSULIN GLARGINE 100 [IU]/ML
5 INJECTION, SOLUTION SUBCUTANEOUS ONCE
Status: COMPLETED | OUTPATIENT
Start: 2024-04-06 | End: 2024-04-06

## 2024-04-06 RX ORDER — INSULIN GLARGINE 100 [IU]/ML
15 INJECTION, SOLUTION SUBCUTANEOUS NIGHTLY
Status: DISCONTINUED | OUTPATIENT
Start: 2024-04-06 | End: 2024-04-07

## 2024-04-06 RX ORDER — INSULIN LISPRO 100 [IU]/ML
10 INJECTION, SOLUTION INTRAVENOUS; SUBCUTANEOUS
Status: DISCONTINUED | OUTPATIENT
Start: 2024-04-06 | End: 2024-04-07

## 2024-04-06 RX ORDER — FUROSEMIDE 10 MG/ML
40 INJECTION INTRAMUSCULAR; INTRAVENOUS ONCE
Status: COMPLETED | OUTPATIENT
Start: 2024-04-06 | End: 2024-04-06

## 2024-04-06 RX ORDER — ALBUTEROL SULFATE 2.5 MG/3ML
2.5 SOLUTION RESPIRATORY (INHALATION)
Status: DISCONTINUED | OUTPATIENT
Start: 2024-04-06 | End: 2024-04-07

## 2024-04-06 RX ADMIN — INSULIN LISPRO 8 UNITS: 100 INJECTION, SOLUTION INTRAVENOUS; SUBCUTANEOUS at 17:19

## 2024-04-06 RX ADMIN — INSULIN GLARGINE 5 UNITS: 100 INJECTION, SOLUTION SUBCUTANEOUS at 11:00

## 2024-04-06 RX ADMIN — ALBUTEROL SULFATE 2.5 MG: 2.5 SOLUTION RESPIRATORY (INHALATION) at 15:08

## 2024-04-06 RX ADMIN — INSULIN LISPRO 8 UNITS: 100 INJECTION, SOLUTION INTRAVENOUS; SUBCUTANEOUS at 12:42

## 2024-04-06 RX ADMIN — INSULIN LISPRO 10 UNITS: 100 INJECTION, SOLUTION INTRAVENOUS; SUBCUTANEOUS at 17:19

## 2024-04-06 RX ADMIN — SODIUM CHLORIDE, PRESERVATIVE FREE 10 ML: 5 INJECTION INTRAVENOUS at 09:02

## 2024-04-06 RX ADMIN — ALBUTEROL SULFATE 2.5 MG: 2.5 SOLUTION RESPIRATORY (INHALATION) at 09:23

## 2024-04-06 RX ADMIN — HYDROMORPHONE HYDROCHLORIDE 0.5 MG: 1 INJECTION, SOLUTION INTRAMUSCULAR; INTRAVENOUS; SUBCUTANEOUS at 22:14

## 2024-04-06 RX ADMIN — HEPARIN SODIUM 12 UNITS/KG/HR: 10000 INJECTION, SOLUTION INTRAVENOUS at 19:38

## 2024-04-06 RX ADMIN — ALBUTEROL SULFATE 2.5 MG: 2.5 SOLUTION RESPIRATORY (INHALATION) at 20:03

## 2024-04-06 RX ADMIN — LEVOFLOXACIN 750 MG: 5 INJECTION, SOLUTION INTRAVENOUS at 08:50

## 2024-04-06 RX ADMIN — METHOCARBAMOL 500 MG: 100 INJECTION INTRAMUSCULAR; INTRAVENOUS at 12:39

## 2024-04-06 RX ADMIN — INSULIN LISPRO 12 UNITS: 100 INJECTION, SOLUTION INTRAVENOUS; SUBCUTANEOUS at 09:16

## 2024-04-06 RX ADMIN — WATER 40 MG: 1 INJECTION INTRAMUSCULAR; INTRAVENOUS; SUBCUTANEOUS at 16:11

## 2024-04-06 RX ADMIN — HYDROMORPHONE HYDROCHLORIDE 0.5 MG: 1 INJECTION, SOLUTION INTRAMUSCULAR; INTRAVENOUS; SUBCUTANEOUS at 01:15

## 2024-04-06 RX ADMIN — INSULIN GLARGINE 15 UNITS: 100 INJECTION, SOLUTION SUBCUTANEOUS at 20:32

## 2024-04-06 RX ADMIN — INSULIN LISPRO 10 UNITS: 100 INJECTION, SOLUTION INTRAVENOUS; SUBCUTANEOUS at 12:43

## 2024-04-06 RX ADMIN — WATER 40 MG: 1 INJECTION INTRAMUSCULAR; INTRAVENOUS; SUBCUTANEOUS at 03:51

## 2024-04-06 RX ADMIN — HYDROMORPHONE HYDROCHLORIDE 0.25 MG: 1 INJECTION, SOLUTION INTRAMUSCULAR; INTRAVENOUS; SUBCUTANEOUS at 18:12

## 2024-04-06 RX ADMIN — FUROSEMIDE 40 MG: 10 INJECTION, SOLUTION INTRAMUSCULAR; INTRAVENOUS at 11:00

## 2024-04-06 RX ADMIN — SODIUM CHLORIDE, PRESERVATIVE FREE 10 ML: 5 INJECTION INTRAVENOUS at 19:52

## 2024-04-06 RX ADMIN — HYDROMORPHONE HYDROCHLORIDE 0.5 MG: 1 INJECTION, SOLUTION INTRAMUSCULAR; INTRAVENOUS; SUBCUTANEOUS at 06:01

## 2024-04-06 RX ADMIN — HYDROMORPHONE HYDROCHLORIDE 0.5 MG: 1 INJECTION, SOLUTION INTRAMUSCULAR; INTRAVENOUS; SUBCUTANEOUS at 13:49

## 2024-04-06 ASSESSMENT — PAIN SCALES - GENERAL
PAINLEVEL_OUTOF10: 4
PAINLEVEL_OUTOF10: 4
PAINLEVEL_OUTOF10: 3
PAINLEVEL_OUTOF10: 4
PAINLEVEL_OUTOF10: 7
PAINLEVEL_OUTOF10: 5
PAINLEVEL_OUTOF10: 4
PAINLEVEL_OUTOF10: 7
PAINLEVEL_OUTOF10: 4
PAINLEVEL_OUTOF10: 4
PAINLEVEL_OUTOF10: 7

## 2024-04-06 ASSESSMENT — PAIN SCALES - WONG BAKER
WONGBAKER_NUMERICALRESPONSE: NO HURT
WONGBAKER_NUMERICALRESPONSE: HURTS A LITTLE BIT

## 2024-04-07 ENCOUNTER — APPOINTMENT (OUTPATIENT)
Dept: CT IMAGING | Age: 69
DRG: 064 | End: 2024-04-07
Payer: MEDICARE

## 2024-04-07 LAB
ANION GAP SERPL CALCULATED.3IONS-SCNC: 16 MMOL/L (ref 3–16)
ANTI-XA UNFRAC HEPARIN: 0.32 IU/ML (ref 0.3–0.7)
ANTI-XA UNFRAC HEPARIN: 0.43 IU/ML (ref 0.3–0.7)
BASOPHILS # BLD: 0 K/UL (ref 0–0.2)
BASOPHILS NFR BLD: 0.2 %
BUN SERPL-MCNC: 39 MG/DL (ref 7–20)
CALCIUM SERPL-MCNC: 9.1 MG/DL (ref 8.3–10.6)
CHLORIDE SERPL-SCNC: 92 MMOL/L (ref 99–110)
CO2 SERPL-SCNC: 26 MMOL/L (ref 21–32)
CREAT SERPL-MCNC: 1 MG/DL (ref 0.8–1.3)
DEPRECATED RDW RBC AUTO: 13.6 % (ref 12.4–15.4)
EOSINOPHIL # BLD: 0 K/UL (ref 0–0.6)
EOSINOPHIL NFR BLD: 0 %
GFR SERPLBLD CREATININE-BSD FMLA CKD-EPI: 83 ML/MIN/{1.73_M2}
GLUCOSE BLD-MCNC: 186 MG/DL (ref 70–99)
GLUCOSE BLD-MCNC: 214 MG/DL (ref 70–99)
GLUCOSE BLD-MCNC: 286 MG/DL (ref 70–99)
GLUCOSE BLD-MCNC: 288 MG/DL (ref 70–99)
GLUCOSE SERPL-MCNC: 293 MG/DL (ref 70–99)
HCT VFR BLD AUTO: 48 % (ref 40.5–52.5)
HGB BLD-MCNC: 16.3 G/DL (ref 13.5–17.5)
LYMPHOCYTES # BLD: 1.3 K/UL (ref 1–5.1)
LYMPHOCYTES NFR BLD: 7.7 %
MCH RBC QN AUTO: 30.3 PG (ref 26–34)
MCHC RBC AUTO-ENTMCNC: 34 G/DL (ref 31–36)
MCV RBC AUTO: 89 FL (ref 80–100)
MONOCYTES # BLD: 0.7 K/UL (ref 0–1.3)
MONOCYTES NFR BLD: 4 %
NEUTROPHILS # BLD: 15.2 K/UL (ref 1.7–7.7)
NEUTROPHILS NFR BLD: 88.1 %
PERFORMED ON: ABNORMAL
PLATELET # BLD AUTO: 312 K/UL (ref 135–450)
PMV BLD AUTO: 8.5 FL (ref 5–10.5)
POTASSIUM SERPL-SCNC: 4.1 MMOL/L (ref 3.5–5.1)
RBC # BLD AUTO: 5.4 M/UL (ref 4.2–5.9)
SODIUM SERPL-SCNC: 134 MMOL/L (ref 136–145)
WBC # BLD AUTO: 17.2 K/UL (ref 4–11)

## 2024-04-07 PROCEDURE — 36415 COLL VENOUS BLD VENIPUNCTURE: CPT

## 2024-04-07 PROCEDURE — 94640 AIRWAY INHALATION TREATMENT: CPT

## 2024-04-07 PROCEDURE — 70450 CT HEAD/BRAIN W/O DYE: CPT

## 2024-04-07 PROCEDURE — 99291 CRITICAL CARE FIRST HOUR: CPT | Performed by: NURSE PRACTITIONER

## 2024-04-07 PROCEDURE — 99233 SBSQ HOSP IP/OBS HIGH 50: CPT | Performed by: INTERNAL MEDICINE

## 2024-04-07 PROCEDURE — 80048 BASIC METABOLIC PNL TOTAL CA: CPT

## 2024-04-07 PROCEDURE — 6360000002 HC RX W HCPCS: Performed by: INTERNAL MEDICINE

## 2024-04-07 PROCEDURE — 2000000000 HC ICU R&B

## 2024-04-07 PROCEDURE — 2700000000 HC OXYGEN THERAPY PER DAY

## 2024-04-07 PROCEDURE — 6360000002 HC RX W HCPCS

## 2024-04-07 PROCEDURE — 6370000000 HC RX 637 (ALT 250 FOR IP)

## 2024-04-07 PROCEDURE — 2580000003 HC RX 258

## 2024-04-07 PROCEDURE — 92526 ORAL FUNCTION THERAPY: CPT

## 2024-04-07 PROCEDURE — 97530 THERAPEUTIC ACTIVITIES: CPT

## 2024-04-07 PROCEDURE — 2580000003 HC RX 258: Performed by: INTERNAL MEDICINE

## 2024-04-07 PROCEDURE — 6360000002 HC RX W HCPCS: Performed by: HOSPITALIST

## 2024-04-07 PROCEDURE — 85520 HEPARIN ASSAY: CPT

## 2024-04-07 PROCEDURE — 97535 SELF CARE MNGMENT TRAINING: CPT

## 2024-04-07 PROCEDURE — 94761 N-INVAS EAR/PLS OXIMETRY MLT: CPT

## 2024-04-07 PROCEDURE — 85025 COMPLETE CBC W/AUTO DIFF WBC: CPT

## 2024-04-07 RX ORDER — SODIUM CHLORIDE 9 MG/ML
INJECTION, SOLUTION INTRAVENOUS CONTINUOUS
Status: DISCONTINUED | OUTPATIENT
Start: 2024-04-07 | End: 2024-04-07

## 2024-04-07 RX ORDER — PREDNISONE 20 MG/1
40 TABLET ORAL DAILY
Status: DISCONTINUED | OUTPATIENT
Start: 2024-04-07 | End: 2024-04-07

## 2024-04-07 RX ORDER — ALBUTEROL SULFATE 2.5 MG/3ML
2.5 SOLUTION RESPIRATORY (INHALATION)
Status: DISCONTINUED | OUTPATIENT
Start: 2024-04-07 | End: 2024-04-08

## 2024-04-07 RX ORDER — INSULIN LISPRO 100 [IU]/ML
10 INJECTION, SOLUTION INTRAVENOUS; SUBCUTANEOUS 3 TIMES DAILY
Status: DISCONTINUED | OUTPATIENT
Start: 2024-04-07 | End: 2024-04-09

## 2024-04-07 RX ORDER — INSULIN GLARGINE 100 [IU]/ML
25 INJECTION, SOLUTION SUBCUTANEOUS NIGHTLY
Status: DISCONTINUED | OUTPATIENT
Start: 2024-04-07 | End: 2024-04-10

## 2024-04-07 RX ADMIN — INSULIN LISPRO 10 UNITS: 100 INJECTION, SOLUTION INTRAVENOUS; SUBCUTANEOUS at 16:45

## 2024-04-07 RX ADMIN — ALBUTEROL SULFATE 2.5 MG: 2.5 SOLUTION RESPIRATORY (INHALATION) at 09:40

## 2024-04-07 RX ADMIN — INSULIN LISPRO 4 UNITS: 100 INJECTION, SOLUTION INTRAVENOUS; SUBCUTANEOUS at 16:46

## 2024-04-07 RX ADMIN — LEVOFLOXACIN 750 MG: 5 INJECTION, SOLUTION INTRAVENOUS at 09:53

## 2024-04-07 RX ADMIN — INSULIN LISPRO 10 UNITS: 100 INJECTION, SOLUTION INTRAVENOUS; SUBCUTANEOUS at 10:03

## 2024-04-07 RX ADMIN — METHOCARBAMOL 500 MG: 100 INJECTION INTRAMUSCULAR; INTRAVENOUS at 04:32

## 2024-04-07 RX ADMIN — INSULIN LISPRO 8 UNITS: 100 INJECTION, SOLUTION INTRAVENOUS; SUBCUTANEOUS at 10:03

## 2024-04-07 RX ADMIN — ALBUTEROL SULFATE 2.5 MG: 2.5 SOLUTION RESPIRATORY (INHALATION) at 19:55

## 2024-04-07 RX ADMIN — INSULIN LISPRO 8 UNITS: 100 INJECTION, SOLUTION INTRAVENOUS; SUBCUTANEOUS at 13:02

## 2024-04-07 RX ADMIN — INSULIN LISPRO 10 UNITS: 100 INJECTION, SOLUTION INTRAVENOUS; SUBCUTANEOUS at 20:03

## 2024-04-07 RX ADMIN — HEPARIN SODIUM 12 UNITS/KG/HR: 10000 INJECTION, SOLUTION INTRAVENOUS at 12:26

## 2024-04-07 RX ADMIN — SODIUM CHLORIDE, PRESERVATIVE FREE 10 ML: 5 INJECTION INTRAVENOUS at 09:51

## 2024-04-07 RX ADMIN — HYDROMORPHONE HYDROCHLORIDE 0.25 MG: 1 INJECTION, SOLUTION INTRAMUSCULAR; INTRAVENOUS; SUBCUTANEOUS at 12:26

## 2024-04-07 RX ADMIN — HYDROMORPHONE HYDROCHLORIDE 0.25 MG: 1 INJECTION, SOLUTION INTRAMUSCULAR; INTRAVENOUS; SUBCUTANEOUS at 02:16

## 2024-04-07 RX ADMIN — INSULIN GLARGINE 25 UNITS: 100 INJECTION, SOLUTION SUBCUTANEOUS at 20:30

## 2024-04-07 RX ADMIN — METHOCARBAMOL 500 MG: 100 INJECTION INTRAMUSCULAR; INTRAVENOUS at 15:47

## 2024-04-07 RX ADMIN — SODIUM CHLORIDE: 9 INJECTION, SOLUTION INTRAVENOUS at 13:06

## 2024-04-07 RX ADMIN — SODIUM CHLORIDE, PRESERVATIVE FREE 10 ML: 5 INJECTION INTRAVENOUS at 19:39

## 2024-04-07 RX ADMIN — WATER 40 MG: 1 INJECTION INTRAMUSCULAR; INTRAVENOUS; SUBCUTANEOUS at 02:17

## 2024-04-07 RX ADMIN — HYDROMORPHONE HYDROCHLORIDE 0.5 MG: 1 INJECTION, SOLUTION INTRAMUSCULAR; INTRAVENOUS; SUBCUTANEOUS at 18:09

## 2024-04-07 RX ADMIN — HYDROMORPHONE HYDROCHLORIDE 0.5 MG: 1 INJECTION, SOLUTION INTRAMUSCULAR; INTRAVENOUS; SUBCUTANEOUS at 21:58

## 2024-04-07 RX ADMIN — HYDROMORPHONE HYDROCHLORIDE 0.25 MG: 1 INJECTION, SOLUTION INTRAMUSCULAR; INTRAVENOUS; SUBCUTANEOUS at 06:27

## 2024-04-07 RX ADMIN — ONDANSETRON 4 MG: 4 TABLET, ORALLY DISINTEGRATING ORAL at 06:27

## 2024-04-07 ASSESSMENT — PAIN DESCRIPTION - ORIENTATION
ORIENTATION: LEFT
ORIENTATION: LOWER;MID
ORIENTATION: RIGHT;LEFT
ORIENTATION: RIGHT;LEFT

## 2024-04-07 ASSESSMENT — PAIN SCALES - GENERAL
PAINLEVEL_OUTOF10: 3
PAINLEVEL_OUTOF10: 8
PAINLEVEL_OUTOF10: 6
PAINLEVEL_OUTOF10: 6
PAINLEVEL_OUTOF10: 3
PAINLEVEL_OUTOF10: 3
PAINLEVEL_OUTOF10: 7
PAINLEVEL_OUTOF10: 3
PAINLEVEL_OUTOF10: 3
PAINLEVEL_OUTOF10: 0
PAINLEVEL_OUTOF10: 3
PAINLEVEL_OUTOF10: 3
PAINLEVEL_OUTOF10: 4

## 2024-04-07 ASSESSMENT — PAIN DESCRIPTION - DESCRIPTORS
DESCRIPTORS: ACHING
DESCRIPTORS: ACHING
DESCRIPTORS: SORE
DESCRIPTORS: ACHING;THROBBING

## 2024-04-07 ASSESSMENT — PAIN DESCRIPTION - LOCATION
LOCATION: BACK

## 2024-04-07 ASSESSMENT — PAIN SCALES - WONG BAKER
WONGBAKER_NUMERICALRESPONSE: NO HURT

## 2024-04-07 ASSESSMENT — PAIN - FUNCTIONAL ASSESSMENT
PAIN_FUNCTIONAL_ASSESSMENT: ACTIVITIES ARE NOT PREVENTED

## 2024-04-08 ENCOUNTER — APPOINTMENT (OUTPATIENT)
Dept: GENERAL RADIOLOGY | Age: 69
DRG: 064 | End: 2024-04-08
Payer: MEDICARE

## 2024-04-08 LAB
ANION GAP SERPL CALCULATED.3IONS-SCNC: 12 MMOL/L (ref 3–16)
ANTI-XA UNFRAC HEPARIN: 0.28 IU/ML (ref 0.3–0.7)
ANTI-XA UNFRAC HEPARIN: 0.55 IU/ML (ref 0.3–0.7)
ANTI-XA UNFRAC HEPARIN: 0.7 IU/ML (ref 0.3–0.7)
BACTERIA BLD CULT ORG #2: NORMAL
BASOPHILS # BLD: 0 K/UL (ref 0–0.2)
BASOPHILS NFR BLD: 0.2 %
BUN SERPL-MCNC: 38 MG/DL (ref 7–20)
CALCIUM SERPL-MCNC: 8.6 MG/DL (ref 8.3–10.6)
CHLORIDE SERPL-SCNC: 95 MMOL/L (ref 99–110)
CO2 SERPL-SCNC: 28 MMOL/L (ref 21–32)
CREAT SERPL-MCNC: 0.9 MG/DL (ref 0.8–1.3)
DEPRECATED RDW RBC AUTO: 13.6 % (ref 12.4–15.4)
EOSINOPHIL # BLD: 0 K/UL (ref 0–0.6)
EOSINOPHIL NFR BLD: 0.1 %
GFR SERPLBLD CREATININE-BSD FMLA CKD-EPI: >90 ML/MIN/{1.73_M2}
GLUCOSE BLD-MCNC: 208 MG/DL (ref 70–99)
GLUCOSE BLD-MCNC: 208 MG/DL (ref 70–99)
GLUCOSE BLD-MCNC: 248 MG/DL (ref 70–99)
GLUCOSE BLD-MCNC: 263 MG/DL (ref 70–99)
GLUCOSE SERPL-MCNC: 202 MG/DL (ref 70–99)
HCT VFR BLD AUTO: 47.3 % (ref 40.5–52.5)
HGB BLD-MCNC: 16.3 G/DL (ref 13.5–17.5)
LYMPHOCYTES # BLD: 1.9 K/UL (ref 1–5.1)
LYMPHOCYTES NFR BLD: 11.2 %
MCH RBC QN AUTO: 30.9 PG (ref 26–34)
MCHC RBC AUTO-ENTMCNC: 34.4 G/DL (ref 31–36)
MCV RBC AUTO: 89.8 FL (ref 80–100)
MONOCYTES # BLD: 1.5 K/UL (ref 0–1.3)
MONOCYTES NFR BLD: 8.9 %
NEUTROPHILS # BLD: 13.2 K/UL (ref 1.7–7.7)
NEUTROPHILS NFR BLD: 79.6 %
PERFORMED ON: ABNORMAL
PLATELET # BLD AUTO: 323 K/UL (ref 135–450)
PMV BLD AUTO: 8.3 FL (ref 5–10.5)
POTASSIUM SERPL-SCNC: 3.5 MMOL/L (ref 3.5–5.1)
RBC # BLD AUTO: 5.26 M/UL (ref 4.2–5.9)
SODIUM SERPL-SCNC: 135 MMOL/L (ref 136–145)
WBC # BLD AUTO: 16.6 K/UL (ref 4–11)

## 2024-04-08 PROCEDURE — 6360000002 HC RX W HCPCS

## 2024-04-08 PROCEDURE — 94761 N-INVAS EAR/PLS OXIMETRY MLT: CPT

## 2024-04-08 PROCEDURE — 94669 MECHANICAL CHEST WALL OSCILL: CPT

## 2024-04-08 PROCEDURE — C9113 INJ PANTOPRAZOLE SODIUM, VIA: HCPCS

## 2024-04-08 PROCEDURE — 94150 VITAL CAPACITY TEST: CPT

## 2024-04-08 PROCEDURE — 2580000003 HC RX 258

## 2024-04-08 PROCEDURE — 6370000000 HC RX 637 (ALT 250 FOR IP)

## 2024-04-08 PROCEDURE — 97530 THERAPEUTIC ACTIVITIES: CPT

## 2024-04-08 PROCEDURE — 92612 ENDOSCOPY SWALLOW (FEES) VID: CPT

## 2024-04-08 PROCEDURE — 71045 X-RAY EXAM CHEST 1 VIEW: CPT

## 2024-04-08 PROCEDURE — 2700000000 HC OXYGEN THERAPY PER DAY

## 2024-04-08 PROCEDURE — 85025 COMPLETE CBC W/AUTO DIFF WBC: CPT

## 2024-04-08 PROCEDURE — C8929 TTE W OR WO FOL WCON,DOPPLER: HCPCS

## 2024-04-08 PROCEDURE — 6360000004 HC RX CONTRAST MEDICATION: Performed by: STUDENT IN AN ORGANIZED HEALTH CARE EDUCATION/TRAINING PROGRAM

## 2024-04-08 PROCEDURE — 99233 SBSQ HOSP IP/OBS HIGH 50: CPT | Performed by: STUDENT IN AN ORGANIZED HEALTH CARE EDUCATION/TRAINING PROGRAM

## 2024-04-08 PROCEDURE — 6360000002 HC RX W HCPCS: Performed by: INTERNAL MEDICINE

## 2024-04-08 PROCEDURE — 36415 COLL VENOUS BLD VENIPUNCTURE: CPT

## 2024-04-08 PROCEDURE — 6360000002 HC RX W HCPCS: Performed by: HOSPITALIST

## 2024-04-08 PROCEDURE — 94640 AIRWAY INHALATION TREATMENT: CPT

## 2024-04-08 PROCEDURE — 80048 BASIC METABOLIC PNL TOTAL CA: CPT

## 2024-04-08 PROCEDURE — 2000000000 HC ICU R&B

## 2024-04-08 PROCEDURE — 99233 SBSQ HOSP IP/OBS HIGH 50: CPT | Performed by: INTERNAL MEDICINE

## 2024-04-08 PROCEDURE — 92526 ORAL FUNCTION THERAPY: CPT

## 2024-04-08 PROCEDURE — 85520 HEPARIN ASSAY: CPT

## 2024-04-08 RX ORDER — ALBUTEROL SULFATE 2.5 MG/3ML
2.5 SOLUTION RESPIRATORY (INHALATION) 3 TIMES DAILY
Status: DISCONTINUED | OUTPATIENT
Start: 2024-04-08 | End: 2024-04-24 | Stop reason: HOSPADM

## 2024-04-08 RX ORDER — INSULIN LISPRO 100 [IU]/ML
0-16 INJECTION, SOLUTION INTRAVENOUS; SUBCUTANEOUS EVERY 6 HOURS
Status: DISCONTINUED | OUTPATIENT
Start: 2024-04-08 | End: 2024-04-24 | Stop reason: HOSPADM

## 2024-04-08 RX ORDER — TIZANIDINE 4 MG/1
4 TABLET ORAL EVERY 8 HOURS PRN
COMMUNITY

## 2024-04-08 RX ORDER — METOLAZONE 5 MG/1
2.5 TABLET ORAL DAILY PRN
COMMUNITY

## 2024-04-08 RX ORDER — PANTOPRAZOLE SODIUM 40 MG/10ML
40 INJECTION, POWDER, LYOPHILIZED, FOR SOLUTION INTRAVENOUS DAILY
Status: DISCONTINUED | OUTPATIENT
Start: 2024-04-08 | End: 2024-04-19 | Stop reason: ALTCHOICE

## 2024-04-08 RX ORDER — SODIUM CHLORIDE FOR INHALATION 3 %
4 VIAL, NEBULIZER (ML) INHALATION 3 TIMES DAILY
Status: DISCONTINUED | OUTPATIENT
Start: 2024-04-08 | End: 2024-04-24 | Stop reason: HOSPADM

## 2024-04-08 RX ADMIN — SODIUM CHLORIDE 30 MG/ML INHALATION SOLUTION 4 ML: 30 SOLUTION INHALANT at 21:51

## 2024-04-08 RX ADMIN — HYDROMORPHONE HYDROCHLORIDE 0.25 MG: 1 INJECTION, SOLUTION INTRAMUSCULAR; INTRAVENOUS; SUBCUTANEOUS at 01:56

## 2024-04-08 RX ADMIN — HEPARIN SODIUM 14 UNITS/KG/HR: 10000 INJECTION, SOLUTION INTRAVENOUS at 05:21

## 2024-04-08 RX ADMIN — HYDROMORPHONE HYDROCHLORIDE 0.5 MG: 1 INJECTION, SOLUTION INTRAMUSCULAR; INTRAVENOUS; SUBCUTANEOUS at 14:45

## 2024-04-08 RX ADMIN — LEVOFLOXACIN 750 MG: 5 INJECTION, SOLUTION INTRAVENOUS at 09:23

## 2024-04-08 RX ADMIN — SODIUM CHLORIDE, PRESERVATIVE FREE 10 ML: 5 INJECTION INTRAVENOUS at 09:10

## 2024-04-08 RX ADMIN — SODIUM CHLORIDE 30 MG/ML INHALATION SOLUTION 4 ML: 30 SOLUTION INHALANT at 14:37

## 2024-04-08 RX ADMIN — ALBUTEROL SULFATE 2.5 MG: 2.5 SOLUTION RESPIRATORY (INHALATION) at 14:37

## 2024-04-08 RX ADMIN — PANTOPRAZOLE SODIUM 40 MG: 40 INJECTION, POWDER, FOR SOLUTION INTRAVENOUS at 14:22

## 2024-04-08 RX ADMIN — HYDROMORPHONE HYDROCHLORIDE 0.5 MG: 1 INJECTION, SOLUTION INTRAMUSCULAR; INTRAVENOUS; SUBCUTANEOUS at 06:26

## 2024-04-08 RX ADMIN — HYDROMORPHONE HYDROCHLORIDE 0.5 MG: 1 INJECTION, SOLUTION INTRAMUSCULAR; INTRAVENOUS; SUBCUTANEOUS at 18:47

## 2024-04-08 RX ADMIN — PERFLUTREN 1.5 ML: 6.52 INJECTION, SUSPENSION INTRAVENOUS at 17:00

## 2024-04-08 RX ADMIN — HYDROMORPHONE HYDROCHLORIDE 0.5 MG: 1 INJECTION, SOLUTION INTRAMUSCULAR; INTRAVENOUS; SUBCUTANEOUS at 22:59

## 2024-04-08 RX ADMIN — INSULIN LISPRO 4 UNITS: 100 INJECTION, SOLUTION INTRAVENOUS; SUBCUTANEOUS at 14:22

## 2024-04-08 RX ADMIN — INSULIN LISPRO 4 UNITS: 100 INJECTION, SOLUTION INTRAVENOUS; SUBCUTANEOUS at 09:09

## 2024-04-08 RX ADMIN — INSULIN GLARGINE 25 UNITS: 100 INJECTION, SOLUTION SUBCUTANEOUS at 20:42

## 2024-04-08 RX ADMIN — INSULIN LISPRO 10 UNITS: 100 INJECTION, SOLUTION INTRAVENOUS; SUBCUTANEOUS at 09:09

## 2024-04-08 RX ADMIN — INSULIN LISPRO 8 UNITS: 100 INJECTION, SOLUTION INTRAVENOUS; SUBCUTANEOUS at 20:41

## 2024-04-08 RX ADMIN — ASPIRIN 81 MG: 81 TABLET, COATED ORAL at 09:09

## 2024-04-08 RX ADMIN — ALBUTEROL SULFATE 2.5 MG: 2.5 SOLUTION RESPIRATORY (INHALATION) at 21:50

## 2024-04-08 RX ADMIN — HYDROMORPHONE HYDROCHLORIDE 0.5 MG: 1 INJECTION, SOLUTION INTRAMUSCULAR; INTRAVENOUS; SUBCUTANEOUS at 10:49

## 2024-04-08 RX ADMIN — ALBUTEROL SULFATE 2.5 MG: 2.5 SOLUTION RESPIRATORY (INHALATION) at 01:12

## 2024-04-08 RX ADMIN — SODIUM CHLORIDE, PRESERVATIVE FREE 10 ML: 5 INJECTION INTRAVENOUS at 20:34

## 2024-04-08 RX ADMIN — WATER 40 MG: 1 INJECTION INTRAMUSCULAR; INTRAVENOUS; SUBCUTANEOUS at 09:09

## 2024-04-08 RX ADMIN — ALBUTEROL SULFATE 2.5 MG: 2.5 SOLUTION RESPIRATORY (INHALATION) at 09:33

## 2024-04-08 ASSESSMENT — PAIN DESCRIPTION - PAIN TYPE
TYPE: CHRONIC PAIN

## 2024-04-08 ASSESSMENT — PAIN DESCRIPTION - LOCATION
LOCATION: BACK

## 2024-04-08 ASSESSMENT — PAIN SCALES - GENERAL
PAINLEVEL_OUTOF10: 3
PAINLEVEL_OUTOF10: 7
PAINLEVEL_OUTOF10: 4
PAINLEVEL_OUTOF10: 7
PAINLEVEL_OUTOF10: 4
PAINLEVEL_OUTOF10: 3
PAINLEVEL_OUTOF10: 8
PAINLEVEL_OUTOF10: 3
PAINLEVEL_OUTOF10: 5
PAINLEVEL_OUTOF10: 7
PAINLEVEL_OUTOF10: 3
PAINLEVEL_OUTOF10: 2
PAINLEVEL_OUTOF10: 7
PAINLEVEL_OUTOF10: 3
PAINLEVEL_OUTOF10: 8

## 2024-04-08 ASSESSMENT — PAIN DESCRIPTION - FREQUENCY
FREQUENCY: CONTINUOUS

## 2024-04-08 ASSESSMENT — PAIN DESCRIPTION - ORIENTATION
ORIENTATION: LOWER;MID
ORIENTATION: MID;LOWER
ORIENTATION: LOWER
ORIENTATION: LOWER;MID
ORIENTATION: MID;LOWER
ORIENTATION: LOWER
ORIENTATION: MID;LOWER
ORIENTATION: LOWER
ORIENTATION: LOWER
ORIENTATION: LOWER;MID
ORIENTATION: LOWER;MID

## 2024-04-08 ASSESSMENT — PAIN DESCRIPTION - DESCRIPTORS
DESCRIPTORS: ACHING
DESCRIPTORS: ACHING;THROBBING
DESCRIPTORS: ACHING
DESCRIPTORS: ACHING
DESCRIPTORS: ACHING;THROBBING
DESCRIPTORS: ACHING;THROBBING
DESCRIPTORS: ACHING

## 2024-04-08 ASSESSMENT — PAIN DESCRIPTION - ONSET
ONSET: GRADUAL

## 2024-04-08 ASSESSMENT — PAIN - FUNCTIONAL ASSESSMENT
PAIN_FUNCTIONAL_ASSESSMENT: PREVENTS OR INTERFERES SOME ACTIVE ACTIVITIES AND ADLS
PAIN_FUNCTIONAL_ASSESSMENT: ACTIVITIES ARE NOT PREVENTED

## 2024-04-08 NOTE — NURSE NAVIGATOR
Patient seen in NCC rounds by Dr Garrett, provider team.     - Echo ordered  - Q 4 hours neuro checks, but patient to remain in ICU for airway watch. Dr Garrett discussed with Dr Parish, ICU team.  - Repeat CTA on 4/10 (72 hours after heparin gtt initiation)   Clindamycin Counseling: I counseled the patient regarding use of clindamycin as an antibiotic for prophylactic and/or therapeutic purposes. Clindamycin is active against numerous classes of bacteria, including skin bacteria. Side effects may include nausea, diarrhea, gastrointestinal upset, rash, hives, yeast infections, and in rare cases, colitis.

## 2024-04-09 ENCOUNTER — APPOINTMENT (OUTPATIENT)
Dept: GENERAL RADIOLOGY | Age: 69
DRG: 064 | End: 2024-04-09
Payer: MEDICARE

## 2024-04-09 ENCOUNTER — APPOINTMENT (OUTPATIENT)
Dept: CT IMAGING | Age: 69
DRG: 064 | End: 2024-04-09
Payer: MEDICARE

## 2024-04-09 LAB
ANION GAP SERPL CALCULATED.3IONS-SCNC: 10 MMOL/L (ref 3–16)
ANTI-XA UNFRAC HEPARIN: 0.16 IU/ML (ref 0.3–0.7)
ANTI-XA UNFRAC HEPARIN: 0.26 IU/ML (ref 0.3–0.7)
ANTI-XA UNFRAC HEPARIN: 0.29 IU/ML (ref 0.3–0.7)
ANTI-XA UNFRAC HEPARIN: 0.93 IU/ML (ref 0.3–0.7)
BASOPHILS # BLD: 0 K/UL (ref 0–0.2)
BASOPHILS NFR BLD: 0 %
BUN SERPL-MCNC: 30 MG/DL (ref 7–20)
CALCIUM SERPL-MCNC: 8.8 MG/DL (ref 8.3–10.6)
CHLORIDE SERPL-SCNC: 97 MMOL/L (ref 99–110)
CO2 SERPL-SCNC: 30 MMOL/L (ref 21–32)
CREAT SERPL-MCNC: 0.8 MG/DL (ref 0.8–1.3)
DEPRECATED RDW RBC AUTO: 13.5 % (ref 12.4–15.4)
EOSINOPHIL # BLD: 0 K/UL (ref 0–0.6)
EOSINOPHIL NFR BLD: 0 %
GFR SERPLBLD CREATININE-BSD FMLA CKD-EPI: >90 ML/MIN/{1.73_M2}
GLUCOSE BLD-MCNC: 196 MG/DL (ref 70–99)
GLUCOSE BLD-MCNC: 228 MG/DL (ref 70–99)
GLUCOSE BLD-MCNC: 229 MG/DL (ref 70–99)
GLUCOSE SERPL-MCNC: 217 MG/DL (ref 70–99)
HCT VFR BLD AUTO: 49.9 % (ref 40.5–52.5)
HGB BLD-MCNC: 16.7 G/DL (ref 13.5–17.5)
LYMPHOCYTES # BLD: 1.7 K/UL (ref 1–5.1)
LYMPHOCYTES NFR BLD: 14 %
MCH RBC QN AUTO: 30.8 PG (ref 26–34)
MCHC RBC AUTO-ENTMCNC: 33.6 G/DL (ref 31–36)
MCV RBC AUTO: 91.7 FL (ref 80–100)
METAMYELOCYTES NFR BLD MANUAL: 1 %
MONOCYTES # BLD: 1.1 K/UL (ref 0–1.3)
MONOCYTES NFR BLD: 9 %
NEUTROPHILS # BLD: 9.2 K/UL (ref 1.7–7.7)
NEUTROPHILS NFR BLD: 76 %
PERFORMED ON: ABNORMAL
PLATELET # BLD AUTO: 233 K/UL (ref 135–450)
PLATELET BLD QL SMEAR: ADEQUATE
PMV BLD AUTO: 8.4 FL (ref 5–10.5)
POTASSIUM SERPL-SCNC: 3.8 MMOL/L (ref 3.5–5.1)
RBC # BLD AUTO: 5.44 M/UL (ref 4.2–5.9)
RBC MORPH BLD: NORMAL
SLIDE REVIEW: ABNORMAL
SODIUM SERPL-SCNC: 137 MMOL/L (ref 136–145)
WBC # BLD AUTO: 12 K/UL (ref 4–11)

## 2024-04-09 PROCEDURE — 6360000002 HC RX W HCPCS

## 2024-04-09 PROCEDURE — 6360000004 HC RX CONTRAST MEDICATION: Performed by: NURSE PRACTITIONER

## 2024-04-09 PROCEDURE — 94640 AIRWAY INHALATION TREATMENT: CPT

## 2024-04-09 PROCEDURE — C9113 INJ PANTOPRAZOLE SODIUM, VIA: HCPCS

## 2024-04-09 PROCEDURE — 85520 HEPARIN ASSAY: CPT

## 2024-04-09 PROCEDURE — 97110 THERAPEUTIC EXERCISES: CPT

## 2024-04-09 PROCEDURE — 80048 BASIC METABOLIC PNL TOTAL CA: CPT

## 2024-04-09 PROCEDURE — 85025 COMPLETE CBC W/AUTO DIFF WBC: CPT

## 2024-04-09 PROCEDURE — 2000000000 HC ICU R&B

## 2024-04-09 PROCEDURE — 97530 THERAPEUTIC ACTIVITIES: CPT

## 2024-04-09 PROCEDURE — 70450 CT HEAD/BRAIN W/O DYE: CPT

## 2024-04-09 PROCEDURE — 92526 ORAL FUNCTION THERAPY: CPT

## 2024-04-09 PROCEDURE — 6370000000 HC RX 637 (ALT 250 FOR IP)

## 2024-04-09 PROCEDURE — 70498 CT ANGIOGRAPHY NECK: CPT

## 2024-04-09 PROCEDURE — 2500000003 HC RX 250 WO HCPCS

## 2024-04-09 PROCEDURE — 2580000003 HC RX 258

## 2024-04-09 PROCEDURE — 97112 NEUROMUSCULAR REEDUCATION: CPT

## 2024-04-09 PROCEDURE — 74018 RADEX ABDOMEN 1 VIEW: CPT

## 2024-04-09 PROCEDURE — 94761 N-INVAS EAR/PLS OXIMETRY MLT: CPT

## 2024-04-09 PROCEDURE — 36415 COLL VENOUS BLD VENIPUNCTURE: CPT

## 2024-04-09 PROCEDURE — 94669 MECHANICAL CHEST WALL OSCILL: CPT

## 2024-04-09 PROCEDURE — 99233 SBSQ HOSP IP/OBS HIGH 50: CPT | Performed by: STUDENT IN AN ORGANIZED HEALTH CARE EDUCATION/TRAINING PROGRAM

## 2024-04-09 PROCEDURE — 99232 SBSQ HOSP IP/OBS MODERATE 35: CPT | Performed by: INTERNAL MEDICINE

## 2024-04-09 PROCEDURE — 2700000000 HC OXYGEN THERAPY PER DAY

## 2024-04-09 RX ORDER — LOSARTAN POTASSIUM 100 MG/1
100 TABLET ORAL DAILY
Status: DISCONTINUED | OUTPATIENT
Start: 2024-04-09 | End: 2024-04-12

## 2024-04-09 RX ORDER — METOPROLOL TARTRATE 1 MG/ML
5 INJECTION, SOLUTION INTRAVENOUS EVERY 8 HOURS PRN
Status: DISCONTINUED | OUTPATIENT
Start: 2024-04-09 | End: 2024-04-19

## 2024-04-09 RX ADMIN — METOPROLOL TARTRATE 5 MG: 1 INJECTION, SOLUTION INTRAVENOUS at 11:09

## 2024-04-09 RX ADMIN — HYDROMORPHONE HYDROCHLORIDE 0.25 MG: 1 INJECTION, SOLUTION INTRAMUSCULAR; INTRAVENOUS; SUBCUTANEOUS at 21:39

## 2024-04-09 RX ADMIN — IOPAMIDOL 75 ML: 755 INJECTION, SOLUTION INTRAVENOUS at 08:13

## 2024-04-09 RX ADMIN — INSULIN LISPRO 4 UNITS: 100 INJECTION, SOLUTION INTRAVENOUS; SUBCUTANEOUS at 11:09

## 2024-04-09 RX ADMIN — PANTOPRAZOLE SODIUM 40 MG: 40 INJECTION, POWDER, FOR SOLUTION INTRAVENOUS at 09:52

## 2024-04-09 RX ADMIN — INSULIN LISPRO 4 UNITS: 100 INJECTION, SOLUTION INTRAVENOUS; SUBCUTANEOUS at 14:59

## 2024-04-09 RX ADMIN — INSULIN LISPRO 4 UNITS: 100 INJECTION, SOLUTION INTRAVENOUS; SUBCUTANEOUS at 20:26

## 2024-04-09 RX ADMIN — SODIUM CHLORIDE, PRESERVATIVE FREE 10 ML: 5 INJECTION INTRAVENOUS at 10:01

## 2024-04-09 RX ADMIN — HYDROMORPHONE HYDROCHLORIDE 0.5 MG: 1 INJECTION, SOLUTION INTRAMUSCULAR; INTRAVENOUS; SUBCUTANEOUS at 08:30

## 2024-04-09 RX ADMIN — HYDROMORPHONE HYDROCHLORIDE 0.5 MG: 1 INJECTION, SOLUTION INTRAMUSCULAR; INTRAVENOUS; SUBCUTANEOUS at 18:05

## 2024-04-09 RX ADMIN — METHOCARBAMOL 500 MG: 100 INJECTION INTRAMUSCULAR; INTRAVENOUS at 04:15

## 2024-04-09 RX ADMIN — SODIUM CHLORIDE 30 MG/ML INHALATION SOLUTION 4 ML: 30 SOLUTION INHALANT at 10:52

## 2024-04-09 RX ADMIN — ATORVASTATIN CALCIUM 80 MG: 80 TABLET, FILM COATED ORAL at 20:25

## 2024-04-09 RX ADMIN — WATER 40 MG: 1 INJECTION INTRAMUSCULAR; INTRAVENOUS; SUBCUTANEOUS at 15:55

## 2024-04-09 RX ADMIN — GABAPENTIN 600 MG: 600 TABLET, FILM COATED ORAL at 20:30

## 2024-04-09 RX ADMIN — ALBUTEROL SULFATE 2.5 MG: 2.5 SOLUTION RESPIRATORY (INHALATION) at 10:51

## 2024-04-09 RX ADMIN — HYDROMORPHONE HYDROCHLORIDE 0.25 MG: 1 INJECTION, SOLUTION INTRAMUSCULAR; INTRAVENOUS; SUBCUTANEOUS at 12:42

## 2024-04-09 RX ADMIN — HYDROMORPHONE HYDROCHLORIDE 0.5 MG: 1 INJECTION, SOLUTION INTRAMUSCULAR; INTRAVENOUS; SUBCUTANEOUS at 02:50

## 2024-04-09 RX ADMIN — ONDANSETRON 4 MG: 4 TABLET, ORALLY DISINTEGRATING ORAL at 09:59

## 2024-04-09 RX ADMIN — ALBUTEROL SULFATE 2.5 MG: 2.5 SOLUTION RESPIRATORY (INHALATION) at 19:47

## 2024-04-09 RX ADMIN — SODIUM CHLORIDE 30 MG/ML INHALATION SOLUTION 4 ML: 30 SOLUTION INHALANT at 19:47

## 2024-04-09 RX ADMIN — LEVOFLOXACIN 750 MG: 5 INJECTION, SOLUTION INTRAVENOUS at 09:52

## 2024-04-09 RX ADMIN — SODIUM CHLORIDE, PRESERVATIVE FREE 10 ML: 5 INJECTION INTRAVENOUS at 20:15

## 2024-04-09 RX ADMIN — CLONIDINE HYDROCHLORIDE 0.1 MG: 0.1 TABLET ORAL at 20:26

## 2024-04-09 ASSESSMENT — PAIN DESCRIPTION - FREQUENCY
FREQUENCY: CONTINUOUS

## 2024-04-09 ASSESSMENT — PAIN DESCRIPTION - ORIENTATION
ORIENTATION: LOWER;MID
ORIENTATION: MID;LOWER
ORIENTATION: MID;LOWER
ORIENTATION: LOWER;MID
ORIENTATION: MID;LOWER

## 2024-04-09 ASSESSMENT — PAIN DESCRIPTION - ONSET
ONSET: GRADUAL
ONSET: GRADUAL

## 2024-04-09 ASSESSMENT — PAIN SCALES - GENERAL
PAINLEVEL_OUTOF10: 6
PAINLEVEL_OUTOF10: 3
PAINLEVEL_OUTOF10: 7
PAINLEVEL_OUTOF10: 3
PAINLEVEL_OUTOF10: 0
PAINLEVEL_OUTOF10: 7
PAINLEVEL_OUTOF10: 3
PAINLEVEL_OUTOF10: 5
PAINLEVEL_OUTOF10: 10
PAINLEVEL_OUTOF10: 6
PAINLEVEL_OUTOF10: 10

## 2024-04-09 ASSESSMENT — PAIN DESCRIPTION - LOCATION
LOCATION: BACK

## 2024-04-09 ASSESSMENT — PAIN DESCRIPTION - DESCRIPTORS
DESCRIPTORS: ACHING
DESCRIPTORS: ACHING;THROBBING
DESCRIPTORS: ACHING
DESCRIPTORS: ACHING;THROBBING

## 2024-04-09 ASSESSMENT — PAIN - FUNCTIONAL ASSESSMENT
PAIN_FUNCTIONAL_ASSESSMENT: PREVENTS OR INTERFERES SOME ACTIVE ACTIVITIES AND ADLS
PAIN_FUNCTIONAL_ASSESSMENT: PREVENTS OR INTERFERES SOME ACTIVE ACTIVITIES AND ADLS
PAIN_FUNCTIONAL_ASSESSMENT: ACTIVITIES ARE NOT PREVENTED
PAIN_FUNCTIONAL_ASSESSMENT: PREVENTS OR INTERFERES SOME ACTIVE ACTIVITIES AND ADLS
PAIN_FUNCTIONAL_ASSESSMENT: ACTIVITIES ARE NOT PREVENTED

## 2024-04-09 ASSESSMENT — PAIN DESCRIPTION - PAIN TYPE
TYPE: CHRONIC PAIN
TYPE: ACUTE PAIN;CHRONIC PAIN
TYPE: CHRONIC PAIN

## 2024-04-09 NOTE — NURSE NAVIGATOR
Patient seen in NCC rounds by Dr Garrett and provider team.    - CT stable, CTA improved  - SLP finishing with patient, concern patient may need PEG tube given potential length of recovery. Consult for ENT already placed by medical team.  - NCC to reach out to Dr Joshi to discuss follow up plan  - Plan for Holter monitor at discharge, [employer] message sent to Cardiac navigator

## 2024-04-10 ENCOUNTER — APPOINTMENT (OUTPATIENT)
Dept: GENERAL RADIOLOGY | Age: 69
DRG: 064 | End: 2024-04-10
Payer: MEDICARE

## 2024-04-10 PROBLEM — R13.14 PHARYNGOESOPHAGEAL DYSPHAGIA: Status: ACTIVE | Noted: 2024-04-10

## 2024-04-10 LAB
ANION GAP SERPL CALCULATED.3IONS-SCNC: 11 MMOL/L (ref 3–16)
ANTI-XA UNFRAC HEPARIN: 0.43 IU/ML (ref 0.3–0.7)
ANTI-XA UNFRAC HEPARIN: 0.61 IU/ML (ref 0.3–0.7)
ANTI-XA UNFRAC HEPARIN: 0.61 IU/ML (ref 0.3–0.7)
ANTI-XA UNFRAC HEPARIN: >1.1 IU/ML (ref 0.3–0.7)
BACTERIA BLD CULT ORG #2: NORMAL
BACTERIA BLD CULT: NORMAL
BASOPHILS # BLD: 0 K/UL (ref 0–0.2)
BASOPHILS NFR BLD: 0.3 %
BUN SERPL-MCNC: 25 MG/DL (ref 7–20)
CALCIUM SERPL-MCNC: 8.9 MG/DL (ref 8.3–10.6)
CHLORIDE SERPL-SCNC: 100 MMOL/L (ref 99–110)
CO2 SERPL-SCNC: 29 MMOL/L (ref 21–32)
CREAT SERPL-MCNC: 0.7 MG/DL (ref 0.8–1.3)
DEPRECATED RDW RBC AUTO: 13.7 % (ref 12.4–15.4)
EOSINOPHIL # BLD: 0 K/UL (ref 0–0.6)
EOSINOPHIL NFR BLD: 0 %
GFR SERPLBLD CREATININE-BSD FMLA CKD-EPI: >90 ML/MIN/{1.73_M2}
GLUCOSE BLD-MCNC: 208 MG/DL (ref 70–99)
GLUCOSE BLD-MCNC: 233 MG/DL (ref 70–99)
GLUCOSE BLD-MCNC: 278 MG/DL (ref 70–99)
GLUCOSE BLD-MCNC: 282 MG/DL (ref 70–99)
GLUCOSE SERPL-MCNC: 261 MG/DL (ref 70–99)
HCT VFR BLD AUTO: 49.7 % (ref 40.5–52.5)
HGB BLD-MCNC: 16.5 G/DL (ref 13.5–17.5)
LYMPHOCYTES # BLD: 1.1 K/UL (ref 1–5.1)
LYMPHOCYTES NFR BLD: 9.1 %
MAGNESIUM SERPL-MCNC: 2.4 MG/DL (ref 1.8–2.4)
MCH RBC QN AUTO: 30.2 PG (ref 26–34)
MCHC RBC AUTO-ENTMCNC: 33.1 G/DL (ref 31–36)
MCV RBC AUTO: 91.1 FL (ref 80–100)
MONOCYTES # BLD: 0.7 K/UL (ref 0–1.3)
MONOCYTES NFR BLD: 5.6 %
NEUTROPHILS # BLD: 10.5 K/UL (ref 1.7–7.7)
NEUTROPHILS NFR BLD: 85 %
PERFORMED ON: ABNORMAL
PHOSPHATE SERPL-MCNC: 4.1 MG/DL (ref 2.5–4.9)
PLATELET # BLD AUTO: 266 K/UL (ref 135–450)
PMV BLD AUTO: 8 FL (ref 5–10.5)
POTASSIUM SERPL-SCNC: 4.1 MMOL/L (ref 3.5–5.1)
RBC # BLD AUTO: 5.45 M/UL (ref 4.2–5.9)
SODIUM SERPL-SCNC: 140 MMOL/L (ref 136–145)
WBC # BLD AUTO: 12.4 K/UL (ref 4–11)

## 2024-04-10 PROCEDURE — 83735 ASSAY OF MAGNESIUM: CPT

## 2024-04-10 PROCEDURE — 1200000000 HC SEMI PRIVATE

## 2024-04-10 PROCEDURE — 84100 ASSAY OF PHOSPHORUS: CPT

## 2024-04-10 PROCEDURE — 6370000000 HC RX 637 (ALT 250 FOR IP)

## 2024-04-10 PROCEDURE — 6360000002 HC RX W HCPCS

## 2024-04-10 PROCEDURE — 2580000003 HC RX 258

## 2024-04-10 PROCEDURE — 74018 RADEX ABDOMEN 1 VIEW: CPT

## 2024-04-10 PROCEDURE — 36415 COLL VENOUS BLD VENIPUNCTURE: CPT

## 2024-04-10 PROCEDURE — 94761 N-INVAS EAR/PLS OXIMETRY MLT: CPT

## 2024-04-10 PROCEDURE — 97530 THERAPEUTIC ACTIVITIES: CPT

## 2024-04-10 PROCEDURE — 2700000000 HC OXYGEN THERAPY PER DAY

## 2024-04-10 PROCEDURE — 85025 COMPLETE CBC W/AUTO DIFF WBC: CPT

## 2024-04-10 PROCEDURE — 94669 MECHANICAL CHEST WALL OSCILL: CPT

## 2024-04-10 PROCEDURE — 80048 BASIC METABOLIC PNL TOTAL CA: CPT

## 2024-04-10 PROCEDURE — 85520 HEPARIN ASSAY: CPT

## 2024-04-10 PROCEDURE — C9113 INJ PANTOPRAZOLE SODIUM, VIA: HCPCS

## 2024-04-10 PROCEDURE — 92526 ORAL FUNCTION THERAPY: CPT

## 2024-04-10 PROCEDURE — 94640 AIRWAY INHALATION TREATMENT: CPT

## 2024-04-10 PROCEDURE — 99232 SBSQ HOSP IP/OBS MODERATE 35: CPT | Performed by: HOSPITALIST

## 2024-04-10 RX ORDER — INSULIN GLARGINE 100 [IU]/ML
10 INJECTION, SOLUTION SUBCUTANEOUS NIGHTLY
Status: DISCONTINUED | OUTPATIENT
Start: 2024-04-10 | End: 2024-04-11

## 2024-04-10 RX ORDER — POLYETHYLENE GLYCOL 3350 17 G/17G
17 POWDER, FOR SOLUTION ORAL 2 TIMES DAILY
Status: CANCELLED | OUTPATIENT
Start: 2024-04-10

## 2024-04-10 RX ADMIN — INSULIN LISPRO 8 UNITS: 100 INJECTION, SOLUTION INTRAVENOUS; SUBCUTANEOUS at 20:50

## 2024-04-10 RX ADMIN — HYDROMORPHONE HYDROCHLORIDE 0.5 MG: 1 INJECTION, SOLUTION INTRAMUSCULAR; INTRAVENOUS; SUBCUTANEOUS at 04:22

## 2024-04-10 RX ADMIN — INSULIN LISPRO 8 UNITS: 100 INJECTION, SOLUTION INTRAVENOUS; SUBCUTANEOUS at 14:15

## 2024-04-10 RX ADMIN — SODIUM CHLORIDE 30 MG/ML INHALATION SOLUTION 4 ML: 30 SOLUTION INHALANT at 20:32

## 2024-04-10 RX ADMIN — GABAPENTIN 600 MG: 600 TABLET, FILM COATED ORAL at 08:31

## 2024-04-10 RX ADMIN — INSULIN LISPRO 4 UNITS: 100 INJECTION, SOLUTION INTRAVENOUS; SUBCUTANEOUS at 02:37

## 2024-04-10 RX ADMIN — ATORVASTATIN CALCIUM 80 MG: 80 TABLET, FILM COATED ORAL at 20:52

## 2024-04-10 RX ADMIN — CLONIDINE HYDROCHLORIDE 0.1 MG: 0.1 TABLET ORAL at 20:52

## 2024-04-10 RX ADMIN — HYDROMORPHONE HYDROCHLORIDE 0.5 MG: 1 INJECTION, SOLUTION INTRAMUSCULAR; INTRAVENOUS; SUBCUTANEOUS at 20:53

## 2024-04-10 RX ADMIN — HYDROMORPHONE HYDROCHLORIDE 0.5 MG: 1 INJECTION, SOLUTION INTRAMUSCULAR; INTRAVENOUS; SUBCUTANEOUS at 08:26

## 2024-04-10 RX ADMIN — NALOXEGOL OXALATE 12.5 MG: 12.5 TABLET, FILM COATED ORAL at 14:23

## 2024-04-10 RX ADMIN — ASPIRIN 81 MG: 81 TABLET, COATED ORAL at 08:25

## 2024-04-10 RX ADMIN — ALBUTEROL SULFATE 2.5 MG: 2.5 SOLUTION RESPIRATORY (INHALATION) at 08:45

## 2024-04-10 RX ADMIN — CLONIDINE HYDROCHLORIDE 0.1 MG: 0.1 TABLET ORAL at 08:25

## 2024-04-10 RX ADMIN — GABAPENTIN 600 MG: 600 TABLET, FILM COATED ORAL at 20:52

## 2024-04-10 RX ADMIN — AMLODIPINE BESYLATE 5 MG: 5 TABLET ORAL at 08:24

## 2024-04-10 RX ADMIN — SODIUM CHLORIDE, PRESERVATIVE FREE 10 ML: 5 INJECTION INTRAVENOUS at 08:44

## 2024-04-10 RX ADMIN — SODIUM CHLORIDE 30 MG/ML INHALATION SOLUTION 4 ML: 30 SOLUTION INHALANT at 08:44

## 2024-04-10 RX ADMIN — SODIUM CHLORIDE 30 MG/ML INHALATION SOLUTION 4 ML: 30 SOLUTION INHALANT at 15:15

## 2024-04-10 RX ADMIN — HYDROMORPHONE HYDROCHLORIDE 0.25 MG: 1 INJECTION, SOLUTION INTRAMUSCULAR; INTRAVENOUS; SUBCUTANEOUS at 12:25

## 2024-04-10 RX ADMIN — INSULIN GLARGINE 10 UNITS: 100 INJECTION, SOLUTION SUBCUTANEOUS at 20:52

## 2024-04-10 RX ADMIN — INSULIN LISPRO 4 UNITS: 100 INJECTION, SOLUTION INTRAVENOUS; SUBCUTANEOUS at 08:47

## 2024-04-10 RX ADMIN — WATER 40 MG: 1 INJECTION INTRAMUSCULAR; INTRAVENOUS; SUBCUTANEOUS at 08:25

## 2024-04-10 RX ADMIN — ALBUTEROL SULFATE 2.5 MG: 2.5 SOLUTION RESPIRATORY (INHALATION) at 15:11

## 2024-04-10 RX ADMIN — SODIUM CHLORIDE, PRESERVATIVE FREE 10 ML: 5 INJECTION INTRAVENOUS at 21:04

## 2024-04-10 RX ADMIN — ALBUTEROL SULFATE 2.5 MG: 2.5 SOLUTION RESPIRATORY (INHALATION) at 20:32

## 2024-04-10 RX ADMIN — LOSARTAN POTASSIUM 100 MG: 100 TABLET, FILM COATED ORAL at 08:24

## 2024-04-10 RX ADMIN — PANTOPRAZOLE SODIUM 40 MG: 40 INJECTION, POWDER, FOR SOLUTION INTRAVENOUS at 08:25

## 2024-04-10 RX ADMIN — GABAPENTIN 600 MG: 600 TABLET, FILM COATED ORAL at 14:15

## 2024-04-10 ASSESSMENT — PAIN SCALES - GENERAL
PAINLEVEL_OUTOF10: 8
PAINLEVEL_OUTOF10: 3
PAINLEVEL_OUTOF10: 7
PAINLEVEL_OUTOF10: 3
PAINLEVEL_OUTOF10: 5
PAINLEVEL_OUTOF10: 3
PAINLEVEL_OUTOF10: 7
PAINLEVEL_OUTOF10: 3
PAINLEVEL_OUTOF10: 3

## 2024-04-10 ASSESSMENT — PAIN DESCRIPTION - LOCATION
LOCATION: BACK

## 2024-04-10 ASSESSMENT — PAIN DESCRIPTION - DESCRIPTORS
DESCRIPTORS: ACHING
DESCRIPTORS: ACHING;THROBBING
DESCRIPTORS: ACHING

## 2024-04-10 ASSESSMENT — PAIN DESCRIPTION - PAIN TYPE
TYPE: CHRONIC PAIN

## 2024-04-10 ASSESSMENT — PAIN - FUNCTIONAL ASSESSMENT
PAIN_FUNCTIONAL_ASSESSMENT: ACTIVITIES ARE NOT PREVENTED
PAIN_FUNCTIONAL_ASSESSMENT: PREVENTS OR INTERFERES SOME ACTIVE ACTIVITIES AND ADLS
PAIN_FUNCTIONAL_ASSESSMENT: ACTIVITIES ARE NOT PREVENTED

## 2024-04-10 ASSESSMENT — PAIN DESCRIPTION - ORIENTATION
ORIENTATION: LOWER;MID
ORIENTATION: LOWER;MID
ORIENTATION: MID;LOWER
ORIENTATION: LOWER;MID
ORIENTATION: MID
ORIENTATION: MID

## 2024-04-10 ASSESSMENT — PAIN DESCRIPTION - ONSET
ONSET: ON-GOING
ONSET: GRADUAL
ONSET: GRADUAL

## 2024-04-10 ASSESSMENT — PAIN DESCRIPTION - FREQUENCY
FREQUENCY: CONTINUOUS

## 2024-04-11 LAB
ALBUMIN SERPL-MCNC: 3.6 G/DL (ref 3.4–5)
ANION GAP SERPL CALCULATED.3IONS-SCNC: 6 MMOL/L (ref 3–16)
ANTI-XA UNFRAC HEPARIN: 0.27 IU/ML (ref 0.3–0.7)
ANTI-XA UNFRAC HEPARIN: 0.39 IU/ML (ref 0.3–0.7)
ANTI-XA UNFRAC HEPARIN: 0.39 IU/ML (ref 0.3–0.7)
ANTI-XA UNFRAC HEPARIN: 0.41 IU/ML (ref 0.3–0.7)
ANTI-XA UNFRAC HEPARIN: 0.52 IU/ML (ref 0.3–0.7)
BASOPHILS # BLD: 0 K/UL (ref 0–0.2)
BASOPHILS NFR BLD: 0.2 %
BUN SERPL-MCNC: 36 MG/DL (ref 7–20)
CALCIUM SERPL-MCNC: 8.9 MG/DL (ref 8.3–10.6)
CHLORIDE SERPL-SCNC: 100 MMOL/L (ref 99–110)
CO2 SERPL-SCNC: 33 MMOL/L (ref 21–32)
CREAT SERPL-MCNC: 1.1 MG/DL (ref 0.8–1.3)
DEPRECATED RDW RBC AUTO: 13.4 % (ref 12.4–15.4)
EOSINOPHIL # BLD: 0 K/UL (ref 0–0.6)
EOSINOPHIL NFR BLD: 0.1 %
F2 C.20210G>A GENO BLD/T: ABNORMAL
GFR SERPLBLD CREATININE-BSD FMLA CKD-EPI: 74 ML/MIN/{1.73_M2}
GLUCOSE BLD-MCNC: 246 MG/DL (ref 70–99)
GLUCOSE BLD-MCNC: 262 MG/DL (ref 70–99)
GLUCOSE BLD-MCNC: 298 MG/DL (ref 70–99)
GLUCOSE BLD-MCNC: 315 MG/DL (ref 70–99)
GLUCOSE BLD-MCNC: 376 MG/DL (ref 70–99)
GLUCOSE SERPL-MCNC: 292 MG/DL (ref 70–99)
HCT VFR BLD AUTO: 47.3 % (ref 40.5–52.5)
HGB BLD-MCNC: 15.7 G/DL (ref 13.5–17.5)
LYMPHOCYTES # BLD: 1.6 K/UL (ref 1–5.1)
LYMPHOCYTES NFR BLD: 9.1 %
MAGNESIUM SERPL-MCNC: 2.4 MG/DL (ref 1.8–2.4)
MCH RBC QN AUTO: 30.4 PG (ref 26–34)
MCHC RBC AUTO-ENTMCNC: 33.2 G/DL (ref 31–36)
MCV RBC AUTO: 91.6 FL (ref 80–100)
MONOCYTES # BLD: 1.4 K/UL (ref 0–1.3)
MONOCYTES NFR BLD: 8.4 %
NEUTROPHILS # BLD: 14.1 K/UL (ref 1.7–7.7)
NEUTROPHILS NFR BLD: 82.2 %
PERFORMED ON: ABNORMAL
PHOSPHATE SERPL-MCNC: 4.1 MG/DL (ref 2.5–4.9)
PLATELET # BLD AUTO: 267 K/UL (ref 135–450)
PMV BLD AUTO: 8.1 FL (ref 5–10.5)
POTASSIUM SERPL-SCNC: 3.9 MMOL/L (ref 3.5–5.1)
RBC # BLD AUTO: 5.16 M/UL (ref 4.2–5.9)
SODIUM SERPL-SCNC: 139 MMOL/L (ref 136–145)
SPECIMEN SOURCE: ABNORMAL
WBC # BLD AUTO: 17.2 K/UL (ref 4–11)

## 2024-04-11 PROCEDURE — 80069 RENAL FUNCTION PANEL: CPT

## 2024-04-11 PROCEDURE — 85025 COMPLETE CBC W/AUTO DIFF WBC: CPT

## 2024-04-11 PROCEDURE — 6370000000 HC RX 637 (ALT 250 FOR IP)

## 2024-04-11 PROCEDURE — 94640 AIRWAY INHALATION TREATMENT: CPT

## 2024-04-11 PROCEDURE — 6370000000 HC RX 637 (ALT 250 FOR IP): Performed by: HOSPITALIST

## 2024-04-11 PROCEDURE — 6360000002 HC RX W HCPCS

## 2024-04-11 PROCEDURE — 2700000000 HC OXYGEN THERAPY PER DAY

## 2024-04-11 PROCEDURE — 2580000003 HC RX 258

## 2024-04-11 PROCEDURE — 94669 MECHANICAL CHEST WALL OSCILL: CPT

## 2024-04-11 PROCEDURE — 85520 HEPARIN ASSAY: CPT

## 2024-04-11 PROCEDURE — 83735 ASSAY OF MAGNESIUM: CPT

## 2024-04-11 PROCEDURE — 99232 SBSQ HOSP IP/OBS MODERATE 35: CPT | Performed by: HOSPITALIST

## 2024-04-11 PROCEDURE — 1200000000 HC SEMI PRIVATE

## 2024-04-11 PROCEDURE — C9113 INJ PANTOPRAZOLE SODIUM, VIA: HCPCS

## 2024-04-11 PROCEDURE — 92526 ORAL FUNCTION THERAPY: CPT

## 2024-04-11 PROCEDURE — 94761 N-INVAS EAR/PLS OXIMETRY MLT: CPT

## 2024-04-11 PROCEDURE — 36415 COLL VENOUS BLD VENIPUNCTURE: CPT

## 2024-04-11 RX ORDER — CETIRIZINE HYDROCHLORIDE 10 MG/1
10 TABLET ORAL ONCE
Status: COMPLETED | OUTPATIENT
Start: 2024-04-11 | End: 2024-04-11

## 2024-04-11 RX ORDER — INSULIN GLARGINE 100 [IU]/ML
16 INJECTION, SOLUTION SUBCUTANEOUS NIGHTLY
Status: DISCONTINUED | OUTPATIENT
Start: 2024-04-11 | End: 2024-04-11

## 2024-04-11 RX ORDER — INSULIN GLARGINE 100 [IU]/ML
25 INJECTION, SOLUTION SUBCUTANEOUS NIGHTLY
Status: DISCONTINUED | OUTPATIENT
Start: 2024-04-11 | End: 2024-04-14

## 2024-04-11 RX ADMIN — SODIUM CHLORIDE 30 MG/ML INHALATION SOLUTION 4 ML: 30 SOLUTION INHALANT at 08:48

## 2024-04-11 RX ADMIN — HEPARIN SODIUM 9 UNITS/KG/HR: 10000 INJECTION, SOLUTION INTRAVENOUS at 22:17

## 2024-04-11 RX ADMIN — INSULIN HUMAN 6 UNITS: 100 INJECTION, SOLUTION PARENTERAL at 14:01

## 2024-04-11 RX ADMIN — HYDROMORPHONE HYDROCHLORIDE 0.5 MG: 1 INJECTION, SOLUTION INTRAMUSCULAR; INTRAVENOUS; SUBCUTANEOUS at 09:35

## 2024-04-11 RX ADMIN — ALBUTEROL SULFATE 2.5 MG: 2.5 SOLUTION RESPIRATORY (INHALATION) at 13:59

## 2024-04-11 RX ADMIN — GABAPENTIN 600 MG: 600 TABLET, FILM COATED ORAL at 08:41

## 2024-04-11 RX ADMIN — INSULIN LISPRO 8 UNITS: 100 INJECTION, SOLUTION INTRAVENOUS; SUBCUTANEOUS at 21:23

## 2024-04-11 RX ADMIN — WATER 40 MG: 1 INJECTION INTRAMUSCULAR; INTRAVENOUS; SUBCUTANEOUS at 08:38

## 2024-04-11 RX ADMIN — CLONIDINE HYDROCHLORIDE 0.1 MG: 0.1 TABLET ORAL at 21:23

## 2024-04-11 RX ADMIN — ASPIRIN 81 MG: 81 TABLET, COATED ORAL at 08:41

## 2024-04-11 RX ADMIN — SODIUM CHLORIDE 30 MG/ML INHALATION SOLUTION 4 ML: 30 SOLUTION INHALANT at 13:59

## 2024-04-11 RX ADMIN — AMLODIPINE BESYLATE 5 MG: 5 TABLET ORAL at 08:41

## 2024-04-11 RX ADMIN — HYDROMORPHONE HYDROCHLORIDE 0.5 MG: 1 INJECTION, SOLUTION INTRAMUSCULAR; INTRAVENOUS; SUBCUTANEOUS at 23:14

## 2024-04-11 RX ADMIN — LOSARTAN POTASSIUM 100 MG: 100 TABLET, FILM COATED ORAL at 08:41

## 2024-04-11 RX ADMIN — HYDROMORPHONE HYDROCHLORIDE 0.5 MG: 1 INJECTION, SOLUTION INTRAMUSCULAR; INTRAVENOUS; SUBCUTANEOUS at 00:55

## 2024-04-11 RX ADMIN — INSULIN LISPRO 4 UNITS: 100 INJECTION, SOLUTION INTRAVENOUS; SUBCUTANEOUS at 03:09

## 2024-04-11 RX ADMIN — ATORVASTATIN CALCIUM 80 MG: 80 TABLET, FILM COATED ORAL at 21:23

## 2024-04-11 RX ADMIN — INSULIN LISPRO 16 UNITS: 100 INJECTION, SOLUTION INTRAVENOUS; SUBCUTANEOUS at 16:14

## 2024-04-11 RX ADMIN — INSULIN LISPRO 8 UNITS: 100 INJECTION, SOLUTION INTRAVENOUS; SUBCUTANEOUS at 08:39

## 2024-04-11 RX ADMIN — SODIUM CHLORIDE, PRESERVATIVE FREE 10 ML: 5 INJECTION INTRAVENOUS at 21:24

## 2024-04-11 RX ADMIN — INSULIN GLARGINE 25 UNITS: 100 INJECTION, SOLUTION SUBCUTANEOUS at 21:24

## 2024-04-11 RX ADMIN — SODIUM CHLORIDE 30 MG/ML INHALATION SOLUTION 4 ML: 30 SOLUTION INHALANT at 20:39

## 2024-04-11 RX ADMIN — INSULIN HUMAN 6 UNITS: 100 INJECTION, SOLUTION PARENTERAL at 16:13

## 2024-04-11 RX ADMIN — HYDROMORPHONE HYDROCHLORIDE 0.5 MG: 1 INJECTION, SOLUTION INTRAMUSCULAR; INTRAVENOUS; SUBCUTANEOUS at 18:58

## 2024-04-11 RX ADMIN — GABAPENTIN 600 MG: 600 TABLET, FILM COATED ORAL at 13:53

## 2024-04-11 RX ADMIN — HYDROMORPHONE HYDROCHLORIDE 0.5 MG: 1 INJECTION, SOLUTION INTRAMUSCULAR; INTRAVENOUS; SUBCUTANEOUS at 14:50

## 2024-04-11 RX ADMIN — PANTOPRAZOLE SODIUM 40 MG: 40 INJECTION, POWDER, FOR SOLUTION INTRAVENOUS at 08:37

## 2024-04-11 RX ADMIN — ONDANSETRON 4 MG: 2 INJECTION INTRAMUSCULAR; INTRAVENOUS at 09:04

## 2024-04-11 RX ADMIN — SODIUM CHLORIDE, PRESERVATIVE FREE 10 ML: 5 INJECTION INTRAVENOUS at 08:37

## 2024-04-11 RX ADMIN — CETIRIZINE HYDROCHLORIDE 10 MG: 10 TABLET, FILM COATED ORAL at 23:15

## 2024-04-11 RX ADMIN — NALOXEGOL OXALATE 12.5 MG: 12.5 TABLET, FILM COATED ORAL at 05:50

## 2024-04-11 RX ADMIN — HYDROMORPHONE HYDROCHLORIDE 0.5 MG: 1 INJECTION, SOLUTION INTRAMUSCULAR; INTRAVENOUS; SUBCUTANEOUS at 05:28

## 2024-04-11 RX ADMIN — ALBUTEROL SULFATE 2.5 MG: 2.5 SOLUTION RESPIRATORY (INHALATION) at 08:48

## 2024-04-11 RX ADMIN — ALBUTEROL SULFATE 2.5 MG: 2.5 SOLUTION RESPIRATORY (INHALATION) at 20:39

## 2024-04-11 RX ADMIN — GABAPENTIN 600 MG: 600 TABLET, FILM COATED ORAL at 21:25

## 2024-04-11 RX ADMIN — HEPARIN SODIUM 11 UNITS/KG/HR: 10000 INJECTION, SOLUTION INTRAVENOUS at 05:52

## 2024-04-11 RX ADMIN — CLONIDINE HYDROCHLORIDE 0.1 MG: 0.1 TABLET ORAL at 08:41

## 2024-04-11 ASSESSMENT — PAIN DESCRIPTION - ONSET
ONSET: ON-GOING

## 2024-04-11 ASSESSMENT — PAIN DESCRIPTION - LOCATION
LOCATION: BACK

## 2024-04-11 ASSESSMENT — PAIN DESCRIPTION - DESCRIPTORS
DESCRIPTORS: ACHING

## 2024-04-11 ASSESSMENT — PAIN SCALES - WONG BAKER
WONGBAKER_NUMERICALRESPONSE: NO HURT

## 2024-04-11 ASSESSMENT — PAIN SCALES - GENERAL
PAINLEVEL_OUTOF10: 8
PAINLEVEL_OUTOF10: 8
PAINLEVEL_OUTOF10: 3
PAINLEVEL_OUTOF10: 8
PAINLEVEL_OUTOF10: 7
PAINLEVEL_OUTOF10: 5
PAINLEVEL_OUTOF10: 5
PAINLEVEL_OUTOF10: 7
PAINLEVEL_OUTOF10: 3
PAINLEVEL_OUTOF10: 8
PAINLEVEL_OUTOF10: 7
PAINLEVEL_OUTOF10: 3
PAINLEVEL_OUTOF10: 7
PAINLEVEL_OUTOF10: 3
PAINLEVEL_OUTOF10: 4
PAINLEVEL_OUTOF10: 3
PAINLEVEL_OUTOF10: 7
PAINLEVEL_OUTOF10: 3
PAINLEVEL_OUTOF10: 5

## 2024-04-11 ASSESSMENT — PAIN DESCRIPTION - ORIENTATION
ORIENTATION: POSTERIOR
ORIENTATION: MID
ORIENTATION: POSTERIOR
ORIENTATION: MID
ORIENTATION: POSTERIOR

## 2024-04-11 ASSESSMENT — PAIN DESCRIPTION - FREQUENCY
FREQUENCY: CONTINUOUS

## 2024-04-11 ASSESSMENT — PAIN DESCRIPTION - PAIN TYPE
TYPE: CHRONIC PAIN

## 2024-04-11 ASSESSMENT — PAIN - FUNCTIONAL ASSESSMENT
PAIN_FUNCTIONAL_ASSESSMENT: PREVENTS OR INTERFERES SOME ACTIVE ACTIVITIES AND ADLS

## 2024-04-12 ENCOUNTER — ANESTHESIA (OUTPATIENT)
Dept: ENDOSCOPY | Age: 69
End: 2024-04-12
Payer: MEDICARE

## 2024-04-12 ENCOUNTER — ANESTHESIA EVENT (OUTPATIENT)
Dept: ENDOSCOPY | Age: 69
End: 2024-04-12
Payer: MEDICARE

## 2024-04-12 ENCOUNTER — APPOINTMENT (OUTPATIENT)
Dept: GENERAL RADIOLOGY | Age: 69
DRG: 064 | End: 2024-04-12
Payer: MEDICARE

## 2024-04-12 PROBLEM — N17.9 AKI (ACUTE KIDNEY INJURY) (HCC): Status: ACTIVE | Noted: 2024-04-12

## 2024-04-12 LAB
ALBUMIN SERPL-MCNC: 3.5 G/DL (ref 3.4–5)
ANION GAP SERPL CALCULATED.3IONS-SCNC: 11 MMOL/L (ref 3–16)
ANTI-XA UNFRAC HEPARIN: 0.25 IU/ML (ref 0.3–0.7)
ANTI-XA UNFRAC HEPARIN: 0.37 IU/ML (ref 0.3–0.7)
BASOPHILS # BLD: 0 K/UL (ref 0–0.2)
BASOPHILS NFR BLD: 0.1 %
BUN SERPL-MCNC: 51 MG/DL (ref 7–20)
CALCIUM SERPL-MCNC: 9 MG/DL (ref 8.3–10.6)
CHLORIDE SERPL-SCNC: 99 MMOL/L (ref 99–110)
CO2 SERPL-SCNC: 30 MMOL/L (ref 21–32)
CREAT SERPL-MCNC: 1.4 MG/DL (ref 0.8–1.3)
DEPRECATED RDW RBC AUTO: 13.5 % (ref 12.4–15.4)
EOSINOPHIL # BLD: 0.1 K/UL (ref 0–0.6)
EOSINOPHIL NFR BLD: 0.3 %
GFR SERPLBLD CREATININE-BSD FMLA CKD-EPI: 55 ML/MIN/{1.73_M2}
GLUCOSE BLD-MCNC: 235 MG/DL (ref 70–99)
GLUCOSE BLD-MCNC: 262 MG/DL (ref 70–99)
GLUCOSE BLD-MCNC: 280 MG/DL (ref 70–99)
GLUCOSE BLD-MCNC: 292 MG/DL (ref 70–99)
GLUCOSE BLD-MCNC: 309 MG/DL (ref 70–99)
GLUCOSE SERPL-MCNC: 255 MG/DL (ref 70–99)
HCT VFR BLD AUTO: 45.9 % (ref 40.5–52.5)
HGB BLD-MCNC: 15.3 G/DL (ref 13.5–17.5)
LYMPHOCYTES # BLD: 2 K/UL (ref 1–5.1)
LYMPHOCYTES NFR BLD: 10.1 %
MAGNESIUM SERPL-MCNC: 2.6 MG/DL (ref 1.8–2.4)
MCH RBC QN AUTO: 30.7 PG (ref 26–34)
MCHC RBC AUTO-ENTMCNC: 33.3 G/DL (ref 31–36)
MCV RBC AUTO: 92.1 FL (ref 80–100)
MONOCYTES # BLD: 1.5 K/UL (ref 0–1.3)
MONOCYTES NFR BLD: 7.8 %
NEUTROPHILS # BLD: 15.8 K/UL (ref 1.7–7.7)
NEUTROPHILS NFR BLD: 81.7 %
PERFORMED ON: ABNORMAL
PHOSPHATE SERPL-MCNC: 4.5 MG/DL (ref 2.5–4.9)
PLATELET # BLD AUTO: 236 K/UL (ref 135–450)
PMV BLD AUTO: 8.5 FL (ref 5–10.5)
POTASSIUM SERPL-SCNC: 4.1 MMOL/L (ref 3.5–5.1)
RBC # BLD AUTO: 4.98 M/UL (ref 4.2–5.9)
SODIUM SERPL-SCNC: 140 MMOL/L (ref 136–145)
WBC # BLD AUTO: 19.4 K/UL (ref 4–11)

## 2024-04-12 PROCEDURE — 2720000010 HC SURG SUPPLY STERILE: Performed by: INTERNAL MEDICINE

## 2024-04-12 PROCEDURE — 3609017100 HC EGD: Performed by: INTERNAL MEDICINE

## 2024-04-12 PROCEDURE — 1200000000 HC SEMI PRIVATE

## 2024-04-12 PROCEDURE — 83735 ASSAY OF MAGNESIUM: CPT

## 2024-04-12 PROCEDURE — C9113 INJ PANTOPRAZOLE SODIUM, VIA: HCPCS

## 2024-04-12 PROCEDURE — 99223 1ST HOSP IP/OBS HIGH 75: CPT | Performed by: SURGERY

## 2024-04-12 PROCEDURE — 94640 AIRWAY INHALATION TREATMENT: CPT

## 2024-04-12 PROCEDURE — 94761 N-INVAS EAR/PLS OXIMETRY MLT: CPT

## 2024-04-12 PROCEDURE — 2700000000 HC OXYGEN THERAPY PER DAY

## 2024-04-12 PROCEDURE — 74018 RADEX ABDOMEN 1 VIEW: CPT

## 2024-04-12 PROCEDURE — 6370000000 HC RX 637 (ALT 250 FOR IP): Performed by: HOSPITALIST

## 2024-04-12 PROCEDURE — 94669 MECHANICAL CHEST WALL OSCILL: CPT

## 2024-04-12 PROCEDURE — 0DJ08ZZ INSPECTION OF UPPER INTESTINAL TRACT, VIA NATURAL OR ARTIFICIAL OPENING ENDOSCOPIC: ICD-10-PCS | Performed by: INTERNAL MEDICINE

## 2024-04-12 PROCEDURE — 6370000000 HC RX 637 (ALT 250 FOR IP)

## 2024-04-12 PROCEDURE — 97530 THERAPEUTIC ACTIVITIES: CPT

## 2024-04-12 PROCEDURE — 2500000003 HC RX 250 WO HCPCS: Performed by: NURSE ANESTHETIST, CERTIFIED REGISTERED

## 2024-04-12 PROCEDURE — 97110 THERAPEUTIC EXERCISES: CPT

## 2024-04-12 PROCEDURE — 2580000003 HC RX 258

## 2024-04-12 PROCEDURE — 3700000000 HC ANESTHESIA ATTENDED CARE: Performed by: INTERNAL MEDICINE

## 2024-04-12 PROCEDURE — 2709999900 HC NON-CHARGEABLE SUPPLY: Performed by: INTERNAL MEDICINE

## 2024-04-12 PROCEDURE — 6360000002 HC RX W HCPCS

## 2024-04-12 PROCEDURE — 80069 RENAL FUNCTION PANEL: CPT

## 2024-04-12 PROCEDURE — 85520 HEPARIN ASSAY: CPT

## 2024-04-12 PROCEDURE — 36415 COLL VENOUS BLD VENIPUNCTURE: CPT

## 2024-04-12 PROCEDURE — 99232 SBSQ HOSP IP/OBS MODERATE 35: CPT | Performed by: HOSPITALIST

## 2024-04-12 PROCEDURE — 2580000003 HC RX 258: Performed by: NURSE ANESTHETIST, CERTIFIED REGISTERED

## 2024-04-12 PROCEDURE — 3700000001 HC ADD 15 MINUTES (ANESTHESIA): Performed by: INTERNAL MEDICINE

## 2024-04-12 PROCEDURE — 85025 COMPLETE CBC W/AUTO DIFF WBC: CPT

## 2024-04-12 PROCEDURE — 6360000002 HC RX W HCPCS: Performed by: NURSE ANESTHETIST, CERTIFIED REGISTERED

## 2024-04-12 RX ORDER — HYDROMORPHONE HYDROCHLORIDE 1 MG/ML
0.5 INJECTION, SOLUTION INTRAMUSCULAR; INTRAVENOUS; SUBCUTANEOUS EVERY 4 HOURS PRN
Status: DISPENSED | OUTPATIENT
Start: 2024-04-12 | End: 2024-04-12

## 2024-04-12 RX ORDER — METHOCARBAMOL 500 MG/1
500 TABLET, FILM COATED ORAL 4 TIMES DAILY PRN
Status: DISCONTINUED | OUTPATIENT
Start: 2024-04-12 | End: 2024-04-18

## 2024-04-12 RX ORDER — SODIUM CHLORIDE, SODIUM LACTATE, POTASSIUM CHLORIDE, CALCIUM CHLORIDE 600; 310; 30; 20 MG/100ML; MG/100ML; MG/100ML; MG/100ML
INJECTION, SOLUTION INTRAVENOUS CONTINUOUS
Status: DISCONTINUED | OUTPATIENT
Start: 2024-04-12 | End: 2024-04-19

## 2024-04-12 RX ORDER — LOSARTAN POTASSIUM 100 MG/1
100 TABLET ORAL DAILY
Status: DISCONTINUED | OUTPATIENT
Start: 2024-04-12 | End: 2024-04-18

## 2024-04-12 RX ORDER — LIDOCAINE HYDROCHLORIDE 20 MG/ML
INJECTION, SOLUTION INFILTRATION; PERINEURAL PRN
Status: DISCONTINUED | OUTPATIENT
Start: 2024-04-12 | End: 2024-04-12 | Stop reason: SDUPTHER

## 2024-04-12 RX ORDER — HEPARIN SODIUM 10000 [USP'U]/100ML
5-30 INJECTION, SOLUTION INTRAVENOUS CONTINUOUS
Status: DISPENSED | OUTPATIENT
Start: 2024-04-12 | End: 2024-04-17

## 2024-04-12 RX ORDER — SODIUM CHLORIDE, SODIUM LACTATE, POTASSIUM CHLORIDE, CALCIUM CHLORIDE 600; 310; 30; 20 MG/100ML; MG/100ML; MG/100ML; MG/100ML
INJECTION, SOLUTION INTRAVENOUS CONTINUOUS PRN
Status: DISCONTINUED | OUTPATIENT
Start: 2024-04-12 | End: 2024-04-12 | Stop reason: SDUPTHER

## 2024-04-12 RX ORDER — PROPOFOL 10 MG/ML
INJECTION, EMULSION INTRAVENOUS PRN
Status: DISCONTINUED | OUTPATIENT
Start: 2024-04-12 | End: 2024-04-12 | Stop reason: SDUPTHER

## 2024-04-12 RX ORDER — CLONIDINE HYDROCHLORIDE 0.1 MG/1
0.1 TABLET ORAL 2 TIMES DAILY
Status: DISCONTINUED | OUTPATIENT
Start: 2024-04-12 | End: 2024-04-24 | Stop reason: HOSPADM

## 2024-04-12 RX ORDER — DEXMEDETOMIDINE HYDROCHLORIDE 100 UG/ML
INJECTION, SOLUTION INTRAVENOUS PRN
Status: DISCONTINUED | OUTPATIENT
Start: 2024-04-12 | End: 2024-04-12 | Stop reason: SDUPTHER

## 2024-04-12 RX ORDER — OXYCODONE HYDROCHLORIDE 5 MG/1
5 TABLET ORAL EVERY 6 HOURS PRN
Status: DISCONTINUED | OUTPATIENT
Start: 2024-04-12 | End: 2024-04-16

## 2024-04-12 RX ADMIN — DEXMEDETOMIDINE HYDROCHLORIDE 6 MCG: 100 INJECTION, SOLUTION INTRAVENOUS at 14:58

## 2024-04-12 RX ADMIN — SODIUM CHLORIDE, POTASSIUM CHLORIDE, SODIUM LACTATE AND CALCIUM CHLORIDE: 600; 310; 30; 20 INJECTION, SOLUTION INTRAVENOUS at 10:09

## 2024-04-12 RX ADMIN — DEXMEDETOMIDINE HYDROCHLORIDE 4 MCG: 100 INJECTION, SOLUTION INTRAVENOUS at 15:00

## 2024-04-12 RX ADMIN — SODIUM CHLORIDE, PRESERVATIVE FREE 10 ML: 5 INJECTION INTRAVENOUS at 21:30

## 2024-04-12 RX ADMIN — PROPOFOL 30 MG: 10 INJECTION, EMULSION INTRAVENOUS at 15:01

## 2024-04-12 RX ADMIN — HEPARIN SODIUM 9 UNITS/KG/HR: 10000 INJECTION, SOLUTION INTRAVENOUS at 17:36

## 2024-04-12 RX ADMIN — HYDROMORPHONE HYDROCHLORIDE 0.5 MG: 1 INJECTION, SOLUTION INTRAMUSCULAR; INTRAVENOUS; SUBCUTANEOUS at 10:25

## 2024-04-12 RX ADMIN — INSULIN LISPRO 4 UNITS: 100 INJECTION, SOLUTION INTRAVENOUS; SUBCUTANEOUS at 04:05

## 2024-04-12 RX ADMIN — SODIUM CHLORIDE, SODIUM LACTATE, POTASSIUM CHLORIDE, AND CALCIUM CHLORIDE: .6; .31; .03; .02 INJECTION, SOLUTION INTRAVENOUS at 14:50

## 2024-04-12 RX ADMIN — ALBUTEROL SULFATE 2.5 MG: 2.5 SOLUTION RESPIRATORY (INHALATION) at 20:24

## 2024-04-12 RX ADMIN — INSULIN LISPRO 8 UNITS: 100 INJECTION, SOLUTION INTRAVENOUS; SUBCUTANEOUS at 17:40

## 2024-04-12 RX ADMIN — CLONIDINE HYDROCHLORIDE 0.1 MG: 0.1 TABLET ORAL at 21:30

## 2024-04-12 RX ADMIN — INSULIN LISPRO 8 UNITS: 100 INJECTION, SOLUTION INTRAVENOUS; SUBCUTANEOUS at 09:07

## 2024-04-12 RX ADMIN — PROPOFOL 50 MG: 10 INJECTION, EMULSION INTRAVENOUS at 14:58

## 2024-04-12 RX ADMIN — WATER 40 MG: 1 INJECTION INTRAMUSCULAR; INTRAVENOUS; SUBCUTANEOUS at 09:07

## 2024-04-12 RX ADMIN — SODIUM CHLORIDE 30 MG/ML INHALATION SOLUTION 4 ML: 30 SOLUTION INHALANT at 08:23

## 2024-04-12 RX ADMIN — INSULIN GLARGINE 25 UNITS: 100 INJECTION, SOLUTION SUBCUTANEOUS at 21:29

## 2024-04-12 RX ADMIN — LIDOCAINE HYDROCHLORIDE 100 MG: 20 INJECTION, SOLUTION INFILTRATION; PERINEURAL at 14:58

## 2024-04-12 RX ADMIN — INSULIN LISPRO 8 UNITS: 100 INJECTION, SOLUTION INTRAVENOUS; SUBCUTANEOUS at 21:29

## 2024-04-12 RX ADMIN — ALBUTEROL SULFATE 2.5 MG: 2.5 SOLUTION RESPIRATORY (INHALATION) at 08:23

## 2024-04-12 RX ADMIN — ATORVASTATIN CALCIUM 80 MG: 80 TABLET, FILM COATED ORAL at 21:29

## 2024-04-12 RX ADMIN — PANTOPRAZOLE SODIUM 40 MG: 40 INJECTION, POWDER, FOR SOLUTION INTRAVENOUS at 09:07

## 2024-04-12 RX ADMIN — SODIUM CHLORIDE, PRESERVATIVE FREE 10 ML: 5 INJECTION INTRAVENOUS at 09:08

## 2024-04-12 RX ADMIN — HYDROMORPHONE HYDROCHLORIDE 0.5 MG: 1 INJECTION, SOLUTION INTRAMUSCULAR; INTRAVENOUS; SUBCUTANEOUS at 04:02

## 2024-04-12 RX ADMIN — SODIUM CHLORIDE 30 MG/ML INHALATION SOLUTION 4 ML: 30 SOLUTION INHALANT at 20:24

## 2024-04-12 RX ADMIN — GABAPENTIN 600 MG: 600 TABLET, FILM COATED ORAL at 21:29

## 2024-04-12 ASSESSMENT — PAIN DESCRIPTION - PAIN TYPE: TYPE: CHRONIC PAIN

## 2024-04-12 ASSESSMENT — PAIN DESCRIPTION - ONSET: ONSET: ON-GOING

## 2024-04-12 ASSESSMENT — PAIN SCALES - WONG BAKER: WONGBAKER_NUMERICALRESPONSE: HURTS A LITTLE BIT

## 2024-04-12 ASSESSMENT — PAIN SCALES - GENERAL
PAINLEVEL_OUTOF10: 9
PAINLEVEL_OUTOF10: 5
PAINLEVEL_OUTOF10: 5
PAINLEVEL_OUTOF10: 8
PAINLEVEL_OUTOF10: 10

## 2024-04-12 ASSESSMENT — PAIN DESCRIPTION - ORIENTATION
ORIENTATION: LOWER
ORIENTATION: LEFT;POSTERIOR
ORIENTATION: LEFT

## 2024-04-12 ASSESSMENT — PAIN DESCRIPTION - DESCRIPTORS
DESCRIPTORS: ACHING;DISCOMFORT
DESCRIPTORS: ACHING
DESCRIPTORS: ACHING;DISCOMFORT

## 2024-04-12 ASSESSMENT — PAIN DESCRIPTION - LOCATION
LOCATION: BACK
LOCATION: BACK;SHOULDER
LOCATION: BACK

## 2024-04-12 ASSESSMENT — PAIN DESCRIPTION - FREQUENCY: FREQUENCY: CONTINUOUS

## 2024-04-12 ASSESSMENT — PAIN - FUNCTIONAL ASSESSMENT
PAIN_FUNCTIONAL_ASSESSMENT: PREVENTS OR INTERFERES SOME ACTIVE ACTIVITIES AND ADLS
PAIN_FUNCTIONAL_ASSESSMENT: 0-10
PAIN_FUNCTIONAL_ASSESSMENT: ACTIVITIES ARE NOT PREVENTED

## 2024-04-12 NOTE — ANESTHESIA POSTPROCEDURE EVALUATION
Department of Anesthesiology  Postprocedure Note    Patient: Kulwant Lamb  MRN: [id number]  YOB: 1955  Date of evaluation: 4/12/2024    Procedure Summary       Date: 04/12/24 Room / Location: [place] [address] / [facility]    Anesthesia Start: 1454 Anesthesia Stop: 1509    Procedure: ESOPHAGOGASTRODUODENOSCOPY Diagnosis:       Dysphagia, unspecified type      (Dysphagia, unspecified type [R13.10])    Surgeons: Anyi Herndon MD Responsible Provider: Eric Prajapati MD    Anesthesia Type: MAC ASA Status: 4            Anesthesia Type: No value filed.    Boy Phase I: Boy Score: 9    Boy Phase II:      Anesthesia Post Evaluation    Patient location during evaluation: PACU  Patient participation: complete - patient participated  Level of consciousness: awake and alert  Airway patency: patent  Nausea & Vomiting: no nausea and no vomiting  Cardiovascular status: hemodynamically stable  Respiratory status: acceptable  Hydration status: euvolemic  Multimodal analgesia pain management approach  Pain management: satisfactory to patient    No notable events documented.

## 2024-04-12 NOTE — ANESTHESIA PRE PROCEDURE
Department of Anesthesiology  Preprocedure Note       Name:  Kulwant Lamb   Age:  68 y.o.  :  1955                                          MRN:  [id number]         Date:  2024      Surgeon: Surgeon(s):  Anyi Herndon MD    Procedure: Procedure(s):  ESOPHAGOGASTRODUODENOSCOPY PERCUTANEOUS ENDOSCOPIC GASTROSTOMY TUBE INSERTION    Medications prior to admission:   Prior to Admission medications    Medication Sig Start Date End Date Taking? Authorizing Provider   metOLazone (ZAROXOLYN) 5 MG tablet Take 0.5 tablets by mouth daily as needed (leg swelling)   Yes Ramon Carmona MD   tiZANidine (ZANAFLEX) 4 MG tablet Take 1 tablet by mouth every 8 hours as needed   Yes Ramon Carmona MD   insulin NPH (NOVOLIN N) 100 UNIT/ML injection vial ADMINISTER 10 TO 40 UNITS UNDER THE SKIN THREE TIMES DAILY PER SLIDING SCALE 3/15/24   Margoth Webber MD   ONETOUCH ULTRA strip USE THREE TIMES DAILY AND AS NEEDED FOR SYMPTOMS OF IRREGULAR GLCOSE 24   Margoth Webber MD   potassium chloride (KLOR-CON) 10 MEQ extended release tablet TAKE 2 TABLETS BY MOUTH 6 TIMES DAILY 23   Margoth Webber MD   ferrous sulfate (IRON 325) 325 (65 Fe) MG tablet Take 1 tablet by mouth daily (with breakfast)    Ramon Carmona MD   olmesartan (BENICAR) 40 MG tablet Take 1 tablet by mouth daily 23   Margoth Webber MD   amLODIPine (NORVASC) 5 MG tablet Take 1 tablet by mouth daily 11/10/23   Margoth Webber MD   Insulin Syringe-Needle U-100 (BD INSULIN SYRINGE U/F) 31G X \" 0.5 ML MISC USE THREE TIMES DAILY AS DIRCTED 10/23/23   Margoth Webber MD   oxyCODONE-acetaminophen (PERCOCET)  MG per tablet Take 1 tablet by mouth every 6 hours as needed for Pain.    Ramon Carmona MD   atorvastatin (LIPITOR) 40 MG tablet TAKE 1 TABLET BY MOUTH EVERY NIGHT AT BEDTIME 22   Margoth Webber MD   cloNIDine (CATAPRES) 0.2 MG/24HR Place 1 patch onto the skin once a week    Ramon Carmona MD   gabapentin

## 2024-04-13 LAB
ALBUMIN SERPL-MCNC: 3.5 G/DL (ref 3.4–5)
ANION GAP SERPL CALCULATED.3IONS-SCNC: 7 MMOL/L (ref 3–16)
ANTI-XA UNFRAC HEPARIN: 0.47 IU/ML (ref 0.3–0.7)
BASOPHILS # BLD: 0 K/UL (ref 0–0.2)
BASOPHILS NFR BLD: 0.1 %
BUN SERPL-MCNC: 46 MG/DL (ref 7–20)
CALCIUM SERPL-MCNC: 8.9 MG/DL (ref 8.3–10.6)
CHLORIDE SERPL-SCNC: 101 MMOL/L (ref 99–110)
CO2 SERPL-SCNC: 35 MMOL/L (ref 21–32)
CREAT SERPL-MCNC: 1.2 MG/DL (ref 0.8–1.3)
DEPRECATED RDW RBC AUTO: 13.8 % (ref 12.4–15.4)
EOSINOPHIL # BLD: 0.1 K/UL (ref 0–0.6)
EOSINOPHIL NFR BLD: 0.5 %
GFR SERPLBLD CREATININE-BSD FMLA CKD-EPI: 66 ML/MIN/{1.73_M2}
GLUCOSE BLD-MCNC: 200 MG/DL (ref 70–99)
GLUCOSE BLD-MCNC: 222 MG/DL (ref 70–99)
GLUCOSE BLD-MCNC: 246 MG/DL (ref 70–99)
GLUCOSE BLD-MCNC: 269 MG/DL (ref 70–99)
GLUCOSE BLD-MCNC: 292 MG/DL (ref 70–99)
GLUCOSE SERPL-MCNC: 241 MG/DL (ref 70–99)
HCT VFR BLD AUTO: 45.1 % (ref 40.5–52.5)
HGB BLD-MCNC: 14.9 G/DL (ref 13.5–17.5)
LYMPHOCYTES # BLD: 2.4 K/UL (ref 1–5.1)
LYMPHOCYTES NFR BLD: 15.4 %
MAGNESIUM SERPL-MCNC: 2.6 MG/DL (ref 1.8–2.4)
MCH RBC QN AUTO: 30.5 PG (ref 26–34)
MCHC RBC AUTO-ENTMCNC: 33 G/DL (ref 31–36)
MCV RBC AUTO: 92.4 FL (ref 80–100)
MONOCYTES # BLD: 1.4 K/UL (ref 0–1.3)
MONOCYTES NFR BLD: 9.5 %
NEUTROPHILS # BLD: 11.4 K/UL (ref 1.7–7.7)
NEUTROPHILS NFR BLD: 74.5 %
PERFORMED ON: ABNORMAL
PHOSPHATE SERPL-MCNC: 4 MG/DL (ref 2.5–4.9)
PLATELET # BLD AUTO: 222 K/UL (ref 135–450)
PMV BLD AUTO: 8 FL (ref 5–10.5)
POTASSIUM SERPL-SCNC: 4.2 MMOL/L (ref 3.5–5.1)
RBC # BLD AUTO: 4.88 M/UL (ref 4.2–5.9)
SODIUM SERPL-SCNC: 143 MMOL/L (ref 136–145)
WBC # BLD AUTO: 15.2 K/UL (ref 4–11)

## 2024-04-13 PROCEDURE — 36415 COLL VENOUS BLD VENIPUNCTURE: CPT

## 2024-04-13 PROCEDURE — 6370000000 HC RX 637 (ALT 250 FOR IP)

## 2024-04-13 PROCEDURE — 94640 AIRWAY INHALATION TREATMENT: CPT

## 2024-04-13 PROCEDURE — 80069 RENAL FUNCTION PANEL: CPT

## 2024-04-13 PROCEDURE — 83735 ASSAY OF MAGNESIUM: CPT

## 2024-04-13 PROCEDURE — C9113 INJ PANTOPRAZOLE SODIUM, VIA: HCPCS

## 2024-04-13 PROCEDURE — 94669 MECHANICAL CHEST WALL OSCILL: CPT

## 2024-04-13 PROCEDURE — 99232 SBSQ HOSP IP/OBS MODERATE 35: CPT | Performed by: HOSPITALIST

## 2024-04-13 PROCEDURE — 1200000000 HC SEMI PRIVATE

## 2024-04-13 PROCEDURE — 2580000003 HC RX 258

## 2024-04-13 PROCEDURE — 94761 N-INVAS EAR/PLS OXIMETRY MLT: CPT

## 2024-04-13 PROCEDURE — 85025 COMPLETE CBC W/AUTO DIFF WBC: CPT

## 2024-04-13 PROCEDURE — 2700000000 HC OXYGEN THERAPY PER DAY

## 2024-04-13 PROCEDURE — 6370000000 HC RX 637 (ALT 250 FOR IP): Performed by: STUDENT IN AN ORGANIZED HEALTH CARE EDUCATION/TRAINING PROGRAM

## 2024-04-13 PROCEDURE — 85520 HEPARIN ASSAY: CPT

## 2024-04-13 PROCEDURE — 6360000002 HC RX W HCPCS

## 2024-04-13 PROCEDURE — 6370000000 HC RX 637 (ALT 250 FOR IP): Performed by: HOSPITALIST

## 2024-04-13 PROCEDURE — 92526 ORAL FUNCTION THERAPY: CPT

## 2024-04-13 RX ORDER — SENNOSIDES A AND B 8.6 MG/1
1 TABLET, FILM COATED ORAL NIGHTLY
Status: DISCONTINUED | OUTPATIENT
Start: 2024-04-13 | End: 2024-04-18

## 2024-04-13 RX ADMIN — PANTOPRAZOLE SODIUM 40 MG: 40 INJECTION, POWDER, FOR SOLUTION INTRAVENOUS at 07:45

## 2024-04-13 RX ADMIN — SODIUM CHLORIDE 30 MG/ML INHALATION SOLUTION 4 ML: 30 SOLUTION INHALANT at 09:14

## 2024-04-13 RX ADMIN — LOSARTAN POTASSIUM 100 MG: 100 TABLET, FILM COATED ORAL at 07:45

## 2024-04-13 RX ADMIN — SENNOSIDES 8.6 MG: 8.6 TABLET, FILM COATED ORAL at 21:22

## 2024-04-13 RX ADMIN — METHOCARBAMOL 500 MG: 500 TABLET ORAL at 06:53

## 2024-04-13 RX ADMIN — OXYCODONE 5 MG: 5 TABLET ORAL at 01:46

## 2024-04-13 RX ADMIN — ALBUTEROL SULFATE 2.5 MG: 2.5 SOLUTION RESPIRATORY (INHALATION) at 09:14

## 2024-04-13 RX ADMIN — HEPARIN SODIUM 9 UNITS/KG/HR: 10000 INJECTION, SOLUTION INTRAVENOUS at 08:21

## 2024-04-13 RX ADMIN — OXYCODONE 5 MG: 5 TABLET ORAL at 20:17

## 2024-04-13 RX ADMIN — AMLODIPINE BESYLATE 5 MG: 5 TABLET ORAL at 09:05

## 2024-04-13 RX ADMIN — SODIUM CHLORIDE 30 MG/ML INHALATION SOLUTION 4 ML: 30 SOLUTION INHALANT at 15:13

## 2024-04-13 RX ADMIN — ALBUTEROL SULFATE 2.5 MG: 2.5 SOLUTION RESPIRATORY (INHALATION) at 15:13

## 2024-04-13 RX ADMIN — SODIUM CHLORIDE, PRESERVATIVE FREE 10 ML: 5 INJECTION INTRAVENOUS at 21:22

## 2024-04-13 RX ADMIN — INSULIN LISPRO 4 UNITS: 100 INJECTION, SOLUTION INTRAVENOUS; SUBCUTANEOUS at 07:44

## 2024-04-13 RX ADMIN — ASPIRIN 81 MG: 81 TABLET, COATED ORAL at 07:44

## 2024-04-13 RX ADMIN — SODIUM CHLORIDE, PRESERVATIVE FREE 10 ML: 5 INJECTION INTRAVENOUS at 07:45

## 2024-04-13 RX ADMIN — OXYCODONE 5 MG: 5 TABLET ORAL at 13:42

## 2024-04-13 RX ADMIN — GABAPENTIN 600 MG: 600 TABLET, FILM COATED ORAL at 13:42

## 2024-04-13 RX ADMIN — ATORVASTATIN CALCIUM 80 MG: 80 TABLET, FILM COATED ORAL at 21:22

## 2024-04-13 RX ADMIN — INSULIN LISPRO 8 UNITS: 100 INJECTION, SOLUTION INTRAVENOUS; SUBCUTANEOUS at 21:52

## 2024-04-13 RX ADMIN — WATER 40 MG: 1 INJECTION INTRAMUSCULAR; INTRAVENOUS; SUBCUTANEOUS at 07:45

## 2024-04-13 RX ADMIN — GABAPENTIN 600 MG: 600 TABLET, FILM COATED ORAL at 07:44

## 2024-04-13 RX ADMIN — INSULIN LISPRO 4 UNITS: 100 INJECTION, SOLUTION INTRAVENOUS; SUBCUTANEOUS at 03:19

## 2024-04-13 RX ADMIN — SODIUM CHLORIDE, POTASSIUM CHLORIDE, SODIUM LACTATE AND CALCIUM CHLORIDE: 600; 310; 30; 20 INJECTION, SOLUTION INTRAVENOUS at 08:08

## 2024-04-13 RX ADMIN — GABAPENTIN 600 MG: 600 TABLET, FILM COATED ORAL at 21:22

## 2024-04-13 RX ADMIN — METHOCARBAMOL 500 MG: 500 TABLET ORAL at 13:42

## 2024-04-13 RX ADMIN — OXYCODONE 5 MG: 5 TABLET ORAL at 07:44

## 2024-04-13 RX ADMIN — INSULIN LISPRO 8 UNITS: 100 INJECTION, SOLUTION INTRAVENOUS; SUBCUTANEOUS at 13:42

## 2024-04-13 RX ADMIN — SODIUM CHLORIDE 30 MG/ML INHALATION SOLUTION 4 ML: 30 SOLUTION INHALANT at 20:35

## 2024-04-13 RX ADMIN — INSULIN GLARGINE 25 UNITS: 100 INJECTION, SOLUTION SUBCUTANEOUS at 21:35

## 2024-04-13 RX ADMIN — CLONIDINE HYDROCHLORIDE 0.1 MG: 0.1 TABLET ORAL at 21:22

## 2024-04-13 RX ADMIN — ALBUTEROL SULFATE 2.5 MG: 2.5 SOLUTION RESPIRATORY (INHALATION) at 20:36

## 2024-04-13 RX ADMIN — CLONIDINE HYDROCHLORIDE 0.1 MG: 0.1 TABLET ORAL at 07:44

## 2024-04-13 RX ADMIN — NALOXEGOL OXALATE 12.5 MG: 12.5 TABLET, FILM COATED ORAL at 06:52

## 2024-04-13 RX ADMIN — METHOCARBAMOL 500 MG: 500 TABLET ORAL at 23:45

## 2024-04-13 ASSESSMENT — PAIN SCALES - GENERAL
PAINLEVEL_OUTOF10: 6
PAINLEVEL_OUTOF10: 8
PAINLEVEL_OUTOF10: 0
PAINLEVEL_OUTOF10: 2
PAINLEVEL_OUTOF10: 7
PAINLEVEL_OUTOF10: 5
PAINLEVEL_OUTOF10: 8
PAINLEVEL_OUTOF10: 6
PAINLEVEL_OUTOF10: 8

## 2024-04-13 ASSESSMENT — PAIN DESCRIPTION - ONSET
ONSET: GRADUAL
ONSET: GRADUAL
ONSET: ON-GOING
ONSET: ON-GOING

## 2024-04-13 ASSESSMENT — PAIN DESCRIPTION - LOCATION
LOCATION: BACK
LOCATION: BACK
LOCATION: BACK;SHOULDER
LOCATION: BACK

## 2024-04-13 ASSESSMENT — PAIN DESCRIPTION - DESCRIPTORS
DESCRIPTORS: ACHING
DESCRIPTORS: SPASM;DISCOMFORT
DESCRIPTORS: ACHING
DESCRIPTORS: ACHING
DESCRIPTORS: SPASM;SORE
DESCRIPTORS: ACHING

## 2024-04-13 ASSESSMENT — PAIN DESCRIPTION - ORIENTATION
ORIENTATION: LEFT
ORIENTATION: UPPER;MID
ORIENTATION: RIGHT;LEFT
ORIENTATION: RIGHT;LEFT
ORIENTATION: LOWER

## 2024-04-13 ASSESSMENT — PAIN SCALES - WONG BAKER
WONGBAKER_NUMERICALRESPONSE: HURTS A LITTLE BIT

## 2024-04-13 ASSESSMENT — PAIN - FUNCTIONAL ASSESSMENT
PAIN_FUNCTIONAL_ASSESSMENT: ACTIVITIES ARE NOT PREVENTED
PAIN_FUNCTIONAL_ASSESSMENT: ACTIVITIES ARE NOT PREVENTED
PAIN_FUNCTIONAL_ASSESSMENT: PREVENTS OR INTERFERES SOME ACTIVE ACTIVITIES AND ADLS
PAIN_FUNCTIONAL_ASSESSMENT: ACTIVITIES ARE NOT PREVENTED
PAIN_FUNCTIONAL_ASSESSMENT: PREVENTS OR INTERFERES SOME ACTIVE ACTIVITIES AND ADLS

## 2024-04-13 ASSESSMENT — PAIN DESCRIPTION - FREQUENCY
FREQUENCY: CONTINUOUS

## 2024-04-13 ASSESSMENT — PAIN DESCRIPTION - PAIN TYPE
TYPE: CHRONIC PAIN

## 2024-04-14 ENCOUNTER — APPOINTMENT (OUTPATIENT)
Dept: GENERAL RADIOLOGY | Age: 69
DRG: 064 | End: 2024-04-14
Payer: MEDICARE

## 2024-04-14 LAB
ALBUMIN SERPL-MCNC: 3.2 G/DL (ref 3.4–5)
ANION GAP SERPL CALCULATED.3IONS-SCNC: 12 MMOL/L (ref 3–16)
ANION GAP SERPL CALCULATED.3IONS-SCNC: 7 MMOL/L (ref 3–16)
ANTI-XA UNFRAC HEPARIN: 0.16 IU/ML (ref 0.3–0.7)
ANTI-XA UNFRAC HEPARIN: 0.16 IU/ML (ref 0.3–0.7)
ANTI-XA UNFRAC HEPARIN: >1.1 IU/ML (ref 0.3–0.7)
BASOPHILS # BLD: 0 K/UL (ref 0–0.2)
BASOPHILS # BLD: 0 K/UL (ref 0–0.2)
BASOPHILS NFR BLD: 0.1 %
BASOPHILS NFR BLD: 0.1 %
BUN SERPL-MCNC: 38 MG/DL (ref 7–20)
BUN SERPL-MCNC: 42 MG/DL (ref 7–20)
CALCIUM SERPL-MCNC: 8.8 MG/DL (ref 8.3–10.6)
CALCIUM SERPL-MCNC: 8.8 MG/DL (ref 8.3–10.6)
CHLORIDE SERPL-SCNC: 100 MMOL/L (ref 99–110)
CHLORIDE SERPL-SCNC: 101 MMOL/L (ref 99–110)
CO2 SERPL-SCNC: 30 MMOL/L (ref 21–32)
CO2 SERPL-SCNC: 33 MMOL/L (ref 21–32)
CREAT SERPL-MCNC: 0.9 MG/DL (ref 0.8–1.3)
CREAT SERPL-MCNC: 1.3 MG/DL (ref 0.8–1.3)
DEPRECATED RDW RBC AUTO: 13.4 % (ref 12.4–15.4)
DEPRECATED RDW RBC AUTO: 13.8 % (ref 12.4–15.4)
EOSINOPHIL # BLD: 0.1 K/UL (ref 0–0.6)
EOSINOPHIL # BLD: 0.1 K/UL (ref 0–0.6)
EOSINOPHIL NFR BLD: 0.5 %
EOSINOPHIL NFR BLD: 0.5 %
GFR SERPLBLD CREATININE-BSD FMLA CKD-EPI: 60 ML/MIN/{1.73_M2}
GFR SERPLBLD CREATININE-BSD FMLA CKD-EPI: >90 ML/MIN/{1.73_M2}
GLUCOSE BLD-MCNC: 231 MG/DL (ref 70–99)
GLUCOSE BLD-MCNC: 243 MG/DL (ref 70–99)
GLUCOSE BLD-MCNC: 264 MG/DL (ref 70–99)
GLUCOSE BLD-MCNC: 265 MG/DL (ref 70–99)
GLUCOSE BLD-MCNC: 332 MG/DL (ref 70–99)
GLUCOSE SERPL-MCNC: 265 MG/DL (ref 70–99)
GLUCOSE SERPL-MCNC: 337 MG/DL (ref 70–99)
HCT VFR BLD AUTO: 43.2 % (ref 40.5–52.5)
HCT VFR BLD AUTO: 43.8 % (ref 40.5–52.5)
HGB BLD-MCNC: 14.1 G/DL (ref 13.5–17.5)
HGB BLD-MCNC: 14.5 G/DL (ref 13.5–17.5)
LACTATE BLDV-SCNC: 1.7 MMOL/L (ref 0.4–2)
LYMPHOCYTES # BLD: 1.7 K/UL (ref 1–5.1)
LYMPHOCYTES # BLD: 2.4 K/UL (ref 1–5.1)
LYMPHOCYTES NFR BLD: 13.2 %
LYMPHOCYTES NFR BLD: 7.2 %
MAGNESIUM SERPL-MCNC: 2.4 MG/DL (ref 1.8–2.4)
MAGNESIUM SERPL-MCNC: 2.6 MG/DL (ref 1.8–2.4)
MCH RBC QN AUTO: 30.2 PG (ref 26–34)
MCH RBC QN AUTO: 30.7 PG (ref 26–34)
MCHC RBC AUTO-ENTMCNC: 32.7 G/DL (ref 31–36)
MCHC RBC AUTO-ENTMCNC: 33.2 G/DL (ref 31–36)
MCV RBC AUTO: 92.4 FL (ref 80–100)
MCV RBC AUTO: 92.5 FL (ref 80–100)
MONOCYTES # BLD: 1.6 K/UL (ref 0–1.3)
MONOCYTES # BLD: 1.9 K/UL (ref 0–1.3)
MONOCYTES NFR BLD: 8.3 %
MONOCYTES NFR BLD: 8.8 %
NEUTROPHILS # BLD: 13.8 K/UL (ref 1.7–7.7)
NEUTROPHILS # BLD: 19.7 K/UL (ref 1.7–7.7)
NEUTROPHILS NFR BLD: 77.4 %
NEUTROPHILS NFR BLD: 83.9 %
PERFORMED ON: ABNORMAL
PHOSPHATE SERPL-MCNC: 3.5 MG/DL (ref 2.5–4.9)
PLATELET # BLD AUTO: 224 K/UL (ref 135–450)
PLATELET # BLD AUTO: 225 K/UL (ref 135–450)
PMV BLD AUTO: 8.3 FL (ref 5–10.5)
PMV BLD AUTO: 8.5 FL (ref 5–10.5)
POTASSIUM SERPL-SCNC: 4.5 MMOL/L (ref 3.5–5.1)
POTASSIUM SERPL-SCNC: 4.7 MMOL/L (ref 3.5–5.1)
PROCALCITONIN SERPL IA-MCNC: 0.15 NG/ML (ref 0–0.15)
RBC # BLD AUTO: 4.67 M/UL (ref 4.2–5.9)
RBC # BLD AUTO: 4.74 M/UL (ref 4.2–5.9)
SODIUM SERPL-SCNC: 141 MMOL/L (ref 136–145)
SODIUM SERPL-SCNC: 142 MMOL/L (ref 136–145)
WBC # BLD AUTO: 17.8 K/UL (ref 4–11)
WBC # BLD AUTO: 23.5 K/UL (ref 4–11)

## 2024-04-14 PROCEDURE — 84145 PROCALCITONIN (PCT): CPT

## 2024-04-14 PROCEDURE — 6370000000 HC RX 637 (ALT 250 FOR IP)

## 2024-04-14 PROCEDURE — 97535 SELF CARE MNGMENT TRAINING: CPT

## 2024-04-14 PROCEDURE — 2580000003 HC RX 258: Performed by: STUDENT IN AN ORGANIZED HEALTH CARE EDUCATION/TRAINING PROGRAM

## 2024-04-14 PROCEDURE — 97110 THERAPEUTIC EXERCISES: CPT

## 2024-04-14 PROCEDURE — 6370000000 HC RX 637 (ALT 250 FOR IP): Performed by: STUDENT IN AN ORGANIZED HEALTH CARE EDUCATION/TRAINING PROGRAM

## 2024-04-14 PROCEDURE — 6360000002 HC RX W HCPCS

## 2024-04-14 PROCEDURE — 85025 COMPLETE CBC W/AUTO DIFF WBC: CPT

## 2024-04-14 PROCEDURE — 97530 THERAPEUTIC ACTIVITIES: CPT

## 2024-04-14 PROCEDURE — 2500000003 HC RX 250 WO HCPCS

## 2024-04-14 PROCEDURE — C9113 INJ PANTOPRAZOLE SODIUM, VIA: HCPCS

## 2024-04-14 PROCEDURE — 80069 RENAL FUNCTION PANEL: CPT

## 2024-04-14 PROCEDURE — 36415 COLL VENOUS BLD VENIPUNCTURE: CPT

## 2024-04-14 PROCEDURE — 93005 ELECTROCARDIOGRAM TRACING: CPT | Performed by: HOSPITALIST

## 2024-04-14 PROCEDURE — 87040 BLOOD CULTURE FOR BACTERIA: CPT

## 2024-04-14 PROCEDURE — 71045 X-RAY EXAM CHEST 1 VIEW: CPT

## 2024-04-14 PROCEDURE — 2580000003 HC RX 258

## 2024-04-14 PROCEDURE — 85520 HEPARIN ASSAY: CPT

## 2024-04-14 PROCEDURE — 83605 ASSAY OF LACTIC ACID: CPT

## 2024-04-14 PROCEDURE — 2700000000 HC OXYGEN THERAPY PER DAY

## 2024-04-14 PROCEDURE — 99232 SBSQ HOSP IP/OBS MODERATE 35: CPT | Performed by: HOSPITALIST

## 2024-04-14 PROCEDURE — 6370000000 HC RX 637 (ALT 250 FOR IP): Performed by: HOSPITALIST

## 2024-04-14 PROCEDURE — 83735 ASSAY OF MAGNESIUM: CPT

## 2024-04-14 PROCEDURE — 1200000000 HC SEMI PRIVATE

## 2024-04-14 PROCEDURE — 94640 AIRWAY INHALATION TREATMENT: CPT

## 2024-04-14 PROCEDURE — 94761 N-INVAS EAR/PLS OXIMETRY MLT: CPT

## 2024-04-14 PROCEDURE — 94669 MECHANICAL CHEST WALL OSCILL: CPT

## 2024-04-14 RX ORDER — 0.9 % SODIUM CHLORIDE 0.9 %
500 INTRAVENOUS SOLUTION INTRAVENOUS ONCE
Status: DISCONTINUED | OUTPATIENT
Start: 2024-04-14 | End: 2024-04-14

## 2024-04-14 RX ORDER — SODIUM CHLORIDE, SODIUM LACTATE, POTASSIUM CHLORIDE, AND CALCIUM CHLORIDE .6; .31; .03; .02 G/100ML; G/100ML; G/100ML; G/100ML
1000 INJECTION, SOLUTION INTRAVENOUS ONCE
Status: COMPLETED | OUTPATIENT
Start: 2024-04-14 | End: 2024-04-15

## 2024-04-14 RX ORDER — INSULIN GLARGINE 100 [IU]/ML
30 INJECTION, SOLUTION SUBCUTANEOUS NIGHTLY
Status: DISCONTINUED | OUTPATIENT
Start: 2024-04-14 | End: 2024-04-15

## 2024-04-14 RX ORDER — SODIUM CHLORIDE, SODIUM LACTATE, POTASSIUM CHLORIDE, AND CALCIUM CHLORIDE .6; .31; .03; .02 G/100ML; G/100ML; G/100ML; G/100ML
500 INJECTION, SOLUTION INTRAVENOUS ONCE
Status: COMPLETED | OUTPATIENT
Start: 2024-04-14 | End: 2024-04-15

## 2024-04-14 RX ADMIN — SODIUM CHLORIDE 30 MG/ML INHALATION SOLUTION 4 ML: 30 SOLUTION INHALANT at 16:07

## 2024-04-14 RX ADMIN — SODIUM CHLORIDE, POTASSIUM CHLORIDE, SODIUM LACTATE AND CALCIUM CHLORIDE 1000 ML: 600; 310; 30; 20 INJECTION, SOLUTION INTRAVENOUS at 22:00

## 2024-04-14 RX ADMIN — CLONIDINE HYDROCHLORIDE 0.1 MG: 0.1 TABLET ORAL at 08:09

## 2024-04-14 RX ADMIN — AMLODIPINE BESYLATE 5 MG: 5 TABLET ORAL at 08:09

## 2024-04-14 RX ADMIN — OXYCODONE 5 MG: 5 TABLET ORAL at 08:09

## 2024-04-14 RX ADMIN — LOSARTAN POTASSIUM 100 MG: 100 TABLET, FILM COATED ORAL at 08:09

## 2024-04-14 RX ADMIN — ALBUTEROL SULFATE 2.5 MG: 2.5 SOLUTION RESPIRATORY (INHALATION) at 09:49

## 2024-04-14 RX ADMIN — AMIODARONE HYDROCHLORIDE 150 MG: 50 INJECTION, SOLUTION INTRAVENOUS at 22:26

## 2024-04-14 RX ADMIN — METHOCARBAMOL 500 MG: 500 TABLET ORAL at 20:20

## 2024-04-14 RX ADMIN — METHOCARBAMOL 500 MG: 500 TABLET ORAL at 13:58

## 2024-04-14 RX ADMIN — ALBUTEROL SULFATE 2.5 MG: 2.5 SOLUTION RESPIRATORY (INHALATION) at 16:07

## 2024-04-14 RX ADMIN — CLONIDINE HYDROCHLORIDE 0.1 MG: 0.1 TABLET ORAL at 20:20

## 2024-04-14 RX ADMIN — SODIUM CHLORIDE, POTASSIUM CHLORIDE, SODIUM LACTATE AND CALCIUM CHLORIDE 1000 ML: 600; 310; 30; 20 INJECTION, SOLUTION INTRAVENOUS at 00:11

## 2024-04-14 RX ADMIN — SODIUM CHLORIDE, PRESERVATIVE FREE 10 ML: 5 INJECTION INTRAVENOUS at 08:08

## 2024-04-14 RX ADMIN — GABAPENTIN 600 MG: 600 TABLET, FILM COATED ORAL at 13:58

## 2024-04-14 RX ADMIN — AMIODARONE HYDROCHLORIDE 1 MG/MIN: 50 INJECTION, SOLUTION INTRAVENOUS at 22:28

## 2024-04-14 RX ADMIN — METHOCARBAMOL 500 MG: 500 TABLET ORAL at 08:09

## 2024-04-14 RX ADMIN — INSULIN LISPRO 4 UNITS: 100 INJECTION, SOLUTION INTRAVENOUS; SUBCUTANEOUS at 02:57

## 2024-04-14 RX ADMIN — ATORVASTATIN CALCIUM 80 MG: 80 TABLET, FILM COATED ORAL at 20:20

## 2024-04-14 RX ADMIN — ASPIRIN 81 MG: 81 TABLET, COATED ORAL at 08:09

## 2024-04-14 RX ADMIN — PANTOPRAZOLE SODIUM 40 MG: 40 INJECTION, POWDER, FOR SOLUTION INTRAVENOUS at 08:07

## 2024-04-14 RX ADMIN — GABAPENTIN 600 MG: 600 TABLET, FILM COATED ORAL at 20:20

## 2024-04-14 RX ADMIN — METOPROLOL TARTRATE 5 MG: 1 INJECTION, SOLUTION INTRAVENOUS at 21:00

## 2024-04-14 RX ADMIN — NALOXEGOL OXALATE 12.5 MG: 12.5 TABLET, FILM COATED ORAL at 05:51

## 2024-04-14 RX ADMIN — HEPARIN SODIUM 9 UNITS/KG/HR: 10000 INJECTION, SOLUTION INTRAVENOUS at 01:55

## 2024-04-14 RX ADMIN — OXYCODONE 5 MG: 5 TABLET ORAL at 13:58

## 2024-04-14 RX ADMIN — SODIUM CHLORIDE, PRESERVATIVE FREE 10 ML: 5 INJECTION INTRAVENOUS at 20:21

## 2024-04-14 RX ADMIN — SODIUM CHLORIDE 30 MG/ML INHALATION SOLUTION 4 ML: 30 SOLUTION INHALANT at 09:49

## 2024-04-14 RX ADMIN — INSULIN LISPRO 12 UNITS: 100 INJECTION, SOLUTION INTRAVENOUS; SUBCUTANEOUS at 20:41

## 2024-04-14 RX ADMIN — INSULIN LISPRO 8 UNITS: 100 INJECTION, SOLUTION INTRAVENOUS; SUBCUTANEOUS at 08:46

## 2024-04-14 RX ADMIN — INSULIN GLARGINE 30 UNITS: 100 INJECTION, SOLUTION SUBCUTANEOUS at 20:42

## 2024-04-14 RX ADMIN — GABAPENTIN 600 MG: 600 TABLET, FILM COATED ORAL at 08:09

## 2024-04-14 RX ADMIN — OXYCODONE 5 MG: 5 TABLET ORAL at 20:20

## 2024-04-14 RX ADMIN — ACETAMINOPHEN 650 MG: 650 SUPPOSITORY RECTAL at 23:36

## 2024-04-14 RX ADMIN — INSULIN LISPRO 4 UNITS: 100 INJECTION, SOLUTION INTRAVENOUS; SUBCUTANEOUS at 13:58

## 2024-04-14 RX ADMIN — ONDANSETRON 4 MG: 2 INJECTION INTRAMUSCULAR; INTRAVENOUS at 05:51

## 2024-04-14 RX ADMIN — OXYCODONE 5 MG: 5 TABLET ORAL at 02:47

## 2024-04-14 ASSESSMENT — PAIN SCALES - GENERAL
PAINLEVEL_OUTOF10: 7
PAINLEVEL_OUTOF10: 6
PAINLEVEL_OUTOF10: 0
PAINLEVEL_OUTOF10: 1
PAINLEVEL_OUTOF10: 4
PAINLEVEL_OUTOF10: 8
PAINLEVEL_OUTOF10: 6
PAINLEVEL_OUTOF10: 5
PAINLEVEL_OUTOF10: 0

## 2024-04-14 ASSESSMENT — PAIN DESCRIPTION - LOCATION
LOCATION: BACK

## 2024-04-14 ASSESSMENT — PAIN SCALES - WONG BAKER
WONGBAKER_NUMERICALRESPONSE: HURTS A LITTLE BIT

## 2024-04-14 ASSESSMENT — PAIN DESCRIPTION - FREQUENCY
FREQUENCY: CONTINUOUS
FREQUENCY: CONTINUOUS
FREQUENCY: INTERMITTENT
FREQUENCY: CONTINUOUS

## 2024-04-14 ASSESSMENT — PAIN DESCRIPTION - DESCRIPTORS
DESCRIPTORS: ACHING;DISCOMFORT
DESCRIPTORS: SPASM;DISCOMFORT
DESCRIPTORS: SPASM;DISCOMFORT
DESCRIPTORS: ACHING;DISCOMFORT

## 2024-04-14 ASSESSMENT — PAIN - FUNCTIONAL ASSESSMENT
PAIN_FUNCTIONAL_ASSESSMENT: PREVENTS OR INTERFERES SOME ACTIVE ACTIVITIES AND ADLS
PAIN_FUNCTIONAL_ASSESSMENT: ACTIVITIES ARE NOT PREVENTED
PAIN_FUNCTIONAL_ASSESSMENT: PREVENTS OR INTERFERES SOME ACTIVE ACTIVITIES AND ADLS
PAIN_FUNCTIONAL_ASSESSMENT: PREVENTS OR INTERFERES WITH MANY ACTIVE NOT PASSIVE ACTIVITIES

## 2024-04-14 ASSESSMENT — PAIN DESCRIPTION - ORIENTATION
ORIENTATION: LOWER
ORIENTATION: LEFT;RIGHT;POSTERIOR

## 2024-04-14 ASSESSMENT — PAIN DESCRIPTION - PAIN TYPE
TYPE: CHRONIC PAIN

## 2024-04-14 ASSESSMENT — PAIN DESCRIPTION - ONSET
ONSET: GRADUAL
ONSET: AWAKENED FROM SLEEP
ONSET: ON-GOING
ONSET: ON-GOING

## 2024-04-15 PROBLEM — I48.91 ATRIAL FIBRILLATION WITH RAPID VENTRICULAR RESPONSE (HCC): Status: ACTIVE | Noted: 2024-04-15

## 2024-04-15 LAB
ALBUMIN SERPL-MCNC: 2.9 G/DL (ref 3.4–5)
ALBUMIN SERPL-MCNC: 3.1 G/DL (ref 3.4–5)
ALP SERPL-CCNC: 104 U/L (ref 40–129)
ALT SERPL-CCNC: 17 U/L (ref 10–40)
ANION GAP SERPL CALCULATED.3IONS-SCNC: 6 MMOL/L (ref 3–16)
ANTI-XA UNFRAC HEPARIN: 0.41 IU/ML (ref 0.3–0.7)
ANTI-XA UNFRAC HEPARIN: 0.44 IU/ML (ref 0.3–0.7)
AST SERPL-CCNC: 24 U/L (ref 15–37)
BASE EXCESS BLDA CALC-SCNC: 11 MMOL/L (ref -3–3)
BASOPHILS # BLD: 0 K/UL (ref 0–0.2)
BASOPHILS NFR BLD: 0.2 %
BILIRUB DIRECT SERPL-MCNC: <0.2 MG/DL (ref 0–0.3)
BILIRUB INDIRECT SERPL-MCNC: ABNORMAL MG/DL (ref 0–1)
BILIRUB SERPL-MCNC: 1.3 MG/DL (ref 0–1)
BUN SERPL-MCNC: 39 MG/DL (ref 7–20)
CA-I BLD-SCNC: 1.18 MMOL/L (ref 1.12–1.32)
CALCIUM SERPL-MCNC: 8.4 MG/DL (ref 8.3–10.6)
CHLORIDE SERPL-SCNC: 101 MMOL/L (ref 99–110)
CO2 BLDA-SCNC: 37 MMOL/L
CO2 SERPL-SCNC: 35 MMOL/L (ref 21–32)
CREAT SERPL-MCNC: 1.4 MG/DL (ref 0.8–1.3)
DEPRECATED RDW RBC AUTO: 13.3 % (ref 12.4–15.4)
EOSINOPHIL # BLD: 0.2 K/UL (ref 0–0.6)
EOSINOPHIL NFR BLD: 0.7 %
GFR SERPLBLD CREATININE-BSD FMLA CKD-EPI: 55 ML/MIN/{1.73_M2}
GLUCOSE BLD-MCNC: 224 MG/DL (ref 70–99)
GLUCOSE BLD-MCNC: 234 MG/DL (ref 70–99)
GLUCOSE BLD-MCNC: 246 MG/DL (ref 70–99)
GLUCOSE BLD-MCNC: 248 MG/DL (ref 70–99)
GLUCOSE BLD-MCNC: 248 MG/DL (ref 70–99)
GLUCOSE BLD-MCNC: 254 MG/DL (ref 70–99)
GLUCOSE BLD-MCNC: 280 MG/DL (ref 70–99)
GLUCOSE SERPL-MCNC: 281 MG/DL (ref 70–99)
HCO3 BLDA-SCNC: 35.7 MMOL/L (ref 21–29)
HCT VFR BLD AUTO: 41.4 % (ref 40.5–52.5)
HGB BLD-MCNC: 13.6 G/DL (ref 13.5–17.5)
LACTATE BLD-SCNC: 0.51 MMOL/L (ref 0.4–2)
LYMPHOCYTES # BLD: 2.7 K/UL (ref 1–5.1)
LYMPHOCYTES NFR BLD: 10.9 %
MAGNESIUM SERPL-MCNC: 2.3 MG/DL (ref 1.8–2.4)
MCH RBC QN AUTO: 30.7 PG (ref 26–34)
MCHC RBC AUTO-ENTMCNC: 32.9 G/DL (ref 31–36)
MCV RBC AUTO: 93.4 FL (ref 80–100)
MONOCYTES # BLD: 2 K/UL (ref 0–1.3)
MONOCYTES NFR BLD: 8.1 %
NEUTROPHILS # BLD: 19.9 K/UL (ref 1.7–7.7)
NEUTROPHILS NFR BLD: 80.1 %
NT-PROBNP SERPL-MCNC: 2327 PG/ML (ref 0–124)
PCO2 BLDA: 56.2 MM HG (ref 35–45)
PERFORMED ON: ABNORMAL
PH BLDA: 7.41 [PH] (ref 7.35–7.45)
PHOSPHATE SERPL-MCNC: 4.5 MG/DL (ref 2.5–4.9)
PLATELET # BLD AUTO: 228 K/UL (ref 135–450)
PMV BLD AUTO: 8.7 FL (ref 5–10.5)
PO2 BLDA: 70.6 MM HG (ref 75–108)
POC SAMPLE TYPE: ABNORMAL
POTASSIUM BLD-SCNC: 4.2 MMOL/L (ref 3.5–5.1)
POTASSIUM SERPL-SCNC: 4.7 MMOL/L (ref 3.5–5.1)
PROCALCITONIN SERPL IA-MCNC: 0.2 NG/ML (ref 0–0.15)
PROT SERPL-MCNC: 6.5 G/DL (ref 6.4–8.2)
RBC # BLD AUTO: 4.43 M/UL (ref 4.2–5.9)
SAO2 % BLDA: 94 % (ref 93–100)
SODIUM BLD-SCNC: 144 MMOL/L (ref 136–145)
SODIUM SERPL-SCNC: 142 MMOL/L (ref 136–145)
WBC # BLD AUTO: 24.8 K/UL (ref 4–11)

## 2024-04-15 PROCEDURE — 80076 HEPATIC FUNCTION PANEL: CPT

## 2024-04-15 PROCEDURE — 6360000002 HC RX W HCPCS

## 2024-04-15 PROCEDURE — 36569 INSJ PICC 5 YR+ W/O IMAGING: CPT

## 2024-04-15 PROCEDURE — 83605 ASSAY OF LACTIC ACID: CPT

## 2024-04-15 PROCEDURE — 2700000000 HC OXYGEN THERAPY PER DAY

## 2024-04-15 PROCEDURE — 84132 ASSAY OF SERUM POTASSIUM: CPT

## 2024-04-15 PROCEDURE — 83880 ASSAY OF NATRIURETIC PEPTIDE: CPT

## 2024-04-15 PROCEDURE — 2580000003 HC RX 258

## 2024-04-15 PROCEDURE — 6370000000 HC RX 637 (ALT 250 FOR IP): Performed by: STUDENT IN AN ORGANIZED HEALTH CARE EDUCATION/TRAINING PROGRAM

## 2024-04-15 PROCEDURE — 6370000000 HC RX 637 (ALT 250 FOR IP)

## 2024-04-15 PROCEDURE — 99233 SBSQ HOSP IP/OBS HIGH 50: CPT | Performed by: HOSPITALIST

## 2024-04-15 PROCEDURE — 6360000002 HC RX W HCPCS: Performed by: INTERNAL MEDICINE

## 2024-04-15 PROCEDURE — 82803 BLOOD GASES ANY COMBINATION: CPT

## 2024-04-15 PROCEDURE — 36600 WITHDRAWAL OF ARTERIAL BLOOD: CPT

## 2024-04-15 PROCEDURE — 94761 N-INVAS EAR/PLS OXIMETRY MLT: CPT

## 2024-04-15 PROCEDURE — 99222 1ST HOSP IP/OBS MODERATE 55: CPT | Performed by: INTERNAL MEDICINE

## 2024-04-15 PROCEDURE — 36415 COLL VENOUS BLD VENIPUNCTURE: CPT

## 2024-04-15 PROCEDURE — 05HY33Z INSERTION OF INFUSION DEVICE INTO UPPER VEIN, PERCUTANEOUS APPROACH: ICD-10-PCS | Performed by: INTERNAL MEDICINE

## 2024-04-15 PROCEDURE — C9113 INJ PANTOPRAZOLE SODIUM, VIA: HCPCS

## 2024-04-15 PROCEDURE — 92526 ORAL FUNCTION THERAPY: CPT

## 2024-04-15 PROCEDURE — 1200000000 HC SEMI PRIVATE

## 2024-04-15 PROCEDURE — 82947 ASSAY GLUCOSE BLOOD QUANT: CPT

## 2024-04-15 PROCEDURE — 82330 ASSAY OF CALCIUM: CPT

## 2024-04-15 PROCEDURE — 84295 ASSAY OF SERUM SODIUM: CPT

## 2024-04-15 PROCEDURE — 84145 PROCALCITONIN (PCT): CPT

## 2024-04-15 PROCEDURE — 83735 ASSAY OF MAGNESIUM: CPT

## 2024-04-15 PROCEDURE — C1751 CATH, INF, PER/CENT/MIDLINE: HCPCS

## 2024-04-15 PROCEDURE — 85520 HEPARIN ASSAY: CPT

## 2024-04-15 PROCEDURE — 85025 COMPLETE CBC W/AUTO DIFF WBC: CPT

## 2024-04-15 PROCEDURE — 80069 RENAL FUNCTION PANEL: CPT

## 2024-04-15 RX ORDER — LIDOCAINE 4 G/G
1 PATCH TOPICAL DAILY
Status: DISCONTINUED | OUTPATIENT
Start: 2024-04-15 | End: 2024-04-24 | Stop reason: HOSPADM

## 2024-04-15 RX ORDER — SODIUM CHLORIDE, SODIUM LACTATE, POTASSIUM CHLORIDE, AND CALCIUM CHLORIDE .6; .31; .03; .02 G/100ML; G/100ML; G/100ML; G/100ML
1000 INJECTION, SOLUTION INTRAVENOUS ONCE
Status: COMPLETED | OUTPATIENT
Start: 2024-04-15 | End: 2024-04-15

## 2024-04-15 RX ORDER — DIGOXIN 0.25 MG/ML
250 INJECTION INTRAMUSCULAR; INTRAVENOUS ONCE
Status: COMPLETED | OUTPATIENT
Start: 2024-04-15 | End: 2024-04-15

## 2024-04-15 RX ORDER — ACETAMINOPHEN 325 MG/1
650 TABLET ORAL EVERY 6 HOURS
Status: DISCONTINUED | OUTPATIENT
Start: 2024-04-15 | End: 2024-04-16

## 2024-04-15 RX ORDER — DIGOXIN 0.25 MG/ML
250 INJECTION INTRAMUSCULAR; INTRAVENOUS DAILY
Status: DISCONTINUED | OUTPATIENT
Start: 2024-04-15 | End: 2024-04-19

## 2024-04-15 RX ORDER — INSULIN GLARGINE 100 [IU]/ML
40 INJECTION, SOLUTION SUBCUTANEOUS NIGHTLY
Status: DISCONTINUED | OUTPATIENT
Start: 2024-04-15 | End: 2024-04-24 | Stop reason: HOSPADM

## 2024-04-15 RX ADMIN — PIPERACILLIN AND TAZOBACTAM 3375 MG: 3; .375 INJECTION, POWDER, FOR SOLUTION INTRAVENOUS; PARENTERAL at 05:03

## 2024-04-15 RX ADMIN — SODIUM CHLORIDE, POTASSIUM CHLORIDE, SODIUM LACTATE AND CALCIUM CHLORIDE 1000 ML: 600; 310; 30; 20 INJECTION, SOLUTION INTRAVENOUS at 03:16

## 2024-04-15 RX ADMIN — VANCOMYCIN HYDROCHLORIDE 2500 MG: 10 INJECTION, POWDER, LYOPHILIZED, FOR SOLUTION INTRAVENOUS at 01:03

## 2024-04-15 RX ADMIN — INSULIN LISPRO 4 UNITS: 100 INJECTION, SOLUTION INTRAVENOUS; SUBCUTANEOUS at 20:42

## 2024-04-15 RX ADMIN — METHOCARBAMOL 500 MG: 500 TABLET ORAL at 13:08

## 2024-04-15 RX ADMIN — SODIUM CHLORIDE, POTASSIUM CHLORIDE, SODIUM LACTATE AND CALCIUM CHLORIDE 500 ML: 600; 310; 30; 20 INJECTION, SOLUTION INTRAVENOUS at 01:15

## 2024-04-15 RX ADMIN — SODIUM CHLORIDE, PRESERVATIVE FREE 10 ML: 5 INJECTION INTRAVENOUS at 10:05

## 2024-04-15 RX ADMIN — AMIODARONE HYDROCHLORIDE 0.5 MG/MIN: 50 INJECTION, SOLUTION INTRAVENOUS at 05:07

## 2024-04-15 RX ADMIN — DIGOXIN 250 MCG: 0.25 INJECTION INTRAMUSCULAR; INTRAVENOUS at 20:05

## 2024-04-15 RX ADMIN — INSULIN GLARGINE 40 UNITS: 100 INJECTION, SOLUTION SUBCUTANEOUS at 20:42

## 2024-04-15 RX ADMIN — DIGOXIN 250 MCG: 0.25 INJECTION INTRAMUSCULAR; INTRAVENOUS at 14:27

## 2024-04-15 RX ADMIN — ATORVASTATIN CALCIUM 80 MG: 80 TABLET, FILM COATED ORAL at 20:18

## 2024-04-15 RX ADMIN — HEPARIN SODIUM 10 UNITS/KG/HR: 10000 INJECTION, SOLUTION INTRAVENOUS at 16:33

## 2024-04-15 RX ADMIN — SODIUM CHLORIDE, POTASSIUM CHLORIDE, SODIUM LACTATE AND CALCIUM CHLORIDE: 600; 310; 30; 20 INJECTION, SOLUTION INTRAVENOUS at 05:08

## 2024-04-15 RX ADMIN — SENNOSIDES 8.6 MG: 8.6 TABLET, FILM COATED ORAL at 20:18

## 2024-04-15 RX ADMIN — HEPARIN SODIUM 10 UNITS/KG/HR: 10000 INJECTION, SOLUTION INTRAVENOUS at 00:09

## 2024-04-15 RX ADMIN — PIPERACILLIN AND TAZOBACTAM 4500 MG: 4; .5 INJECTION, POWDER, LYOPHILIZED, FOR SOLUTION INTRAVENOUS at 00:29

## 2024-04-15 RX ADMIN — PIPERACILLIN AND TAZOBACTAM 3375 MG: 3; .375 INJECTION, POWDER, FOR SOLUTION INTRAVENOUS; PARENTERAL at 15:20

## 2024-04-15 RX ADMIN — SODIUM CHLORIDE, PRESERVATIVE FREE 10 ML: 5 INJECTION INTRAVENOUS at 20:19

## 2024-04-15 RX ADMIN — GABAPENTIN 600 MG: 600 TABLET, FILM COATED ORAL at 10:04

## 2024-04-15 RX ADMIN — INSULIN LISPRO 4 UNITS: 100 INJECTION, SOLUTION INTRAVENOUS; SUBCUTANEOUS at 15:25

## 2024-04-15 RX ADMIN — ASPIRIN 81 MG: 81 TABLET, COATED ORAL at 10:04

## 2024-04-15 RX ADMIN — GABAPENTIN 600 MG: 600 TABLET, FILM COATED ORAL at 20:18

## 2024-04-15 RX ADMIN — PANTOPRAZOLE SODIUM 40 MG: 40 INJECTION, POWDER, FOR SOLUTION INTRAVENOUS at 10:05

## 2024-04-15 RX ADMIN — ACETAMINOPHEN 650 MG: 325 TABLET ORAL at 20:09

## 2024-04-15 RX ADMIN — OXYCODONE 5 MG: 5 TABLET ORAL at 20:18

## 2024-04-15 RX ADMIN — INSULIN LISPRO 4 UNITS: 100 INJECTION, SOLUTION INTRAVENOUS; SUBCUTANEOUS at 10:05

## 2024-04-15 RX ADMIN — GABAPENTIN 600 MG: 600 TABLET, FILM COATED ORAL at 14:45

## 2024-04-15 RX ADMIN — PIPERACILLIN AND TAZOBACTAM 4500 MG: 4; .5 INJECTION, POWDER, LYOPHILIZED, FOR SOLUTION INTRAVENOUS at 20:38

## 2024-04-15 ASSESSMENT — PAIN SCALES - WONG BAKER
WONGBAKER_NUMERICALRESPONSE: HURTS A LITTLE BIT

## 2024-04-15 ASSESSMENT — PAIN DESCRIPTION - ORIENTATION: ORIENTATION: MID

## 2024-04-15 ASSESSMENT — PAIN - FUNCTIONAL ASSESSMENT: PAIN_FUNCTIONAL_ASSESSMENT: PREVENTS OR INTERFERES SOME ACTIVE ACTIVITIES AND ADLS

## 2024-04-15 ASSESSMENT — PAIN DESCRIPTION - LOCATION: LOCATION: BACK

## 2024-04-15 ASSESSMENT — PAIN SCALES - GENERAL
PAINLEVEL_OUTOF10: 2
PAINLEVEL_OUTOF10: 6

## 2024-04-15 ASSESSMENT — PAIN DESCRIPTION - DESCRIPTORS: DESCRIPTORS: ACHING

## 2024-04-16 PROBLEM — J69.0 ASPIRATION PNEUMONIA OF RIGHT LOWER LOBE DUE TO GASTRIC SECRETIONS (HCC): Status: ACTIVE | Noted: 2024-04-16

## 2024-04-16 LAB
ALBUMIN SERPL-MCNC: 2.9 G/DL (ref 3.4–5)
ANION GAP SERPL CALCULATED.3IONS-SCNC: 7 MMOL/L (ref 3–16)
ANTI-XA UNFRAC HEPARIN: 0.22 IU/ML (ref 0.3–0.7)
ANTI-XA UNFRAC HEPARIN: 0.47 IU/ML (ref 0.3–0.7)
ANTI-XA UNFRAC HEPARIN: 0.48 IU/ML (ref 0.3–0.7)
BASOPHILS # BLD: 0 K/UL (ref 0–0.2)
BASOPHILS NFR BLD: 0.3 %
BUN SERPL-MCNC: 27 MG/DL (ref 7–20)
CALCIUM SERPL-MCNC: 8.4 MG/DL (ref 8.3–10.6)
CHLORIDE SERPL-SCNC: 100 MMOL/L (ref 99–110)
CO2 SERPL-SCNC: 34 MMOL/L (ref 21–32)
CREAT SERPL-MCNC: 1.1 MG/DL (ref 0.8–1.3)
DEPRECATED RDW RBC AUTO: 13.2 % (ref 12.4–15.4)
EKG ATRIAL RATE: 340 BPM
EKG DIAGNOSIS: NORMAL
EKG Q-T INTERVAL: 320 MS
EKG QRS DURATION: 108 MS
EKG QTC CALCULATION (BAZETT): 468 MS
EKG R AXIS: -40 DEGREES
EKG T AXIS: 64 DEGREES
EKG VENTRICULAR RATE: 129 BPM
EOSINOPHIL # BLD: 0.3 K/UL (ref 0–0.6)
EOSINOPHIL NFR BLD: 1.8 %
GFR SERPLBLD CREATININE-BSD FMLA CKD-EPI: 74 ML/MIN/{1.73_M2}
GLUCOSE BLD-MCNC: 242 MG/DL (ref 70–99)
GLUCOSE BLD-MCNC: 263 MG/DL (ref 70–99)
GLUCOSE BLD-MCNC: 292 MG/DL (ref 70–99)
GLUCOSE SERPL-MCNC: 289 MG/DL (ref 70–99)
HCT VFR BLD AUTO: 42.8 % (ref 40.5–52.5)
HGB BLD-MCNC: 14.2 G/DL (ref 13.5–17.5)
LYMPHOCYTES # BLD: 2 K/UL (ref 1–5.1)
LYMPHOCYTES NFR BLD: 12.9 %
MAGNESIUM SERPL-MCNC: 2 MG/DL (ref 1.8–2.4)
MCH RBC QN AUTO: 30.6 PG (ref 26–34)
MCHC RBC AUTO-ENTMCNC: 33.1 G/DL (ref 31–36)
MCV RBC AUTO: 92.6 FL (ref 80–100)
MONOCYTES # BLD: 1 K/UL (ref 0–1.3)
MONOCYTES NFR BLD: 6.6 %
NEUTROPHILS # BLD: 12 K/UL (ref 1.7–7.7)
NEUTROPHILS NFR BLD: 78.4 %
PERFORMED ON: ABNORMAL
PHOSPHATE SERPL-MCNC: 3.2 MG/DL (ref 2.5–4.9)
PLATELET # BLD AUTO: 189 K/UL (ref 135–450)
PMV BLD AUTO: 9 FL (ref 5–10.5)
POTASSIUM SERPL-SCNC: 4.1 MMOL/L (ref 3.5–5.1)
RBC # BLD AUTO: 4.62 M/UL (ref 4.2–5.9)
SODIUM SERPL-SCNC: 141 MMOL/L (ref 136–145)
VANCOMYCIN SERPL-MCNC: 5.3 UG/ML
WBC # BLD AUTO: 15.3 K/UL (ref 4–11)

## 2024-04-16 PROCEDURE — 2580000003 HC RX 258

## 2024-04-16 PROCEDURE — 6370000000 HC RX 637 (ALT 250 FOR IP)

## 2024-04-16 PROCEDURE — 6360000002 HC RX W HCPCS

## 2024-04-16 PROCEDURE — 92526 ORAL FUNCTION THERAPY: CPT

## 2024-04-16 PROCEDURE — 99232 SBSQ HOSP IP/OBS MODERATE 35: CPT | Performed by: HOSPITALIST

## 2024-04-16 PROCEDURE — 80069 RENAL FUNCTION PANEL: CPT

## 2024-04-16 PROCEDURE — 2580000003 HC RX 258: Performed by: INTERNAL MEDICINE

## 2024-04-16 PROCEDURE — 97530 THERAPEUTIC ACTIVITIES: CPT

## 2024-04-16 PROCEDURE — 85025 COMPLETE CBC W/AUTO DIFF WBC: CPT

## 2024-04-16 PROCEDURE — 2500000003 HC RX 250 WO HCPCS: Performed by: INTERNAL MEDICINE

## 2024-04-16 PROCEDURE — 1200000000 HC SEMI PRIVATE

## 2024-04-16 PROCEDURE — 83735 ASSAY OF MAGNESIUM: CPT

## 2024-04-16 PROCEDURE — 94669 MECHANICAL CHEST WALL OSCILL: CPT

## 2024-04-16 PROCEDURE — 6360000002 HC RX W HCPCS: Performed by: INTERNAL MEDICINE

## 2024-04-16 PROCEDURE — 97110 THERAPEUTIC EXERCISES: CPT

## 2024-04-16 PROCEDURE — 93010 ELECTROCARDIOGRAM REPORT: CPT | Performed by: INTERNAL MEDICINE

## 2024-04-16 PROCEDURE — 94640 AIRWAY INHALATION TREATMENT: CPT

## 2024-04-16 PROCEDURE — 80202 ASSAY OF VANCOMYCIN: CPT

## 2024-04-16 PROCEDURE — 36415 COLL VENOUS BLD VENIPUNCTURE: CPT

## 2024-04-16 PROCEDURE — 99233 SBSQ HOSP IP/OBS HIGH 50: CPT | Performed by: INTERNAL MEDICINE

## 2024-04-16 PROCEDURE — 2700000000 HC OXYGEN THERAPY PER DAY

## 2024-04-16 PROCEDURE — 6370000000 HC RX 637 (ALT 250 FOR IP): Performed by: STUDENT IN AN ORGANIZED HEALTH CARE EDUCATION/TRAINING PROGRAM

## 2024-04-16 PROCEDURE — C9113 INJ PANTOPRAZOLE SODIUM, VIA: HCPCS

## 2024-04-16 PROCEDURE — 94761 N-INVAS EAR/PLS OXIMETRY MLT: CPT

## 2024-04-16 PROCEDURE — 85520 HEPARIN ASSAY: CPT

## 2024-04-16 PROCEDURE — 93005 ELECTROCARDIOGRAM TRACING: CPT | Performed by: INTERNAL MEDICINE

## 2024-04-16 RX ORDER — OXYCODONE AND ACETAMINOPHEN 10; 325 MG/1; MG/1
1 TABLET ORAL EVERY 6 HOURS PRN
Status: DISCONTINUED | OUTPATIENT
Start: 2024-04-16 | End: 2024-04-18

## 2024-04-16 RX ORDER — ASPIRIN 81 MG/1
81 TABLET, CHEWABLE ORAL DAILY
Status: DISCONTINUED | OUTPATIENT
Start: 2024-04-17 | End: 2024-04-24 | Stop reason: HOSPADM

## 2024-04-16 RX ORDER — DIGOXIN 0.25 MG/ML
250 INJECTION INTRAMUSCULAR; INTRAVENOUS ONCE
Status: COMPLETED | OUTPATIENT
Start: 2024-04-16 | End: 2024-04-16

## 2024-04-16 RX ORDER — INSULIN GLARGINE 100 [IU]/ML
45 INJECTION, SOLUTION SUBCUTANEOUS NIGHTLY
Status: CANCELLED | OUTPATIENT
Start: 2024-04-16

## 2024-04-16 RX ORDER — DEXTROSE AND SODIUM CHLORIDE 5; .45 G/100ML; G/100ML
INJECTION, SOLUTION INTRAVENOUS CONTINUOUS
Status: DISCONTINUED | OUTPATIENT
Start: 2024-04-16 | End: 2024-04-16

## 2024-04-16 RX ORDER — DIGOXIN 0.25 MG/ML
250 INJECTION INTRAMUSCULAR; INTRAVENOUS DAILY
Status: DISCONTINUED | OUTPATIENT
Start: 2024-04-16 | End: 2024-04-17

## 2024-04-16 RX ADMIN — GABAPENTIN 600 MG: 600 TABLET, FILM COATED ORAL at 21:22

## 2024-04-16 RX ADMIN — OXYCODONE 5 MG: 5 TABLET ORAL at 14:00

## 2024-04-16 RX ADMIN — SODIUM CHLORIDE 1250 MG: 9 INJECTION, SOLUTION INTRAVENOUS at 21:21

## 2024-04-16 RX ADMIN — ALBUTEROL SULFATE 2.5 MG: 2.5 SOLUTION RESPIRATORY (INHALATION) at 09:13

## 2024-04-16 RX ADMIN — INSULIN HUMAN 8 UNITS: 100 INJECTION, SOLUTION PARENTERAL at 09:21

## 2024-04-16 RX ADMIN — OXYCODONE 5 MG: 5 TABLET ORAL at 09:20

## 2024-04-16 RX ADMIN — DIGOXIN 250 MCG: 250 INJECTION, SOLUTION INTRAMUSCULAR; INTRAVENOUS; PARENTERAL at 21:23

## 2024-04-16 RX ADMIN — NALOXEGOL OXALATE 12.5 MG: 12.5 TABLET, FILM COATED ORAL at 06:05

## 2024-04-16 RX ADMIN — SODIUM CHLORIDE 30 MG/ML INHALATION SOLUTION 4 ML: 30 SOLUTION INHALANT at 09:13

## 2024-04-16 RX ADMIN — OXYCODONE 5 MG: 5 TABLET ORAL at 02:33

## 2024-04-16 RX ADMIN — INSULIN LISPRO 4 UNITS: 100 INJECTION, SOLUTION INTRAVENOUS; SUBCUTANEOUS at 04:11

## 2024-04-16 RX ADMIN — METHOCARBAMOL 500 MG: 500 TABLET ORAL at 21:22

## 2024-04-16 RX ADMIN — SODIUM CHLORIDE, POTASSIUM CHLORIDE, SODIUM LACTATE AND CALCIUM CHLORIDE: 600; 310; 30; 20 INJECTION, SOLUTION INTRAVENOUS at 15:59

## 2024-04-16 RX ADMIN — SODIUM CHLORIDE, PRESERVATIVE FREE 10 ML: 5 INJECTION INTRAVENOUS at 09:28

## 2024-04-16 RX ADMIN — GABAPENTIN 600 MG: 600 TABLET, FILM COATED ORAL at 09:20

## 2024-04-16 RX ADMIN — OXYCODONE AND ACETAMINOPHEN 1 TABLET: 10; 325 TABLET ORAL at 17:44

## 2024-04-16 RX ADMIN — AMIODARONE HYDROCHLORIDE 0.5 MG/MIN: 50 INJECTION, SOLUTION INTRAVENOUS at 01:07

## 2024-04-16 RX ADMIN — SODIUM CHLORIDE 1250 MG: 9 INJECTION, SOLUTION INTRAVENOUS at 09:19

## 2024-04-16 RX ADMIN — HEPARIN SODIUM 12 UNITS/KG/HR: 10000 INJECTION, SOLUTION INTRAVENOUS at 06:08

## 2024-04-16 RX ADMIN — DILTIAZEM HYDROCHLORIDE 5 MG/HR: 5 INJECTION, SOLUTION INTRAVENOUS at 19:49

## 2024-04-16 RX ADMIN — ATORVASTATIN CALCIUM 80 MG: 80 TABLET, FILM COATED ORAL at 21:22

## 2024-04-16 RX ADMIN — SENNOSIDES 8.6 MG: 8.6 TABLET, FILM COATED ORAL at 21:21

## 2024-04-16 RX ADMIN — ACETAMINOPHEN 650 MG: 325 TABLET ORAL at 00:42

## 2024-04-16 RX ADMIN — PANTOPRAZOLE SODIUM 40 MG: 40 INJECTION, POWDER, FOR SOLUTION INTRAVENOUS at 09:26

## 2024-04-16 RX ADMIN — GABAPENTIN 600 MG: 600 TABLET, FILM COATED ORAL at 14:14

## 2024-04-16 RX ADMIN — PIPERACILLIN AND TAZOBACTAM 4500 MG: 4; .5 INJECTION, POWDER, LYOPHILIZED, FOR SOLUTION INTRAVENOUS at 04:31

## 2024-04-16 RX ADMIN — ACETAMINOPHEN 650 MG: 325 TABLET ORAL at 14:14

## 2024-04-16 RX ADMIN — AMIODARONE HYDROCHLORIDE 0.5 MG/MIN: 50 INJECTION, SOLUTION INTRAVENOUS at 19:41

## 2024-04-16 RX ADMIN — ALBUTEROL SULFATE 2.5 MG: 2.5 SOLUTION RESPIRATORY (INHALATION) at 19:53

## 2024-04-16 RX ADMIN — SODIUM CHLORIDE, PRESERVATIVE FREE 10 ML: 5 INJECTION INTRAVENOUS at 21:22

## 2024-04-16 RX ADMIN — DIGOXIN 250 MCG: 0.25 INJECTION INTRAMUSCULAR; INTRAVENOUS at 09:25

## 2024-04-16 RX ADMIN — PIPERACILLIN AND TAZOBACTAM 4500 MG: 4; .5 INJECTION, POWDER, LYOPHILIZED, FOR SOLUTION INTRAVENOUS at 16:15

## 2024-04-16 RX ADMIN — INSULIN LISPRO 8 UNITS: 100 INJECTION, SOLUTION INTRAVENOUS; SUBCUTANEOUS at 21:39

## 2024-04-16 RX ADMIN — ASPIRIN 81 MG: 81 TABLET, COATED ORAL at 09:20

## 2024-04-16 RX ADMIN — HEPARIN SODIUM 12 UNITS/KG/HR: 10000 INJECTION, SOLUTION INTRAVENOUS at 19:39

## 2024-04-16 RX ADMIN — ACETAMINOPHEN 650 MG: 325 TABLET ORAL at 06:05

## 2024-04-16 RX ADMIN — SODIUM CHLORIDE 30 MG/ML INHALATION SOLUTION 4 ML: 30 SOLUTION INHALANT at 19:53

## 2024-04-16 RX ADMIN — INSULIN GLARGINE 40 UNITS: 100 INJECTION, SOLUTION SUBCUTANEOUS at 21:21

## 2024-04-16 ASSESSMENT — PAIN DESCRIPTION - FREQUENCY
FREQUENCY: CONTINUOUS
FREQUENCY: CONTINUOUS
FREQUENCY: INTERMITTENT

## 2024-04-16 ASSESSMENT — PAIN DESCRIPTION - ONSET
ONSET: ON-GOING

## 2024-04-16 ASSESSMENT — PAIN DESCRIPTION - DESCRIPTORS
DESCRIPTORS: ACHING
DESCRIPTORS: THROBBING
DESCRIPTORS: ACHING;DISCOMFORT;TIGHTNESS
DESCRIPTORS: THROBBING
DESCRIPTORS: ACHING;DISCOMFORT

## 2024-04-16 ASSESSMENT — PAIN SCALES - GENERAL
PAINLEVEL_OUTOF10: 5
PAINLEVEL_OUTOF10: 7
PAINLEVEL_OUTOF10: 5
PAINLEVEL_OUTOF10: 0
PAINLEVEL_OUTOF10: 7
PAINLEVEL_OUTOF10: 7
PAINLEVEL_OUTOF10: 6
PAINLEVEL_OUTOF10: 7
PAINLEVEL_OUTOF10: 7

## 2024-04-16 ASSESSMENT — PAIN DESCRIPTION - ORIENTATION
ORIENTATION: LOWER;MID
ORIENTATION: LOWER;MID
ORIENTATION: LEFT
ORIENTATION: MID;LOWER;RIGHT
ORIENTATION: RIGHT

## 2024-04-16 ASSESSMENT — PAIN DESCRIPTION - LOCATION
LOCATION: BACK;SHOULDER
LOCATION: BACK;SHOULDER
LOCATION: BACK
LOCATION: HIP
LOCATION: BACK
LOCATION: BACK

## 2024-04-16 ASSESSMENT — PAIN - FUNCTIONAL ASSESSMENT
PAIN_FUNCTIONAL_ASSESSMENT: PREVENTS OR INTERFERES SOME ACTIVE ACTIVITIES AND ADLS
PAIN_FUNCTIONAL_ASSESSMENT: ACTIVITIES ARE NOT PREVENTED
PAIN_FUNCTIONAL_ASSESSMENT: ACTIVITIES ARE NOT PREVENTED
PAIN_FUNCTIONAL_ASSESSMENT: PREVENTS OR INTERFERES WITH MANY ACTIVE NOT PASSIVE ACTIVITIES

## 2024-04-16 ASSESSMENT — PAIN DESCRIPTION - PAIN TYPE
TYPE: CHRONIC PAIN

## 2024-04-17 ENCOUNTER — ANESTHESIA EVENT (OUTPATIENT)
Dept: OPERATING ROOM | Age: 69
DRG: 064 | End: 2024-04-17
Payer: MEDICARE

## 2024-04-17 ENCOUNTER — APPOINTMENT (OUTPATIENT)
Dept: GENERAL RADIOLOGY | Age: 69
DRG: 064 | End: 2024-04-17
Payer: MEDICARE

## 2024-04-17 ENCOUNTER — ANESTHESIA (OUTPATIENT)
Dept: OPERATING ROOM | Age: 69
DRG: 064 | End: 2024-04-17
Payer: MEDICARE

## 2024-04-17 LAB
ABO + RH BLD: NORMAL
ALBUMIN SERPL-MCNC: 3 G/DL (ref 3.4–5)
ANION GAP SERPL CALCULATED.3IONS-SCNC: 7 MMOL/L (ref 3–16)
ANTI-XA UNFRAC HEPARIN: 0.33 IU/ML (ref 0.3–0.7)
ANTI-XA UNFRAC HEPARIN: 0.45 IU/ML (ref 0.3–0.7)
BASE EXCESS BLDA CALC-SCNC: 7.8 MMOL/L (ref -3–3)
BASOPHILS # BLD: 0 K/UL (ref 0–0.2)
BASOPHILS NFR BLD: 0.1 %
BLD GP AB SCN SERPL QL: NORMAL
BUN SERPL-MCNC: 18 MG/DL (ref 7–20)
CALCIUM SERPL-MCNC: 8.2 MG/DL (ref 8.3–10.6)
CHLORIDE SERPL-SCNC: 100 MMOL/L (ref 99–110)
CO2 BLDA-SCNC: 33 MMOL/L
CO2 SERPL-SCNC: 32 MMOL/L (ref 21–32)
COHGB MFR BLDA: 0.4 % (ref 0–1.5)
CREAT SERPL-MCNC: 0.8 MG/DL (ref 0.8–1.3)
DEPRECATED RDW RBC AUTO: 13.5 % (ref 12.4–15.4)
EKG DIAGNOSIS: NORMAL
EKG DIAGNOSIS: NORMAL
EKG Q-T INTERVAL: 346 MS
EKG Q-T INTERVAL: 346 MS
EKG QRS DURATION: 104 MS
EKG QRS DURATION: 108 MS
EKG QTC CALCULATION (BAZETT): 474 MS
EKG QTC CALCULATION (BAZETT): 484 MS
EKG R AXIS: -37 DEGREES
EKG R AXIS: -40 DEGREES
EKG T AXIS: 74 DEGREES
EKG T AXIS: 94 DEGREES
EKG VENTRICULAR RATE: 113 BPM
EKG VENTRICULAR RATE: 118 BPM
EOSINOPHIL # BLD: 0.4 K/UL (ref 0–0.6)
EOSINOPHIL NFR BLD: 1.8 %
GFR SERPLBLD CREATININE-BSD FMLA CKD-EPI: >90 ML/MIN/{1.73_M2}
GLUCOSE BLD-MCNC: 183 MG/DL (ref 70–99)
GLUCOSE BLD-MCNC: 219 MG/DL (ref 70–99)
GLUCOSE BLD-MCNC: 234 MG/DL (ref 70–99)
GLUCOSE BLD-MCNC: 238 MG/DL (ref 70–99)
GLUCOSE SERPL-MCNC: 250 MG/DL (ref 70–99)
HCO3 BLDA-SCNC: 32 MMOL/L (ref 21–29)
HCT VFR BLD AUTO: 41.9 % (ref 40.5–52.5)
HGB BLD-MCNC: 13.9 G/DL (ref 13.5–17.5)
HGB BLDA-MCNC: 12.8 G/DL
INR PPP: 1.08 (ref 0.85–1.15)
LACTATE BLDV-SCNC: 0.9 MMOL/L (ref 0.4–1.9)
LYMPHOCYTES # BLD: 2.4 K/UL (ref 1–5.1)
LYMPHOCYTES NFR BLD: 12.4 %
MAGNESIUM SERPL-MCNC: 2 MG/DL (ref 1.8–2.4)
MCH RBC QN AUTO: 30.6 PG (ref 26–34)
MCHC RBC AUTO-ENTMCNC: 33.2 G/DL (ref 31–36)
MCV RBC AUTO: 92 FL (ref 80–100)
METHGB MFR BLDA: 0 % (ref 0–1.4)
MONOCYTES # BLD: 1.3 K/UL (ref 0–1.3)
MONOCYTES NFR BLD: 6.7 %
NEUTROPHILS # BLD: 15.4 K/UL (ref 1.7–7.7)
NEUTROPHILS NFR BLD: 79 %
PCO2 BLDA: 40.5 MMHG (ref 35–45)
PERFORMED ON: ABNORMAL
PH BLDA: 7.5 [PH] (ref 7.35–7.45)
PHOSPHATE SERPL-MCNC: 2.6 MG/DL (ref 2.5–4.9)
PLATELET # BLD AUTO: 231 K/UL (ref 135–450)
PMV BLD AUTO: 8.6 FL (ref 5–10.5)
PO2 BLDA: 83.5 MMHG (ref 75–108)
POTASSIUM SERPL-SCNC: 4 MMOL/L (ref 3.5–5.1)
PROTHROMBIN TIME: 14.2 SEC (ref 11.9–14.9)
RBC # BLD AUTO: 4.55 M/UL (ref 4.2–5.9)
SAO2 % BLDA: 96 % (ref 93–100)
SODIUM SERPL-SCNC: 139 MMOL/L (ref 136–145)
WBC # BLD AUTO: 19.5 K/UL (ref 4–11)

## 2024-04-17 PROCEDURE — A4217 STERILE WATER/SALINE, 500 ML: HCPCS | Performed by: SURGERY

## 2024-04-17 PROCEDURE — 86901 BLOOD TYPING SEROLOGIC RH(D): CPT

## 2024-04-17 PROCEDURE — 6360000002 HC RX W HCPCS

## 2024-04-17 PROCEDURE — 6370000000 HC RX 637 (ALT 250 FOR IP): Performed by: INTERNAL MEDICINE

## 2024-04-17 PROCEDURE — 6370000000 HC RX 637 (ALT 250 FOR IP): Performed by: HOSPITALIST

## 2024-04-17 PROCEDURE — 94002 VENT MGMT INPAT INIT DAY: CPT

## 2024-04-17 PROCEDURE — 6370000000 HC RX 637 (ALT 250 FOR IP)

## 2024-04-17 PROCEDURE — 3700000001 HC ADD 15 MINUTES (ANESTHESIA): Performed by: SURGERY

## 2024-04-17 PROCEDURE — 85520 HEPARIN ASSAY: CPT

## 2024-04-17 PROCEDURE — 93005 ELECTROCARDIOGRAM TRACING: CPT | Performed by: INTERNAL MEDICINE

## 2024-04-17 PROCEDURE — 2580000003 HC RX 258

## 2024-04-17 PROCEDURE — 3600000014 HC SURGERY LEVEL 4 ADDTL 15MIN: Performed by: SURGERY

## 2024-04-17 PROCEDURE — 2700000000 HC OXYGEN THERAPY PER DAY

## 2024-04-17 PROCEDURE — 83605 ASSAY OF LACTIC ACID: CPT

## 2024-04-17 PROCEDURE — 2580000003 HC RX 258: Performed by: NURSE ANESTHETIST, CERTIFIED REGISTERED

## 2024-04-17 PROCEDURE — 3E0G76Z INTRODUCTION OF NUTRITIONAL SUBSTANCE INTO UPPER GI, VIA NATURAL OR ARTIFICIAL OPENING: ICD-10-PCS | Performed by: SURGERY

## 2024-04-17 PROCEDURE — 86850 RBC ANTIBODY SCREEN: CPT

## 2024-04-17 PROCEDURE — 2709999900 HC NON-CHARGEABLE SUPPLY: Performed by: SURGERY

## 2024-04-17 PROCEDURE — 51798 US URINE CAPACITY MEASURE: CPT

## 2024-04-17 PROCEDURE — P9045 ALBUMIN (HUMAN), 5%, 250 ML: HCPCS | Performed by: NURSE ANESTHETIST, CERTIFIED REGISTERED

## 2024-04-17 PROCEDURE — 86900 BLOOD TYPING SEROLOGIC ABO: CPT

## 2024-04-17 PROCEDURE — 94640 AIRWAY INHALATION TREATMENT: CPT

## 2024-04-17 PROCEDURE — 6360000002 HC RX W HCPCS: Performed by: SURGERY

## 2024-04-17 PROCEDURE — 51701 INSERT BLADDER CATHETER: CPT

## 2024-04-17 PROCEDURE — 6360000002 HC RX W HCPCS: Performed by: INTERNAL MEDICINE

## 2024-04-17 PROCEDURE — 2000000000 HC ICU R&B

## 2024-04-17 PROCEDURE — 6360000002 HC RX W HCPCS: Performed by: NURSE ANESTHETIST, CERTIFIED REGISTERED

## 2024-04-17 PROCEDURE — 94761 N-INVAS EAR/PLS OXIMETRY MLT: CPT

## 2024-04-17 PROCEDURE — 2580000003 HC RX 258: Performed by: SURGERY

## 2024-04-17 PROCEDURE — 85025 COMPLETE CBC W/AUTO DIFF WBC: CPT

## 2024-04-17 PROCEDURE — 82803 BLOOD GASES ANY COMBINATION: CPT

## 2024-04-17 PROCEDURE — 87040 BLOOD CULTURE FOR BACTERIA: CPT

## 2024-04-17 PROCEDURE — 93010 ELECTROCARDIOGRAM REPORT: CPT | Performed by: INTERNAL MEDICINE

## 2024-04-17 PROCEDURE — 2500000003 HC RX 250 WO HCPCS: Performed by: NURSE ANESTHETIST, CERTIFIED REGISTERED

## 2024-04-17 PROCEDURE — 0DH60UZ INSERTION OF FEEDING DEVICE INTO STOMACH, OPEN APPROACH: ICD-10-PCS | Performed by: SURGERY

## 2024-04-17 PROCEDURE — C9113 INJ PANTOPRAZOLE SODIUM, VIA: HCPCS

## 2024-04-17 PROCEDURE — 3600000004 HC SURGERY LEVEL 4 BASE: Performed by: SURGERY

## 2024-04-17 PROCEDURE — 36415 COLL VENOUS BLD VENIPUNCTURE: CPT

## 2024-04-17 PROCEDURE — 71045 X-RAY EXAM CHEST 1 VIEW: CPT

## 2024-04-17 PROCEDURE — 5A1945Z RESPIRATORY VENTILATION, 24-96 CONSECUTIVE HOURS: ICD-10-PCS | Performed by: INTERNAL MEDICINE

## 2024-04-17 PROCEDURE — 6370000000 HC RX 637 (ALT 250 FOR IP): Performed by: STUDENT IN AN ORGANIZED HEALTH CARE EDUCATION/TRAINING PROGRAM

## 2024-04-17 PROCEDURE — 85610 PROTHROMBIN TIME: CPT

## 2024-04-17 PROCEDURE — 99233 SBSQ HOSP IP/OBS HIGH 50: CPT | Performed by: NURSE PRACTITIONER

## 2024-04-17 PROCEDURE — 80069 RENAL FUNCTION PANEL: CPT

## 2024-04-17 PROCEDURE — 99232 SBSQ HOSP IP/OBS MODERATE 35: CPT | Performed by: HOSPITALIST

## 2024-04-17 PROCEDURE — 83735 ASSAY OF MAGNESIUM: CPT

## 2024-04-17 PROCEDURE — 3700000000 HC ANESTHESIA ATTENDED CARE: Performed by: SURGERY

## 2024-04-17 RX ORDER — NALOXONE HYDROCHLORIDE 0.4 MG/ML
INJECTION, SOLUTION INTRAMUSCULAR; INTRAVENOUS; SUBCUTANEOUS PRN
Status: DISCONTINUED | OUTPATIENT
Start: 2024-04-17 | End: 2024-04-17 | Stop reason: HOSPADM

## 2024-04-17 RX ORDER — PROPOFOL 10 MG/ML
5-50 INJECTION, EMULSION INTRAVENOUS CONTINUOUS
Status: DISCONTINUED | OUTPATIENT
Start: 2024-04-17 | End: 2024-04-19 | Stop reason: ALTCHOICE

## 2024-04-17 RX ORDER — ALBUMIN, HUMAN INJ 5% 5 %
SOLUTION INTRAVENOUS PRN
Status: DISCONTINUED | OUTPATIENT
Start: 2024-04-17 | End: 2024-04-17 | Stop reason: SDUPTHER

## 2024-04-17 RX ORDER — ACETAMINOPHEN 325 MG/1
650 TABLET ORAL
Status: DISCONTINUED | OUTPATIENT
Start: 2024-04-17 | End: 2024-04-17 | Stop reason: HOSPADM

## 2024-04-17 RX ORDER — CALCIUM CHLORIDE 100 MG/ML
INJECTION INTRAVENOUS; INTRAVENTRICULAR PRN
Status: DISCONTINUED | OUTPATIENT
Start: 2024-04-17 | End: 2024-04-17 | Stop reason: SDUPTHER

## 2024-04-17 RX ORDER — PHENYLEPHRINE HYDROCHLORIDE 10 MG/ML
INJECTION INTRAVENOUS PRN
Status: DISCONTINUED | OUTPATIENT
Start: 2024-04-17 | End: 2024-04-17 | Stop reason: SDUPTHER

## 2024-04-17 RX ORDER — AMIODARONE HYDROCHLORIDE 200 MG/1
200 TABLET ORAL 2 TIMES DAILY
Status: DISCONTINUED | OUTPATIENT
Start: 2024-04-17 | End: 2024-04-18

## 2024-04-17 RX ORDER — PROPOFOL 10 MG/ML
INJECTION, EMULSION INTRAVENOUS CONTINUOUS PRN
Status: DISCONTINUED | OUTPATIENT
Start: 2024-04-17 | End: 2024-04-17 | Stop reason: SDUPTHER

## 2024-04-17 RX ORDER — MORPHINE SULFATE 2 MG/ML
2 INJECTION, SOLUTION INTRAMUSCULAR; INTRAVENOUS 2 TIMES DAILY PRN
Status: DISCONTINUED | OUTPATIENT
Start: 2024-04-17 | End: 2024-04-19

## 2024-04-17 RX ORDER — PROCHLORPERAZINE EDISYLATE 5 MG/ML
5 INJECTION INTRAMUSCULAR; INTRAVENOUS
Status: DISCONTINUED | OUTPATIENT
Start: 2024-04-17 | End: 2024-04-17 | Stop reason: HOSPADM

## 2024-04-17 RX ORDER — IPRATROPIUM BROMIDE AND ALBUTEROL SULFATE 2.5; .5 MG/3ML; MG/3ML
1 SOLUTION RESPIRATORY (INHALATION)
Status: DISCONTINUED | OUTPATIENT
Start: 2024-04-17 | End: 2024-04-17 | Stop reason: HOSPADM

## 2024-04-17 RX ORDER — MAGNESIUM HYDROXIDE 1200 MG/15ML
LIQUID ORAL CONTINUOUS PRN
Status: COMPLETED | OUTPATIENT
Start: 2024-04-17 | End: 2024-04-17

## 2024-04-17 RX ORDER — ONDANSETRON 2 MG/ML
4 INJECTION INTRAMUSCULAR; INTRAVENOUS
Status: DISCONTINUED | OUTPATIENT
Start: 2024-04-17 | End: 2024-04-17 | Stop reason: HOSPADM

## 2024-04-17 RX ORDER — ROCURONIUM BROMIDE 10 MG/ML
INJECTION, SOLUTION INTRAVENOUS PRN
Status: DISCONTINUED | OUTPATIENT
Start: 2024-04-17 | End: 2024-04-17 | Stop reason: SDUPTHER

## 2024-04-17 RX ORDER — DILTIAZEM HYDROCHLORIDE 240 MG/1
240 CAPSULE, COATED, EXTENDED RELEASE ORAL DAILY
Status: DISCONTINUED | OUTPATIENT
Start: 2024-04-17 | End: 2024-04-17

## 2024-04-17 RX ORDER — ONDANSETRON 2 MG/ML
INJECTION INTRAMUSCULAR; INTRAVENOUS PRN
Status: DISCONTINUED | OUTPATIENT
Start: 2024-04-17 | End: 2024-04-17 | Stop reason: SDUPTHER

## 2024-04-17 RX ORDER — LABETALOL HYDROCHLORIDE 5 MG/ML
10 INJECTION, SOLUTION INTRAVENOUS
Status: DISCONTINUED | OUTPATIENT
Start: 2024-04-17 | End: 2024-04-17 | Stop reason: HOSPADM

## 2024-04-17 RX ORDER — DILTIAZEM HYDROCHLORIDE 60 MG/1
60 TABLET, FILM COATED ORAL 4 TIMES DAILY
Status: DISCONTINUED | OUTPATIENT
Start: 2024-04-17 | End: 2024-04-18

## 2024-04-17 RX ORDER — FENTANYL CITRATE 50 UG/ML
25 INJECTION, SOLUTION INTRAMUSCULAR; INTRAVENOUS EVERY 5 MIN PRN
Status: DISCONTINUED | OUTPATIENT
Start: 2024-04-17 | End: 2024-04-17 | Stop reason: HOSPADM

## 2024-04-17 RX ORDER — SODIUM CHLORIDE 9 MG/ML
INJECTION, SOLUTION INTRAVENOUS PRN
Status: DISCONTINUED | OUTPATIENT
Start: 2024-04-17 | End: 2024-04-17 | Stop reason: HOSPADM

## 2024-04-17 RX ORDER — SODIUM CHLORIDE, SODIUM LACTATE, POTASSIUM CHLORIDE, CALCIUM CHLORIDE 600; 310; 30; 20 MG/100ML; MG/100ML; MG/100ML; MG/100ML
INJECTION, SOLUTION INTRAVENOUS CONTINUOUS PRN
Status: DISCONTINUED | OUTPATIENT
Start: 2024-04-17 | End: 2024-04-17 | Stop reason: SDUPTHER

## 2024-04-17 RX ORDER — HYDROMORPHONE HYDROCHLORIDE 1 MG/ML
0.5 INJECTION, SOLUTION INTRAMUSCULAR; INTRAVENOUS; SUBCUTANEOUS EVERY 5 MIN PRN
Status: DISCONTINUED | OUTPATIENT
Start: 2024-04-17 | End: 2024-04-17 | Stop reason: HOSPADM

## 2024-04-17 RX ORDER — BUPIVACAINE HYDROCHLORIDE 5 MG/ML
INJECTION, SOLUTION EPIDURAL; INTRACAUDAL PRN
Status: DISCONTINUED | OUTPATIENT
Start: 2024-04-17 | End: 2024-04-17 | Stop reason: ALTCHOICE

## 2024-04-17 RX ORDER — SODIUM CHLORIDE 0.9 % (FLUSH) 0.9 %
5-40 SYRINGE (ML) INJECTION PRN
Status: DISCONTINUED | OUTPATIENT
Start: 2024-04-17 | End: 2024-04-17 | Stop reason: HOSPADM

## 2024-04-17 RX ORDER — SODIUM CHLORIDE 0.9 % (FLUSH) 0.9 %
5-40 SYRINGE (ML) INJECTION EVERY 12 HOURS SCHEDULED
Status: DISCONTINUED | OUTPATIENT
Start: 2024-04-17 | End: 2024-04-17 | Stop reason: HOSPADM

## 2024-04-17 RX ORDER — LIDOCAINE HYDROCHLORIDE 20 MG/ML
INJECTION, SOLUTION INTRAVENOUS PRN
Status: DISCONTINUED | OUTPATIENT
Start: 2024-04-17 | End: 2024-04-17 | Stop reason: SDUPTHER

## 2024-04-17 RX ORDER — KETAMINE HCL IN NACL, ISO-OSM 20 MG/2 ML
SYRINGE (ML) INJECTION PRN
Status: DISCONTINUED | OUTPATIENT
Start: 2024-04-17 | End: 2024-04-17 | Stop reason: SDUPTHER

## 2024-04-17 RX ORDER — SODIUM CHLORIDE 9 MG/ML
INJECTION, SOLUTION INTRAVENOUS CONTINUOUS PRN
Status: DISCONTINUED | OUTPATIENT
Start: 2024-04-17 | End: 2024-04-17 | Stop reason: SDUPTHER

## 2024-04-17 RX ORDER — PROPOFOL 10 MG/ML
INJECTION, EMULSION INTRAVENOUS PRN
Status: DISCONTINUED | OUTPATIENT
Start: 2024-04-17 | End: 2024-04-17 | Stop reason: SDUPTHER

## 2024-04-17 RX ADMIN — SODIUM CHLORIDE, POTASSIUM CHLORIDE, SODIUM LACTATE AND CALCIUM CHLORIDE: 600; 310; 30; 20 INJECTION, SOLUTION INTRAVENOUS at 16:36

## 2024-04-17 RX ADMIN — OXYCODONE AND ACETAMINOPHEN 1 TABLET: 10; 325 TABLET ORAL at 08:20

## 2024-04-17 RX ADMIN — PIPERACILLIN AND TAZOBACTAM 4500 MG: 4; .5 INJECTION, POWDER, LYOPHILIZED, FOR SOLUTION INTRAVENOUS at 16:28

## 2024-04-17 RX ADMIN — INSULIN LISPRO 4 UNITS: 100 INJECTION, SOLUTION INTRAVENOUS; SUBCUTANEOUS at 03:55

## 2024-04-17 RX ADMIN — METHOCARBAMOL 500 MG: 500 TABLET ORAL at 08:20

## 2024-04-17 RX ADMIN — PROPOFOL 200 MG: 10 INJECTION, EMULSION INTRAVENOUS at 17:10

## 2024-04-17 RX ADMIN — Medication 10 MG: at 17:20

## 2024-04-17 RX ADMIN — CALCIUM CHLORIDE 0.5 G: 100 INJECTION, SOLUTION INTRAVENOUS at 17:15

## 2024-04-17 RX ADMIN — OXYCODONE AND ACETAMINOPHEN 1 TABLET: 10; 325 TABLET ORAL at 15:49

## 2024-04-17 RX ADMIN — PIPERACILLIN AND TAZOBACTAM 4500 MG: 4; .5 INJECTION, POWDER, LYOPHILIZED, FOR SOLUTION INTRAVENOUS at 04:43

## 2024-04-17 RX ADMIN — INSULIN GLARGINE 40 UNITS: 100 INJECTION, SOLUTION SUBCUTANEOUS at 22:54

## 2024-04-17 RX ADMIN — PHENYLEPHRINE HYDROCHLORIDE 100 MCG: 10 INJECTION, SOLUTION INTRAVENOUS at 17:14

## 2024-04-17 RX ADMIN — GABAPENTIN 600 MG: 600 TABLET, FILM COATED ORAL at 08:20

## 2024-04-17 RX ADMIN — PANTOPRAZOLE SODIUM 40 MG: 40 INJECTION, POWDER, FOR SOLUTION INTRAVENOUS at 08:20

## 2024-04-17 RX ADMIN — ALBUTEROL SULFATE 2.5 MG: 2.5 SOLUTION RESPIRATORY (INHALATION) at 20:22

## 2024-04-17 RX ADMIN — SODIUM CHLORIDE: 9 INJECTION, SOLUTION INTRAVENOUS at 17:10

## 2024-04-17 RX ADMIN — PIPERACILLIN AND TAZOBACTAM 4500 MG: 4; .5 INJECTION, POWDER, LYOPHILIZED, FOR SOLUTION INTRAVENOUS at 00:30

## 2024-04-17 RX ADMIN — SUGAMMADEX 200 MG: 100 INJECTION, SOLUTION INTRAVENOUS at 18:54

## 2024-04-17 RX ADMIN — PROPOFOL 100 MG: 10 INJECTION, EMULSION INTRAVENOUS at 17:11

## 2024-04-17 RX ADMIN — PHENYLEPHRINE HYDROCHLORIDE 100 MCG: 10 INJECTION, SOLUTION INTRAVENOUS at 17:31

## 2024-04-17 RX ADMIN — SODIUM CHLORIDE 30 MG/ML INHALATION SOLUTION 4 ML: 30 SOLUTION INHALANT at 20:22

## 2024-04-17 RX ADMIN — PHENYLEPHRINE HYDROCHLORIDE 100 MCG: 10 INJECTION, SOLUTION INTRAVENOUS at 17:53

## 2024-04-17 RX ADMIN — PROPOFOL 10 MCG/KG/MIN: 10 INJECTION, EMULSION INTRAVENOUS at 20:15

## 2024-04-17 RX ADMIN — SODIUM CHLORIDE 30 MG/ML INHALATION SOLUTION 4 ML: 30 SOLUTION INHALANT at 14:34

## 2024-04-17 RX ADMIN — AMIODARONE HYDROCHLORIDE 200 MG: 200 TABLET ORAL at 12:20

## 2024-04-17 RX ADMIN — LIDOCAINE HYDROCHLORIDE 100 MG: 20 INJECTION, SOLUTION INTRAVENOUS at 17:09

## 2024-04-17 RX ADMIN — NALOXEGOL OXALATE 12.5 MG: 12.5 TABLET, FILM COATED ORAL at 08:20

## 2024-04-17 RX ADMIN — PHENYLEPHRINE HYDROCHLORIDE 100 MCG: 10 INJECTION, SOLUTION INTRAVENOUS at 17:18

## 2024-04-17 RX ADMIN — SODIUM CHLORIDE, SODIUM LACTATE, POTASSIUM CHLORIDE, AND CALCIUM CHLORIDE: .6; .31; .03; .02 INJECTION, SOLUTION INTRAVENOUS at 17:53

## 2024-04-17 RX ADMIN — INSULIN LISPRO 4 UNITS: 100 INJECTION, SOLUTION INTRAVENOUS; SUBCUTANEOUS at 22:55

## 2024-04-17 RX ADMIN — DIGOXIN 250 MCG: 0.25 INJECTION INTRAMUSCULAR; INTRAVENOUS at 12:20

## 2024-04-17 RX ADMIN — PHENYLEPHRINE HYDROCHLORIDE 200 MCG: 10 INJECTION, SOLUTION INTRAVENOUS at 17:36

## 2024-04-17 RX ADMIN — ONDANSETRON 4 MG: 2 INJECTION INTRAMUSCULAR; INTRAVENOUS at 17:15

## 2024-04-17 RX ADMIN — ALBUMIN (HUMAN) 250 ML: 12.5 SOLUTION INTRAVENOUS at 17:15

## 2024-04-17 RX ADMIN — SODIUM CHLORIDE 1500 MG: 9 INJECTION, SOLUTION INTRAVENOUS at 12:22

## 2024-04-17 RX ADMIN — SODIUM CHLORIDE 1500 MG: 9 INJECTION, SOLUTION INTRAVENOUS at 23:07

## 2024-04-17 RX ADMIN — PHENYLEPHRINE HYDROCHLORIDE 100 MCG: 10 INJECTION, SOLUTION INTRAVENOUS at 17:11

## 2024-04-17 RX ADMIN — PHENYLEPHRINE HYDROCHLORIDE 100 MCG: 10 INJECTION, SOLUTION INTRAVENOUS at 17:22

## 2024-04-17 RX ADMIN — ROCURONIUM BROMIDE 100 MG: 10 INJECTION, SOLUTION INTRAVENOUS at 17:11

## 2024-04-17 RX ADMIN — ALBUTEROL SULFATE 2.5 MG: 2.5 SOLUTION RESPIRATORY (INHALATION) at 14:35

## 2024-04-17 RX ADMIN — Medication 10 MG: at 17:15

## 2024-04-17 RX ADMIN — INSULIN LISPRO 4 UNITS: 100 INJECTION, SOLUTION INTRAVENOUS; SUBCUTANEOUS at 08:22

## 2024-04-17 RX ADMIN — CALCIUM CHLORIDE 0.5 G: 100 INJECTION, SOLUTION INTRAVENOUS at 17:30

## 2024-04-17 RX ADMIN — PHENYLEPHRINE HYDROCHLORIDE 100 MCG: 10 INJECTION, SOLUTION INTRAVENOUS at 17:33

## 2024-04-17 RX ADMIN — SODIUM CHLORIDE, PRESERVATIVE FREE 10 ML: 5 INJECTION INTRAVENOUS at 08:20

## 2024-04-17 RX ADMIN — SODIUM CHLORIDE 30 MG/ML INHALATION SOLUTION 4 ML: 30 SOLUTION INHALANT at 09:19

## 2024-04-17 RX ADMIN — SODIUM CHLORIDE, SODIUM LACTATE, POTASSIUM CHLORIDE, AND CALCIUM CHLORIDE: .6; .31; .03; .02 INJECTION, SOLUTION INTRAVENOUS at 18:55

## 2024-04-17 RX ADMIN — GABAPENTIN 600 MG: 600 TABLET, FILM COATED ORAL at 15:49

## 2024-04-17 RX ADMIN — PROPOFOL 30 MCG/KG/MIN: 10 INJECTION, EMULSION INTRAVENOUS at 18:39

## 2024-04-17 RX ADMIN — SODIUM CHLORIDE, SODIUM LACTATE, POTASSIUM CHLORIDE, AND CALCIUM CHLORIDE: .6; .31; .03; .02 INJECTION, SOLUTION INTRAVENOUS at 15:03

## 2024-04-17 RX ADMIN — MORPHINE SULFATE 2 MG: 2 INJECTION, SOLUTION INTRAMUSCULAR; INTRAVENOUS at 12:05

## 2024-04-17 RX ADMIN — LOSARTAN POTASSIUM 100 MG: 100 TABLET, FILM COATED ORAL at 08:19

## 2024-04-17 RX ADMIN — ALBUTEROL SULFATE 2.5 MG: 2.5 SOLUTION RESPIRATORY (INHALATION) at 09:19

## 2024-04-17 ASSESSMENT — PAIN SCALES - GENERAL
PAINLEVEL_OUTOF10: 0
PAINLEVEL_OUTOF10: 8
PAINLEVEL_OUTOF10: 6
PAINLEVEL_OUTOF10: 6
PAINLEVEL_OUTOF10: 7
PAINLEVEL_OUTOF10: 10
PAINLEVEL_OUTOF10: 0
PAINLEVEL_OUTOF10: 9

## 2024-04-17 ASSESSMENT — PULMONARY FUNCTION TESTS
PIF_VALUE: 27
PIF_VALUE: 28
PIF_VALUE: 27
PIF_VALUE: 28
PIF_VALUE: 28

## 2024-04-17 ASSESSMENT — PAIN DESCRIPTION - PAIN TYPE
TYPE: ACUTE PAIN;CHRONIC PAIN

## 2024-04-17 ASSESSMENT — PAIN DESCRIPTION - ORIENTATION
ORIENTATION: LEFT;MID;LOWER
ORIENTATION: RIGHT;LOWER;MID
ORIENTATION: LOWER;MID;LEFT;RIGHT

## 2024-04-17 ASSESSMENT — PAIN - FUNCTIONAL ASSESSMENT
PAIN_FUNCTIONAL_ASSESSMENT: PREVENTS OR INTERFERES WITH MANY ACTIVE NOT PASSIVE ACTIVITIES

## 2024-04-17 ASSESSMENT — PAIN DESCRIPTION - FREQUENCY
FREQUENCY: CONTINUOUS

## 2024-04-17 ASSESSMENT — PAIN DESCRIPTION - DESCRIPTORS
DESCRIPTORS: THROBBING;ACHING;STABBING
DESCRIPTORS: THROBBING
DESCRIPTORS: ACHING;SORE;SPASM

## 2024-04-17 ASSESSMENT — PAIN DESCRIPTION - ONSET
ONSET: ON-GOING

## 2024-04-17 ASSESSMENT — PAIN DESCRIPTION - LOCATION
LOCATION: BACK;ARM;SHOULDER
LOCATION: BACK
LOCATION: BACK;ARM

## 2024-04-17 NOTE — ANESTHESIA PRE PROCEDURE
times daily.    Provider, MD Ramon   vitamin E 400 UNIT capsule Take 1 capsule by mouth daily    Provider, MD Ramon   potassium chloride SA (K-DUR;KLOR-CON M) 10 MEQ tablet TAKE 2 TABLETS BY MOUTH FOUR TIMES DAILY 4/5/15 7/6/15  Jean Carlos Coelho MD       Current medications:    Current Facility-Administered Medications   Medication Dose Route Frequency Provider Last Rate Last Admin   • vancomycin (VANCOCIN) 1,500 mg in sodium chloride 0.9 % 250 mL IVPB ([id number])  1,500 mg IntraVENous Q12H Carolee Givens MD   Stopped at 04/17/24 1400   • dilTIAZem (CARDIZEM CD) extended release capsule 240 mg  240 mg Oral Daily Daniel Flanagan MD       • amiodarone (CORDARONE) tablet 200 mg  200 mg Oral BID Daniel Flanagan MD   200 mg at 04/17/24 1220   • apixaban (ELIQUIS) tablet 5 mg  5 mg Oral BID Rodney Castillo MD       • morphine (PF) injection 2 mg  2 mg IntraVENous BID PRN Rodney Castillo MD   2 mg at 04/17/24 1205   • aspirin chewable tablet 81 mg  81 mg Per G Tube Daily Rodney Castillo MD       • oxyCODONE-acetaminophen (PERCOCET)  MG per tablet 1 tablet  1 tablet Oral Q6H PRN Rodney Castillo MD   1 tablet at 04/17/24 1549   • insulin glargine (LANTUS) injection vial 40 Units  40 Units SubCUTAneous Nightly Ifeoma Keita MD   40 Units at 04/16/24 2121   • piperacillin-tazobactam (ZOSYN) 4,500 mg in sodium chloride 0.9 % 100 mL IVPB (mini-bag)  4,500 mg IntraVENous Q8H Carolee Givens MD 25 mL/hr at 04/17/24 1628 4,500 mg at 04/17/24 1628   • digoxin (LANOXIN) injection 250 mcg  250 mcg IntraVENous Daily Daniel Flanagan MD   250 mcg at 04/17/24 1220   • lidocaine 4 % external patch 1 patch  1 patch TransDERmal Daily Bri Shaver MD   1 patch at 04/17/24 0821   • senna (SENOKOT) tablet 8.6 mg  1 tablet Oral Nightly Ifeoma Keita MD   8.6 mg at 04/16/24 2121   • methocarbamol (ROBAXIN) tablet 500 mg  500 mg Oral 4x Daily PRN Rodney Castillo MD   500 mg at 04/17/24 0820   • losartan (COZAAR) tablet 
liquid consumption: 04/12/24                        Date of last solid food consumption: 04/12/24    BMI:   Wt Readings from Last 3 Encounters:   04/15/24 (!) 151.2 kg (333 lb 5.4 oz)   04/04/24 (!) 160.8 kg (354 lb 8 oz)   01/20/22 (!) 152 kg (335 lb)     Body mass index is 45.21 kg/m².    CBC:   Lab Results   Component Value Date/Time    WBC 19.5 04/17/2024 05:41 AM    RBC 4.55 04/17/2024 05:41 AM    HGB 13.9 04/17/2024 05:41 AM    HCT 41.9 04/17/2024 05:41 AM    MCV 92.0 04/17/2024 05:41 AM    RDW 13.5 04/17/2024 05:41 AM     04/17/2024 05:41 AM       CMP:   Lab Results   Component Value Date/Time     04/17/2024 05:41 AM    K 4.0 04/17/2024 05:41 AM    K 4.5 04/14/2024 08:33 PM     04/17/2024 05:41 AM    CO2 32 04/17/2024 05:41 AM    BUN 18 04/17/2024 05:41 AM    CREATININE 0.8 04/17/2024 05:41 AM    GFRAA >60 02/14/2022 10:42 AM    GFRAA >60 12/21/2012 11:32 AM    AGRATIO 1.3 04/04/2024 10:21 AM    LABGLOM >90 04/17/2024 05:41 AM    GLUCOSE 250 04/17/2024 05:41 AM    PROT 6.5 04/15/2024 08:54 AM    PROT 7.9 12/21/2012 11:32 AM    CALCIUM 8.2 04/17/2024 05:41 AM    BILITOT 1.3 04/15/2024 08:54 AM    ALKPHOS 104 04/15/2024 08:54 AM    AST 24 04/15/2024 08:54 AM    ALT 17 04/15/2024 08:54 AM       POC Tests:   Recent Labs     04/15/24  1444 04/15/24  1525 04/17/24  0727   POCGLU 280*   < > 219*   POCNA 144  --   --    POCK 4.2  --   --     < > = values in this interval not displayed.         Coags:   Lab Results   Component Value Date/Time    PROTIME 14.2 04/17/2024 05:41 AM    PROTIME 17.6 12/30/2010 11:23 AM    INR 1.08 04/17/2024 05:41 AM    APTT 33.9 04/05/2024 11:29 PM       HCG (If Applicable): No results found for: \"PREGTESTUR\", \"PREGSERUM\", \"HCG\", \"HCGQUANT\"     ABGs:   Lab Results   Component Value Date/Time    PHART 7.411 04/15/2024 02:44 PM    PO2ART 70.6 04/15/2024 02:44 PM    HCC4NHF 56.2 04/15/2024 02:44 PM    ZPG6RJJ 35.7 04/15/2024 02:44 PM    BEART 11 04/15/2024 02:44 PM

## 2024-04-17 NOTE — BRIEF OP NOTE
Brief Postoperative Note      Patient: Kulwant Lamb  YOB: 1955  MRN: [id number]    Date of Procedure: 4/17/2024    Pre-Op Diagnosis Codes:     * Inadequate oral nutritional intake [E63.9]    Post-Op Diagnosis: Same       Procedure(s):  OPEN GASTROSTOMY TUBE PLACEMENT    Surgeon(s):  Dhaval Gilliam MD    Assistant:  Resident: Candi Lyn MD    Anesthesia: General    Estimated Blood Loss (mL): less than 50     Complications: None    Specimens:   * No specimens in log *    Implants:  * No implants in log *      Drains:   NG/OG/NJ/NE Tube Nasogastric Left nostril (Active)   Surrounding Skin Clean, dry & intact;Reddened 04/17/24 1615   Securement device Bridle 04/17/24 1615   Status Clamped 04/17/24 1615   Placement Verified External Catheter Length 04/17/24 1615   NG/OG/NJ/NE External Measurement (cm) 69 cm 04/17/24 1615   Drainage Appearance None 04/16/24 1640   Tube Feeding Diabetic 04/17/24 0400   Tube feeding/verify rate (mL/hr) 60 mL/hr 04/16/24 1640   Tube Feeding Intake (mL) 0 ml 04/16/24 1640   Tube Feeding Supplement Amount (mL) 0 04/14/24 1400   Free Water/Flush (mL) 0 mL 04/16/24 1640   Output (mL) 0 ml 04/16/24 1640   Action Taken Feed set changed 04/16/24 1640       Gastrostomy/Enterostomy/Jejunostomy Tube Gastrostomy LUQ 20 fr (Active)       [REMOVED] NG/OG/NJ/NE Tube Nasoenteric feeding tube 10 fr Right nostril (Removed)   Surrounding Skin Clean, dry & intact 04/10/24 1600   Securement device Adhesive based perez 04/10/24 1600   Status Continuous feeding 04/10/24 1600   Placement Verified External Catheter Length 04/10/24 1600   NG/OG/NJ/NE External Measurement (cm) 56 cm 04/10/24 1600   Drainage Appearance None 04/09/24 1400   Tube Feeding Diabetic 04/10/24 1600   Tube feeding/verify rate (mL/hr) 20 mL/hr 04/10/24 1200   Tube Feeding Intake (mL) 17 ml 04/10/24 0800   Free Water/Flush (mL) 110 mL 04/10/24 0900   Output (mL) 0 ml 04/10/24 0400   Action Taken Other

## 2024-04-18 ENCOUNTER — APPOINTMENT (OUTPATIENT)
Dept: VASCULAR LAB | Age: 69
DRG: 064 | End: 2024-04-18
Payer: MEDICARE

## 2024-04-18 PROBLEM — J96.90 RESPIRATORY FAILURE REQUIRING INTUBATION (HCC): Status: ACTIVE | Noted: 2024-04-18

## 2024-04-18 PROBLEM — J98.6 DIAPHRAGM DYSFUNCTION: Status: ACTIVE | Noted: 2024-04-18

## 2024-04-18 PROBLEM — J98.11: Status: ACTIVE | Noted: 2024-04-18

## 2024-04-18 PROBLEM — T17.500A MUCOID IMPACTION OF BRONCHI: Status: ACTIVE | Noted: 2024-04-18

## 2024-04-18 LAB
ANION GAP SERPL CALCULATED.3IONS-SCNC: 8 MMOL/L (ref 3–16)
ANTI-XA UNFRAC HEPARIN: <0.1 IU/ML (ref 0.3–0.7)
APTT BLD: 38.2 SEC (ref 22.1–36.4)
BACTERIA BLD CULT ORG #2: NORMAL
BACTERIA BLD CULT: NORMAL
BASOPHILS # BLD: 0 K/UL (ref 0–0.2)
BASOPHILS # BLD: 0 K/UL (ref 0–0.2)
BASOPHILS NFR BLD: 0.1 %
BASOPHILS NFR BLD: 0.2 %
BUN SERPL-MCNC: 16 MG/DL (ref 7–20)
CALCIUM SERPL-MCNC: 8.5 MG/DL (ref 8.3–10.6)
CHLORIDE SERPL-SCNC: 99 MMOL/L (ref 99–110)
CO2 SERPL-SCNC: 31 MMOL/L (ref 21–32)
CREAT SERPL-MCNC: 0.9 MG/DL (ref 0.8–1.3)
DEPRECATED RDW RBC AUTO: 13.2 % (ref 12.4–15.4)
DEPRECATED RDW RBC AUTO: 13.3 % (ref 12.4–15.4)
DIGOXIN SERPL-MCNC: 1.6 NG/ML (ref 0.8–2)
EOSINOPHIL # BLD: 0 K/UL (ref 0–0.6)
EOSINOPHIL # BLD: 0.1 K/UL (ref 0–0.6)
EOSINOPHIL NFR BLD: 0.1 %
EOSINOPHIL NFR BLD: 0.3 %
GFR SERPLBLD CREATININE-BSD FMLA CKD-EPI: >90 ML/MIN/{1.73_M2}
GLUCOSE BLD-MCNC: 186 MG/DL (ref 70–99)
GLUCOSE BLD-MCNC: 192 MG/DL (ref 70–99)
GLUCOSE BLD-MCNC: 194 MG/DL (ref 70–99)
GLUCOSE BLD-MCNC: 201 MG/DL (ref 70–99)
GLUCOSE BLD-MCNC: 236 MG/DL (ref 70–99)
GLUCOSE SERPL-MCNC: 237 MG/DL (ref 70–99)
HCT VFR BLD AUTO: 36.4 % (ref 40.5–52.5)
HCT VFR BLD AUTO: 39 % (ref 40.5–52.5)
HGB BLD-MCNC: 12.1 G/DL (ref 13.5–17.5)
HGB BLD-MCNC: 13 G/DL (ref 13.5–17.5)
INR PPP: 1.06 (ref 0.85–1.15)
LYMPHOCYTES # BLD: 1.3 K/UL (ref 1–5.1)
LYMPHOCYTES # BLD: 1.9 K/UL (ref 1–5.1)
LYMPHOCYTES NFR BLD: 11 %
LYMPHOCYTES NFR BLD: 6.9 %
MCH RBC QN AUTO: 30.5 PG (ref 26–34)
MCH RBC QN AUTO: 30.6 PG (ref 26–34)
MCHC RBC AUTO-ENTMCNC: 33.2 G/DL (ref 31–36)
MCHC RBC AUTO-ENTMCNC: 33.3 G/DL (ref 31–36)
MCV RBC AUTO: 91.6 FL (ref 80–100)
MCV RBC AUTO: 92.2 FL (ref 80–100)
MONOCYTES # BLD: 1 K/UL (ref 0–1.3)
MONOCYTES # BLD: 1.1 K/UL (ref 0–1.3)
MONOCYTES NFR BLD: 5.5 %
MONOCYTES NFR BLD: 6.1 %
NEUTROPHILS # BLD: 14.5 K/UL (ref 1.7–7.7)
NEUTROPHILS # BLD: 16.7 K/UL (ref 1.7–7.7)
NEUTROPHILS NFR BLD: 82.5 %
NEUTROPHILS NFR BLD: 87.3 %
PERFORMED ON: ABNORMAL
PLATELET # BLD AUTO: 177 K/UL (ref 135–450)
PLATELET # BLD AUTO: 190 K/UL (ref 135–450)
PMV BLD AUTO: 9.1 FL (ref 5–10.5)
PMV BLD AUTO: 9.4 FL (ref 5–10.5)
POTASSIUM SERPL-SCNC: 4.9 MMOL/L (ref 3.5–5.1)
PROTHROMBIN TIME: 14 SEC (ref 11.9–14.9)
RBC # BLD AUTO: 3.97 M/UL (ref 4.2–5.9)
RBC # BLD AUTO: 4.23 M/UL (ref 4.2–5.9)
SODIUM SERPL-SCNC: 138 MMOL/L (ref 136–145)
VANCOMYCIN TROUGH SERPL-MCNC: 21.8 UG/ML (ref 10–20)
WBC # BLD AUTO: 17.5 K/UL (ref 4–11)
WBC # BLD AUTO: 19.1 K/UL (ref 4–11)

## 2024-04-18 PROCEDURE — 6360000002 HC RX W HCPCS

## 2024-04-18 PROCEDURE — 80048 BASIC METABOLIC PNL TOTAL CA: CPT

## 2024-04-18 PROCEDURE — 80162 ASSAY OF DIGOXIN TOTAL: CPT

## 2024-04-18 PROCEDURE — 85520 HEPARIN ASSAY: CPT

## 2024-04-18 PROCEDURE — C9113 INJ PANTOPRAZOLE SODIUM, VIA: HCPCS

## 2024-04-18 PROCEDURE — 85025 COMPLETE CBC W/AUTO DIFF WBC: CPT

## 2024-04-18 PROCEDURE — 80202 ASSAY OF VANCOMYCIN: CPT

## 2024-04-18 PROCEDURE — 6370000000 HC RX 637 (ALT 250 FOR IP)

## 2024-04-18 PROCEDURE — 2580000003 HC RX 258

## 2024-04-18 PROCEDURE — 87205 SMEAR GRAM STAIN: CPT

## 2024-04-18 PROCEDURE — 94640 AIRWAY INHALATION TREATMENT: CPT

## 2024-04-18 PROCEDURE — 2700000000 HC OXYGEN THERAPY PER DAY

## 2024-04-18 PROCEDURE — 99232 SBSQ HOSP IP/OBS MODERATE 35: CPT | Performed by: HOSPITALIST

## 2024-04-18 PROCEDURE — 31624 DX BRONCHOSCOPE/LAVAGE: CPT | Performed by: INTERNAL MEDICINE

## 2024-04-18 PROCEDURE — 6370000000 HC RX 637 (ALT 250 FOR IP): Performed by: STUDENT IN AN ORGANIZED HEALTH CARE EDUCATION/TRAINING PROGRAM

## 2024-04-18 PROCEDURE — 99232 SBSQ HOSP IP/OBS MODERATE 35: CPT | Performed by: INTERNAL MEDICINE

## 2024-04-18 PROCEDURE — 31645 BRNCHSC W/THER ASPIR 1ST: CPT | Performed by: INTERNAL MEDICINE

## 2024-04-18 PROCEDURE — 87070 CULTURE OTHR SPECIMN AEROBIC: CPT

## 2024-04-18 PROCEDURE — 85610 PROTHROMBIN TIME: CPT

## 2024-04-18 PROCEDURE — 31622 DX BRONCHOSCOPE/WASH: CPT

## 2024-04-18 PROCEDURE — 36415 COLL VENOUS BLD VENIPUNCTURE: CPT

## 2024-04-18 PROCEDURE — 93971 EXTREMITY STUDY: CPT

## 2024-04-18 PROCEDURE — 94003 VENT MGMT INPAT SUBQ DAY: CPT

## 2024-04-18 PROCEDURE — 87641 MR-STAPH DNA AMP PROBE: CPT

## 2024-04-18 PROCEDURE — 85730 THROMBOPLASTIN TIME PARTIAL: CPT

## 2024-04-18 PROCEDURE — 0B9D8ZX DRAINAGE OF RIGHT MIDDLE LUNG LOBE, VIA NATURAL OR ARTIFICIAL OPENING ENDOSCOPIC, DIAGNOSTIC: ICD-10-PCS | Performed by: INTERNAL MEDICINE

## 2024-04-18 PROCEDURE — 2000000000 HC ICU R&B

## 2024-04-18 PROCEDURE — 99223 1ST HOSP IP/OBS HIGH 75: CPT | Performed by: INTERNAL MEDICINE

## 2024-04-18 PROCEDURE — 94761 N-INVAS EAR/PLS OXIMETRY MLT: CPT

## 2024-04-18 PROCEDURE — 6360000002 HC RX W HCPCS: Performed by: INTERNAL MEDICINE

## 2024-04-18 RX ORDER — GABAPENTIN 600 MG/1
600 TABLET ORAL 3 TIMES DAILY
Status: DISCONTINUED | OUTPATIENT
Start: 2024-04-18 | End: 2024-04-24 | Stop reason: HOSPADM

## 2024-04-18 RX ORDER — DILTIAZEM HYDROCHLORIDE 60 MG/1
60 TABLET, FILM COATED ORAL 4 TIMES DAILY
Status: DISCONTINUED | OUTPATIENT
Start: 2024-04-18 | End: 2024-04-20

## 2024-04-18 RX ORDER — AMIODARONE HYDROCHLORIDE 200 MG/1
200 TABLET ORAL 2 TIMES DAILY
Status: COMPLETED | OUTPATIENT
Start: 2024-04-18 | End: 2024-04-23

## 2024-04-18 RX ORDER — OXYCODONE AND ACETAMINOPHEN 10; 325 MG/1; MG/1
1 TABLET ORAL EVERY 6 HOURS PRN
Status: DISCONTINUED | OUTPATIENT
Start: 2024-04-18 | End: 2024-04-24 | Stop reason: HOSPADM

## 2024-04-18 RX ORDER — ATORVASTATIN CALCIUM 80 MG/1
80 TABLET, FILM COATED ORAL NIGHTLY
Status: DISCONTINUED | OUTPATIENT
Start: 2024-04-18 | End: 2024-04-24 | Stop reason: HOSPADM

## 2024-04-18 RX ORDER — LOSARTAN POTASSIUM 100 MG/1
100 TABLET ORAL DAILY
Status: DISCONTINUED | OUTPATIENT
Start: 2024-04-19 | End: 2024-04-24 | Stop reason: HOSPADM

## 2024-04-18 RX ORDER — HEPARIN SODIUM 10000 [USP'U]/100ML
5-30 INJECTION, SOLUTION INTRAVENOUS CONTINUOUS
Status: DISCONTINUED | OUTPATIENT
Start: 2024-04-18 | End: 2024-04-21

## 2024-04-18 RX ORDER — SENNOSIDES A AND B 8.6 MG/1
1 TABLET, FILM COATED ORAL NIGHTLY
Status: DISCONTINUED | OUTPATIENT
Start: 2024-04-18 | End: 2024-04-24 | Stop reason: HOSPADM

## 2024-04-18 RX ORDER — METHOCARBAMOL 500 MG/1
500 TABLET, FILM COATED ORAL 4 TIMES DAILY PRN
Status: DISCONTINUED | OUTPATIENT
Start: 2024-04-18 | End: 2024-04-19

## 2024-04-18 RX ORDER — HEPARIN SODIUM 10000 [USP'U]/100ML
5-30 INJECTION, SOLUTION INTRAVENOUS CONTINUOUS
Status: DISCONTINUED | OUTPATIENT
Start: 2024-04-18 | End: 2024-04-18

## 2024-04-18 RX ADMIN — SODIUM CHLORIDE 1500 MG: 9 INJECTION, SOLUTION INTRAVENOUS at 13:56

## 2024-04-18 RX ADMIN — GABAPENTIN 600 MG: 600 TABLET, FILM COATED ORAL at 13:58

## 2024-04-18 RX ADMIN — INSULIN LISPRO 4 UNITS: 100 INJECTION, SOLUTION INTRAVENOUS; SUBCUTANEOUS at 03:24

## 2024-04-18 RX ADMIN — INSULIN LISPRO 4 UNITS: 100 INJECTION, SOLUTION INTRAVENOUS; SUBCUTANEOUS at 20:46

## 2024-04-18 RX ADMIN — SODIUM CHLORIDE 30 MG/ML INHALATION SOLUTION 4 ML: 30 SOLUTION INHALANT at 20:15

## 2024-04-18 RX ADMIN — SODIUM CHLORIDE, PRESERVATIVE FREE 10 ML: 5 INJECTION INTRAVENOUS at 08:33

## 2024-04-18 RX ADMIN — SODIUM CHLORIDE 1500 MG: 9 INJECTION, SOLUTION INTRAVENOUS at 21:39

## 2024-04-18 RX ADMIN — HEPARIN SODIUM 5 UNITS/KG/HR: 10000 INJECTION, SOLUTION INTRAVENOUS at 10:00

## 2024-04-18 RX ADMIN — PROPOFOL 30 MCG/KG/MIN: 10 INJECTION, EMULSION INTRAVENOUS at 13:11

## 2024-04-18 RX ADMIN — PIPERACILLIN AND TAZOBACTAM 4500 MG: 4; .5 INJECTION, POWDER, LYOPHILIZED, FOR SOLUTION INTRAVENOUS at 01:21

## 2024-04-18 RX ADMIN — PROPOFOL 10 MCG/KG/MIN: 10 INJECTION, EMULSION INTRAVENOUS at 03:25

## 2024-04-18 RX ADMIN — AMIODARONE HYDROCHLORIDE 200 MG: 200 TABLET ORAL at 20:47

## 2024-04-18 RX ADMIN — ATORVASTATIN CALCIUM 80 MG: 80 TABLET, FILM COATED ORAL at 20:46

## 2024-04-18 RX ADMIN — PROPOFOL 25 MCG/KG/MIN: 10 INJECTION, EMULSION INTRAVENOUS at 17:21

## 2024-04-18 RX ADMIN — GABAPENTIN 600 MG: 600 TABLET, FILM COATED ORAL at 20:46

## 2024-04-18 RX ADMIN — ALBUTEROL SULFATE 2.5 MG: 2.5 SOLUTION RESPIRATORY (INHALATION) at 20:15

## 2024-04-18 RX ADMIN — DIGOXIN 250 MCG: 0.25 INJECTION INTRAMUSCULAR; INTRAVENOUS at 08:48

## 2024-04-18 RX ADMIN — ALBUTEROL SULFATE 2.5 MG: 2.5 SOLUTION RESPIRATORY (INHALATION) at 07:31

## 2024-04-18 RX ADMIN — SODIUM CHLORIDE 30 MG/ML INHALATION SOLUTION 4 ML: 30 SOLUTION INHALANT at 07:31

## 2024-04-18 RX ADMIN — PROPOFOL 20 MCG/KG/MIN: 10 INJECTION, EMULSION INTRAVENOUS at 08:55

## 2024-04-18 RX ADMIN — SODIUM CHLORIDE 30 MG/ML INHALATION SOLUTION 4 ML: 30 SOLUTION INHALANT at 16:17

## 2024-04-18 RX ADMIN — DILTIAZEM HYDROCHLORIDE 60 MG: 60 TABLET ORAL at 20:46

## 2024-04-18 RX ADMIN — PIPERACILLIN AND TAZOBACTAM 4500 MG: 4; .5 INJECTION, POWDER, LYOPHILIZED, FOR SOLUTION INTRAVENOUS at 15:31

## 2024-04-18 RX ADMIN — PROPOFOL 25 MCG/KG/MIN: 10 INJECTION, EMULSION INTRAVENOUS at 20:44

## 2024-04-18 RX ADMIN — INSULIN GLARGINE 40 UNITS: 100 INJECTION, SOLUTION SUBCUTANEOUS at 20:46

## 2024-04-18 RX ADMIN — ALBUTEROL SULFATE 2.5 MG: 2.5 SOLUTION RESPIRATORY (INHALATION) at 16:16

## 2024-04-18 RX ADMIN — PANTOPRAZOLE SODIUM 40 MG: 40 INJECTION, POWDER, FOR SOLUTION INTRAVENOUS at 08:43

## 2024-04-18 RX ADMIN — SENNOSIDES 8.6 MG: 8.6 TABLET, FILM COATED ORAL at 20:47

## 2024-04-18 RX ADMIN — DILTIAZEM HYDROCHLORIDE 60 MG: 60 TABLET ORAL at 17:13

## 2024-04-18 ASSESSMENT — PULMONARY FUNCTION TESTS
PIF_VALUE: 29
PIF_VALUE: 25
PIF_VALUE: 27
PIF_VALUE: 25
PIF_VALUE: 27
PIF_VALUE: 26
PIF_VALUE: 27
PIF_VALUE: 25
PIF_VALUE: 19
PIF_VALUE: 19
PIF_VALUE: 22
PIF_VALUE: 22
PIF_VALUE: 25
PIF_VALUE: 18
PIF_VALUE: 20
PIF_VALUE: 19
PIF_VALUE: 27
PIF_VALUE: 20
PIF_VALUE: 21
PIF_VALUE: 27
PIF_VALUE: 25
PIF_VALUE: 25
PIF_VALUE: 18
PIF_VALUE: 22
PIF_VALUE: 26
PIF_VALUE: 21
PIF_VALUE: 25
PIF_VALUE: 27
PIF_VALUE: 20
PIF_VALUE: 20
PIF_VALUE: 22
PIF_VALUE: 28
PIF_VALUE: 19
PIF_VALUE: 27
PIF_VALUE: 27
PIF_VALUE: 37
PIF_VALUE: 27
PIF_VALUE: 27
PIF_VALUE: 26
PIF_VALUE: 27
PIF_VALUE: 25
PIF_VALUE: 24
PIF_VALUE: 27
PIF_VALUE: 21
PIF_VALUE: 28
PIF_VALUE: 27
PIF_VALUE: 25
PIF_VALUE: 19

## 2024-04-18 ASSESSMENT — PAIN SCALES - GENERAL
PAINLEVEL_OUTOF10: 0
PAINLEVEL_OUTOF10: 0

## 2024-04-18 NOTE — OP NOTE
Operative Note      Patient: Kulwant Lamb  YOB: 1955  MRN: [id number]    Date of Procedure: 4/17/2024    Pre-Op Diagnosis Codes:     * Inadequate oral nutritional intake [E63.9]    Post-Op Diagnosis: Same       Procedure(s):  OPEN GASTROSTOMY TUBE PLACEMENT    Surgeon(s):  Dhaval Gilliam MD    Assistant:   Resident: Candi Lyn MD    Anesthesia: General    Estimated Blood Loss (mL): less than 50     Complications: None    Specimens:   * No specimens in log *    Implants:  * No implants in log *      Drains:   NG/OG/NJ/NE Tube Nasogastric Left nostril (Active)   Surrounding Skin Clean, dry & intact;Reddened 04/17/24 1615   Securement device Bridle 04/17/24 1615   Status Clamped 04/17/24 1615   Placement Verified External Catheter Length 04/17/24 1615   NG/OG/NJ/NE External Measurement (cm) 69 cm 04/17/24 1615   Drainage Appearance None 04/16/24 1640   Tube Feeding Diabetic 04/17/24 0400   Tube feeding/verify rate (mL/hr) 60 mL/hr 04/16/24 1640   Tube Feeding Intake (mL) 0 ml 04/16/24 1640   Tube Feeding Supplement Amount (mL) 0 04/14/24 1400   Free Water/Flush (mL) 0 mL 04/16/24 1640   Output (mL) 0 ml 04/16/24 1640   Action Taken Feed set changed 04/16/24 1640       Gastrostomy/Enterostomy/Jejunostomy Tube Gastrostomy LUQ 20 fr (Active)       Urinary Catheter 04/17/24 [place] (Active)       [REMOVED] NG/OG/NJ/NE Tube Nasoenteric feeding tube 10 fr Right nostril (Removed)   Surrounding Skin Clean, dry & intact 04/10/24 1600   Securement device Adhesive based perez 04/10/24 1600   Status Continuous feeding 04/10/24 1600   Placement Verified External Catheter Length 04/10/24 1600   NG/OG/NJ/NE External Measurement (cm) 56 cm 04/10/24 1600   Drainage Appearance None 04/09/24 1400   Tube Feeding Diabetic 04/10/24 1600   Tube feeding/verify rate (mL/hr) 20 mL/hr 04/10/24 1200   Tube Feeding Intake (mL) 17 ml 04/10/24 0800   Free Water/Flush (mL) 110 mL 04/10/24 0900   Output (mL) 0 ml 
some errors in translation may have occurred.

## 2024-04-19 ENCOUNTER — APPOINTMENT (OUTPATIENT)
Dept: GENERAL RADIOLOGY | Age: 69
DRG: 064 | End: 2024-04-19
Payer: MEDICARE

## 2024-04-19 LAB
ALBUMIN SERPL-MCNC: 2.6 G/DL (ref 3.4–5)
ANION GAP SERPL CALCULATED.3IONS-SCNC: 8 MMOL/L (ref 3–16)
ANTI-XA UNFRAC HEPARIN: 0.12 IU/ML (ref 0.3–0.7)
ANTI-XA UNFRAC HEPARIN: 0.18 IU/ML (ref 0.3–0.7)
ANTI-XA UNFRAC HEPARIN: >1.1 IU/ML (ref 0.3–0.7)
BASE EXCESS BLDA CALC-SCNC: 6 MMOL/L (ref -3–3)
BASOPHILS # BLD: 0 K/UL (ref 0–0.2)
BASOPHILS NFR BLD: 0 %
BUN SERPL-MCNC: 17 MG/DL (ref 7–20)
CALCIUM SERPL-MCNC: 8.1 MG/DL (ref 8.3–10.6)
CHLORIDE SERPL-SCNC: 98 MMOL/L (ref 99–110)
CO2 BLDA-SCNC: 32 MMOL/L
CO2 SERPL-SCNC: 28 MMOL/L (ref 21–32)
CREAT SERPL-MCNC: 0.9 MG/DL (ref 0.8–1.3)
DEPRECATED RDW RBC AUTO: 13.4 % (ref 12.4–15.4)
EOSINOPHIL # BLD: 0 K/UL (ref 0–0.6)
EOSINOPHIL NFR BLD: 0 %
GFR SERPLBLD CREATININE-BSD FMLA CKD-EPI: >90 ML/MIN/{1.73_M2}
GLUCOSE BLD-MCNC: 178 MG/DL (ref 70–99)
GLUCOSE BLD-MCNC: 195 MG/DL (ref 70–99)
GLUCOSE BLD-MCNC: 208 MG/DL (ref 70–99)
GLUCOSE BLD-MCNC: 253 MG/DL (ref 70–99)
GLUCOSE SERPL-MCNC: 194 MG/DL (ref 70–99)
HCO3 BLDA-SCNC: 30.7 MMOL/L (ref 21–29)
HCT VFR BLD AUTO: 36.6 % (ref 40.5–52.5)
HGB BLD-MCNC: 12.2 G/DL (ref 13.5–17.5)
LACTATE BLDV-SCNC: 1.1 MMOL/L (ref 0.4–2)
LYMPHOCYTES # BLD: 2.4 K/UL (ref 1–5.1)
LYMPHOCYTES NFR BLD: 13 %
MAGNESIUM SERPL-MCNC: 1.8 MG/DL (ref 1.8–2.4)
MCH RBC QN AUTO: 30.5 PG (ref 26–34)
MCHC RBC AUTO-ENTMCNC: 33.2 G/DL (ref 31–36)
MCV RBC AUTO: 91.8 FL (ref 80–100)
MONOCYTES # BLD: 0.6 K/UL (ref 0–1.3)
MONOCYTES NFR BLD: 3 %
MRSA DNA SPEC QL NAA+PROBE: NORMAL
NEUTROPHILS # BLD: 15.5 K/UL (ref 1.7–7.7)
NEUTROPHILS NFR BLD: 84 %
PCO2 BLDA: 47.9 MM HG (ref 35–45)
PERFORMED ON: ABNORMAL
PH BLDA: 7.42 [PH] (ref 7.35–7.45)
PHOSPHATE SERPL-MCNC: 3.7 MG/DL (ref 2.5–4.9)
PLATELET # BLD AUTO: 183 K/UL (ref 135–450)
PMV BLD AUTO: 9.4 FL (ref 5–10.5)
PO2 BLDA: 89.9 MM HG (ref 75–108)
POC SAMPLE TYPE: ABNORMAL
POTASSIUM SERPL-SCNC: 4.2 MMOL/L (ref 3.5–5.1)
RBC # BLD AUTO: 3.99 M/UL (ref 4.2–5.9)
SAO2 % BLDA: 97 % (ref 93–100)
SODIUM SERPL-SCNC: 134 MMOL/L (ref 136–145)
WBC # BLD AUTO: 18.5 K/UL (ref 4–11)

## 2024-04-19 PROCEDURE — 6360000002 HC RX W HCPCS

## 2024-04-19 PROCEDURE — 83735 ASSAY OF MAGNESIUM: CPT

## 2024-04-19 PROCEDURE — 36415 COLL VENOUS BLD VENIPUNCTURE: CPT

## 2024-04-19 PROCEDURE — 2500000003 HC RX 250 WO HCPCS

## 2024-04-19 PROCEDURE — 99233 SBSQ HOSP IP/OBS HIGH 50: CPT | Performed by: INTERNAL MEDICINE

## 2024-04-19 PROCEDURE — 2700000000 HC OXYGEN THERAPY PER DAY

## 2024-04-19 PROCEDURE — 6370000000 HC RX 637 (ALT 250 FOR IP): Performed by: STUDENT IN AN ORGANIZED HEALTH CARE EDUCATION/TRAINING PROGRAM

## 2024-04-19 PROCEDURE — 83605 ASSAY OF LACTIC ACID: CPT

## 2024-04-19 PROCEDURE — 94761 N-INVAS EAR/PLS OXIMETRY MLT: CPT

## 2024-04-19 PROCEDURE — 85025 COMPLETE CBC W/AUTO DIFF WBC: CPT

## 2024-04-19 PROCEDURE — C9113 INJ PANTOPRAZOLE SODIUM, VIA: HCPCS

## 2024-04-19 PROCEDURE — 94003 VENT MGMT INPAT SUBQ DAY: CPT

## 2024-04-19 PROCEDURE — 6370000000 HC RX 637 (ALT 250 FOR IP): Performed by: HOSPITALIST

## 2024-04-19 PROCEDURE — 80069 RENAL FUNCTION PANEL: CPT

## 2024-04-19 PROCEDURE — 6370000000 HC RX 637 (ALT 250 FOR IP)

## 2024-04-19 PROCEDURE — 94799 UNLISTED PULMONARY SVC/PX: CPT

## 2024-04-19 PROCEDURE — 94640 AIRWAY INHALATION TREATMENT: CPT

## 2024-04-19 PROCEDURE — 2580000003 HC RX 258

## 2024-04-19 PROCEDURE — 71045 X-RAY EXAM CHEST 1 VIEW: CPT

## 2024-04-19 PROCEDURE — 99233 SBSQ HOSP IP/OBS HIGH 50: CPT | Performed by: NURSE PRACTITIONER

## 2024-04-19 PROCEDURE — 85520 HEPARIN ASSAY: CPT

## 2024-04-19 PROCEDURE — 2000000000 HC ICU R&B

## 2024-04-19 PROCEDURE — 82803 BLOOD GASES ANY COMBINATION: CPT

## 2024-04-19 RX ORDER — HYDRALAZINE HYDROCHLORIDE 20 MG/ML
5 INJECTION INTRAMUSCULAR; INTRAVENOUS EVERY 4 HOURS PRN
Status: DISCONTINUED | OUTPATIENT
Start: 2024-04-19 | End: 2024-04-24 | Stop reason: HOSPADM

## 2024-04-19 RX ORDER — LABETALOL HYDROCHLORIDE 5 MG/ML
10 INJECTION, SOLUTION INTRAVENOUS EVERY 6 HOURS PRN
Status: DISCONTINUED | OUTPATIENT
Start: 2024-04-19 | End: 2024-04-19

## 2024-04-19 RX ORDER — SODIUM CHLORIDE 0.9 % (FLUSH) 0.9 %
5-40 SYRINGE (ML) INJECTION PRN
Status: DISCONTINUED | OUTPATIENT
Start: 2024-04-19 | End: 2024-04-22 | Stop reason: SDUPTHER

## 2024-04-19 RX ORDER — SODIUM CHLORIDE 9 MG/ML
25 INJECTION, SOLUTION INTRAVENOUS PRN
Status: DISCONTINUED | OUTPATIENT
Start: 2024-04-19 | End: 2024-04-24 | Stop reason: HOSPADM

## 2024-04-19 RX ORDER — METHOCARBAMOL 500 MG/1
500 TABLET, FILM COATED ORAL 4 TIMES DAILY PRN
Status: DISCONTINUED | OUTPATIENT
Start: 2024-04-19 | End: 2024-04-24 | Stop reason: HOSPADM

## 2024-04-19 RX ORDER — NIFEDIPINE 30 MG
30 TABLET, EXTENDED RELEASE ORAL DAILY PRN
Status: DISCONTINUED | OUTPATIENT
Start: 2024-04-19 | End: 2024-04-19

## 2024-04-19 RX ORDER — LANSOPRAZOLE 30 MG/1
30 TABLET, ORALLY DISINTEGRATING, DELAYED RELEASE ORAL DAILY
Status: DISCONTINUED | OUTPATIENT
Start: 2024-04-20 | End: 2024-04-24 | Stop reason: HOSPADM

## 2024-04-19 RX ORDER — LIDOCAINE HYDROCHLORIDE 10 MG/ML
5 INJECTION, SOLUTION EPIDURAL; INFILTRATION; INTRACAUDAL; PERINEURAL ONCE
Status: DISCONTINUED | OUTPATIENT
Start: 2024-04-19 | End: 2024-04-22 | Stop reason: SDUPTHER

## 2024-04-19 RX ORDER — SODIUM CHLORIDE 0.9 % (FLUSH) 0.9 %
5-40 SYRINGE (ML) INJECTION EVERY 12 HOURS SCHEDULED
Status: DISCONTINUED | OUTPATIENT
Start: 2024-04-19 | End: 2024-04-22 | Stop reason: SDUPTHER

## 2024-04-19 RX ORDER — POLYETHYLENE GLYCOL 3350 17 G/17G
17 POWDER, FOR SOLUTION ORAL DAILY
Status: DISCONTINUED | OUTPATIENT
Start: 2024-04-19 | End: 2024-04-22 | Stop reason: SDUPTHER

## 2024-04-19 RX ADMIN — PROPOFOL 20 MCG/KG/MIN: 10 INJECTION, EMULSION INTRAVENOUS at 08:56

## 2024-04-19 RX ADMIN — GABAPENTIN 600 MG: 600 TABLET, FILM COATED ORAL at 15:32

## 2024-04-19 RX ADMIN — INSULIN LISPRO 4 UNITS: 100 INJECTION, SOLUTION INTRAVENOUS; SUBCUTANEOUS at 03:05

## 2024-04-19 RX ADMIN — SODIUM CHLORIDE 30 MG/ML INHALATION SOLUTION 4 ML: 30 SOLUTION INHALANT at 08:17

## 2024-04-19 RX ADMIN — ATORVASTATIN CALCIUM 80 MG: 80 TABLET, FILM COATED ORAL at 21:05

## 2024-04-19 RX ADMIN — SENNOSIDES 8.6 MG: 8.6 TABLET, FILM COATED ORAL at 21:05

## 2024-04-19 RX ADMIN — PROPOFOL 25 MCG/KG/MIN: 10 INJECTION, EMULSION INTRAVENOUS at 05:08

## 2024-04-19 RX ADMIN — AMIODARONE HYDROCHLORIDE 200 MG: 200 TABLET ORAL at 09:25

## 2024-04-19 RX ADMIN — HEPARIN SODIUM 10 UNITS/KG/HR: 10000 INJECTION, SOLUTION INTRAVENOUS at 21:36

## 2024-04-19 RX ADMIN — AMIODARONE HYDROCHLORIDE 200 MG: 200 TABLET ORAL at 21:06

## 2024-04-19 RX ADMIN — PIPERACILLIN AND TAZOBACTAM 4500 MG: 4; .5 INJECTION, POWDER, LYOPHILIZED, FOR SOLUTION INTRAVENOUS at 09:00

## 2024-04-19 RX ADMIN — ALBUTEROL SULFATE 2.5 MG: 2.5 SOLUTION RESPIRATORY (INHALATION) at 08:17

## 2024-04-19 RX ADMIN — PIPERACILLIN AND TAZOBACTAM 4500 MG: 4; .5 INJECTION, POWDER, LYOPHILIZED, FOR SOLUTION INTRAVENOUS at 00:10

## 2024-04-19 RX ADMIN — DILTIAZEM HYDROCHLORIDE 60 MG: 60 TABLET ORAL at 09:22

## 2024-04-19 RX ADMIN — SODIUM CHLORIDE, PRESERVATIVE FREE 10 ML: 5 INJECTION INTRAVENOUS at 09:28

## 2024-04-19 RX ADMIN — OXYCODONE AND ACETAMINOPHEN 1 TABLET: 10; 325 TABLET ORAL at 18:01

## 2024-04-19 RX ADMIN — NALOXEGOL OXALATE 12.5 MG: 12.5 TABLET, FILM COATED ORAL at 06:18

## 2024-04-19 RX ADMIN — DILTIAZEM HYDROCHLORIDE 60 MG: 60 TABLET ORAL at 15:33

## 2024-04-19 RX ADMIN — ALBUTEROL SULFATE 2.5 MG: 2.5 SOLUTION RESPIRATORY (INHALATION) at 14:20

## 2024-04-19 RX ADMIN — SODIUM CHLORIDE, PRESERVATIVE FREE 10 ML: 5 INJECTION INTRAVENOUS at 21:19

## 2024-04-19 RX ADMIN — POLYETHYLENE GLYCOL 3350 17 G: 17 POWDER, FOR SOLUTION ORAL at 09:31

## 2024-04-19 RX ADMIN — METOPROLOL TARTRATE 5 MG: 1 INJECTION, SOLUTION INTRAVENOUS at 11:50

## 2024-04-19 RX ADMIN — OXYCODONE AND ACETAMINOPHEN 1 TABLET: 10; 325 TABLET ORAL at 11:57

## 2024-04-19 RX ADMIN — SODIUM CHLORIDE 30 MG/ML INHALATION SOLUTION 4 ML: 30 SOLUTION INHALANT at 14:20

## 2024-04-19 RX ADMIN — DILTIAZEM HYDROCHLORIDE 60 MG: 60 TABLET ORAL at 21:07

## 2024-04-19 RX ADMIN — INSULIN LISPRO 8 UNITS: 100 INJECTION, SOLUTION INTRAVENOUS; SUBCUTANEOUS at 22:05

## 2024-04-19 RX ADMIN — ASPIRIN 81 MG: 81 TABLET, CHEWABLE ORAL at 09:27

## 2024-04-19 RX ADMIN — CLONIDINE HYDROCHLORIDE 0.1 MG: 0.1 TABLET ORAL at 13:47

## 2024-04-19 RX ADMIN — SODIUM CHLORIDE 30 MG/ML INHALATION SOLUTION 4 ML: 30 SOLUTION INHALANT at 21:14

## 2024-04-19 RX ADMIN — HEPARIN SODIUM 11 UNITS/KG/HR: 10000 INJECTION, SOLUTION INTRAVENOUS at 05:14

## 2024-04-19 RX ADMIN — GABAPENTIN 600 MG: 600 TABLET, FILM COATED ORAL at 09:23

## 2024-04-19 RX ADMIN — PIPERACILLIN AND TAZOBACTAM 4500 MG: 4; .5 INJECTION, POWDER, LYOPHILIZED, FOR SOLUTION INTRAVENOUS at 16:28

## 2024-04-19 RX ADMIN — CLONIDINE HYDROCHLORIDE 0.1 MG: 0.1 TABLET ORAL at 21:06

## 2024-04-19 RX ADMIN — PANTOPRAZOLE SODIUM 40 MG: 40 INJECTION, POWDER, FOR SOLUTION INTRAVENOUS at 09:28

## 2024-04-19 RX ADMIN — GABAPENTIN 600 MG: 600 TABLET, FILM COATED ORAL at 21:06

## 2024-04-19 RX ADMIN — ALBUTEROL SULFATE 2.5 MG: 2.5 SOLUTION RESPIRATORY (INHALATION) at 21:13

## 2024-04-19 RX ADMIN — PROPOFOL 25 MCG/KG/MIN: 10 INJECTION, EMULSION INTRAVENOUS at 01:38

## 2024-04-19 RX ADMIN — METHOCARBAMOL 500 MG: 500 TABLET ORAL at 18:00

## 2024-04-19 RX ADMIN — INSULIN GLARGINE 40 UNITS: 100 INJECTION, SOLUTION SUBCUTANEOUS at 22:05

## 2024-04-19 RX ADMIN — LOSARTAN POTASSIUM 100 MG: 100 TABLET, FILM COATED ORAL at 09:26

## 2024-04-19 ASSESSMENT — PAIN SCALES - WONG BAKER
WONGBAKER_NUMERICALRESPONSE: NO HURT

## 2024-04-19 ASSESSMENT — PAIN SCALES - GENERAL
PAINLEVEL_OUTOF10: 0
PAINLEVEL_OUTOF10: 10
PAINLEVEL_OUTOF10: 0
PAINLEVEL_OUTOF10: 10
PAINLEVEL_OUTOF10: 4
PAINLEVEL_OUTOF10: 5
PAINLEVEL_OUTOF10: 10
PAINLEVEL_OUTOF10: 0

## 2024-04-19 ASSESSMENT — PAIN DESCRIPTION - ORIENTATION
ORIENTATION: LEFT
ORIENTATION: MID
ORIENTATION: LEFT

## 2024-04-19 ASSESSMENT — PULMONARY FUNCTION TESTS
PIF_VALUE: 19
PIF_VALUE: 13
PIF_VALUE: 20
PIF_VALUE: 18
PEFR_L/MIN: 21
PIF_VALUE: 13
PIF_VALUE: 19
PIF_VALUE: 18
PIF_VALUE: 18
PIF_VALUE: 7
PIF_VALUE: 20
PIF_VALUE: 18
PIF_VALUE: 15
PIF_VALUE: 18
PIF_VALUE: 14

## 2024-04-19 ASSESSMENT — PAIN DESCRIPTION - LOCATION
LOCATION: ARM
LOCATION: BACK
LOCATION: ARM

## 2024-04-19 ASSESSMENT — PAIN DESCRIPTION - DESCRIPTORS
DESCRIPTORS: ACHING

## 2024-04-19 NOTE — CONSULTS
Urology Attending Consult Note      Reason for Consultation: Retention    History: 69 yo M with history of DM and prior CVA who came to the hospital for nausea/vomiting and loss of balance. CTA in ED reveaeled occlusion of L vertebral artery and he was admitted to ICU. Prior to being hospitalized he reports voiding well without issue but developed frequency and inability to void while being admitted. Morgan was inserted last night for 1.6L urine. He has no issues with morgan, draining well. Remains in ICU.     Family History, Social History, Review of Systems:  Reviewed and agreed to as per chart    Vitals:  BP (!) 155/89   Pulse 88   Temp 97.8 °F (36.6 °C) (Temporal)   Resp 23   Ht 1.829 m (6')   Wt (!) 160.8 kg (354 lb 9.6 oz)   SpO2 96%   BMI 48.09 kg/m²   Temp  Av.9 °F (36.6 °C)  Min: 97.4 °F (36.3 °C)  Max: 98.4 °F (36.9 °C)    Intake/Output Summary (Last 24 hours) at 2024 1404  Last data filed at 2024 1000  Gross per 24 hour   Intake 0 ml   Output 2525 ml   Net -2525 ml         Physical:  Well developed, well nourished in no acute distress  Mood indicates no abnormalities. Pt doesn’t appear depressed  Orientated to time and place  Neck is supple, trachea is midline       Male :  Urine clear      Labs:  WBC:    Lab Results   Component Value Date/Time    WBC 17.4 2024 03:54 AM     Hemoglobin/Hematocrit:    Lab Results   Component Value Date/Time    HGB 16.5 2024 03:54 AM    HCT 49.0 2024 03:54 AM     BMP:    Lab Results   Component Value Date/Time     2024 09:06 AM    K 4.6 2024 09:06 AM    K 4.0 2024 10:21 AM    CL 91 2024 09:06 AM    CO2 23 2024 09:06 AM    BUN 22 2024 09:06 AM    LABALBU 4.4 2024 10:21 AM    CREATININE 0.7 2024 09:06 AM    CALCIUM 9.0 2024 09:06 AM    GFRAA >60 2022 10:42 AM    GFRAA >60 2012 11:32 AM    LABGLOM >90 2024 09:06 AM     PT/INR:    Lab Results   Component Value 
  Comprehensive Nutrition Assessment    RECOMMENDATIONS:  Modify TF  Glucerna 1.5 @ 20 mL/hr, increase 10 mL q4h until goal of 60 mL/hr is met  Flushes: 80 mL q4h  Nutrition Education: Education not indicated     NUTRITION ASSESSMENT:   Nutritional summary & status: Consult for TF: Pt failed x2 FEES s/p stroke, NGT in place and TF started overnight. Modifying to diabetic formula and adjusted rate to meet pt needs. He has been unable to take any PO for the past week. No recent wt loss, eating well prior to stroke. All lytes WNL, last BM x5 days ago. Bowel regimen on board PRN.   Admission // PMH: Vertebral artery occlusion//stroke, T2DM, ataxia, hematemesis w/ nausea, pneumonia, HTN, dyslipidemia    MALNUTRITION ASSESSMENT  Context of Malnutrition: Acute Illness   Malnutrition Status: At risk for malnutrition (Comment)  Findings of the 6 clinical characteristics of malnutrition (Minimum of 2 out of 6 clinical characteristics is required to make the diagnosis of moderate or severe Protein Calorie Malnutrition based on AND/ASPEN Guidelines):  Energy Intake:  50% or less of estimated energy requirements for 5 or more days  Weight Loss:  No significant weight loss     Body Fat Loss:  No significant body fat loss     Muscle Mass Loss:  No significant muscle mass loss    Fluid Accumulation:  No significant fluid accumulation      NUTRITION DIAGNOSIS   Inadequate oral intake related to swallowing difficulty as evidenced by swallow study results    Nutrition Monitoring and Evaluation:   Food/Nutrient Intake Outcomes:  Enteral Nutrition Intake/Tolerance  Physical Signs/Symptoms Outcomes:  Biochemical Data, Nutrition Focused Physical Findings, Weight     OBJECTIVE DATA: Significant to nutrition assessment  Nutrition Related Findings: . +BM 4/6. -6L.  Wounds: None  Nutrition Goals: Tolerate nutrition support at goal rate     CURRENT NUTRITION THERAPIES  Current Tube Feeding (TF) Orders:  Feeding Route: 
Clinical Pharmacy Consult Note    Vancomycin has been discontinued as MRSA nares has resulted as negative as of this AM. Pharmacy will sign off on dosing at this time.     If resumed, please re-consult Pharmacy.    Please call with questions.    Tirxie Berumen PharmD, [facility]  Clinical Pharmacist - Emergency Med  Wireless: [phone]  4/19/2024 10:59 AM    
Patient: Kulwant Lamb  [id number]  Date: 4/8/2024      Chief Complaint: CVA    History of Present Illness/Hospital Course:  Kulwant Lamb is a 68 year old male with a past medical history significant for morbid obesity, DM2, pancreatitis, CVA, and DVT not on AC who presented to [facility] on 4/4/24 with weakness, light headedness, and dizziness following episode of hematemesis. In the ED he was found to be hypoxic with leukocytosis, elevated lactic acid, and hyponatremia. CT head was negative for acute process. CT AP was negative for acute process. CTA head and neck revealed left vertebral artery occlusion. Neurology and Neurosurgery were consulted. MRI brain revealed acute ischemic infarction involving the left inferior cerebellum, cerebellar tonsil and lateral margin of the medulla. Repeat MRI brain showed increased stroke. He is on a heparin gtt and asa. Urology was consulted for urinary retention. His course has been notable for acute hypoxic respiratory failure. Today he had a FEES and is NPO. He continues to have functional deficits below his baseline. Today Kulwant is seen in his room without family present. He reports difficulty speaking due to recently having a procedure. He notes chronic low back pain, but is currently having left shoulder pain. He is interested in ARU.      has a past medical history of Acute gallstone pancreatitis, Arthritis, Cerebral artery occlusion with cerebral infarction (HCC), Chronic back pain, DVT, Gallstone pancreatitis, Hearing loss, Hx of blood clots, Hypertension, Type II or unspecified type diabetes mellitus without mention of complication, not stated as uncontrolled, and Urinary retention.     has a past surgical history that includes Tonsillectomy and Cholecystectomy, laparoscopic (03/09/2018).     reports that he quit smoking about 46 years ago. His smoking use included cigarettes. He started smoking about 49 years ago. He has a 1.5 pack-year smoking history. He has never used 
Patient: Kulwant Lamb  [id number]  Date: 4/17/2024      Chief Complaint: CVA    History of Present Illness/Hospital Course:  Kulwant Lamb is a 68 year old male with a past medical history significant for morbid obesity, DM2, pancreatitis, CVA, and DVT not on AC who presented to [facility] on 4/4/24 with weakness, light headedness, and dizziness following episode of hematemesis. In the ED he was found to be hypoxic with leukocytosis, elevated lactic acid, and hyponatremia. CT head was negative for acute process. CT AP was negative for acute process. CTA head and neck revealed left vertebral artery occlusion. Neurology and Neurosurgery were consulted. MRI brain revealed acute ischemic infarction involving the left inferior cerebellum, cerebellar tonsil and lateral margin of the medulla. Repeat MRI brain showed increased stroke. He is on a heparin gtt and asa. Urology was consulted for urinary retention. His course has been notable for acute hypoxic respiratory failure. Today he had a FEES and is NPO. He continues to have functional deficits below his baseline. Today Kulwant is seen in his room without family present. He reports difficulty speaking due to recently having a procedure. He notes chronic low back pain, but is currently having left shoulder pain. He is interested in ARU.         Interval history: Limited in therapy on last evaluation. Medically complex at this time.      has a past medical history of Acute gallstone pancreatitis, Arthritis, Cerebral artery occlusion with cerebral infarction (HCC), Chronic back pain, DVT, Gallstone pancreatitis, Hearing loss, Hx of blood clots, Hypertension, Type II or unspecified type diabetes mellitus without mention of complication, not stated as uncontrolled, and Urinary retention.     has a past surgical history that includes Tonsillectomy; Cholecystectomy, laparoscopic (03/09/2018); and Upper gastrointestinal endoscopy (N/A, 4/12/2024).     reports that he quit smoking about 48 
[facility]  CARDIOLOGY  CONSULTATION         Reason for Consultation/Chief Complaint: \"I have been having AF with RVR.\"       History of Present Illness:  Kulwant Lamb is a 68 y.o. male with Hx of morbid obesity, previous L thalamic stroke (2018) with residual R lower face/hand deficits, type 2 DM who presented to [facility] with imbalance, nausea/vomiting and hematemesis with L hand numbness for which he was found to have complete occlusion of L vertebral artery V3 and V4 with high-grade stenosis of bilateral posterior cerebral artery and L posterior communicating artery on April 4th. He has new onset aphonia and moderate pharyngeal dysphagia.  He was placed on 2 L of oxygen and his labs were significant for leukocytosis of 16, lactic acidosis of 2.5, and his blood sugars in the 260s. His hemoglobin was within normal limits. His chest x-ray was notable for mild consolidation in the left lung while CT head without contrast did not show any acute intracranial hemorrhage or mass effect. However CTA head and neck that showed occlusion of the left vertebral artery. UC stroke team was called and did not recommend TNK as the patient was outside the window but did recommend heparin. He was admitted to the ICU for every hour neurochecks and started on heparin gtt after CT scans showed stable lesion. MRI showed an infarct in the left inferior cerebellum and occlusion of the left vertebral artery, now with resolved occlusion on repeat CTA Head.    On 4/424 the pt was in NSR.  4/14/24 ECG shows AF with RVR and no acute ST changes.  Pt appears to have developed acute AF with RVR at 120-160s bpm at about 8 pm last night.   Past Medical History:   has a past medical history of Acute gallstone pancreatitis, Arthritis, Cerebral artery occlusion with cerebral infarction (HCC), Chronic back pain, DVT, Gallstone pancreatitis, Hearing loss, Hx of blood clots, Hypertension, Type II or unspecified type diabetes mellitus without 
[facility] -  Clinical Pharmacy Note    Vancomycin - Management by Pharmacy    Consult Date(s): 04/14/2024  Consulting Provider(s): Dr. Carolee Rodriguez    Assessment / Plan  Sepsis- Vancomycin  Concurrent Antimicrobials:   Zosyn  Day of Vanc Therapy / Ordered Duration: Day 1 of 7  Current Dosing Method: Intermittent Dosing by Levels  Therapeutic Goal: Trough 10-15 mg/L  Current Dose / Plan:   Vancomycin 2500 mg (~16 mg/kg) IV x1  Will dose based on levels due to unstable renal function  Obtaining Vancomycin level 04/16 morning  Will continue to monitor clinical condition and make adjustments to regimen as appropriate.    Thank you for consulting pharmacy,      Tamara Rinaldi, PharmD  [id number] (Main Pharmacy)        Interval update:  therapy initiation     Subjective/Objective:   Kulwant Lamb is a 68 y.o. male with a PMHx significant for morbid obesity, type 2 diabetes, pancreatitis, CVA, DVT (not on anticoagulation) who is admitted with vertebral artery occlusion.     Pharmacy is consulted to dose Vancomycin.    Ht Readings from Last 1 Encounters:   04/04/24 1.829 m (6')     Wt Readings from Last 1 Encounters:   04/14/24 (!) 152.1 kg (335 lb 5.1 oz)     Current & Prior Antimicrobial Regimen(s):  Zosyn 3375 mg Q8H  Vancomycin- Pharmacy to dose  Vancomycin 2500 mg (~16 mg/kg) IV x1  Will dose based on levels due to unstable renal function    Vancomycin Level(s) / Doses:    Date Time Dose Type of Level / Level Interpretation                 Note: Serum levels collected for AUC-based dosing may be high if collected in close proximity to the dose administered. This is not necessarily indicative of toxicity.    Cultures & Sensitivities:    Date Site Micro Susceptibility / Result                 Recent Labs     04/14/24  0354 04/14/24  2033 04/14/24  2203 04/15/24  0420   CREATININE 0.9 1.3  --  1.4*   BUN 38* 42*  --  39*   WBC 17.8*  --  23.5* 24.8*       Estimated Creatinine Clearance: 79 mL/min (A) (based on 
x-ray findings.  Patient denies any new symptoms.      Past Medical Hx:      Diagnosis Date    Acute gallstone pancreatitis 2/9/2018    Arthritis     Cerebral artery occlusion with cerebral infarction (HCC)     Chronic back pain     DVT     Gallstone pancreatitis 2/9/2018    Hearing loss     Hx of blood clots     dvt    Hypertension     Movement disorder     Obesity     Type II or unspecified type diabetes mellitus without mention of complication, not stated as uncontrolled     Urinary retention 4/5/2024       Past Surgical Hx:      Procedure Laterality Date    CHOLECYSTECTOMY, LAPAROSCOPIC  03/09/2018    TONSILLECTOMY          Home Medication:  Prior to Admission medications    Medication Sig Start Date End Date Taking? Authorizing Provider   insulin NPH (NOVOLIN N) 100 UNIT/ML injection vial ADMINISTER 10 TO 40 UNITS UNDER THE SKIN THREE TIMES DAILY PER SLIDING SCALE 3/15/24   Margoth Webber MD   ONETOUCH ULTRA strip USE THREE TIMES DAILY AND AS NEEDED FOR SYMPTOMS OF IRREGULAR GLCOSE 1/16/24   Margoth Webber MD   potassium chloride (KLOR-CON) 10 MEQ extended release tablet TAKE 2 TABLETS BY MOUTH 6 TIMES DAILY 12/12/23   Margoth Webber MD   ferrous sulfate (IRON 325) 325 (65 Fe) MG tablet Take 1 tablet by mouth daily (with breakfast)    ProviderRamon MD   olmesartan (BENICAR) 40 MG tablet Take 1 tablet by mouth daily 12/4/23   Margoth Webber MD   amLODIPine (NORVASC) 5 MG tablet Take 1 tablet by mouth daily 11/10/23   Margoth Webber MD   Insulin Syringe-Needle U-100 (BD INSULIN SYRINGE U/F) 31G X 5/16\" 0.5 ML MISC USE THREE TIMES DAILY AS DIRCTED 10/23/23   Margoth Webber MD   oxyCODONE-acetaminophen (PERCOCET)  MG per tablet Take 1 tablet by mouth every 6 hours as needed. 7/28/23   Ramon Carmona MD   atorvastatin (LIPITOR) 40 MG tablet TAKE 1 TABLET BY MOUTH EVERY NIGHT AT BEDTIME 1/28/22   Margoth Webber MD   metOLazone (ZAROXOLYN) 5 MG tablet TAKE 1/2 TABLET BY MOUTH EVERY DAY  Patient taking 
From To Years    0 11/21/1977  46.4    0.5 11/21/1974 [birth year]21/1977 3.0      Smokeless Tobacco Use  Never              Alcohol History       Alcohol Use Status  No              Drug Use       Drug Use Status  No              Sexual Activity       Sexually Active  Yes                     Family History:  Family History   Problem Relation Age of Onset    Cancer Father     Other Mother         Allergies:  Allergies   Allergen Reactions    Adhesive Tape Other (See Comments)     Unknown reaction    Corticosteroids Other (See Comments)     \"Pt states he can not tolerate steroid injection\"        ROS:   General: No fever or weight change  Hematologic: No unexpected submucosal bleeding or bruising  HEENT: No sore throat or facial pain  Respiratory: No cough or dyspnea  Cardiovascular: No angina or dependent edema  Gastrointestinal: See HPI  Musculoskeletal: No usual joint pain or stiffness  Skin: No skin eruptions or changing lesions  Neurologic: No focal weakness or numbness  Psychiatric: No anxiety or sleep disturbance    Physical Exam:  Vital Signs:   Vitals:    04/11/24 1005   BP:    Pulse:    Resp: 14   Temp:    SpO2:        General: Well-nourished, well-developed  HEENT: Sclera anicteric, mucosal membranes moist  Cardiovascular: Regular rate and rhythm.  No murmurs.  Respiratory: Respirations nonlabored, no crepitus  GI: Abdomen nondistended, soft, and nontender.  Normal active bowel sounds.  No masses palpable.   Rectal: Deferred  Musculoskeletal: No pitting edema of the lower legs.  Neurological: Gross memory appears intact.  Patient is alert and oriented      Recent Imaging:   XR ABDOMEN (KUB) (SINGLE AP VIEW)  Narrative: Exam: Portable Abdomen, 1 view    History: following NG tube insertion  Impression: Findings/IMPRESSION:  The tip of the enteric tube terminates in the body of the stomach.       Labs:   Recent Labs     04/09/24  0846 04/10/24  0418 04/11/24  0355   WBC 12.0* 12.4* 17.2*   HGB 16.7 16.5 15.7   PLT 
nerves: pupils 3mm->2mm bilaterally; fields intact; EOMI, no nystagmus; sensation intact V1, V2, V3; no facial asymmetry; hearing grossly intact bilaterally; palate elevates symmetrically; SCMs & trapezii intact bilaterally; tongue midline  -Sensory: intact to lt touch throughout  -Motor:   RUE: 5/5, no pronator drift  RLE: 5/5  LUE: 5/5, no pronator drift  LLE: 5/5  -Tone: Normal throughout  -Reflexes: 1+ & symmetric throughout  -Coordination: FNF intact  -Gait & Station: deferred for pt safety  -Other: no adventitious movements noted  Other Systems  -General Appearance: well-developed, well-nourished, no apparent distress  -Neck: supple  -Lungs: breathing unlabored, regular, no audible wheezes  -CV: pulses strong x4 extremities  -Abd: flat  -Skin: warm to touch, no wounds  -Psych: pleasant and appropriate  -Extrem: no c/c/e    NIHSS Score: 1 (for left hand sensation)      Imaging:  All reports below personally reviewed & actual images reviewed where indicated. Pertinent positives & negatives are addressed in Assessment & Plan section of note  CT HEAD WO CONTRAST   My Read: old left thalamic stroke; Smvesdz; some atrophy  Final Read:    1.  No acute intracranial hemorrhage or mass effect.   2.  Mild burden nonspecific white matter disease compatible with chronic small   vessel ischemia. Chronic left thalamus lacunar infarct.      CTA HEAD NECK W CONTRAST   My Read: left V3 and V4 segment occlusions; svr stenosis of bilateral PCAs and left pComm  Final Read:    1.  Complete occlusion of the left vertebral artery V3 and V4 segments new from   prior CTA and likely acute occlusion.   2.  New high-grade stenosis without complete occlusion within the bilateral   posterior cerebral artery proximal segments. New high-grade stenosis or   occlusion of the left posterior communicating artery PCA insertion.   3.  No carotid artery or other anterior circulation steno-occlusive disease.        Laboratory Review:   All results 
involving the   intracranial left vertebral artery (new compared to 7/25/2018)      3. No intracranial hemorrhage or mass effect      4. Mild to moderate chronic small vessel ischemia               CT HEAD WO CONTRAST   Final Result      1.  No acute intracranial hemorrhage or mass effect.   2.  Mild burden nonspecific white matter disease compatible with chronic small   vessel ischemia. Chronic left thalamus lacunar infarct.      CTA HEAD NECK W CONTRAST   Final Result      1.  Complete occlusion of the left vertebral artery V3 and V4 segments new from   prior CTA and likely acute occlusion.   2.  New high-grade stenosis without complete occlusion within the bilateral   posterior cerebral artery proximal segments. New high-grade stenosis or   occlusion of the left posterior communicating artery PCA insertion.   3.  No carotid artery or other anterior circulation steno-occlusive disease.          Critical result of vertebral artery occlusion reported to the ED resident Dr. Bullock on 11:47 a.m. 4/4/2024.      CT ABDOMEN PELVIS W IV CONTRAST Additional Contrast? None   Final Result      1.  Motion compromised exam without definite acute abnormality.   2.  Severely distended bladder.   3.  Nonobstructing calculi in the right kidney.      XR CHEST PORTABLE   Final Result      Mild patchy consolidation left perihilar lung.      Elevated right hemidiaphragm with mild right basilar atelectasis.      Cardiomegaly.      XR CHEST PORTABLE    (Results Pending)       EKG:   Echo:  Micro:     ASSESSMENT AND PLAN:   Kulwant Lamb is a 68 y.o. male w a PMHx of T2DM, prior CVA, morbid obesity who presented with N/V and loss of balance. Placed in ICU s/p G tube placement, remaining intubated.    Mechanical Ventilation  Pt presented to ICU s/p G tube placement 4/17. Remained intubated 2/2 underlying pneumonia, difficult intubation.   - current settings: , RR 16, FiO2 60, PEEP 10  - sedated on propofol 10  - ABG pending; 
intervention  - Patient has only been on enteric tube feeds via [place] since 4/10. Recommend to continue TF via [place] until goal  - Discussed with neuro-critical care regarding prognosis and possibility of improvement in pharyngeal dysphagia  - If patient requires long-term nutrition, patient could either continue with [place] versus surgical PEG/GJ/J-tube placement depending on prognosis  - Will discuss further surgical interventions with attending as early as next week  - Continue working with SLP      The patient was seen by senior resident and discussed with Dr. Kirt Washington.      Mayra Christine,   04/12/24  4:30 PM    I have seen, examined, and reviewed the patients chart. I agree with the residents assessment and have made appropriate changes.    Dhaval Gilliam

## 2024-04-20 LAB
ALBUMIN SERPL-MCNC: 2.8 G/DL (ref 3.4–5)
ANION GAP SERPL CALCULATED.3IONS-SCNC: 12 MMOL/L (ref 3–16)
ANION GAP SERPL CALCULATED.3IONS-SCNC: 9 MMOL/L (ref 3–16)
ANTI-XA UNFRAC HEPARIN: 0.16 IU/ML (ref 0.3–0.7)
ANTI-XA UNFRAC HEPARIN: 0.17 IU/ML (ref 0.3–0.7)
ANTI-XA UNFRAC HEPARIN: 0.39 IU/ML (ref 0.3–0.7)
ANTI-XA UNFRAC HEPARIN: 0.46 IU/ML (ref 0.3–0.7)
BACTERIA SPEC RESP CULT: NORMAL
BASOPHILS # BLD: 0.3 K/UL (ref 0–0.2)
BASOPHILS NFR BLD: 2 %
BUN SERPL-MCNC: 15 MG/DL (ref 7–20)
BUN SERPL-MCNC: 15 MG/DL (ref 7–20)
CALCIUM SERPL-MCNC: 7.9 MG/DL (ref 8.3–10.6)
CALCIUM SERPL-MCNC: 8 MG/DL (ref 8.3–10.6)
CHLORIDE SERPL-SCNC: 98 MMOL/L (ref 99–110)
CHLORIDE SERPL-SCNC: 98 MMOL/L (ref 99–110)
CO2 SERPL-SCNC: 28 MMOL/L (ref 21–32)
CO2 SERPL-SCNC: 30 MMOL/L (ref 21–32)
CREAT SERPL-MCNC: 0.7 MG/DL (ref 0.8–1.3)
CREAT SERPL-MCNC: 0.7 MG/DL (ref 0.8–1.3)
DEPRECATED RDW RBC AUTO: 13.3 % (ref 12.4–15.4)
EOSINOPHIL # BLD: 0.3 K/UL (ref 0–0.6)
EOSINOPHIL NFR BLD: 2 %
GFR SERPLBLD CREATININE-BSD FMLA CKD-EPI: >90 ML/MIN/{1.73_M2}
GFR SERPLBLD CREATININE-BSD FMLA CKD-EPI: >90 ML/MIN/{1.73_M2}
GLUCOSE BLD-MCNC: 210 MG/DL (ref 70–99)
GLUCOSE BLD-MCNC: 210 MG/DL (ref 70–99)
GLUCOSE BLD-MCNC: 228 MG/DL (ref 70–99)
GLUCOSE BLD-MCNC: 237 MG/DL (ref 70–99)
GLUCOSE BLD-MCNC: 246 MG/DL (ref 70–99)
GLUCOSE BLD-MCNC: 266 MG/DL (ref 70–99)
GLUCOSE SERPL-MCNC: 225 MG/DL (ref 70–99)
GLUCOSE SERPL-MCNC: 225 MG/DL (ref 70–99)
GRAM STN SPEC: NORMAL
HCT VFR BLD AUTO: 36.6 % (ref 40.5–52.5)
HGB BLD-MCNC: 12.1 G/DL (ref 13.5–17.5)
LYMPHOCYTES # BLD: 2.8 K/UL (ref 1–5.1)
LYMPHOCYTES NFR BLD: 20 %
MAGNESIUM SERPL-MCNC: 1.9 MG/DL (ref 1.8–2.4)
MCH RBC QN AUTO: 30.4 PG (ref 26–34)
MCHC RBC AUTO-ENTMCNC: 32.9 G/DL (ref 31–36)
MCV RBC AUTO: 92.4 FL (ref 80–100)
MONOCYTES # BLD: 1.3 K/UL (ref 0–1.3)
MONOCYTES NFR BLD: 9 %
NEUTROPHILS # BLD: 9.3 K/UL (ref 1.7–7.7)
NEUTROPHILS NFR BLD: 65 %
NEUTS BAND NFR BLD MANUAL: 2 % (ref 0–7)
PERFORMED ON: ABNORMAL
PHOSPHATE SERPL-MCNC: 3.5 MG/DL (ref 2.5–4.9)
PLATELET # BLD AUTO: 177 K/UL (ref 135–450)
PMV BLD AUTO: 9.4 FL (ref 5–10.5)
POTASSIUM SERPL-SCNC: 3.7 MMOL/L (ref 3.5–5.1)
POTASSIUM SERPL-SCNC: 3.8 MMOL/L (ref 3.5–5.1)
RBC # BLD AUTO: 3.97 M/UL (ref 4.2–5.9)
SODIUM SERPL-SCNC: 137 MMOL/L (ref 136–145)
SODIUM SERPL-SCNC: 138 MMOL/L (ref 136–145)
WBC # BLD AUTO: 13.9 K/UL (ref 4–11)

## 2024-04-20 PROCEDURE — 6360000002 HC RX W HCPCS

## 2024-04-20 PROCEDURE — 94640 AIRWAY INHALATION TREATMENT: CPT

## 2024-04-20 PROCEDURE — 99233 SBSQ HOSP IP/OBS HIGH 50: CPT | Performed by: INTERNAL MEDICINE

## 2024-04-20 PROCEDURE — 6370000000 HC RX 637 (ALT 250 FOR IP): Performed by: HOSPITALIST

## 2024-04-20 PROCEDURE — 6370000000 HC RX 637 (ALT 250 FOR IP)

## 2024-04-20 PROCEDURE — 2580000003 HC RX 258

## 2024-04-20 PROCEDURE — 83735 ASSAY OF MAGNESIUM: CPT

## 2024-04-20 PROCEDURE — 6370000000 HC RX 637 (ALT 250 FOR IP): Performed by: INTERNAL MEDICINE

## 2024-04-20 PROCEDURE — 85520 HEPARIN ASSAY: CPT

## 2024-04-20 PROCEDURE — 85025 COMPLETE CBC W/AUTO DIFF WBC: CPT

## 2024-04-20 PROCEDURE — 2700000000 HC OXYGEN THERAPY PER DAY

## 2024-04-20 PROCEDURE — 94761 N-INVAS EAR/PLS OXIMETRY MLT: CPT

## 2024-04-20 PROCEDURE — 99233 SBSQ HOSP IP/OBS HIGH 50: CPT | Performed by: NURSE PRACTITIONER

## 2024-04-20 PROCEDURE — 36415 COLL VENOUS BLD VENIPUNCTURE: CPT

## 2024-04-20 PROCEDURE — 1200000000 HC SEMI PRIVATE

## 2024-04-20 PROCEDURE — 80069 RENAL FUNCTION PANEL: CPT

## 2024-04-20 PROCEDURE — 6370000000 HC RX 637 (ALT 250 FOR IP): Performed by: STUDENT IN AN ORGANIZED HEALTH CARE EDUCATION/TRAINING PROGRAM

## 2024-04-20 RX ORDER — AMIODARONE HYDROCHLORIDE 200 MG/1
200 TABLET ORAL DAILY
Status: DISCONTINUED | OUTPATIENT
Start: 2024-04-24 | End: 2024-04-24 | Stop reason: HOSPADM

## 2024-04-20 RX ORDER — AMLODIPINE BESYLATE 5 MG/1
5 TABLET ORAL DAILY
Status: DISCONTINUED | OUTPATIENT
Start: 2024-04-20 | End: 2024-04-24 | Stop reason: HOSPADM

## 2024-04-20 RX ADMIN — HEPARIN SODIUM 14 UNITS/KG/HR: 10000 INJECTION, SOLUTION INTRAVENOUS at 11:50

## 2024-04-20 RX ADMIN — OXYCODONE AND ACETAMINOPHEN 1 TABLET: 10; 325 TABLET ORAL at 00:18

## 2024-04-20 RX ADMIN — SODIUM CHLORIDE, PRESERVATIVE FREE 10 ML: 5 INJECTION INTRAVENOUS at 08:47

## 2024-04-20 RX ADMIN — PIPERACILLIN AND TAZOBACTAM 4500 MG: 4; .5 INJECTION, POWDER, LYOPHILIZED, FOR SOLUTION INTRAVENOUS at 16:19

## 2024-04-20 RX ADMIN — METHOCARBAMOL 500 MG: 500 TABLET ORAL at 00:18

## 2024-04-20 RX ADMIN — OXYCODONE AND ACETAMINOPHEN 1 TABLET: 10; 325 TABLET ORAL at 12:32

## 2024-04-20 RX ADMIN — POLYETHYLENE GLYCOL 3350 17 G: 17 POWDER, FOR SOLUTION ORAL at 08:41

## 2024-04-20 RX ADMIN — ALBUTEROL SULFATE 2.5 MG: 2.5 SOLUTION RESPIRATORY (INHALATION) at 15:13

## 2024-04-20 RX ADMIN — GABAPENTIN 600 MG: 600 TABLET, FILM COATED ORAL at 21:03

## 2024-04-20 RX ADMIN — PIPERACILLIN AND TAZOBACTAM 4500 MG: 4; .5 INJECTION, POWDER, LYOPHILIZED, FOR SOLUTION INTRAVENOUS at 08:02

## 2024-04-20 RX ADMIN — AMIODARONE HYDROCHLORIDE 200 MG: 200 TABLET ORAL at 21:03

## 2024-04-20 RX ADMIN — INSULIN LISPRO 4 UNITS: 100 INJECTION, SOLUTION INTRAVENOUS; SUBCUTANEOUS at 03:03

## 2024-04-20 RX ADMIN — NALOXEGOL OXALATE 12.5 MG: 12.5 TABLET, FILM COATED ORAL at 06:18

## 2024-04-20 RX ADMIN — DILTIAZEM HYDROCHLORIDE 60 MG: 60 TABLET ORAL at 12:00

## 2024-04-20 RX ADMIN — PIPERACILLIN AND TAZOBACTAM 4500 MG: 4; .5 INJECTION, POWDER, LYOPHILIZED, FOR SOLUTION INTRAVENOUS at 00:21

## 2024-04-20 RX ADMIN — SENNOSIDES 8.6 MG: 8.6 TABLET, FILM COATED ORAL at 21:03

## 2024-04-20 RX ADMIN — DILTIAZEM HYDROCHLORIDE 60 MG: 60 TABLET ORAL at 08:41

## 2024-04-20 RX ADMIN — AMIODARONE HYDROCHLORIDE 200 MG: 200 TABLET ORAL at 08:42

## 2024-04-20 RX ADMIN — GABAPENTIN 600 MG: 600 TABLET, FILM COATED ORAL at 08:41

## 2024-04-20 RX ADMIN — ASPIRIN 81 MG: 81 TABLET, CHEWABLE ORAL at 08:42

## 2024-04-20 RX ADMIN — METHOCARBAMOL 500 MG: 500 TABLET ORAL at 06:18

## 2024-04-20 RX ADMIN — INSULIN GLARGINE 40 UNITS: 100 INJECTION, SOLUTION SUBCUTANEOUS at 21:08

## 2024-04-20 RX ADMIN — CLONIDINE HYDROCHLORIDE 0.1 MG: 0.1 TABLET ORAL at 08:42

## 2024-04-20 RX ADMIN — CLONIDINE HYDROCHLORIDE 0.1 MG: 0.1 TABLET ORAL at 21:03

## 2024-04-20 RX ADMIN — GABAPENTIN 600 MG: 600 TABLET, FILM COATED ORAL at 14:20

## 2024-04-20 RX ADMIN — ALBUTEROL SULFATE 2.5 MG: 2.5 SOLUTION RESPIRATORY (INHALATION) at 08:59

## 2024-04-20 RX ADMIN — SODIUM CHLORIDE, PRESERVATIVE FREE 10 ML: 5 INJECTION INTRAVENOUS at 21:17

## 2024-04-20 RX ADMIN — OXYCODONE AND ACETAMINOPHEN 1 TABLET: 10; 325 TABLET ORAL at 18:03

## 2024-04-20 RX ADMIN — METHOCARBAMOL 500 MG: 500 TABLET ORAL at 12:00

## 2024-04-20 RX ADMIN — METHOCARBAMOL 500 MG: 500 TABLET ORAL at 21:03

## 2024-04-20 RX ADMIN — INSULIN LISPRO 4 UNITS: 100 INJECTION, SOLUTION INTRAVENOUS; SUBCUTANEOUS at 09:01

## 2024-04-20 RX ADMIN — LANSOPRAZOLE 30 MG: 30 TABLET, ORALLY DISINTEGRATING, DELAYED RELEASE ORAL at 08:47

## 2024-04-20 RX ADMIN — HEPARIN SODIUM 14 UNITS/KG/HR: 10000 INJECTION, SOLUTION INTRAVENOUS at 22:55

## 2024-04-20 RX ADMIN — INSULIN LISPRO 8 UNITS: 100 INJECTION, SOLUTION INTRAVENOUS; SUBCUTANEOUS at 15:07

## 2024-04-20 RX ADMIN — LOSARTAN POTASSIUM 100 MG: 100 TABLET, FILM COATED ORAL at 08:42

## 2024-04-20 RX ADMIN — DILTIAZEM HYDROCHLORIDE 60 MG: 60 TABLET ORAL at 02:55

## 2024-04-20 RX ADMIN — SODIUM CHLORIDE 30 MG/ML INHALATION SOLUTION 4 ML: 30 SOLUTION INHALANT at 08:59

## 2024-04-20 RX ADMIN — OXYCODONE AND ACETAMINOPHEN 1 TABLET: 10; 325 TABLET ORAL at 06:18

## 2024-04-20 RX ADMIN — SODIUM CHLORIDE 30 MG/ML INHALATION SOLUTION 4 ML: 30 SOLUTION INHALANT at 15:13

## 2024-04-20 RX ADMIN — INSULIN LISPRO 4 UNITS: 100 INJECTION, SOLUTION INTRAVENOUS; SUBCUTANEOUS at 21:09

## 2024-04-20 RX ADMIN — AMLODIPINE BESYLATE 5 MG: 5 TABLET ORAL at 18:03

## 2024-04-20 RX ADMIN — ATORVASTATIN CALCIUM 80 MG: 80 TABLET, FILM COATED ORAL at 21:04

## 2024-04-20 ASSESSMENT — PAIN - FUNCTIONAL ASSESSMENT
PAIN_FUNCTIONAL_ASSESSMENT: ACTIVITIES ARE NOT PREVENTED
PAIN_FUNCTIONAL_ASSESSMENT: PREVENTS OR INTERFERES SOME ACTIVE ACTIVITIES AND ADLS
PAIN_FUNCTIONAL_ASSESSMENT: PREVENTS OR INTERFERES SOME ACTIVE ACTIVITIES AND ADLS

## 2024-04-20 ASSESSMENT — PAIN SCALES - GENERAL
PAINLEVEL_OUTOF10: 0
PAINLEVEL_OUTOF10: 7
PAINLEVEL_OUTOF10: 9
PAINLEVEL_OUTOF10: 9
PAINLEVEL_OUTOF10: 0
PAINLEVEL_OUTOF10: 6
PAINLEVEL_OUTOF10: 5
PAINLEVEL_OUTOF10: 7
PAINLEVEL_OUTOF10: 10
PAINLEVEL_OUTOF10: 0
PAINLEVEL_OUTOF10: 0
PAINLEVEL_OUTOF10: 8

## 2024-04-20 ASSESSMENT — PAIN DESCRIPTION - LOCATION
LOCATION: BACK
LOCATION: BACK;SHOULDER
LOCATION: BACK;KNEE

## 2024-04-20 ASSESSMENT — PAIN DESCRIPTION - DESCRIPTORS
DESCRIPTORS: ACHING

## 2024-04-20 ASSESSMENT — PAIN DESCRIPTION - ORIENTATION
ORIENTATION: LOWER
ORIENTATION: LEFT;LOWER
ORIENTATION: LOWER

## 2024-04-20 ASSESSMENT — PAIN SCALES - WONG BAKER: WONGBAKER_NUMERICALRESPONSE: NO HURT

## 2024-04-20 NOTE — MANAGEMENT PLAN
ICU to Wards Transfer  Internal Medicine Wards Acceptance Note   [facility]        The ICU-PAUSE transfer documentation has been acknowledged and reviewed by the wards team.  Additionally, the wards team has received official sign-out from the ICU team with acceptance of care of the patient.         At the Time of transfer, patient was seen and examined by the wards team with the findings below:    Review of Systems   Constitutional:  Negative for activity change, appetite change, chills, diaphoresis, fatigue, fever and unexpected weight change.   HENT:  Negative for trouble swallowing.    Eyes:  Negative for redness and visual disturbance.   Respiratory:  Negative for cough, choking, chest tightness and shortness of breath.    Cardiovascular:  Negative for chest pain and palpitations.   Gastrointestinal:  Positive for abdominal pain. Negative for abdominal distention, constipation, diarrhea and nausea.   Endocrine: Negative for polydipsia and polyphagia.   Genitourinary:  Negative for dysuria, flank pain and hematuria.   Musculoskeletal:  Positive for back pain. Negative for arthralgias, myalgias and neck stiffness.   Skin:  Negative for rash and wound.   Allergic/Immunologic: Negative for environmental allergies and food allergies.   Neurological:  Positive for weakness and numbness. Negative for dizziness, seizures, syncope, facial asymmetry, light-headedness and headaches.         Physical Exam  Constitutional:       Appearance: Normal appearance.   HENT:      Head: Normocephalic.      Comments: Hypophonic     Mouth/Throat:      Mouth: Mucous membranes are moist.   Eyes:      Extraocular Movements: Extraocular movements intact.      Conjunctiva/sclera: Conjunctivae normal.      Pupils: Pupils are equal, round, and reactive to light.   Cardiovascular:      Rate and Rhythm: Normal rate and regular rhythm.      Pulses: Normal pulses.      Heart sounds: Normal heart sounds.   Pulmonary:      
any failed extubations, if yes , Difficult airway      A Active consultants, including Rehab:  Subspecialty Consultants: yes  Other cardiology      U Uncertainty Measure/Diagnostic Pause:   Working diagnosis at the time of transfer Dysfunctional right diaphragm causing constant right mucous plugging  Important differential diagnoses include aspiration PNA   Select from the followin: High degree of certainty about the clinical diagnosis.   2. Some uncertainty about the clinical diagnosis.   3. Marked uncertainty about the clinical diagnosis.      S Summary of Major Problems and To-Dos:    Dysfunctional right diaphragm  Mucous plug, continuous  CXR revealed new right opacity, left main pulm noted, right not seen- suspected mucous plug. Prior CXR revealed interstitial opacity in RLL.  - Bronch - removed large mucous plug  - neg flu and COVID  - Resp cultures- no organisms seen  - strep, legionella pending- pending  - continue vanc and zosyn (on day 6, likely will need greater than 7 days, will continue to evaluate)  - awaiting blood cultures- pending  - albuterol TID     Dysphagia 2/2 pharyngeal swallow impairments   Concern for aspiration pneumonia. SLP eval/FEES suggestive of pharyngeal impairments  - ENT consulted with concerns for vocal cord impairment; no intervention necessary  - open G tube placement , drain in place overnight; indicated in suspected long length of rehab  - continue SLP therapy     Cardiovascular  New onset A fib with RVR, asymptomatic  Since 4/15, was started on amiodarone  gtt and diltizem gtt, asymptomatic. S/p 3 L fluids  - on oral amio   - d/c oral dilt   - trending digoxin levels         GI  Diet: Diet NPO  ADULT TUBE FEEDING VOLUME BASED; Gastrostomy; Diabetic; 30; Continuous; 1,440; 24  GI ppx: none  Ok to use G tube for medications and feeds     Infectious   Sepsis, resolving  - on zosyn and vanc  - not trending LA as last value was 0.9     Chronic Conditions  - T2DM: 40u

## 2024-04-21 LAB
25(OH)D3 SERPL-MCNC: 20.9 NG/ML
ALBUMIN SERPL-MCNC: 2.9 G/DL (ref 3.4–5)
ANION GAP SERPL CALCULATED.3IONS-SCNC: 5 MMOL/L (ref 3–16)
ANTI-XA UNFRAC HEPARIN: 0.3 IU/ML (ref 0.3–0.7)
ANTI-XA UNFRAC HEPARIN: 0.45 IU/ML (ref 0.3–0.7)
BACTERIA BLD CULT ORG #2: NORMAL
BACTERIA BLD CULT: NORMAL
BASOPHILS # BLD: 0 K/UL (ref 0–0.2)
BASOPHILS NFR BLD: 0 %
BUN SERPL-MCNC: 12 MG/DL (ref 7–20)
CALCIUM SERPL-MCNC: 8.1 MG/DL (ref 8.3–10.6)
CHLORIDE SERPL-SCNC: 94 MMOL/L (ref 99–110)
CO2 SERPL-SCNC: 35 MMOL/L (ref 21–32)
CREAT SERPL-MCNC: 0.8 MG/DL (ref 0.8–1.3)
DEPRECATED RDW RBC AUTO: 13.3 % (ref 12.4–15.4)
EOSINOPHIL # BLD: 0.1 K/UL (ref 0–0.6)
EOSINOPHIL NFR BLD: 1 %
GFR SERPLBLD CREATININE-BSD FMLA CKD-EPI: >90 ML/MIN/{1.73_M2}
GLUCOSE BLD-MCNC: 225 MG/DL (ref 70–99)
GLUCOSE BLD-MCNC: 233 MG/DL (ref 70–99)
GLUCOSE BLD-MCNC: 252 MG/DL (ref 70–99)
GLUCOSE BLD-MCNC: 253 MG/DL (ref 70–99)
GLUCOSE BLD-MCNC: 257 MG/DL (ref 70–99)
GLUCOSE SERPL-MCNC: 259 MG/DL (ref 70–99)
HCT VFR BLD AUTO: 33.1 % (ref 40.5–52.5)
HGB BLD-MCNC: 11 G/DL (ref 13.5–17.5)
LYMPHOCYTES # BLD: 2.2 K/UL (ref 1–5.1)
LYMPHOCYTES NFR BLD: 19 %
MAGNESIUM SERPL-MCNC: 1.9 MG/DL (ref 1.8–2.4)
MCH RBC QN AUTO: 30.9 PG (ref 26–34)
MCHC RBC AUTO-ENTMCNC: 33.3 G/DL (ref 31–36)
MCV RBC AUTO: 92.8 FL (ref 80–100)
MONOCYTES # BLD: 0.8 K/UL (ref 0–1.3)
MONOCYTES NFR BLD: 7 %
MYELOCYTES NFR BLD MANUAL: 3 %
NEUTROPHILS # BLD: 8 K/UL (ref 1.7–7.7)
NEUTROPHILS NFR BLD: 69 %
PERFORMED ON: ABNORMAL
PHOSPHATE SERPL-MCNC: 2.7 MG/DL (ref 2.5–4.9)
PLATELET # BLD AUTO: 195 K/UL (ref 135–450)
PMV BLD AUTO: 8.8 FL (ref 5–10.5)
POTASSIUM SERPL-SCNC: 3.5 MMOL/L (ref 3.5–5.1)
RBC # BLD AUTO: 3.57 M/UL (ref 4.2–5.9)
SODIUM SERPL-SCNC: 134 MMOL/L (ref 136–145)
VARIANT LYMPHS NFR BLD MANUAL: 1 % (ref 0–6)
WBC # BLD AUTO: 11.1 K/UL (ref 4–11)

## 2024-04-21 PROCEDURE — 6370000000 HC RX 637 (ALT 250 FOR IP)

## 2024-04-21 PROCEDURE — 6360000002 HC RX W HCPCS

## 2024-04-21 PROCEDURE — 1200000000 HC SEMI PRIVATE

## 2024-04-21 PROCEDURE — 6370000000 HC RX 637 (ALT 250 FOR IP): Performed by: HOSPITALIST

## 2024-04-21 PROCEDURE — 97530 THERAPEUTIC ACTIVITIES: CPT

## 2024-04-21 PROCEDURE — 97166 OT EVAL MOD COMPLEX 45 MIN: CPT

## 2024-04-21 PROCEDURE — 36415 COLL VENOUS BLD VENIPUNCTURE: CPT

## 2024-04-21 PROCEDURE — 6370000000 HC RX 637 (ALT 250 FOR IP): Performed by: INTERNAL MEDICINE

## 2024-04-21 PROCEDURE — 2580000003 HC RX 258

## 2024-04-21 PROCEDURE — 97163 PT EVAL HIGH COMPLEX 45 MIN: CPT

## 2024-04-21 PROCEDURE — 83735 ASSAY OF MAGNESIUM: CPT

## 2024-04-21 PROCEDURE — 85025 COMPLETE CBC W/AUTO DIFF WBC: CPT

## 2024-04-21 PROCEDURE — 94761 N-INVAS EAR/PLS OXIMETRY MLT: CPT

## 2024-04-21 PROCEDURE — 94640 AIRWAY INHALATION TREATMENT: CPT

## 2024-04-21 PROCEDURE — 2700000000 HC OXYGEN THERAPY PER DAY

## 2024-04-21 PROCEDURE — 99233 SBSQ HOSP IP/OBS HIGH 50: CPT | Performed by: NURSE PRACTITIONER

## 2024-04-21 PROCEDURE — 85520 HEPARIN ASSAY: CPT

## 2024-04-21 PROCEDURE — 82306 VITAMIN D 25 HYDROXY: CPT

## 2024-04-21 PROCEDURE — 80069 RENAL FUNCTION PANEL: CPT

## 2024-04-21 PROCEDURE — 6370000000 HC RX 637 (ALT 250 FOR IP): Performed by: STUDENT IN AN ORGANIZED HEALTH CARE EDUCATION/TRAINING PROGRAM

## 2024-04-21 RX ORDER — INSULIN LISPRO 100 [IU]/ML
5 INJECTION, SOLUTION INTRAVENOUS; SUBCUTANEOUS 3 TIMES DAILY
Status: DISCONTINUED | OUTPATIENT
Start: 2024-04-21 | End: 2024-04-23

## 2024-04-21 RX ADMIN — SODIUM CHLORIDE, PRESERVATIVE FREE 10 ML: 5 INJECTION INTRAVENOUS at 09:58

## 2024-04-21 RX ADMIN — APIXABAN 5 MG: 5 TABLET, FILM COATED ORAL at 20:34

## 2024-04-21 RX ADMIN — OXYCODONE AND ACETAMINOPHEN 1 TABLET: 10; 325 TABLET ORAL at 00:11

## 2024-04-21 RX ADMIN — ALBUTEROL SULFATE 2.5 MG: 2.5 SOLUTION RESPIRATORY (INHALATION) at 10:10

## 2024-04-21 RX ADMIN — ASPIRIN 81 MG: 81 TABLET, CHEWABLE ORAL at 09:36

## 2024-04-21 RX ADMIN — INSULIN LISPRO 4 UNITS: 100 INJECTION, SOLUTION INTRAVENOUS; SUBCUTANEOUS at 03:34

## 2024-04-21 RX ADMIN — OXYCODONE AND ACETAMINOPHEN 1 TABLET: 10; 325 TABLET ORAL at 18:27

## 2024-04-21 RX ADMIN — METHOCARBAMOL 500 MG: 500 TABLET ORAL at 23:58

## 2024-04-21 RX ADMIN — CLONIDINE HYDROCHLORIDE 0.1 MG: 0.1 TABLET ORAL at 09:35

## 2024-04-21 RX ADMIN — SODIUM CHLORIDE, PRESERVATIVE FREE 10 ML: 5 INJECTION INTRAVENOUS at 21:16

## 2024-04-21 RX ADMIN — AMLODIPINE BESYLATE 5 MG: 5 TABLET ORAL at 09:35

## 2024-04-21 RX ADMIN — ATORVASTATIN CALCIUM 80 MG: 80 TABLET, FILM COATED ORAL at 20:34

## 2024-04-21 RX ADMIN — INSULIN GLARGINE 40 UNITS: 100 INJECTION, SOLUTION SUBCUTANEOUS at 20:35

## 2024-04-21 RX ADMIN — OXYCODONE AND ACETAMINOPHEN 1 TABLET: 10; 325 TABLET ORAL at 12:35

## 2024-04-21 RX ADMIN — AMIODARONE HYDROCHLORIDE 200 MG: 200 TABLET ORAL at 20:34

## 2024-04-21 RX ADMIN — INSULIN LISPRO 8 UNITS: 100 INJECTION, SOLUTION INTRAVENOUS; SUBCUTANEOUS at 09:53

## 2024-04-21 RX ADMIN — LANSOPRAZOLE 30 MG: 30 TABLET, ORALLY DISINTEGRATING, DELAYED RELEASE ORAL at 09:35

## 2024-04-21 RX ADMIN — PIPERACILLIN AND TAZOBACTAM 4500 MG: 4; .5 INJECTION, POWDER, LYOPHILIZED, FOR SOLUTION INTRAVENOUS at 16:40

## 2024-04-21 RX ADMIN — ALBUTEROL SULFATE 2.5 MG: 2.5 SOLUTION RESPIRATORY (INHALATION) at 19:46

## 2024-04-21 RX ADMIN — NALOXEGOL OXALATE 12.5 MG: 12.5 TABLET, FILM COATED ORAL at 06:42

## 2024-04-21 RX ADMIN — GABAPENTIN 600 MG: 600 TABLET, FILM COATED ORAL at 14:45

## 2024-04-21 RX ADMIN — METHOCARBAMOL 500 MG: 500 TABLET ORAL at 03:35

## 2024-04-21 RX ADMIN — SODIUM CHLORIDE 30 MG/ML INHALATION SOLUTION 4 ML: 30 SOLUTION INHALANT at 10:10

## 2024-04-21 RX ADMIN — OXYCODONE AND ACETAMINOPHEN 1 TABLET: 10; 325 TABLET ORAL at 23:58

## 2024-04-21 RX ADMIN — OXYCODONE AND ACETAMINOPHEN 1 TABLET: 10; 325 TABLET ORAL at 06:06

## 2024-04-21 RX ADMIN — PIPERACILLIN AND TAZOBACTAM 4500 MG: 4; .5 INJECTION, POWDER, LYOPHILIZED, FOR SOLUTION INTRAVENOUS at 00:20

## 2024-04-21 RX ADMIN — AMIODARONE HYDROCHLORIDE 200 MG: 200 TABLET ORAL at 09:35

## 2024-04-21 RX ADMIN — INSULIN LISPRO 5 UNITS: 100 INJECTION, SOLUTION INTRAVENOUS; SUBCUTANEOUS at 20:45

## 2024-04-21 RX ADMIN — INSULIN LISPRO 4 UNITS: 100 INJECTION, SOLUTION INTRAVENOUS; SUBCUTANEOUS at 20:45

## 2024-04-21 RX ADMIN — INSULIN LISPRO 8 UNITS: 100 INJECTION, SOLUTION INTRAVENOUS; SUBCUTANEOUS at 14:45

## 2024-04-21 RX ADMIN — INSULIN LISPRO 5 UNITS: 100 INJECTION, SOLUTION INTRAVENOUS; SUBCUTANEOUS at 14:45

## 2024-04-21 RX ADMIN — APIXABAN 5 MG: 5 TABLET, FILM COATED ORAL at 11:34

## 2024-04-21 RX ADMIN — METHOCARBAMOL 500 MG: 500 TABLET ORAL at 12:35

## 2024-04-21 RX ADMIN — HEPARIN SODIUM 14 UNITS/KG/HR: 10000 INJECTION, SOLUTION INTRAVENOUS at 10:21

## 2024-04-21 RX ADMIN — GABAPENTIN 600 MG: 600 TABLET, FILM COATED ORAL at 20:34

## 2024-04-21 RX ADMIN — GABAPENTIN 600 MG: 600 TABLET, FILM COATED ORAL at 09:36

## 2024-04-21 RX ADMIN — POLYETHYLENE GLYCOL 3350 17 G: 17 POWDER, FOR SOLUTION ORAL at 09:35

## 2024-04-21 RX ADMIN — METHOCARBAMOL 500 MG: 500 TABLET ORAL at 18:28

## 2024-04-21 RX ADMIN — CLONIDINE HYDROCHLORIDE 0.1 MG: 0.1 TABLET ORAL at 20:34

## 2024-04-21 RX ADMIN — PIPERACILLIN AND TAZOBACTAM 4500 MG: 4; .5 INJECTION, POWDER, LYOPHILIZED, FOR SOLUTION INTRAVENOUS at 09:35

## 2024-04-21 RX ADMIN — LOSARTAN POTASSIUM 100 MG: 100 TABLET, FILM COATED ORAL at 09:36

## 2024-04-21 RX ADMIN — SODIUM CHLORIDE 30 MG/ML INHALATION SOLUTION 4 ML: 30 SOLUTION INHALANT at 19:46

## 2024-04-21 RX ADMIN — SENNOSIDES 8.6 MG: 8.6 TABLET, FILM COATED ORAL at 20:34

## 2024-04-21 ASSESSMENT — PAIN SCALES - WONG BAKER: WONGBAKER_NUMERICALRESPONSE: NO HURT

## 2024-04-21 ASSESSMENT — PAIN SCALES - GENERAL
PAINLEVEL_OUTOF10: 0
PAINLEVEL_OUTOF10: 7
PAINLEVEL_OUTOF10: 0
PAINLEVEL_OUTOF10: 7
PAINLEVEL_OUTOF10: 9
PAINLEVEL_OUTOF10: 0
PAINLEVEL_OUTOF10: 9
PAINLEVEL_OUTOF10: 8
PAINLEVEL_OUTOF10: 0
PAINLEVEL_OUTOF10: 9
PAINLEVEL_OUTOF10: 6
PAINLEVEL_OUTOF10: 7
PAINLEVEL_OUTOF10: 7

## 2024-04-21 ASSESSMENT — PAIN DESCRIPTION - DESCRIPTORS
DESCRIPTORS: ACHING

## 2024-04-21 ASSESSMENT — PAIN - FUNCTIONAL ASSESSMENT
PAIN_FUNCTIONAL_ASSESSMENT: PREVENTS OR INTERFERES SOME ACTIVE ACTIVITIES AND ADLS

## 2024-04-21 ASSESSMENT — PAIN DESCRIPTION - LOCATION
LOCATION: BACK

## 2024-04-21 ASSESSMENT — PAIN DESCRIPTION - ORIENTATION
ORIENTATION: LOWER

## 2024-04-22 ENCOUNTER — APPOINTMENT (OUTPATIENT)
Dept: GENERAL RADIOLOGY | Age: 69
DRG: 064 | End: 2024-04-22
Payer: MEDICARE

## 2024-04-22 ENCOUNTER — ANESTHESIA EVENT (OUTPATIENT)
Dept: ENDOSCOPY | Age: 69
DRG: 064 | End: 2024-04-22
Payer: MEDICARE

## 2024-04-22 LAB
ALBUMIN SERPL-MCNC: 2.9 G/DL (ref 3.4–5)
ANION GAP SERPL CALCULATED.3IONS-SCNC: 7 MMOL/L (ref 3–16)
ANTI-XA UNFRAC HEPARIN: >1.1 IU/ML (ref 0.3–0.7)
BASOPHILS # BLD: 0 K/UL (ref 0–0.2)
BASOPHILS NFR BLD: 0 %
BUN SERPL-MCNC: 10 MG/DL (ref 7–20)
CALCIUM SERPL-MCNC: 8.5 MG/DL (ref 8.3–10.6)
CHLORIDE SERPL-SCNC: 98 MMOL/L (ref 99–110)
CO2 SERPL-SCNC: 34 MMOL/L (ref 21–32)
CREAT SERPL-MCNC: 0.6 MG/DL (ref 0.8–1.3)
DEPRECATED RDW RBC AUTO: 13.4 % (ref 12.4–15.4)
EOSINOPHIL # BLD: 0 K/UL (ref 0–0.6)
EOSINOPHIL NFR BLD: 0 %
GFR SERPLBLD CREATININE-BSD FMLA CKD-EPI: >90 ML/MIN/{1.73_M2}
GLUCOSE BLD-MCNC: 167 MG/DL (ref 70–99)
GLUCOSE BLD-MCNC: 191 MG/DL (ref 70–99)
GLUCOSE BLD-MCNC: 196 MG/DL (ref 70–99)
GLUCOSE BLD-MCNC: 215 MG/DL (ref 70–99)
GLUCOSE BLD-MCNC: 228 MG/DL (ref 70–99)
GLUCOSE BLD-MCNC: 235 MG/DL (ref 70–99)
GLUCOSE BLD-MCNC: 256 MG/DL (ref 70–99)
GLUCOSE SERPL-MCNC: 172 MG/DL (ref 70–99)
HCT VFR BLD AUTO: 35.1 % (ref 40.5–52.5)
HGB BLD-MCNC: 11.6 G/DL (ref 13.5–17.5)
LYMPHOCYTES # BLD: 1.1 K/UL (ref 1–5.1)
LYMPHOCYTES NFR BLD: 10 %
MAGNESIUM SERPL-MCNC: 2.1 MG/DL (ref 1.8–2.4)
MCH RBC QN AUTO: 30.5 PG (ref 26–34)
MCHC RBC AUTO-ENTMCNC: 33.1 G/DL (ref 31–36)
MCV RBC AUTO: 92 FL (ref 80–100)
MONOCYTES # BLD: 0.2 K/UL (ref 0–1.3)
MONOCYTES NFR BLD: 2 %
NEUTROPHILS # BLD: 9.9 K/UL (ref 1.7–7.7)
NEUTROPHILS NFR BLD: 88 %
PERFORMED ON: ABNORMAL
PHOSPHATE SERPL-MCNC: 2.8 MG/DL (ref 2.5–4.9)
PLATELET # BLD AUTO: 202 K/UL (ref 135–450)
PMV BLD AUTO: 8.6 FL (ref 5–10.5)
POTASSIUM SERPL-SCNC: 4 MMOL/L (ref 3.5–5.1)
RBC # BLD AUTO: 3.81 M/UL (ref 4.2–5.9)
SODIUM SERPL-SCNC: 139 MMOL/L (ref 136–145)
WBC # BLD AUTO: 11.3 K/UL (ref 4–11)

## 2024-04-22 PROCEDURE — 6370000000 HC RX 637 (ALT 250 FOR IP)

## 2024-04-22 PROCEDURE — 85520 HEPARIN ASSAY: CPT

## 2024-04-22 PROCEDURE — 6370000000 HC RX 637 (ALT 250 FOR IP): Performed by: INTERNAL MEDICINE

## 2024-04-22 PROCEDURE — 6370000000 HC RX 637 (ALT 250 FOR IP): Performed by: HOSPITALIST

## 2024-04-22 PROCEDURE — 80069 RENAL FUNCTION PANEL: CPT

## 2024-04-22 PROCEDURE — 6360000002 HC RX W HCPCS

## 2024-04-22 PROCEDURE — 74018 RADEX ABDOMEN 1 VIEW: CPT

## 2024-04-22 PROCEDURE — 2700000000 HC OXYGEN THERAPY PER DAY

## 2024-04-22 PROCEDURE — 2580000003 HC RX 258

## 2024-04-22 PROCEDURE — 94761 N-INVAS EAR/PLS OXIMETRY MLT: CPT

## 2024-04-22 PROCEDURE — 83735 ASSAY OF MAGNESIUM: CPT

## 2024-04-22 PROCEDURE — 97535 SELF CARE MNGMENT TRAINING: CPT

## 2024-04-22 PROCEDURE — 94640 AIRWAY INHALATION TREATMENT: CPT

## 2024-04-22 PROCEDURE — 97530 THERAPEUTIC ACTIVITIES: CPT

## 2024-04-22 PROCEDURE — 71045 X-RAY EXAM CHEST 1 VIEW: CPT

## 2024-04-22 PROCEDURE — 99233 SBSQ HOSP IP/OBS HIGH 50: CPT | Performed by: NURSE PRACTITIONER

## 2024-04-22 PROCEDURE — 85025 COMPLETE CBC W/AUTO DIFF WBC: CPT

## 2024-04-22 PROCEDURE — 1200000000 HC SEMI PRIVATE

## 2024-04-22 PROCEDURE — 36415 COLL VENOUS BLD VENIPUNCTURE: CPT

## 2024-04-22 PROCEDURE — 6370000000 HC RX 637 (ALT 250 FOR IP): Performed by: STUDENT IN AN ORGANIZED HEALTH CARE EDUCATION/TRAINING PROGRAM

## 2024-04-22 PROCEDURE — 94669 MECHANICAL CHEST WALL OSCILL: CPT

## 2024-04-22 RX ORDER — LOSARTAN POTASSIUM 100 MG/1
100 TABLET ORAL DAILY
Qty: 30 TABLET | Refills: 3 | Status: SHIPPED | OUTPATIENT
Start: 2024-04-23

## 2024-04-22 RX ORDER — OXYCODONE AND ACETAMINOPHEN 10; 325 MG/1; MG/1
1 TABLET ORAL EVERY 6 HOURS PRN
Qty: 120 TABLET | Refills: 0 | Status: SHIPPED | OUTPATIENT
Start: 2024-04-22 | End: 2024-05-22

## 2024-04-22 RX ORDER — ALBUTEROL SULFATE 2.5 MG/3ML
2.5 SOLUTION RESPIRATORY (INHALATION) 3 TIMES DAILY
Qty: 120 EACH | Refills: 3 | Status: SHIPPED | OUTPATIENT
Start: 2024-04-22

## 2024-04-22 RX ORDER — ACETAMINOPHEN 325 MG/1
650 TABLET ORAL ONCE
Status: COMPLETED | OUTPATIENT
Start: 2024-04-22 | End: 2024-04-22

## 2024-04-22 RX ORDER — ATORVASTATIN CALCIUM 80 MG/1
80 TABLET, FILM COATED ORAL NIGHTLY
Qty: 30 TABLET | Refills: 3 | Status: SHIPPED | OUTPATIENT
Start: 2024-04-22

## 2024-04-22 RX ORDER — GABAPENTIN 600 MG/1
600 TABLET ORAL 3 TIMES DAILY
Qty: 90 TABLET | Refills: 3 | Status: SHIPPED | OUTPATIENT
Start: 2024-04-22 | End: 2024-08-20

## 2024-04-22 RX ORDER — AMIODARONE HYDROCHLORIDE 200 MG/1
200 TABLET ORAL DAILY
Qty: 30 TABLET | Refills: 2 | Status: SHIPPED | OUTPATIENT
Start: 2024-04-24

## 2024-04-22 RX ORDER — ASPIRIN 81 MG/1
81 TABLET, CHEWABLE ORAL DAILY
Qty: 30 TABLET | Refills: 3 | Status: SHIPPED | OUTPATIENT
Start: 2024-04-23

## 2024-04-22 RX ORDER — POLYETHYLENE GLYCOL 3350 17 G/17G
17 POWDER, FOR SOLUTION ORAL 2 TIMES DAILY
Status: DISCONTINUED | OUTPATIENT
Start: 2024-04-22 | End: 2024-04-23

## 2024-04-22 RX ADMIN — METHOCARBAMOL 500 MG: 500 TABLET ORAL at 18:08

## 2024-04-22 RX ADMIN — INSULIN LISPRO 5 UNITS: 100 INJECTION, SOLUTION INTRAVENOUS; SUBCUTANEOUS at 20:48

## 2024-04-22 RX ADMIN — SENNOSIDES 8.6 MG: 8.6 TABLET, FILM COATED ORAL at 20:47

## 2024-04-22 RX ADMIN — INSULIN LISPRO 4 UNITS: 100 INJECTION, SOLUTION INTRAVENOUS; SUBCUTANEOUS at 20:48

## 2024-04-22 RX ADMIN — SODIUM CHLORIDE 30 MG/ML INHALATION SOLUTION 4 ML: 30 SOLUTION INHALANT at 08:52

## 2024-04-22 RX ADMIN — POLYETHYLENE GLYCOL 3350 17 G: 17 POWDER, FOR SOLUTION ORAL at 08:07

## 2024-04-22 RX ADMIN — POLYETHYLENE GLYCOL 3350 17 G: 17 POWDER, FOR SOLUTION ORAL at 20:48

## 2024-04-22 RX ADMIN — INSULIN LISPRO 5 UNITS: 100 INJECTION, SOLUTION INTRAVENOUS; SUBCUTANEOUS at 08:06

## 2024-04-22 RX ADMIN — SODIUM CHLORIDE 30 MG/ML INHALATION SOLUTION 4 ML: 30 SOLUTION INHALANT at 14:52

## 2024-04-22 RX ADMIN — APIXABAN 5 MG: 5 TABLET, FILM COATED ORAL at 08:07

## 2024-04-22 RX ADMIN — SODIUM CHLORIDE 30 MG/ML INHALATION SOLUTION 4 ML: 30 SOLUTION INHALANT at 20:26

## 2024-04-22 RX ADMIN — METHOCARBAMOL 500 MG: 500 TABLET ORAL at 12:27

## 2024-04-22 RX ADMIN — OXYCODONE AND ACETAMINOPHEN 1 TABLET: 10; 325 TABLET ORAL at 18:09

## 2024-04-22 RX ADMIN — OXYCODONE AND ACETAMINOPHEN 1 TABLET: 10; 325 TABLET ORAL at 06:01

## 2024-04-22 RX ADMIN — APIXABAN 5 MG: 5 TABLET, FILM COATED ORAL at 20:47

## 2024-04-22 RX ADMIN — ALBUTEROL SULFATE 2.5 MG: 2.5 SOLUTION RESPIRATORY (INHALATION) at 20:26

## 2024-04-22 RX ADMIN — GABAPENTIN 600 MG: 600 TABLET, FILM COATED ORAL at 08:07

## 2024-04-22 RX ADMIN — GABAPENTIN 600 MG: 600 TABLET, FILM COATED ORAL at 20:47

## 2024-04-22 RX ADMIN — OXYCODONE AND ACETAMINOPHEN 1 TABLET: 10; 325 TABLET ORAL at 12:27

## 2024-04-22 RX ADMIN — POLYETHYLENE GLYCOL 3350 17 G: 17 POWDER, FOR SOLUTION ORAL at 14:51

## 2024-04-22 RX ADMIN — LOSARTAN POTASSIUM 100 MG: 100 TABLET, FILM COATED ORAL at 08:07

## 2024-04-22 RX ADMIN — METHOCARBAMOL 500 MG: 500 TABLET ORAL at 06:01

## 2024-04-22 RX ADMIN — ACETAMINOPHEN 650 MG: 325 TABLET ORAL at 18:08

## 2024-04-22 RX ADMIN — SODIUM CHLORIDE, PRESERVATIVE FREE 10 ML: 5 INJECTION INTRAVENOUS at 21:11

## 2024-04-22 RX ADMIN — ASPIRIN 81 MG: 81 TABLET, CHEWABLE ORAL at 08:08

## 2024-04-22 RX ADMIN — ATORVASTATIN CALCIUM 80 MG: 80 TABLET, FILM COATED ORAL at 20:47

## 2024-04-22 RX ADMIN — AMIODARONE HYDROCHLORIDE 200 MG: 200 TABLET ORAL at 20:47

## 2024-04-22 RX ADMIN — INSULIN LISPRO 4 UNITS: 100 INJECTION, SOLUTION INTRAVENOUS; SUBCUTANEOUS at 14:54

## 2024-04-22 RX ADMIN — AMIODARONE HYDROCHLORIDE 200 MG: 200 TABLET ORAL at 08:07

## 2024-04-22 RX ADMIN — AMLODIPINE BESYLATE 5 MG: 5 TABLET ORAL at 08:07

## 2024-04-22 RX ADMIN — LANSOPRAZOLE 30 MG: 30 TABLET, ORALLY DISINTEGRATING, DELAYED RELEASE ORAL at 08:08

## 2024-04-22 RX ADMIN — ALBUTEROL SULFATE 2.5 MG: 2.5 SOLUTION RESPIRATORY (INHALATION) at 08:52

## 2024-04-22 RX ADMIN — GABAPENTIN 600 MG: 600 TABLET, FILM COATED ORAL at 14:42

## 2024-04-22 RX ADMIN — INSULIN GLARGINE 40 UNITS: 100 INJECTION, SOLUTION SUBCUTANEOUS at 20:47

## 2024-04-22 RX ADMIN — CLONIDINE HYDROCHLORIDE 0.1 MG: 0.1 TABLET ORAL at 08:07

## 2024-04-22 RX ADMIN — CLONIDINE HYDROCHLORIDE 0.1 MG: 0.1 TABLET ORAL at 20:47

## 2024-04-22 RX ADMIN — INSULIN LISPRO 5 UNITS: 100 INJECTION, SOLUTION INTRAVENOUS; SUBCUTANEOUS at 14:42

## 2024-04-22 RX ADMIN — ALBUTEROL SULFATE 2.5 MG: 2.5 SOLUTION RESPIRATORY (INHALATION) at 14:52

## 2024-04-22 RX ADMIN — NALOXEGOL OXALATE 12.5 MG: 12.5 TABLET, FILM COATED ORAL at 06:01

## 2024-04-22 RX ADMIN — SODIUM CHLORIDE, PRESERVATIVE FREE 10 ML: 5 INJECTION INTRAVENOUS at 08:09

## 2024-04-22 ASSESSMENT — PAIN SCALES - GENERAL
PAINLEVEL_OUTOF10: 0
PAINLEVEL_OUTOF10: 0
PAINLEVEL_OUTOF10: 8
PAINLEVEL_OUTOF10: 0
PAINLEVEL_OUTOF10: 6
PAINLEVEL_OUTOF10: 0
PAINLEVEL_OUTOF10: 7
PAINLEVEL_OUTOF10: 6
PAINLEVEL_OUTOF10: 8
PAINLEVEL_OUTOF10: 9
PAINLEVEL_OUTOF10: 0

## 2024-04-22 ASSESSMENT — PAIN - FUNCTIONAL ASSESSMENT: PAIN_FUNCTIONAL_ASSESSMENT: PREVENTS OR INTERFERES SOME ACTIVE ACTIVITIES AND ADLS

## 2024-04-22 ASSESSMENT — PAIN DESCRIPTION - ORIENTATION: ORIENTATION: LOWER

## 2024-04-22 ASSESSMENT — PAIN DESCRIPTION - LOCATION: LOCATION: BACK

## 2024-04-22 ASSESSMENT — PAIN DESCRIPTION - DESCRIPTORS: DESCRIPTORS: ACHING

## 2024-04-22 NOTE — DISCHARGE INSTR - COC
Continuity of Care Form    Patient Name: Kulwant Lamb   :  1955  MRN:  [id number]    Admit date:  2024  Discharge date:  2024    Code Status Order: Full Code   Advance Directives:   Advance Care Flowsheet Documentation       Date/Time Healthcare Directive Type of Healthcare Directive Copy in Chart Healthcare Agent Appointed Healthcare Agent's Name Healthcare Agent's Phone Number    [phone] Yes, patient has an advance directive for healthcare treatment Durable power of  for health care -- Spouse -- --            Admitting Physician:  Fabricio Vega MD  PCP: Margoth Webber MD    Discharging Nurse:   Discharging Hospital Unit/Room#: 4503/4503-01  Discharging Unit Phone Number: [phone]    Emergency Contact:   Extended Emergency Contact Information  Primary Emergency Contact: Bella Lambthia  Address: [address] [place] [address]  Home Phone: [phone]  Relation: Spouse  Secondary Emergency Contact: Kirt Lamb  Mobile Phone: [phone]  Relation: Child    Past Surgical History:  Past Surgical History:   Procedure Laterality Date    CHOLECYSTECTOMY, LAPAROSCOPIC  2018    GASTROSTOMY TUBE PLACEMENT N/A 2024    OPEN GASTROSTOMY TUBE PLACEMENT performed by Dhaval Gilliam MD at [facility] OR    TONSILLECTOMY      UPPER GASTROINTESTINAL ENDOSCOPY N/A 2024    ESOPHAGOGASTRODUODENOSCOPY performed by Anyi Herndon MD at [facility] ENDOSCOPY       Immunization History:   Immunization History   Administered Date(s) Administered    Influenza 2010    Influenza Virus Vaccine 10/30/2007, 10/23/2008, 01/10/2010    Pneumococcal, PCV-13, PREVNAR 13, (age 6w+), IM, 0.5mL 2015       Active Problems:  Patient Active Problem List   Diagnosis Code    Morbid obesity (HCC) E66.01    DVT (deep venous thrombosis) (HCC) I82.409    Type 2 diabetes mellitus (HCC) E11.9    Essential hypertension I10    Hearing loss H91.90    Chronic back

## 2024-04-23 ENCOUNTER — APPOINTMENT (OUTPATIENT)
Dept: CT IMAGING | Age: 69
DRG: 064 | End: 2024-04-23
Payer: MEDICARE

## 2024-04-23 ENCOUNTER — ANESTHESIA (OUTPATIENT)
Dept: ENDOSCOPY | Age: 69
DRG: 064 | End: 2024-04-23
Payer: MEDICARE

## 2024-04-23 ENCOUNTER — APPOINTMENT (OUTPATIENT)
Dept: GENERAL RADIOLOGY | Age: 69
DRG: 064 | End: 2024-04-23
Payer: MEDICARE

## 2024-04-23 LAB
ANION GAP SERPL CALCULATED.3IONS-SCNC: 7 MMOL/L (ref 3–16)
BASE EXCESS BLDA CALC-SCNC: 10 MMOL/L (ref -3–3)
BUN SERPL-MCNC: 10 MG/DL (ref 7–20)
CA-I BLD-SCNC: 1.19 MMOL/L (ref 1.12–1.32)
CALCIUM SERPL-MCNC: 8.9 MG/DL (ref 8.3–10.6)
CHLORIDE SERPL-SCNC: 98 MMOL/L (ref 99–110)
CO2 BLDA-SCNC: 37 MMOL/L
CO2 SERPL-SCNC: 34 MMOL/L (ref 21–32)
CREAT SERPL-MCNC: 0.6 MG/DL (ref 0.8–1.3)
DEPRECATED RDW RBC AUTO: 13.4 % (ref 12.4–15.4)
GFR SERPLBLD CREATININE-BSD FMLA CKD-EPI: >90 ML/MIN/{1.73_M2}
GLUCOSE BLD-MCNC: 201 MG/DL (ref 70–99)
GLUCOSE BLD-MCNC: 202 MG/DL (ref 70–99)
GLUCOSE BLD-MCNC: 207 MG/DL (ref 70–99)
GLUCOSE BLD-MCNC: 212 MG/DL (ref 70–99)
GLUCOSE BLD-MCNC: 213 MG/DL (ref 70–99)
GLUCOSE BLD-MCNC: 214 MG/DL (ref 70–99)
GLUCOSE BLD-MCNC: 226 MG/DL (ref 70–99)
GLUCOSE BLD-MCNC: 268 MG/DL (ref 70–99)
GLUCOSE SERPL-MCNC: 220 MG/DL (ref 70–99)
HCO3 BLDA-SCNC: 35 MMOL/L (ref 21–29)
HCT VFR BLD AUTO: 37.3 % (ref 40.5–52.5)
HGB BLD-MCNC: 12.5 G/DL (ref 13.5–17.5)
LACTATE BLD-SCNC: 0.41 MMOL/L (ref 0.4–2)
MCH RBC QN AUTO: 30.9 PG (ref 26–34)
MCHC RBC AUTO-ENTMCNC: 33.5 G/DL (ref 31–36)
MCV RBC AUTO: 92.2 FL (ref 80–100)
PCO2 BLDA: 62.3 MM HG (ref 35–45)
PERFORMED ON: ABNORMAL
PH BLDA: 7.36 [PH] (ref 7.35–7.45)
PLATELET # BLD AUTO: 225 K/UL (ref 135–450)
PMV BLD AUTO: 8.1 FL (ref 5–10.5)
PO2 BLDA: 78.9 MM HG (ref 75–108)
POC SAMPLE TYPE: ABNORMAL
POTASSIUM BLD-SCNC: 4.2 MMOL/L (ref 3.5–5.1)
POTASSIUM SERPL-SCNC: 4.3 MMOL/L (ref 3.5–5.1)
RBC # BLD AUTO: 4.05 M/UL (ref 4.2–5.9)
SAO2 % BLDA: 95 % (ref 93–100)
SODIUM BLD-SCNC: 138 MMOL/L (ref 136–145)
SODIUM SERPL-SCNC: 139 MMOL/L (ref 136–145)
WBC # BLD AUTO: 10.9 K/UL (ref 4–11)

## 2024-04-23 PROCEDURE — 82803 BLOOD GASES ANY COMBINATION: CPT

## 2024-04-23 PROCEDURE — 6370000000 HC RX 637 (ALT 250 FOR IP)

## 2024-04-23 PROCEDURE — 74018 RADEX ABDOMEN 1 VIEW: CPT

## 2024-04-23 PROCEDURE — 36415 COLL VENOUS BLD VENIPUNCTURE: CPT

## 2024-04-23 PROCEDURE — 6360000002 HC RX W HCPCS: Performed by: ANESTHESIOLOGY

## 2024-04-23 PROCEDURE — 94669 MECHANICAL CHEST WALL OSCILL: CPT

## 2024-04-23 PROCEDURE — 2500000003 HC RX 250 WO HCPCS: Performed by: NURSE ANESTHETIST, CERTIFIED REGISTERED

## 2024-04-23 PROCEDURE — 82330 ASSAY OF CALCIUM: CPT

## 2024-04-23 PROCEDURE — 3700000001 HC ADD 15 MINUTES (ANESTHESIA): Performed by: INTERNAL MEDICINE

## 2024-04-23 PROCEDURE — 82947 ASSAY GLUCOSE BLOOD QUANT: CPT

## 2024-04-23 PROCEDURE — 6370000000 HC RX 637 (ALT 250 FOR IP): Performed by: INTERNAL MEDICINE

## 2024-04-23 PROCEDURE — 51798 US URINE CAPACITY MEASURE: CPT

## 2024-04-23 PROCEDURE — 80048 BASIC METABOLIC PNL TOTAL CA: CPT

## 2024-04-23 PROCEDURE — 0B948ZZ DRAINAGE OF RIGHT UPPER LOBE BRONCHUS, VIA NATURAL OR ARTIFICIAL OPENING ENDOSCOPIC: ICD-10-PCS | Performed by: INTERNAL MEDICINE

## 2024-04-23 PROCEDURE — 70450 CT HEAD/BRAIN W/O DYE: CPT

## 2024-04-23 PROCEDURE — 6360000002 HC RX W HCPCS

## 2024-04-23 PROCEDURE — 85027 COMPLETE CBC AUTOMATED: CPT

## 2024-04-23 PROCEDURE — 7100000001 HC PACU RECOVERY - ADDTL 15 MIN: Performed by: INTERNAL MEDICINE

## 2024-04-23 PROCEDURE — 3700000000 HC ANESTHESIA ATTENDED CARE: Performed by: INTERNAL MEDICINE

## 2024-04-23 PROCEDURE — 0BC38ZZ EXTIRPATION OF MATTER FROM RIGHT MAIN BRONCHUS, VIA NATURAL OR ARTIFICIAL OPENING ENDOSCOPIC: ICD-10-PCS | Performed by: INTERNAL MEDICINE

## 2024-04-23 PROCEDURE — 2580000003 HC RX 258: Performed by: ANESTHESIOLOGY

## 2024-04-23 PROCEDURE — 84132 ASSAY OF SERUM POTASSIUM: CPT

## 2024-04-23 PROCEDURE — 2700000000 HC OXYGEN THERAPY PER DAY

## 2024-04-23 PROCEDURE — 1200000000 HC SEMI PRIVATE

## 2024-04-23 PROCEDURE — 2580000003 HC RX 258: Performed by: NURSE ANESTHETIST, CERTIFIED REGISTERED

## 2024-04-23 PROCEDURE — 7100000000 HC PACU RECOVERY - FIRST 15 MIN: Performed by: INTERNAL MEDICINE

## 2024-04-23 PROCEDURE — 51702 INSERT TEMP BLADDER CATH: CPT

## 2024-04-23 PROCEDURE — 94761 N-INVAS EAR/PLS OXIMETRY MLT: CPT

## 2024-04-23 PROCEDURE — 2580000003 HC RX 258

## 2024-04-23 PROCEDURE — 6360000002 HC RX W HCPCS: Performed by: NURSE ANESTHETIST, CERTIFIED REGISTERED

## 2024-04-23 PROCEDURE — 99233 SBSQ HOSP IP/OBS HIGH 50: CPT | Performed by: NURSE PRACTITIONER

## 2024-04-23 PROCEDURE — 94640 AIRWAY INHALATION TREATMENT: CPT

## 2024-04-23 PROCEDURE — 84295 ASSAY OF SERUM SODIUM: CPT

## 2024-04-23 PROCEDURE — 31646 BRNCHSC W/THER ASPIR SBSQ: CPT | Performed by: INTERNAL MEDICINE

## 2024-04-23 PROCEDURE — 83605 ASSAY OF LACTIC ACID: CPT

## 2024-04-23 PROCEDURE — 3609010900 HC BRONCHOSCOPY THERAPUTIC ASPIRATION INITIAL: Performed by: INTERNAL MEDICINE

## 2024-04-23 RX ORDER — CETIRIZINE HYDROCHLORIDE 10 MG/1
10 TABLET ORAL DAILY
Status: DISCONTINUED | OUTPATIENT
Start: 2024-04-23 | End: 2024-04-24 | Stop reason: HOSPADM

## 2024-04-23 RX ORDER — KETOROLAC TROMETHAMINE 30 MG/ML
INJECTION, SOLUTION INTRAMUSCULAR; INTRAVENOUS
Status: COMPLETED
Start: 2024-04-23 | End: 2024-04-23

## 2024-04-23 RX ORDER — POLYETHYLENE GLYCOL 3350 17 G/17G
32 POWDER, FOR SOLUTION ORAL 2 TIMES DAILY
Status: DISCONTINUED | OUTPATIENT
Start: 2024-04-23 | End: 2024-04-24 | Stop reason: HOSPADM

## 2024-04-23 RX ORDER — INSULIN LISPRO 100 [IU]/ML
7 INJECTION, SOLUTION INTRAVENOUS; SUBCUTANEOUS 3 TIMES DAILY
Status: DISCONTINUED | OUTPATIENT
Start: 2024-04-23 | End: 2024-04-23

## 2024-04-23 RX ORDER — ACETAMINOPHEN 325 MG/1
650 TABLET ORAL
Status: DISCONTINUED | OUTPATIENT
Start: 2024-04-23 | End: 2024-04-23 | Stop reason: HOSPADM

## 2024-04-23 RX ORDER — LIDOCAINE HYDROCHLORIDE 20 MG/ML
JELLY TOPICAL PRN
Status: DISCONTINUED | OUTPATIENT
Start: 2024-04-23 | End: 2024-04-24 | Stop reason: HOSPADM

## 2024-04-23 RX ORDER — ONDANSETRON 2 MG/ML
INJECTION INTRAMUSCULAR; INTRAVENOUS PRN
Status: DISCONTINUED | OUTPATIENT
Start: 2024-04-23 | End: 2024-04-23 | Stop reason: SDUPTHER

## 2024-04-23 RX ORDER — HYDROMORPHONE HYDROCHLORIDE 1 MG/ML
0.5 INJECTION, SOLUTION INTRAMUSCULAR; INTRAVENOUS; SUBCUTANEOUS EVERY 5 MIN PRN
Status: DISCONTINUED | OUTPATIENT
Start: 2024-04-23 | End: 2024-04-23 | Stop reason: HOSPADM

## 2024-04-23 RX ORDER — PROCHLORPERAZINE EDISYLATE 5 MG/ML
5 INJECTION INTRAMUSCULAR; INTRAVENOUS
Status: DISCONTINUED | OUTPATIENT
Start: 2024-04-23 | End: 2024-04-23 | Stop reason: HOSPADM

## 2024-04-23 RX ORDER — ESMOLOL HYDROCHLORIDE 10 MG/ML
30 INJECTION INTRAVENOUS ONCE
Status: COMPLETED | OUTPATIENT
Start: 2024-04-23 | End: 2024-04-23

## 2024-04-23 RX ORDER — INSULIN LISPRO 100 [IU]/ML
10 INJECTION, SOLUTION INTRAVENOUS; SUBCUTANEOUS 3 TIMES DAILY
Status: DISCONTINUED | OUTPATIENT
Start: 2024-04-23 | End: 2024-04-23 | Stop reason: DRUGHIGH

## 2024-04-23 RX ORDER — SODIUM CHLORIDE 9 MG/ML
INJECTION, SOLUTION INTRAVENOUS PRN
Status: DISCONTINUED | OUTPATIENT
Start: 2024-04-23 | End: 2024-04-23 | Stop reason: HOSPADM

## 2024-04-23 RX ORDER — LABETALOL HYDROCHLORIDE 5 MG/ML
10 INJECTION, SOLUTION INTRAVENOUS
Status: DISCONTINUED | OUTPATIENT
Start: 2024-04-23 | End: 2024-04-23 | Stop reason: HOSPADM

## 2024-04-23 RX ORDER — PROPOFOL 10 MG/ML
INJECTION, EMULSION INTRAVENOUS PRN
Status: DISCONTINUED | OUTPATIENT
Start: 2024-04-23 | End: 2024-04-23 | Stop reason: SDUPTHER

## 2024-04-23 RX ORDER — SODIUM CHLORIDE 0.9 % (FLUSH) 0.9 %
5-40 SYRINGE (ML) INJECTION EVERY 12 HOURS SCHEDULED
Status: DISCONTINUED | OUTPATIENT
Start: 2024-04-23 | End: 2024-04-23 | Stop reason: HOSPADM

## 2024-04-23 RX ORDER — LIDOCAINE HYDROCHLORIDE 20 MG/ML
INJECTION, SOLUTION INFILTRATION; PERINEURAL PRN
Status: DISCONTINUED | OUTPATIENT
Start: 2024-04-23 | End: 2024-04-23 | Stop reason: SDUPTHER

## 2024-04-23 RX ORDER — BISACODYL 10 MG
10 SUPPOSITORY, RECTAL RECTAL DAILY
Status: DISCONTINUED | OUTPATIENT
Start: 2024-04-23 | End: 2024-04-24 | Stop reason: HOSPADM

## 2024-04-23 RX ORDER — SODIUM CHLORIDE 0.9 % (FLUSH) 0.9 %
5-40 SYRINGE (ML) INJECTION PRN
Status: DISCONTINUED | OUTPATIENT
Start: 2024-04-23 | End: 2024-04-23 | Stop reason: HOSPADM

## 2024-04-23 RX ORDER — ONDANSETRON 2 MG/ML
4 INJECTION INTRAMUSCULAR; INTRAVENOUS
Status: DISCONTINUED | OUTPATIENT
Start: 2024-04-23 | End: 2024-04-23 | Stop reason: HOSPADM

## 2024-04-23 RX ORDER — KETOROLAC TROMETHAMINE 30 MG/ML
30 INJECTION, SOLUTION INTRAMUSCULAR; INTRAVENOUS ONCE
Status: COMPLETED | OUTPATIENT
Start: 2024-04-23 | End: 2024-04-23

## 2024-04-23 RX ORDER — SUCCINYLCHOLINE/SOD CL,ISO/PF 100 MG/5ML
SYRINGE (ML) INTRAVENOUS PRN
Status: DISCONTINUED | OUTPATIENT
Start: 2024-04-23 | End: 2024-04-23 | Stop reason: SDUPTHER

## 2024-04-23 RX ORDER — FENTANYL CITRATE 50 UG/ML
25 INJECTION, SOLUTION INTRAMUSCULAR; INTRAVENOUS EVERY 5 MIN PRN
Status: DISCONTINUED | OUTPATIENT
Start: 2024-04-23 | End: 2024-04-23 | Stop reason: HOSPADM

## 2024-04-23 RX ORDER — SODIUM CHLORIDE, SODIUM LACTATE, POTASSIUM CHLORIDE, CALCIUM CHLORIDE 600; 310; 30; 20 MG/100ML; MG/100ML; MG/100ML; MG/100ML
INJECTION, SOLUTION INTRAVENOUS CONTINUOUS PRN
Status: DISCONTINUED | OUTPATIENT
Start: 2024-04-23 | End: 2024-04-23 | Stop reason: SDUPTHER

## 2024-04-23 RX ORDER — IPRATROPIUM BROMIDE AND ALBUTEROL SULFATE 2.5; .5 MG/3ML; MG/3ML
1 SOLUTION RESPIRATORY (INHALATION)
Status: DISCONTINUED | OUTPATIENT
Start: 2024-04-23 | End: 2024-04-23 | Stop reason: HOSPADM

## 2024-04-23 RX ORDER — NALOXONE HYDROCHLORIDE 0.4 MG/ML
INJECTION, SOLUTION INTRAMUSCULAR; INTRAVENOUS; SUBCUTANEOUS PRN
Status: DISCONTINUED | OUTPATIENT
Start: 2024-04-23 | End: 2024-04-23 | Stop reason: HOSPADM

## 2024-04-23 RX ADMIN — INSULIN LISPRO 4 UNITS: 100 INJECTION, SOLUTION INTRAVENOUS; SUBCUTANEOUS at 16:49

## 2024-04-23 RX ADMIN — SENNOSIDES 8.6 MG: 8.6 TABLET, FILM COATED ORAL at 19:47

## 2024-04-23 RX ADMIN — GABAPENTIN 600 MG: 600 TABLET, FILM COATED ORAL at 09:45

## 2024-04-23 RX ADMIN — CLONIDINE HYDROCHLORIDE 0.1 MG: 0.1 TABLET ORAL at 19:48

## 2024-04-23 RX ADMIN — ALBUTEROL SULFATE 2.5 MG: 2.5 SOLUTION RESPIRATORY (INHALATION) at 14:31

## 2024-04-23 RX ADMIN — BISACODYL 10 MG: 10 SUPPOSITORY RECTAL at 16:29

## 2024-04-23 RX ADMIN — NALOXEGOL OXALATE 12.5 MG: 12.5 TABLET, FILM COATED ORAL at 12:01

## 2024-04-23 RX ADMIN — LANSOPRAZOLE 30 MG: 30 TABLET, ORALLY DISINTEGRATING, DELAYED RELEASE ORAL at 09:46

## 2024-04-23 RX ADMIN — GABAPENTIN 600 MG: 600 TABLET, FILM COATED ORAL at 14:28

## 2024-04-23 RX ADMIN — Medication 140 MG: at 07:55

## 2024-04-23 RX ADMIN — METHOCARBAMOL 500 MG: 500 TABLET ORAL at 19:47

## 2024-04-23 RX ADMIN — ACETAMINOPHEN 650 MG: 650 SUPPOSITORY RECTAL at 18:37

## 2024-04-23 RX ADMIN — INSULIN LISPRO 4 UNITS: 100 INJECTION, SOLUTION INTRAVENOUS; SUBCUTANEOUS at 11:14

## 2024-04-23 RX ADMIN — INSULIN HUMAN 5 UNITS: 100 INJECTION, SOLUTION PARENTERAL at 23:53

## 2024-04-23 RX ADMIN — METHOCARBAMOL 1000 MG: 100 INJECTION INTRAMUSCULAR; INTRAVENOUS at 12:03

## 2024-04-23 RX ADMIN — SODIUM CHLORIDE 30 MG/ML INHALATION SOLUTION 4 ML: 30 SOLUTION INHALANT at 14:31

## 2024-04-23 RX ADMIN — LIDOCAINE HYDROCHLORIDE 100 MG: 20 INJECTION, SOLUTION INFILTRATION; PERINEURAL at 07:54

## 2024-04-23 RX ADMIN — AMLODIPINE BESYLATE 5 MG: 5 TABLET ORAL at 09:45

## 2024-04-23 RX ADMIN — INSULIN LISPRO 4 UNITS: 100 INJECTION, SOLUTION INTRAVENOUS; SUBCUTANEOUS at 04:08

## 2024-04-23 RX ADMIN — SODIUM CHLORIDE, SODIUM LACTATE, POTASSIUM CHLORIDE, AND CALCIUM CHLORIDE: .6; .31; .03; .02 INJECTION, SOLUTION INTRAVENOUS at 07:50

## 2024-04-23 RX ADMIN — AMIODARONE HYDROCHLORIDE 200 MG: 200 TABLET ORAL at 19:47

## 2024-04-23 RX ADMIN — APIXABAN 5 MG: 5 TABLET, FILM COATED ORAL at 09:45

## 2024-04-23 RX ADMIN — GABAPENTIN 600 MG: 600 TABLET, FILM COATED ORAL at 19:47

## 2024-04-23 RX ADMIN — ASPIRIN 81 MG: 81 TABLET, CHEWABLE ORAL at 09:46

## 2024-04-23 RX ADMIN — CLONIDINE HYDROCHLORIDE 0.1 MG: 0.1 TABLET ORAL at 09:46

## 2024-04-23 RX ADMIN — POLYETHYLENE GLYCOL 3350 17 G: 17 POWDER, FOR SOLUTION ORAL at 09:44

## 2024-04-23 RX ADMIN — KETOROLAC TROMETHAMINE 30 MG: 30 INJECTION, SOLUTION INTRAMUSCULAR; INTRAVENOUS at 09:09

## 2024-04-23 RX ADMIN — INSULIN LISPRO 4 UNITS: 100 INJECTION, SOLUTION INTRAVENOUS; SUBCUTANEOUS at 23:53

## 2024-04-23 RX ADMIN — METHOCARBAMOL 500 MG: 500 TABLET ORAL at 09:55

## 2024-04-23 RX ADMIN — OXYCODONE AND ACETAMINOPHEN 1 TABLET: 10; 325 TABLET ORAL at 19:47

## 2024-04-23 RX ADMIN — INSULIN HUMAN 5 UNITS: 100 INJECTION, SOLUTION PARENTERAL at 16:49

## 2024-04-23 RX ADMIN — INSULIN LISPRO 5 UNITS: 100 INJECTION, SOLUTION INTRAVENOUS; SUBCUTANEOUS at 10:35

## 2024-04-23 RX ADMIN — ALBUTEROL SULFATE 2.5 MG: 2.5 SOLUTION RESPIRATORY (INHALATION) at 20:22

## 2024-04-23 RX ADMIN — ATORVASTATIN CALCIUM 80 MG: 80 TABLET, FILM COATED ORAL at 19:47

## 2024-04-23 RX ADMIN — AMIODARONE HYDROCHLORIDE 200 MG: 200 TABLET ORAL at 09:45

## 2024-04-23 RX ADMIN — PROPOFOL 200 MG: 10 INJECTION, EMULSION INTRAVENOUS at 07:55

## 2024-04-23 RX ADMIN — ESMOLOL HYDROCHLORIDE 30 MG: 10 INJECTION, SOLUTION INTRAVENOUS at 09:10

## 2024-04-23 RX ADMIN — INSULIN GLARGINE 40 UNITS: 100 INJECTION, SOLUTION SUBCUTANEOUS at 20:08

## 2024-04-23 RX ADMIN — CETIRIZINE HYDROCHLORIDE 10 MG: 10 TABLET, FILM COATED ORAL at 12:02

## 2024-04-23 RX ADMIN — SODIUM CHLORIDE, PRESERVATIVE FREE 10 ML: 5 INJECTION INTRAVENOUS at 09:46

## 2024-04-23 RX ADMIN — APIXABAN 5 MG: 5 TABLET, FILM COATED ORAL at 19:48

## 2024-04-23 RX ADMIN — LOSARTAN POTASSIUM 100 MG: 100 TABLET, FILM COATED ORAL at 09:46

## 2024-04-23 RX ADMIN — SODIUM CHLORIDE 30 MG/ML INHALATION SOLUTION 4 ML: 30 SOLUTION INHALANT at 09:41

## 2024-04-23 RX ADMIN — ONDANSETRON 8 MG: 2 INJECTION INTRAMUSCULAR; INTRAVENOUS at 07:58

## 2024-04-23 RX ADMIN — POLYETHYLENE GLYCOL 3350 34 G: 17 POWDER, FOR SOLUTION ORAL at 19:47

## 2024-04-23 RX ADMIN — ALBUTEROL SULFATE 2.5 MG: 2.5 SOLUTION RESPIRATORY (INHALATION) at 09:41

## 2024-04-23 RX ADMIN — SODIUM CHLORIDE 30 MG/ML INHALATION SOLUTION 4 ML: 30 SOLUTION INHALANT at 20:22

## 2024-04-23 ASSESSMENT — PAIN DESCRIPTION - LOCATION
LOCATION: BACK
LOCATION: BACK;SHOULDER
LOCATION: BACK
LOCATION: BACK

## 2024-04-23 ASSESSMENT — PAIN DESCRIPTION - ORIENTATION
ORIENTATION: LOWER
ORIENTATION: LEFT;MID

## 2024-04-23 ASSESSMENT — PAIN DESCRIPTION - PAIN TYPE
TYPE: SURGICAL PAIN
TYPE: CHRONIC PAIN

## 2024-04-23 ASSESSMENT — PAIN SCALES - WONG BAKER
WONGBAKER_NUMERICALRESPONSE: NO HURT
WONGBAKER_NUMERICALRESPONSE: NO HURT

## 2024-04-23 ASSESSMENT — PAIN - FUNCTIONAL ASSESSMENT
PAIN_FUNCTIONAL_ASSESSMENT: ACTIVITIES ARE NOT PREVENTED
PAIN_FUNCTIONAL_ASSESSMENT: NONE - DENIES PAIN

## 2024-04-23 ASSESSMENT — PAIN SCALES - GENERAL
PAINLEVEL_OUTOF10: 10
PAINLEVEL_OUTOF10: 9
PAINLEVEL_OUTOF10: 10
PAINLEVEL_OUTOF10: 10
PAINLEVEL_OUTOF10: 6
PAINLEVEL_OUTOF10: 8
PAINLEVEL_OUTOF10: 0

## 2024-04-23 ASSESSMENT — PAIN DESCRIPTION - DESCRIPTORS
DESCRIPTORS: ACHING
DESCRIPTORS: ACHING
DESCRIPTORS: DISCOMFORT
DESCRIPTORS: ACHING

## 2024-04-23 ASSESSMENT — PAIN DESCRIPTION - ONSET: ONSET: ON-GOING

## 2024-04-23 ASSESSMENT — PAIN DESCRIPTION - FREQUENCY: FREQUENCY: CONTINUOUS

## 2024-04-23 NOTE — FLOWSHEET NOTE
Bladder scan resulted 512mL. Attempted to straight cath 3x, unable to get any urine to return. Notified galvan residents, asked them to consult urology. Was told they will pass along to day team.

## 2024-04-23 NOTE — ANESTHESIA POSTPROCEDURE EVALUATION
Department of Anesthesiology  Postprocedure Note    Patient: Kulwant Lamb  MRN: [id number]  YOB: 1955  Date of evaluation: 4/23/2024    Procedure Summary       Date: 04/23/24 Room / Location: [facility] ENDO 01 / [facility]    Anesthesia Start: 0750 Anesthesia Stop: 0840    Procedure: BRONCHOSCOPY THERAPEUTIC ASPIRATION INITIAL Diagnosis:       Mucus plugging of bronchi      (Mucus plugging of bronchi [T17.500A])    Surgeons: Wagner Timmons MD Responsible Provider: Eric John MD    Anesthesia Type: general ASA Status: 4            Anesthesia Type: No value filed.    Boy Phase I: Boy Score: 8    Boy Phase II:      Anesthesia Post Evaluation    Patient location during evaluation: PACU  Patient participation: complete - patient participated  Level of consciousness: awake  Pain score: 0  Nausea & Vomiting: no nausea and no vomiting  Cardiovascular status: blood pressure returned to baseline  Respiratory status: acceptable  Hydration status: euvolemic  Pain management: adequate    No notable events documented.

## 2024-04-23 NOTE — RT PROTOCOL NOTE
RT Inhaler-Nebulizer Bronchodilator Protocol Note    There is a bronchodilator order in the chart from a provider indicating to follow the RT Bronchodilator Protocol and there is an “Initiate RT Inhaler-Nebulizer Bronchodilator Protocol” order as well (see protocol at bottom of note).    CXR Findings:  XR CHEST PORTABLE    Result Date: 4/8/2024  Right lower lobe and possibly also the middle lobe collapse. This most likely is due to mucous plugging. Electronically signed by Markel Caballero DO      The findings from the last RT Protocol Assessment were as follows:   History Pulmonary Disease: Smoker 15 pack years or more  Respiratory Pattern: Regular pattern and RR 12-20 bpm  Breath Sounds: Slightly diminished and/or crackles  Cough: Strong, productive  Indication for Bronchodilator Therapy: Decreased or absent breath sounds  Bronchodilator Assessment Score: 4  Will continue with TID    Aerosolized bronchodilator medication orders have been revised according to the RT Inhaler-Nebulizer Bronchodilator Protocol below.    Respiratory Therapist to perform RT Therapy Protocol Assessment initially then follow the protocol.  Repeat RT Therapy Protocol Assessment PRN for score 0-3 or on second treatment, BID, and PRN for scores above 3.    No Indications - adjust the frequency to every 6 hours PRN wheezing or bronchospasm, if no treatments needed after 48 hours then discontinue using Per Protocol order mode.     If indication present, adjust the RT bronchodilator orders based on the Bronchodilator Assessment Score as indicated below.  Use Inhaler orders unless patient has one or more of the following: on home nebulizer, not able to hold breath for 10 seconds, is not alert and oriented, cannot activate and use MDI correctly, or respiratory rate 25 breaths per minute or more, then use the equivalent nebulizer order(s) with same Frequency and PRN reasons based on the score.  If a patient is on this medication at home then do not 
RT Nebulizer Bronchodilator Protocol Note    There is a bronchodilator order in the chart from a provider indicating to follow the RT Bronchodilator Protocol and there is an “Initiate RT Bronchodilator Protocol” order as well (see protocol at bottom of note).    CXR Findings:  No results found.    The findings from the last RT Protocol Assessment were as follows:  Smoking: Smoker 15 pack years or more  Respiratory Pattern: Dyspnea on exertion or RR 21-25 bpm  Breath Sounds: Slightly diminished and/or crackles  Cough: Strong, productive  Indication for Bronchodilator Therapy: Decreased or absent breath sounds  Bronchodilator Assessment Score: 6    Aerosolized bronchodilator medication orders have been revised according to the RT Nebulizer Bronchodilator Protocol below.    Respiratory Therapist to perform RT Therapy Protocol Assessment initially then follow the protocol.  Repeat RT Therapy Protocol Assessment PRN for score 0-3 or on second treatment, BID, and PRN for scores above 3.    No Indications - adjust the frequency to every 6 hours PRN wheezing or bronchospasm, if no treatments needed after 48 hours then discontinue using Per Protocol order mode.     If indication present, adjust the RT bronchodilator orders based on the Bronchodilator Assessment Score as indicated below.  If a patient is on this medication at home then do not decrease Frequency below that used at home.    0-3 - enter or revise RT bronchodilator order(s) to equivalent RT Bronchodilator order with Frequency of every 4 hours PRN for wheezing or increased work of breathing using Per Protocol order mode.       4-6 - enter or revise RT Bronchodilator order(s) to two equivalent RT bronchodilator orders with one order with BID Frequency and one order with Frequency of every 4 hours PRN wheezing or increased work of breathing using Per Protocol order mode.         7-10 - enter or revise RT Bronchodilator order(s) to two equivalent RT bronchodilator 
RT Nebulizer Bronchodilator Protocol Note    There is a bronchodilator order in the chart from a provider indicating to follow the RT Bronchodilator Protocol and there is an “Initiate RT Bronchodilator Protocol” order as well (see protocol at bottom of note).    CXR Findings:  No results found.    The findings from the last RT Protocol Assessment were as follows:  Smoking: Smoker 15 pack years or more  Respiratory Pattern: Dyspnea on exertion or RR 21-25 bpm  Breath Sounds: Slightly diminished and/or crackles  Cough: Strong, productive  Indication for Bronchodilator Therapy: Decreased or absent breath sounds  Bronchodilator Assessment Score: 6    Aerosolized bronchodilator medication orders have been revised according to the RT Nebulizer Bronchodilator Protocol below.    Respiratory Therapist to perform RT Therapy Protocol Assessment initially then follow the protocol.  Repeat RT Therapy Protocol Assessment PRN for score 0-3 or on second treatment, BID, and PRN for scores above 3.    No Indications - adjust the frequency to every 6 hours PRN wheezing or bronchospasm, if no treatments needed after 48 hours then discontinue using Per Protocol order mode.     If indication present, adjust the RT bronchodilator orders based on the Bronchodilator Assessment Score as indicated below.  If a patient is on this medication at home then do not decrease Frequency below that used at home.    0-3 - enter or revise RT bronchodilator order(s) to equivalent RT Bronchodilator order with Frequency of every 4 hours PRN for wheezing or increased work of breathing using Per Protocol order mode.       4-6 - enter or revise RT Bronchodilator order(s) to two equivalent RT bronchodilator orders with one order with BID Frequency and one order with Frequency of every 4 hours PRN wheezing or increased work of breathing using Per Protocol order mode.         7-10 - enter or revise RT Bronchodilator order(s) to two equivalent RT bronchodilator 
RT Nebulizer Bronchodilator Protocol Note    There is a bronchodilator order in the chart from a provider indicating to follow the RT Bronchodilator Protocol and there is an “Initiate RT Bronchodilator Protocol” order as well (see protocol at bottom of note).    CXR Findings:  No results found.    The findings from the last RT Protocol Assessment were as follows:  Smoking: Smoker 15 pack years or more  Respiratory Pattern: Dyspnea on exertion or RR 21-25 bpm  Breath Sounds: Slightly diminished and/or crackles  Cough: Strong, productive  Indication for Bronchodilator Therapy: Mucolytic ordered  Bronchodilator Assessment Score: 6    Aerosolized bronchodilator medication orders have been revised according to the RT Nebulizer Bronchodilator Protocol below.    Respiratory Therapist to perform RT Therapy Protocol Assessment initially then follow the protocol.  Repeat RT Therapy Protocol Assessment PRN for score 0-3 or on second treatment, BID, and PRN for scores above 3.    No Indications - adjust the frequency to every 6 hours PRN wheezing or bronchospasm, if no treatments needed after 48 hours then discontinue using Per Protocol order mode.     If indication present, adjust the RT bronchodilator orders based on the Bronchodilator Assessment Score as indicated below.  If a patient is on this medication at home then do not decrease Frequency below that used at home.    0-3 - enter or revise RT bronchodilator order(s) to equivalent RT Bronchodilator order with Frequency of every 4 hours PRN for wheezing or increased work of breathing using Per Protocol order mode.       4-6 - enter or revise RT Bronchodilator order(s) to two equivalent RT bronchodilator orders with one order with BID Frequency and one order with Frequency of every 4 hours PRN wheezing or increased work of breathing using Per Protocol order mode.         7-10 - enter or revise RT Bronchodilator order(s) to two equivalent RT bronchodilator orders with one 
RT Nebulizer Bronchodilator Protocol Note    There is a bronchodilator order in the chart from a provider indicating to follow the RT Bronchodilator Protocol and there is an “Initiate RT Bronchodilator Protocol” order as well (see protocol at bottom of note).    CXR Findings:  XR CHEST PORTABLE    Result Date: 4/14/2024  Stable cardiomegaly and right hemidiaphragmatic elevation.       The findings from the last RT Protocol Assessment were as follows:  Smoking: Smoker 15 pack years or more  Respiratory Pattern: Regular pattern and RR 12-20 bpm  Breath Sounds: Slightly diminished and/or crackles  Cough: Strong, productive  Indication for Bronchodilator Therapy: Mucolytic ordered  Bronchodilator Assessment Score: 4    Aerosolized bronchodilator medication orders have been revised according to the RT Nebulizer Bronchodilator Protocol below.    Respiratory Therapist to perform RT Therapy Protocol Assessment initially then follow the protocol.  Repeat RT Therapy Protocol Assessment PRN for score 0-3 or on second treatment, BID, and PRN for scores above 3.    No Indications - adjust the frequency to every 6 hours PRN wheezing or bronchospasm, if no treatments needed after 48 hours then discontinue using Per Protocol order mode.     If indication present, adjust the RT bronchodilator orders based on the Bronchodilator Assessment Score as indicated below.  If a patient is on this medication at home then do not decrease Frequency below that used at home.    0-3 - enter or revise RT bronchodilator order(s) to equivalent RT Bronchodilator order with Frequency of every 4 hours PRN for wheezing or increased work of breathing using Per Protocol order mode.       4-6 - enter or revise RT Bronchodilator order(s) to two equivalent RT bronchodilator orders with one order with BID Frequency and one order with Frequency of every 4 hours PRN wheezing or increased work of breathing using Per Protocol order mode.         7-10 - enter or 
RT Nebulizer Bronchodilator Protocol Note    There is a bronchodilator order in the chart from a provider indicating to follow the RT Bronchodilator Protocol and there is an “Initiate RT Bronchodilator Protocol” order as well (see protocol at bottom of note).    CXR Findings:  XR CHEST PORTABLE    Result Date: 4/4/2024  Mild patchy consolidation left perihilar lung. Elevated right hemidiaphragm with mild right basilar atelectasis. Cardiomegaly.      The findings from the last RT Protocol Assessment were as follows:  Smoking: Smoker 15 pack years or more  Respiratory Pattern: Dyspnea on exertion or RR 21-25 bpm  Breath Sounds: Slightly diminished and/or crackles  Cough: Strong, productive  Indication for Bronchodilator Therapy: Decreased or absent breath sounds  Bronchodilator Assessment Score: 6  Albuterol TID &Q4prn for pts benefit  Aerosolized bronchodilator medication orders have been revised according to the RT Nebulizer Bronchodilator Protocol below.    Respiratory Therapist to perform RT Therapy Protocol Assessment initially then follow the protocol.  Repeat RT Therapy Protocol Assessment PRN for score 0-3 or on second treatment, BID, and PRN for scores above 3.    No Indications - adjust the frequency to every 6 hours PRN wheezing or bronchospasm, if no treatments needed after 48 hours then discontinue using Per Protocol order mode.     If indication present, adjust the RT bronchodilator orders based on the Bronchodilator Assessment Score as indicated below.  If a patient is on this medication at home then do not decrease Frequency below that used at home.    0-3 - enter or revise RT bronchodilator order(s) to equivalent RT Bronchodilator order with Frequency of every 4 hours PRN for wheezing or increased work of breathing using Per Protocol order mode.       4-6 - enter or revise RT Bronchodilator order(s) to two equivalent RT bronchodilator orders with one order with BID Frequency and one order with 
equivalent RT Bronchodilator order with Frequency of every 4 hours PRN for wheezing or increased work of breathing using Per Protocol order mode.        4-6 - enter or revise RT Bronchodilator order(s) to two equivalent RT bronchodilator orders with one order with BID Frequency and one order with Frequency of every 4 hours PRN wheezing or increased work of breathing using Per Protocol order mode.        7-10 - enter or revise RT Bronchodilator order(s) to two equivalent RT bronchodilator orders with one order with TID Frequency and one order with Frequency of every 4 hours PRN wheezing or increased work of breathing using Per Protocol order mode.       11-13 - enter or revise RT Bronchodilator order(s) to one equivalent RT bronchodilator order with QID Frequency and an Albuterol order with Frequency of every 4 hours PRN wheezing or increased work of breathing using Per Protocol order mode.      Greater than 13 - enter or revise RT Bronchodilator order(s) to one equivalent RT bronchodilator order with every 4 hours Frequency and an Albuterol order with Frequency of every 2 hours PRN wheezing or increased work of breathing using Per Protocol order mode.     RT to enter RT Home Evaluation for COPD & MDI Assessment order using Per Protocol order mode.    Electronically signed by Gris Hahn RCP on 4/22/2024 at 11:54 PM  
order with TID Frequency and one order with Frequency of every 4 hours PRN wheezing or increased work of breathing using Per Protocol order mode.       11-13 - enter or revise RT Bronchodilator order(s) to one equivalent RT bronchodilator order with QID Frequency and an Albuterol order with Frequency of every 4 hours PRN wheezing or increased work of breathing using Per Protocol order mode.      Greater than 13 - enter or revise RT Bronchodilator order(s) to one equivalent RT bronchodilator order with every 4 hours Frequency and an Albuterol order with Frequency of every 2 hours PRN wheezing or increased work of breathing using Per Protocol order mode.     RT to enter RT Home Evaluation for COPD & MDI Assessment order using Per Protocol order mode.    Electronically signed by Ion Capps RCP on 4/13/2024 at 9:20 AM  
revise RT Bronchodilator order(s) to two equivalent RT bronchodilator orders with one order with TID Frequency and one order with Frequency of every 4 hours PRN wheezing or increased work of breathing using Per Protocol order mode.       11-13 - enter or revise RT Bronchodilator order(s) to one equivalent RT bronchodilator order with QID Frequency and an Albuterol order with Frequency of every 4 hours PRN wheezing or increased work of breathing using Per Protocol order mode.      Greater than 13 - enter or revise RT Bronchodilator order(s) to one equivalent RT bronchodilator order with every 4 hours Frequency and an Albuterol order with Frequency of every 2 hours PRN wheezing or increased work of breathing using Per Protocol order mode.     RT to enter RT Home Evaluation for COPD & MDI Assessment order using Per Protocol order mode.    Electronically signed by Tessy Greenfield RCP on 4/15/2024 at 8:11 AM

## 2024-04-23 NOTE — BRIEF OP NOTE
Brief Postoperative Note      Patient: Kulwant Lamb  YOB: 1955  MRN: [id number]    Date of Procedure: 4/23/2024    Pre-Op Diagnosis: Mucus plugging of bronchi [T17.500A]    Post-Op Diagnosis: Same       Procedure(s):  BRONCHOSCOPY THERAPEUTIC ASPIRATION INITIAL    Surgeon(s):  Wagner Timmons MD    Anesthesia: Monitor Anesthesia Care    Airway: Mallampati: III (soft palate, base of uvula visible)    ASA: Class 3 - A patient with severe systemic disease that limits activity but is not incapacitating    Estimated Blood Loss (mL): Minimal    Complications: None    Specimens: none     Findings:  RMB occluded with viscous mucoid secretions.  Cleared with vigorous saline lavage and suctioning.      Electronically signed by Wagner Timmons MD on 4/23/2024 at 8:31 AM

## 2024-04-23 NOTE — ANESTHESIA PRE PROCEDURE
Department of Anesthesiology  Preprocedure Note       Name:  Kulwant Lamb   Age:  68 y.o.  :  1955                                          MRN:  [id number]         Date:  2024      Surgeon: Surgeon(s):  Wagner Timmons MD    Procedure: Procedure(s):  BRONCHOSCOPY THERAPEUTIC ASPIRATION INITIAL    Medications prior to admission:   Prior to Admission medications    Medication Sig Start Date End Date Taking? Authorizing Provider   oxyCODONE-acetaminophen (PERCOCET)  MG per tablet 1 tablet by Per G Tube route every 6 hours as needed for Pain for up to 30 days. Max Daily Amount: 4 tablets 24 Yes Rodney Castillo MD   aspirin 81 MG chewable tablet 1 tablet by Per G Tube route daily 24  Yes Rodney Castillo MD   albuterol (PROVENTIL) (2.5 MG/3ML) 0.083% nebulizer solution Take 3 mLs by nebulization in the morning, at noon, and at bedtime 24  Yes Rodney Castillo MD   apixaban (ELIQUIS) 5 MG TABS tablet Take 1 tablet by mouth 2 times daily 24  Yes Rodney Castillo MD   gabapentin (NEURONTIN) 600 MG tablet 1 tablet by Per G Tube route 3 times daily for 120 days. 24 Yes Rodney Castillo MD   atorvastatin (LIPITOR) 80 MG tablet 1 tablet by Per G Tube route nightly 24  Yes Rodney Castillo MD   losartan (COZAAR) 100 MG tablet 1 tablet by Per G Tube route daily 24  Yes Rodney Castillo MD   amiodarone (CORDARONE) 200 MG tablet 1 tablet by Per G Tube route daily 24  Yes Rodney Castillo MD   metOLazone (ZAROXOLYN) 5 MG tablet Take 0.5 tablets by mouth daily as needed (leg swelling)   Yes Ramon Carmona MD   tiZANidine (ZANAFLEX) 4 MG tablet Take 1 tablet by mouth every 8 hours as needed   Yes Ramon Carmona MD   insulin NPH (NOVOLIN N) 100 UNIT/ML injection vial ADMINISTER 10 TO 40 UNITS UNDER THE SKIN THREE TIMES DAILY PER SLIDING SCALE 3/15/24   Margoth Webber MD   ONETOUCH ULTRA strip USE THREE TIMES DAILY AND AS NEEDED FOR SYMPTOMS OF IRREGULAR

## 2024-04-23 NOTE — PROCEDURES
[address] [address]                             PROCEDURE NOTE      PATIENT NAME: SOLO BRINK                : 1955  MED REC NO: [id number]                      ROOM: 4503  ACCOUNT NO: [id number]                       ADMIT DATE: 2024  PROVIDER: Wagner Timmons MD      DATE OF PROCEDURE:  2024    SURGEON:  Wagner Timmons MD    PROCEDURE:  Bronchoscopy with therapeutic aspiration and secretions.    PREOPERATIVE DIAGNOSES:  Respiratory failure.  Opacification of right thorax, secondary to mucoid impaction of bronchi.    POSTOPERATIVE DIAGNOSIS:  Respiratory failure.  Opacification of right thorax, secondary to mucoid impaction of bronchi.    ASA score 2.  Mallampati score intubated.    SEDATION:  Ongoing conscious sedation with propofol.    DESCRIPTION OF PROCEDURE:  The patient previously had been intubated and was maintained on mechanical ventilation.  He was receiving ongoing conscious sedation with propofol infusion.  No additional sedation was required for this procedure.    The fiberoptic bronchoscope was passed via the aperture through the endotracheal tube.  The ETT was positioned 5 cm above the main rian.  The distal trachea was normal in appearance.  There was a small amount of mucoid secretions in the distal trachea, but the airway was quite patent.  The left bronchial tree was normal in its anatomy to the subsegmental level in both upper and lower lobes.  Bronchial mucosa was normal in appearance.  There were minimal mucoid secretions encountered.    The right mainstem bronchus was completely occluded by a large amount of very viscous mucoid secretions.  This required vigorous saline lavage and suctioning to clear and was seen to extend into the bronchus intermedius and into the right upper lobe bronchus.  Secretions were so viscous that on 3 different occasions, it could not come through 
               [address] [address]                             PROCEDURE NOTE      PATIENT NAME: SOLO BRINK                : 1955  MED REC NO: [id number]                      ROOM: Endo Pool  ACCOUNT NO: [id number]                       ADMIT DATE: 2024  PROVIDER: Wagner Timmons MD      DATE OF PROCEDURE:  2024    SURGEON:  Wagner Timmons MD    PROCEDURE:  Bronchoscopy with therapeutic aspiration of secretions.    PREOPERATIVE DIAGNOSES:  Right lung atelectasis.  Mucoid impaction of bronchi.    POSTOPERATIVE DIAGNOSES:  Right lung atelectasis.  Mucoid impaction of bronchi.    ASA SCORE:  3.    MALLAMPATI SCORE:  3.    ANESTHESIA:  MAC.    DESCRIPTION OF PROCEDURE:  The patient self identified during the time-out process.  He offered verbal and written consent for procedure.  General anesthesia was then induced, and the patient was intubated per anesthesiologist with 8.0 mm ETT.    The standard bronchoscope was passed via adapter through the ETT.  Endotracheal tube was noted to be in good position 3 cm above the main rian.  The main distal trachea was normal.  Main rian was normal in appearance.  Left endobronchial anatomy was normal to the subsegmental level and upper and lower lobes.  Mucosa was normal in appearance.  There were minimal mucus secretions seen.  All airways were patent.    The right main bronchus was completely occluded by a very viscous plug of mucoid secretions.  Vigorous saline lavage and suctioning was required to clear this plug of mucus from the right main bronchus.  The viscous secretions extended into the right upper lobe and bronchus intermedius, requiring further saline lavage and suctioning.  Discretions were so viscous.  They could not be pulled for the scope sometimes and were pulled out of the airway, trialing off the bronchoscope.  After large airway secretions were cleared, 
PowerGlide Midline Catheter Procedure Note:  Chart reviewed for allergies, diagnosis, labs, known contraindications, reason for line placement and planned length of treatment.  Insertion procedure discussed with patient/family/staff member.  Informed consent not required for PowerGlide Midline placement.  Three patient identifiers - Patient name,   and MRN -  completed &  confirmed verbally.   Hat, mask and eye shield donned.  Site scrubbed with Chloraprep  One-Step applicator  for 30 seconds x 1.  Hand Hygiene  performed with 3% Chlorhexidine surgical scrub x1 min prior to  Sterile gloves.  Patient draped. Vein located by [employer].  Extended Dwell Catheter inserted.  Positive brisk blood return obtained.  Valve applied to lumen and flushed with 10 mls  0.9% Sterile Sodium Chloride. Catheter flushes easily with no resistance.  Skin prep applied to site.  Catheter secured with non-sutured locking device per hospital protocol. Bio-patch/CHG impregnated sterile tegaderm dressing applied.  Alcohol Swab Cap applied to valve.  Sterile field maintained during procedure.   Guide wire accounted for post procedure. Pt. Response to procedure, tolerated well. Appearance of site: Clean dry and intact without bleeding or edema. All edges of Tegaderm occlusive.   Site marked with date and initials of RN placing line.Top 2 side rails in up position, call button in reach, RN notified of all of the above.     
suction; pt reports this is normal for him in the mornings. Voice mostly aphonic/very dysphonic; per pt, it was better but now is a little worse. Positioned him upright and assessed ice chips, thins via tsp/straw, puree. Pt with positive oral acceptance/containment, positive swallow movement, good oral clearance, inconsistent cough. Recommend proceed with instrumental assessment to better assess swallow function.  Goal met      RECOMMENDED COMPENSATORY STRATEGIES / POSTURAL CHANGES:  -Effortful swallows  -Upright for all PO  -Oral care 2-3x each day    EDUCATION:  Educated to FEES procedure, risks associated with procedure (including but not limited to discomfort and bleeding), rationale for completing, results of session, and recommendations.Pt indicated comprehension        Plan:  Continued dysphagia tx, yes  Diet recommendations:  NPO- ice chips OK after oral care  Solid Consistency: NPO   Liquid Consistency: NPO- ice chips OK after oral care   Liquid Administration via: N/A   Medication Administration: via alternative means   Strict oral care 2-3x daily    DC recommendation: suspect pt will require SLP services for dysphagia at d/c  Treatment: 20 minutes FEES, 10 minutes dysphagia tx  D/W nursing, Ayaka. Perfect serve message sent to resident Dr. Ferreira with results and recommendations  Needs met prior to leaving room, call button in reach.     Shannan Cline MA, CCC-SLP  [id number]  Speech-Language Pathologist  Pg. #[phone]        Therapy Time:   Individual Concurrent Group Co-treatment   Time In 1255  0000 0000 0000   Time Out 1325 0000 0000 0000   Minutes 30 0000 0000 0000

## 2024-04-24 ENCOUNTER — APPOINTMENT (OUTPATIENT)
Dept: GENERAL RADIOLOGY | Age: 69
DRG: 064 | End: 2024-04-24
Payer: MEDICARE

## 2024-04-24 VITALS
WEIGHT: 315 LBS | TEMPERATURE: 99.1 F | BODY MASS INDEX: 42.66 KG/M2 | HEART RATE: 116 BPM | OXYGEN SATURATION: 91 % | HEIGHT: 72 IN | DIASTOLIC BLOOD PRESSURE: 130 MMHG | RESPIRATION RATE: 15 BRPM | SYSTOLIC BLOOD PRESSURE: 141 MMHG

## 2024-04-24 LAB
ANION GAP SERPL CALCULATED.3IONS-SCNC: 6 MMOL/L (ref 3–16)
BUN SERPL-MCNC: 14 MG/DL (ref 7–20)
CALCIUM SERPL-MCNC: 8.7 MG/DL (ref 8.3–10.6)
CHLORIDE SERPL-SCNC: 97 MMOL/L (ref 99–110)
CO2 SERPL-SCNC: 32 MMOL/L (ref 21–32)
CREAT SERPL-MCNC: 0.7 MG/DL (ref 0.8–1.3)
DEPRECATED RDW RBC AUTO: 13.9 % (ref 12.4–15.4)
GFR SERPLBLD CREATININE-BSD FMLA CKD-EPI: >90 ML/MIN/{1.73_M2}
GLUCOSE BLD-MCNC: 178 MG/DL (ref 70–99)
GLUCOSE BLD-MCNC: 186 MG/DL (ref 70–99)
GLUCOSE BLD-MCNC: 204 MG/DL (ref 70–99)
GLUCOSE SERPL-MCNC: 159 MG/DL (ref 70–99)
HCT VFR BLD AUTO: 35 % (ref 40.5–52.5)
HGB BLD-MCNC: 11.6 G/DL (ref 13.5–17.5)
MCH RBC QN AUTO: 30.8 PG (ref 26–34)
MCHC RBC AUTO-ENTMCNC: 33.3 G/DL (ref 31–36)
MCV RBC AUTO: 92.6 FL (ref 80–100)
PERFORMED ON: ABNORMAL
PLATELET # BLD AUTO: 233 K/UL (ref 135–450)
PMV BLD AUTO: 8.2 FL (ref 5–10.5)
POTASSIUM SERPL-SCNC: 4.4 MMOL/L (ref 3.5–5.1)
RBC # BLD AUTO: 3.77 M/UL (ref 4.2–5.9)
SODIUM SERPL-SCNC: 135 MMOL/L (ref 136–145)
WBC # BLD AUTO: 14 K/UL (ref 4–11)

## 2024-04-24 PROCEDURE — 85027 COMPLETE CBC AUTOMATED: CPT

## 2024-04-24 PROCEDURE — 6370000000 HC RX 637 (ALT 250 FOR IP)

## 2024-04-24 PROCEDURE — 94669 MECHANICAL CHEST WALL OSCILL: CPT

## 2024-04-24 PROCEDURE — 6360000002 HC RX W HCPCS

## 2024-04-24 PROCEDURE — 71045 X-RAY EXAM CHEST 1 VIEW: CPT

## 2024-04-24 PROCEDURE — 2700000000 HC OXYGEN THERAPY PER DAY

## 2024-04-24 PROCEDURE — 80048 BASIC METABOLIC PNL TOTAL CA: CPT

## 2024-04-24 PROCEDURE — 36415 COLL VENOUS BLD VENIPUNCTURE: CPT

## 2024-04-24 PROCEDURE — 94761 N-INVAS EAR/PLS OXIMETRY MLT: CPT

## 2024-04-24 PROCEDURE — 94760 N-INVAS EAR/PLS OXIMETRY 1: CPT

## 2024-04-24 PROCEDURE — 94640 AIRWAY INHALATION TREATMENT: CPT

## 2024-04-24 PROCEDURE — 2580000003 HC RX 258

## 2024-04-24 RX ORDER — BISACODYL 10 MG
10 SUPPOSITORY, RECTAL RECTAL DAILY PRN
Qty: 30 SUPPOSITORY | Refills: 0 | Status: SHIPPED | OUTPATIENT
Start: 2024-04-24 | End: 2024-05-24

## 2024-04-24 RX ORDER — CETIRIZINE HYDROCHLORIDE 10 MG/1
10 TABLET ORAL DAILY
Qty: 30 TABLET | Refills: 1 | Status: SHIPPED | OUTPATIENT
Start: 2024-04-25

## 2024-04-24 RX ORDER — POLYETHYLENE GLYCOL 3350 17 G/17G
32 POWDER, FOR SOLUTION ORAL 2 TIMES DAILY PRN
Qty: 527 G | Refills: 1 | Status: SHIPPED | OUTPATIENT
Start: 2024-04-24 | End: 2024-05-24

## 2024-04-24 RX ORDER — SENNOSIDES A AND B 8.6 MG/1
1 TABLET, FILM COATED ORAL NIGHTLY PRN
Qty: 30 TABLET | Refills: 0 | Status: SHIPPED | OUTPATIENT
Start: 2024-04-24 | End: 2024-05-24

## 2024-04-24 RX ORDER — POLYETHYLENE GLYCOL 3350 17 G/17G
17 POWDER, FOR SOLUTION ORAL DAILY
Status: CANCELLED | OUTPATIENT
Start: 2024-04-25

## 2024-04-24 RX ORDER — ACETYLCYSTEINE 100 MG/ML
4 SOLUTION ORAL; RESPIRATORY (INHALATION) EVERY 4 HOURS PRN
Qty: 30 ML | Refills: 1 | Status: SHIPPED | OUTPATIENT
Start: 2024-04-24

## 2024-04-24 RX ADMIN — INSULIN HUMAN 5 UNITS: 100 INJECTION, SOLUTION PARENTERAL at 11:19

## 2024-04-24 RX ADMIN — CETIRIZINE HYDROCHLORIDE 10 MG: 10 TABLET, FILM COATED ORAL at 08:44

## 2024-04-24 RX ADMIN — SODIUM CHLORIDE 30 MG/ML INHALATION SOLUTION 4 ML: 30 SOLUTION INHALANT at 09:05

## 2024-04-24 RX ADMIN — CLONIDINE HYDROCHLORIDE 0.1 MG: 0.1 TABLET ORAL at 08:43

## 2024-04-24 RX ADMIN — LOSARTAN POTASSIUM 100 MG: 100 TABLET, FILM COATED ORAL at 08:44

## 2024-04-24 RX ADMIN — AMLODIPINE BESYLATE 5 MG: 5 TABLET ORAL at 08:44

## 2024-04-24 RX ADMIN — LANSOPRAZOLE 30 MG: 30 TABLET, ORALLY DISINTEGRATING, DELAYED RELEASE ORAL at 08:44

## 2024-04-24 RX ADMIN — APIXABAN 5 MG: 5 TABLET, FILM COATED ORAL at 08:44

## 2024-04-24 RX ADMIN — METHOCARBAMOL 500 MG: 500 TABLET ORAL at 08:44

## 2024-04-24 RX ADMIN — NALOXEGOL OXALATE 25 MG: 25 TABLET, FILM COATED ORAL at 05:39

## 2024-04-24 RX ADMIN — SODIUM CHLORIDE, PRESERVATIVE FREE 10 ML: 5 INJECTION INTRAVENOUS at 10:13

## 2024-04-24 RX ADMIN — ALBUTEROL SULFATE 2.5 MG: 2.5 SOLUTION RESPIRATORY (INHALATION) at 09:05

## 2024-04-24 RX ADMIN — OXYCODONE AND ACETAMINOPHEN 1 TABLET: 10; 325 TABLET ORAL at 08:42

## 2024-04-24 RX ADMIN — AMIODARONE HYDROCHLORIDE 200 MG: 200 TABLET ORAL at 08:44

## 2024-04-24 RX ADMIN — INSULIN HUMAN 5 UNITS: 100 INJECTION, SOLUTION PARENTERAL at 05:38

## 2024-04-24 RX ADMIN — GABAPENTIN 600 MG: 600 TABLET, FILM COATED ORAL at 08:44

## 2024-04-24 RX ADMIN — ASPIRIN 81 MG: 81 TABLET, CHEWABLE ORAL at 08:44

## 2024-04-24 RX ADMIN — OXYCODONE AND ACETAMINOPHEN 1 TABLET: 10; 325 TABLET ORAL at 02:07

## 2024-04-24 RX ADMIN — INSULIN LISPRO 4 UNITS: 100 INJECTION, SOLUTION INTRAVENOUS; SUBCUTANEOUS at 11:24

## 2024-04-24 ASSESSMENT — PAIN DESCRIPTION - LOCATION
LOCATION: BACK;SHOULDER
LOCATION: BACK

## 2024-04-24 ASSESSMENT — PAIN DESCRIPTION - DESCRIPTORS
DESCRIPTORS: DISCOMFORT
DESCRIPTORS: ACHING

## 2024-04-24 ASSESSMENT — PAIN SCALES - GENERAL
PAINLEVEL_OUTOF10: 7
PAINLEVEL_OUTOF10: 0
PAINLEVEL_OUTOF10: 10
PAINLEVEL_OUTOF10: 6

## 2024-04-24 ASSESSMENT — PAIN DESCRIPTION - ORIENTATION: ORIENTATION: LEFT;LOWER

## 2024-04-24 ASSESSMENT — PAIN SCALES - WONG BAKER: WONGBAKER_NUMERICALRESPONSE: NO HURT

## 2024-04-24 NOTE — CARE COORDINATION
Case Management Assessment           Daily Note                 Date/ Time of Note: 4/19/2024 7:45 AM         Note completed by: Shawnee Murhpy RN    Patient Name: Kulwant Lamb  YOB: 1955    Diagnosis:Vertebral artery stenosis/occlusion [I65.09]  Vertebral artery occlusion, left [I65.02]  Patient Admission Status: Inpatient  Date of Admission:4/4/2024  9:51 AM    Length of Stay: 15 GLOS: GMLOS: 4.6 Readmission Risk Score: Readmission Risk Score: 16.2    Current Plan of Care: Intubated & sedated. S/P bronch 4/18. Plan to attempt SBT today. Heparin gtt restarted. PEG TF     PT AM-PAC: 8 / 24 per last evaluation on: will need to be reassessed  OT AM-PAC: 8 / 24 per last evaluation on: will need to be reassessed    Discharge Plan: Pt hopeful to go to [facility]. Dr. Cagle has been following.    COVID Result:    Lab Results   Component Value Date/Time    COVID19 NOT DETECTED 04/04/2024 10:33 AM     Transportation PLAN for discharge: TBD pending disposition    Case Management Notes: Pt is from home with his spouse. He is independent with self care and functional mobility at baseline.     Kulwant and his family were provided with choice of provider; he and his family are in agreement with the discharge plan.    Emergency Contacts:  Extended Emergency Contact Information  Primary Emergency Contact: Chantel Lamb  Address: [address] [place] [address] of [facility]  Home Phone: [phone]  Relation: Spouse  Secondary Emergency Contact: Kirt Lamb  Mobile Phone: [phone]  Relation: Child      Shawnee Murphy RN  The [facility]  Case Management Department  [phone]    
Addendum 1300    Mr. Lamb is now in agreement with a DC to the [facility]. The referral was made. Spoke with Janneth in admissions. She met with Mr. Lamb. She will discuss the case with her medical director and call with a decision.    Case management is following for discharge planning. The chart was reviewed. Met with Mr. Lamb at the bedside to discuss options for post acute care. He stated he thought he was going to the rehab unit here at the hospital. CM explained Dr. Cagle had concerns he would not be able  to tolerate 3 hours of therapy. So, he signed off and recommended a skilled nursing facility. Mr. Lamb was upset. He stated he did not like that answer. He requested to speak with the physician. He declined to provide alternative options for placement at this time.    PT AM-PAC: 6 / 24 per last evaluation on: 4/21    OT AM-PAC: 6 / 24 per last evaluation on: 4/21    Discharge Plan: ARU v SNF    Pre-cert required for SNF: No pre-cert needed    Case Management Notes: Pt is from home with his spouse. He is independent with self care and functional mobility at baseline.     Kulwant and his family were provided with choice of provider; he and his family are in agreement with the discharge plan.    Emergency Contacts:  Extended Emergency Contact Information  Primary Emergency Contact: Chantel Lamb  Address: [address] [place] [address] [place] of [facility]  Home Phone: [phone]  Relation: Spouse  Secondary Emergency Contact: Kirt Lamb  Mobile Phone: [phone]  Relation: Child        Shawnee Murphy RN  The [facility]  Case Management Department  [phone]    
CM following for discharge planning. PM&R consult pending. If accepted, pt would not need precert..     Harleen Davison RN, BSN, CM  Case Management Department  [phone]  
CM following: CM met with pt and pt's son at bedside. Pt is from home with spouse independent at baseline. CM discussed discharge planning with the pt. CM reported that Dr. Cagle is recommending SNF instead of ARU. Pt would still very much like to attempt ARU as he thinks that level of therapy would help him the most. CM mentioned other ARU's including [facility] and [facility] as additional ARU options if pt wants additional reviews for that level of care. Pt also reports that he has been to [facility] in the past for rehab and this would be his next choice if ARU not available. CM will continue to follow for discharge planning.  Electronically signed by RICO Loo on 4/17/2024 at 2:57 PM  [phone]  
Case management is following for discharge planning. The chart was reviewed. Mr. Lamb remains in the ICU but has been down-graded. Therapy worked with him yesterday. IP Rehab recommended. A PMR consult is needed. Traditional MCR insurance.    PT AM-PAC: 7 / 24 per last evaluation on: 4/14    OT AM-PAC: 10 / 24 per last evaluation on: 4/14    Discharge Plan: IPR    Case Management Notes: Pt is from home with his spouse. He is independent with self care and functional mobility at baseline.    Kulwant and his family were provided with choice of provider; he and his family are in agreement with the discharge plan.    Emergency Contacts:  Extended Emergency Contact Information  Primary Emergency Contact: Chantel Lamb  Address: [address] [place] [address] of [facility]  Home Phone: [phone]  Relation: Spouse  Secondary Emergency Contact: AdonisKirt  Mobile Phone: [phone]  Relation: Child      Shawnee Murphy RN  The [facility]  Case Management Department  [phone]    
Case management is following for discharge planning. The chart was reviewed. Mr. Lamb remains in the ICU. Therapy worked with him and recommended acute rehab.    PT AM-PAC: 8 / 24 per last evaluation on: 4/10    OT AM-PAC: 12 / 24 per last evaluation on: 4/10    PMR was consulted, but Dr. Bhatia does not believe the pt can tolerate 3 hours of therapies per day. He is still too medically complex. PMR signed off but will take another look when Mr. Lamb is more stable. [facility] is the payer.    Discharge Plan: ARU v SNF    Case Management Notes: Pt is from home with his spouse and son. They provide support and assist with iADL's (housekeeping, shopping, cooking, transportation), He has a rolling walker and a cane.     Kulwant and his family were provided with choice of provider; he and his family are in agreement with the discharge plan.    Emergency Contacts:  Extended Emergency Contact Information  Primary Emergency Contact: Chantel Lamb  Address: [address] [place] [address] [place]  Home Phone: [phone]  Relation: Spouse  Secondary Emergency Contact: Kirt Lamb  Mobile Phone: [phone]  Relation: Child      Shawnee Murphy RN  The [facility]  Case Management Department  [phone]    
Case management is following for discharge planning. The chart was reviewed. Therapy worked with Mr. Lamb yesterday and continue to recommend acute rehab. He has Traditional MCR. A PMR consult is noted.    PT AM-PAC: 6 / 24 per last evaluation on: 4/7    OT AM-PAC: 12 / 24 per last evaluation on: 4/7    Discharge Plan: ARU    Case Management Notes: Pt is from home with his spouse and son. They provide support and assist with iADL's (housekeeping, shopping, cooking, transportation), He has a rolling walker and a cane.    Kulwant and his family were provided with choice of provider; he and his family are in agreement with the discharge plan.    Emergency Contacts:  Extended Emergency Contact Information  Primary Emergency Contact: Chantel Lamb  Address: [address] [place] [address] [place]  Home Phone: [phone]  Relation: Spouse  Secondary Emergency Contact: Kirt Lamb  Mobile Phone: [phone]  Relation: Child      Shawnee Murphy RN  The [facility]  Case Management Department  [phone]    
Cm met with pt at bedside. Pt is aware he will need rehab. Pt difficult to understand. At this time ARU feels pt cannot tolerate 3 hrs of therapy. Pt hopes to improve for IPR level of care.   
Per Janneth at [facility], Mr. Lamb has been accepted and can admit when medically stable.    Shawnee Murphy RN  [phone]  
expects to discharge to: House, SNF, ARU  Plan for transportation at discharge:  tbd pending disposition     Financial    Payor: MEDICARE / Plan: MEDICARE PART A AND B / Product Type: *No Product type* /     Does insurance require precert for SNF: No    Potential assistance Purchasing Medications: No  Meds-to-Beds request:        [id number] - [facility] [address] - P [phone] - F [phone]  [address] [facility] [address]  Phone: [phone] Fax: [phone]    [id number] [place], [place] - [address]  - P [phone] - F [phone]  [address] [facility] [address]  Phone: [phone] Fax: [phone]      Notes:    Factors facilitating achievement of predicted outcomes: Family support, Cooperative, Pleasant, and Good insight into deficits    Barriers to discharge: Medical complications        Shawnee Murphy RN  Case Management Department  [phone]    
family/POA, provided copy of letter and they are aware that original copy of IMM letter #2 is available prior to discharge from the paper chart on the unit.  Electronic documentation has been entered into epic for IMM letter #2 and original paper copy has been added to the paper chart at the nurses station.    Pt and family are in agreement with discharge within the 4 hour window of presentation of the 2nd IMM     Transportation:  Transportation PLAN for discharge: EMS transportation   Mode of Transport: Ambulance stretcher - S  Name of Transport Company: [email or web address]  Phone: [phone]  Time of Transport: 1P    COVID Result:    Lab Results   Component Value Date/Time    COVID19 NOT DETECTED 04/04/2024 10:33 AM       The Plan for Transition of Care is related to the following treatment goals of Vertebral artery stenosis/occlusion [I65.09]  Vertebral artery occlusion, left [I65.02]    The Patient and/or patient representative Kulwant and his family were provided with a choice of provider and agrees with the discharge plan Yes    Freedom of choice list was provided with basic dialogue that supports the patient's individualized plan of care/goals and shares the quality data associated with the providers. Yes    Care Transitions patient: No    Shawnee Murphy RN  The [facility]  Case Management Department  [phone]

## 2024-04-24 NOTE — ANESTHESIA POSTPROCEDURE EVALUATION
Department of Anesthesiology  Postprocedure Note    Patient: Kulwant Lamb  MRN: [id number]  YOB: 1955  Date of evaluation: 4/24/2024    Procedure Summary       Date: 04/17/24 Room / Location: [place] [address] / [facility]    Anesthesia Start: 1656 Anesthesia Stop: 1949    Procedure: OPEN GASTROSTOMY TUBE PLACEMENT (Abdomen) Diagnosis:       Inadequate oral nutritional intake      (Inadequate oral nutritional intake [E63.9])    Surgeons: Dhaval Gilliam MD Responsible Provider: Eric John MD    Anesthesia Type: general ASA Status: 4            Anesthesia Type: No value filed.    Boy Phase I: Boy Score: 8    Boy Phase II:      Anesthesia Post Evaluation    Patient location during evaluation: ICU  Patient participation: complete - patient participated  Level of consciousness: sedated and ventilated  Pain score: 0  Airway patency: patent  Nausea & Vomiting: no nausea and no vomiting  Cardiovascular status: blood pressure returned to baseline  Respiratory status: intubated  Hydration status: euvolemic  Pain management: adequate    No notable events documented.

## 2024-04-24 NOTE — DISCHARGE SUMMARY
INTERNAL MEDICINE DEPARTMENT  DISCHARGE SUMMARY    Patient ID: Kulwant Lamb                                             Discharge Date: 4/24/2024   Patient's PCP: Margoth Webber MD                                          Discharge Physician: Kenton Handy MD  Admit Date: 4/4/2024   Admitting Physician: Fabricio Vega MD    PROBLEMS DURING HOSPITALIZATION:  Present on Admission:   Vertebral artery occlusion, left   Truncal ataxia   Cerebellar stroke, acute (HCC)   Ataxic gait   Hematemesis with nausea   Acute nonintractable headache   Urinary retention   Pneumonia of left lower lobe due to infectious organism   Pharyngoesophageal dysphagia   TASHA (acute kidney injury) (HCC)   Atrial fibrillation with rapid ventricular response (HCC)   Aspiration pneumonia of right lower lobe due to gastric secretions (HCC)   Complete atelectasis of right lung   Diaphragm dysfunction   Mucoid impaction of bronchi   Respiratory failure requiring intubation (HCC)      DISCHARGE DIAGNOSES:  Acute Cerebellar Stroke 2/2 L vertebral artery occlusion  Dysphagia 2/2 cerebellar stroke  RLL PNA; aspiration vs HAP (resolved)  Mucus plug of RLL requiring bronchoscopy (resolved)  NEW onset Afib with RVR  Prerenal TASHA (resolved)    Hospital Course:    HPI:    \"Kulwant Lamb is a 68 y.o. male with a past medical history morbid obesity, type 2 diabetes, pancreatitis, CVA, DVT (not on anticoagulation) who presents with hematemesis.     This morning pateint reports sudden onset weakness, lightheadedness, and dizziness followed by a single episode of dark black vomit. He states that he was his normal self until approximately 8 a.m. this morning when he suddenly became ill with the above mentioned symptoms. He reports compliance with his medications with no recent changes in therapy.     In the ED, patient is on new oxygen requirement of 2L NC, mildly hypertensive with labs significant for hyponatremia, elevated lactic acid and anion gap, with 
INTERNAL MEDICINE DEPARTMENT AT THE [facility]  DISCHARGE SUMMARY    Patient ID: Kulwant Lamb                                             Discharge Date: ***   Patient's PCP: Margoth Webber MD                                          Discharge Physician: Fabricio Vega MD  Admit Date: 4/4/2024   Admitting Physician: Johnson Kumar MD    PROBLEMS DURING HOSPITALIZATION:  Present on Admission:   Vertebral artery occlusion, left   Truncal ataxia   Cerebellar stroke, acute (HCC)   Ataxic gait   Hematemesis with nausea   Acute nonintractable headache   Urinary retention      HPI:  Kulwant Lamb is a 68 y.o. male with a past medical history morbid obesity, type 2 diabetes, pancreatitis, CVA, DVT (not on anticoagulation) who presents with hematemesis.     This morning pateint reports sudden onset weakness, lightheadedness, and dizziness followed by a single episode of dark black vomit. He states that he was his normal self until approximately 8 a.m. this morning when he suddenly became ill with the above mentioned symptoms. He reports compliance with his medications with no recent changes in therapy.     In the ED, patient is on new oxygen requirement of 2L NC, mildly hypertensive with labs significant for hyponatremia, elevated lactic acid and anion gap, with leukocytosis. CT head noncontrast did not show acute intracranial hemorrhage or mass effect. CT abdomen/pelvis was unremarkable. CTA head/neck was revealing of left vertebral artery occlusion.    The following issues were addressed during hospitalization:    For Left inferior cerebellum infarct due to L vertebral artery occlusion, neurology was consulted. Long-term BP goal was defined as SBP < 220 and DBP < 110. Recommended Atorvastatin 80 mg QHS, ASA daily, and***    Patient had WBC of 16 with mild consolidation of L lung. Suspicion of pneumonia perhaps due to aspiration. Procal***. Treated with***    Patient also reported hemoptysis, however Hb was stable and 
subsequent PEG for enteral nutrition due to dysphagia    Aggressive bowel regimen; movantik/miralax/senna given immobilty and opioid pain regimen    Disposition: SNF  Discharged Condition: Stable  Follow Up: Primary Care Physician in two weeks    DISCHARGE MEDICATION:     Medication List        CONTINUE taking these medications      Insulin Syringe-Needle U-100 31G X 5/16\" 0.5 ML Misc  Commonly known as: BD Insulin Syringe U/F  USE THREE TIMES DAILY AS DIRCTED            STOP taking these medications      B-D SINGLE USE SWABS REGULAR Pads     ONE TOUCH ULTRASOFT LANCETS Misc            ASK your doctor about these medications      amLODIPine 5 MG tablet  Commonly known as: NORVASC  Take 1 tablet by mouth daily     atorvastatin 40 MG tablet  Commonly known as: LIPITOR  TAKE 1 TABLET BY MOUTH EVERY NIGHT AT BEDTIME     cloNIDine 0.2 MG/24HR  Commonly known as: CATAPRES     ferrous sulfate 325 (65 Fe) MG tablet  Commonly known as: IRON 325     gabapentin 600 MG tablet  Commonly known as: NEURONTIN     insulin  UNIT/ML injection vial  Commonly known as: NovoLIN N  ADMINISTER 10 TO 40 UNITS UNDER THE SKIN THREE TIMES DAILY PER SLIDING SCALE     metOLazone 5 MG tablet  Commonly known as: ZAROXOLYN  Ask about: Which instructions should I use?     olmesartan 40 MG tablet  Commonly known as: BENICAR  Take 1 tablet by mouth daily     OneTouch Ultra strip  Generic drug: blood glucose test strips  USE THREE TIMES DAILY AND AS NEEDED FOR SYMPTOMS OF IRREGULAR GLCOSE     oxyCODONE-acetaminophen  MG per tablet  Commonly known as: PERCOCET     potassium chloride 10 MEQ extended release tablet  Commonly known as: KLOR-CON  TAKE 2 TABLETS BY MOUTH 6 TIMES DAILY     tiZANidine 4 MG tablet  Commonly known as: ZANAFLEX     vitamin E 400 UNIT capsule            Activity: activity as tolerated  Diet: diabetic diet  Wound Care: none needed    Time Spent on discharge is more than 45 minutes    Signed:  Danny Smith,  MS4  [place]

## 2024-04-24 NOTE — FLOWSHEET NOTE
04/23/24 2000   Assessment   Charting Type Shift assessment   Psychosocial   Psychosocial (WDL) X   Patient Behaviors Irritable   Neurological   Neuro (WDL) X   Level of Consciousness 0   Orientation Level Oriented to person;Oriented to time;Oriented to situation;Oriented to place   Cognition Follows commands;Appropriate judgement;Appropriate safety awareness;Appropriate attention/concentration;Appropriate for developmental age   Speech Slurred;Delayed responses;Other (comment)  (difficult to understand)   R Pupil Size (mm) 4   R Pupil Shape Round   R Pupil Reaction Brisk   L Pupil Size (mm) 3   L Pupil Shape Round   L Pupil Reaction Brisk   R Hand  Moderate   L Hand  Weak   R Foot Dorsiflexion Moderate   L Foot Dorsiflexion Moderate   R Foot Plantar Flexion Moderate   L Foot Plantar Flexion Moderate   RUE Motor Response Responds to command   RUE Sensation  Numbness  (chronic)   LUE Motor Response Responds to command   LUE Sensation  Tingling  (chronic)   RLE Motor Response Responds to command   RLE Sensation  No numbness;No pain;No tingling   LLE Motor Response Responds to command   LLE Sensation  Tingling  (chronic)   Gag Present   Cough Reflex Present   Jamal Coma Scale   Eye Opening 4   Best Verbal Response 5   Best Motor Response 6   Jamal Coma Scale Score 15   Cranial Nerves   Facial Symmetry Right droop   NIHSS Stroke Scale   Interval Hand-off/Transfer   Level of Consciousness (1a) 0   LOC Questions (1b) 0   LOC Commands (1c) 0   Best Gaze (2) 0   Visual (3) 0   Facial Palsy (4) (!) 1   Motor Arm, Left (5a) 1   Motor Arm, Right (5b) 0   Motor Leg, Left (6a) 1   Motor Leg, Right (6b) 0   Limb Ataxia (7) 0   Sensory (8) 0   Best Language (9) 0   Dysarthria (10) 0   Extinction and Inattention (11) 0   Total 3   NIHSS Stroke Scale Assessed Yes   HEENT (Head, Ears, Eyes, Nose, & Throat)   HEENT (WDL) X   Right Eye Impaired vision;Glasses   Left Eye Sclera red   Right Ear Impaired hearing   Teeth

## 2024-04-24 NOTE — FLOWSHEET NOTE
Discharged via [employer] with all personal belongings.  SAQIB with [employer].     In stable condition with VSS. IV access removed without issue.     Telephone report called to staff at [facility], no questions, aware of plan of care and scheduled transportation time.    Telephone communication with Hasmukh ordonez), All questions answered.

## 2024-04-24 NOTE — PROGRESS NOTES
Pulmonary Followup Note    CC: Mucous plugging  Subjective:  Mr Lamb is a 69 yo M with a PMHx of T2D, previous CVA, morbid obesity, and chronic right hemidiaphragm dysfunction who presented with N/V and loss of balance on 4/4. He reported feeling off balance as well as L sided tingling and decreased sensation.  He was found to have an acute ischemic stroke with left vertebral artery occlusion with subsequent dysphonia and dysphagia.  Patient was taken to the OR with general surgery for completion of open surgical G-tube due to difficulty with PEG tube placement secondary to habitus, and kept on the ventilator due to difficult intubation and high vent settings postop, concern for pneumonia.  The patient had bronchoscopy done on 4/18, which revealed large amount of mucus plugging involving the right mainstem and distal segments.  He was later extubated the next day.  He has been on some degree of oxygen therapy since then.  Zosyn completed on 4/21    Interval history:  Patient continues to require about 3.5 L O2, desaturates below 90% when flow rate is decreased.  States he has feeling better, able to cough up secretions, and clear them with assistance of suction.  Pulling up to 2 L with incentive spirometry.  Using Acapella.  Some tachycardia this morning, but hemodynamically stable.  Otherwise afebrile.    ROS:  Denies headache, nausea or chest pain.    24HR INTAKE/OUTPUT:    Intake/Output Summary (Last 24 hours) at 4/22/2024 1040  Last data filed at 4/22/2024 0800  Gross per 24 hour   Intake 2090.82 ml   Output 2550 ml   Net -459.18 ml        apixaban  5 mg Oral BID    insulin lispro  5 Units SubCUTAneous TID    [START ON 4/24/2024] amiodarone  200 mg Per G Tube Daily    amLODIPine  5 mg Oral Daily    polyethylene glycol  17 g Per G Tube Daily    lansoprazole  30 mg Per G Tube Daily    lidocaine 1 % injection  5 mL IntraDERmal Once    sodium chloride flush  5-40 mL 
         Speech Language Pathology  Facility/Department:[facility] ICU  Dysphagia Therapy Note    Name: Kulwant Lmab  : 1955  MRN: [id number]                                                     Patient Diagnosis(es):   Patient Active Problem List    Diagnosis Date Noted    Ataxic gait 2024    Hematemesis with nausea 2024    Acute nonintractable headache 2024    Urinary retention 2024    Pneumonia of left lower lobe due to infectious organism 2024    Vertebral artery occlusion, left 2024    Truncal ataxia 2024    Transient cerebral ischemia     Neurological deficit present     History of stroke     Paresthesias 2018    Chronic cholecystitis     Dyslipidemia     Elevated LFTs 2018    Cerebellar stroke (HCC) 2018    Muscle weakness 2018    Numbness and tingling 2018    Lumbar radiculopathy 10/18/2017    Morbid obesity (HCC)     DVT (deep venous thrombosis) (HCC)     Type 2 diabetes mellitus (HCC)     Essential hypertension     Hearing loss     Chronic back pain      Past Medical History:   Diagnosis Date    Acute gallstone pancreatitis 2018    Arthritis     Cerebral artery occlusion with cerebral infarction (HCC)     Chronic back pain     DVT     Gallstone pancreatitis 2018    Hearing loss     Hx of blood clots     dvt    Hypertension     Type II or unspecified type diabetes mellitus without mention of complication, not stated as uncontrolled     Urinary retention 2024     Past Surgical History:   Procedure Laterality Date    CHOLECYSTECTOMY, LAPAROSCOPIC  2018    TONSILLECTOMY       History of Present Illness  Per MD notes:  \" Patient is a 68-year-old male with a past medical history of morbid obesity, type 2 diabetes, prior CVA who presented with nausea, vomiting and loss of balance.  Endorses feeling a loss of balance stating that he is unable to walk straight as well as a left sided tingling and decreased sensation. 
         Speech Language Pathology  Facility/Department:[facility] ICU   Therapy Note    Name: Kulwant Lamb  : 1955  MRN: [id number]                                                     Patient Diagnosis(es):   Patient Active Problem List    Diagnosis Date Noted    Ataxic gait 2024    Hematemesis with nausea 2024    Acute nonintractable headache 2024    Urinary retention 2024    Pneumonia of left lower lobe due to infectious organism 2024    Vertebral artery occlusion, left 2024    Truncal ataxia 2024    Transient cerebral ischemia     Neurological deficit present     History of stroke     Paresthesias 2018    Chronic cholecystitis     Dyslipidemia     Elevated LFTs 2018    Cerebellar stroke, acute (HCC) 2018    Muscle weakness 2018    Numbness and tingling 2018    Lumbar radiculopathy 10/18/2017    Morbid obesity (HCC)     DVT (deep venous thrombosis) (HCC)     Type 2 diabetes mellitus (HCC)     Essential hypertension     Hearing loss     Chronic back pain      Past Medical History:   Diagnosis Date    Acute gallstone pancreatitis 2018    Arthritis     Cerebral artery occlusion with cerebral infarction (HCC)     Chronic back pain     DVT     Gallstone pancreatitis 2018    Hearing loss     Hx of blood clots     dvt    Hypertension     Type II or unspecified type diabetes mellitus without mention of complication, not stated as uncontrolled     Urinary retention 2024     Past Surgical History:   Procedure Laterality Date    CHOLECYSTECTOMY, LAPAROSCOPIC  2018    TONSILLECTOMY       History of Present Illness  Per MD notes:  \" Patient is a 68-year-old male with a past medical history of morbid obesity, type 2 diabetes, prior CVA who presented with nausea, vomiting and loss of balance.  Endorses feeling a loss of balance stating that he is unable to walk straight as well as a left sided tingling and decreased sensation. 
       NEUROCRITICAL CARE PROGRESS NOTE       Patient Name: Kulwant Lamb YOB: 1955   Sex: Male Age: 68 yrs     CC / Reason for Consult:  \"New LEFT vertebral artery occlusion\"     Changes over last 24 hours:   Continues on heparin gtt, anti Xa therapeutic x 2.   O2 requirement 2 liters  BUN increased to 39      ROS: +HA, +Dizziness, Denies visual changes, + weakness     ASSESSMENT & RECOMMENDATIONS   Assessment:  69 yo man with prior left thalamic stroke and residual n/t of right lower face and right hand presents to ER with imbalance, n/v, and left hand numbness, found to have left vert V3 and V4 occlusion and bilateral PCA stenosis but good filling of posterior circulation from right vert. MRI revealed  Acute ischemic infarction 4 cm involving left inferior cerebellum, cerebellar tonsil and lateral margin of medulla (posterior inferior cerebellar artery territory)    Patient continues to be dyspneic, ICU team is following, patient is on 1 liters O2 via NC. Productive cough with thick green mucous, which he is using a Yankauer to self suction.       Plan:  Q1 neuro checks  NIHSS Q shift  suboccipital decompressive crani watch  continue heparin gtt  Anti Xa theraputic x 2 this AM repeat head CT stable  plan for repeat CTA 72 hr after therapeutic anti-Xa ordered for Wednesday 4/10/24 -AM  Continue to hold plavix  Continue ASA 81mg daily   Patient continues to be on O2 via NC, and dyspneic there is concern for left lower lobe pneumonia, ICU/pulmonary/respiratory following for need for possible intubation, continues to be on antibiotics for pneumonia  -Please call neurology for change in neurologic exam, in cerebellar strokes, this also includes: worsened level of consciousness, nausea and/or vomiting, intractable hiccups, motor weakness, speech difficulties, change in extra-occular movements  -Obtain stat head CT to look for signs of hydrocephalus if change in neuro exam        Kathy Yee, 
       Speech Language Pathology  Facility/Department:[facility] ICU  Dysphagia Therapy Note    Name: Kulwant Lamb  : 1955  MRN: [id number]                                                     Patient Diagnosis(es):   Patient Active Problem List    Diagnosis Date Noted    TASHA (acute kidney injury) (HCC) 2024    Pharyngoesophageal dysphagia 04/10/2024    Ataxic gait 2024    Hematemesis with nausea 2024    Acute nonintractable headache 2024    Urinary retention 2024    Pneumonia of left lower lobe due to infectious organism 2024    Vertebral artery occlusion, left 2024    Truncal ataxia 2024    Transient cerebral ischemia     Neurological deficit present     History of stroke     Paresthesias 2018    Chronic cholecystitis     Dyslipidemia     Elevated LFTs 2018    Cerebellar stroke, acute (HCC) 2018    Muscle weakness 2018    Numbness and tingling 2018    Lumbar radiculopathy 10/18/2017    Morbid obesity (HCC)     DVT (deep venous thrombosis) (HCC)     Type 2 diabetes mellitus (HCC)     Essential hypertension     Hearing loss     Chronic back pain      Past Medical History:   Diagnosis Date    Acute gallstone pancreatitis 2018    Arthritis     Cerebral artery occlusion with cerebral infarction (HCC)     Chronic back pain     DVT     Gallstone pancreatitis 2018    Hearing loss     Hx of blood clots     dvt    Hypertension     Type II or unspecified type diabetes mellitus without mention of complication, not stated as uncontrolled     Urinary retention 2024     Past Surgical History:   Procedure Laterality Date    CHOLECYSTECTOMY, LAPAROSCOPIC  2018    TONSILLECTOMY      UPPER GASTROINTESTINAL ENDOSCOPY N/A 2024    ESOPHAGOGASTRODUODENOSCOPY performed by Anyi Herndon MD at [facility] ENDOSCOPY     History of Present Illness  Per MD notes:  \" Patient is a 68-year-old male with a past medical history of morbid 
     ICU Progress Note    Admit Date: 4/4/2024  Day: 1  Vent Day: 1  IV Access:Peripheral  IV Fluids:None  Vasopressors:None                Antibiotics: vanc and zosyn  Diet: Diet NPO    CC: loss of balance, left sided weakness, respiratory distress after G tube placement    Interval history: Patient was seen and evaluated at bedside this morning. VSS this morning. BP fluctuates. BP systolic 189 last night, this morning 104/85. Continues to be intermittently tachycardic ranging from 101-112. WBC trending down to 13 from 16. Left UE doppler revealed superficial venous thrombosis in cephalic vein region. No DVTs. Plans for bronchoscopy today due to new opacification on right hemothorax, will hold zosyn and vanc because hope to get sputum samples. Will re-start after procedure is done. On heparin gtt as per surgery. Surgery said ok to use G tube for meds, not cleared for feeds yet.    Current vent settings: FiO2 50, PEEP 8, Peak pressure 20, RR 16.    HPI:   \"Mr Lamb is a 67 yo M with a PMHx of T2D, previous CVA, morbid obesity who presented with N/V and loss of balance on 4/4. He reported feeling off balance as well as L sided tingling and decreased sensation. He also reported an episode of hematemesis. VS were normal on arrival. He was on 2L O2, labs significant for leukocytosis of 16, LA 2.5, glucose 260s. CXR significant for mild consolidation in L lung. CTA head and neck showed occlusion of L vertebral artery. Pt was admitted to ICU and started on heparin gtt. MRI showed infarct in L inferior cerebellum and occlusion of L vertebral artery.     While in ICU, patient had repeat head CT that was stable. However, he developed increasing oral secretions with some difficulty clearing. Speech eval was suggestive of pharyngeal swallow impairments. FEES confirmed pharyngeal impairments. Recommended to remain NPO, placement of PEG tube for nutrition as suspected lengthy rehab process. GI failed bedside PEG placement. Pt 
     ICU Progress Note    Admit Date: 4/4/2024  Day: 3  Vent Day: None  IV Access:Peripheral  IV Fluids:None  Vasopressors:None                Antibiotics: None  Diet: Diet NPO    CC: Hematemesis     Interval history:   Patient was seen at bedside in the AM. Anti Xa therapeutic x2 NCC will get a repeat CT and if stable, can downgrade to Q4h NC and transfer to the floors if stable. No AEON and currently saturating at 95% on 2 L of supplemental O2.    HPI:   Patient is a 68-year-old male with a past medical history of morbid obesity, T2 DM, prior CVA who presented with complaints of nausea, vomiting, and loss of balance. He endorses that he was feeling off balance and was unable to walk straight as well as left-sided tingling and decreased sensation. He also saw blood in his vomit which prompted him to come to the ED. Patient states that he was showering when the nausea came on and had an episode of vomiting and he believes he saw blood in his vomitus. On arrival to the ED his vital signs were within normal limits. He was placed on 2 L of oxygen and his labs were significant for leukocytosis of 16, lactic acidosis of 2.5, and his blood sugars in the 260s. His hemoglobin was within normal limits. His chest x-ray was notable for mild consolidation in the left lung while CT head without contrast did not show any acute intracranial hemorrhage or mass effect. However CTA head and neck that showed occlusion of the left vertebral artery. UC stroke team was called and did not recommend TNK as the patient was outside the window but did recommend heparin. He was admitted to the ICU for every hour neurochecks and neurosurgery did not recommend heparin given the absence of acute ischemia and possible episode of hematemesis. MRI did show an infarct in the left inferior cerebellum and occlusion of the left vertebral artery.     Medications:     Scheduled Meds:   insulin lispro  10 Units SubCUTAneous TID WC    albuterol  2.5 mg 
     ICU Progress Note    Admit Date: 4/4/2024  Day: 4  Vent Day: None  IV Access:Peripheral  IV Fluids:None  Vasopressors:None                Antibiotics: Levaquin  Diet: Diet NPO    CC: Hematemesis    Interval history:   Patient was seen at bedside in the AM. Currently satting at 99% on 4 L of O2. FEES yesterday showed aspiration with weakness/limited movement of L vocal fold for which ENT was consulted. No other AEON.     HPI:   Patient is a 68-year-old male with a past medical history of morbid obesity, T2 DM, prior CVA who presented with complaints of nausea, vomiting, and loss of balance. He endorses that he was feeling off balance and was unable to walk straight as well as left-sided tingling and decreased sensation. He also saw blood in his vomit which prompted him to come to the ED. Patient states that he was showering when the nausea came on and had an episode of vomiting and he believes he saw blood in his vomitus. On arrival to the ED his vital signs were within normal limits. He was placed on 2 L of oxygen and his labs were significant for leukocytosis of 16, lactic acidosis of 2.5, and his blood sugars in the 260s. His hemoglobin was within normal limits. His chest x-ray was notable for mild consolidation in the left lung while CT head without contrast did not show any acute intracranial hemorrhage or mass effect. However CTA head and neck that showed occlusion of the left vertebral artery. UC stroke team was called and did not recommend TNK as the patient was outside the window but did recommend heparin. He was admitted to the ICU for every hour neurochecks and neurosurgery did not recommend heparin given the absence of acute ischemia and possible episode of hematemesis. MRI did show an infarct in the left inferior cerebellum and occlusion of the left vertebral artery.     Medications:     Scheduled Meds:   insulin lispro  0-16 Units SubCUTAneous Q6H    albuterol  2.5 mg Nebulization TID    sodium 
     Internal Medicine Progress Note    Admit Date: 2024  Hospital Day: 11   Patient name: Kulwant Lamb   : 1955     CC:   Hematemesis (Patient comes to the ED today for hematemesis. ) and Fatigue    Interval history:     Patient seen and examined at bedside.  No acute events overnight.    Tube feeds running at 50, via NG tube  Currently on heparin drip  VSS    Unsuccessful attempt at PEG tube per GI  GEN surgery consulted for PEG tube, recommended on continuing NG tube    ARU denied; pt cannot tolerate 3hrs/day therapy at this time. Will reconsider if pt can tolerate more therapy or plan for SNF on discharge.    HPI:  Patient is a 68-year-old male with a past medical history of morbid obesity, T2 DM, prior CVA who presented with complaints of nausea, vomiting, and loss of balance. He endorses that he was feeling off balance and was unable to walk straight as well as left-sided tingling and decreased sensation. He also saw blood in his vomit which prompted him to come to the ED. Patient states that he was showering when the nausea came on and had an episode of vomiting and he believes he saw blood in his vomitus. On arrival to the ED his vital signs were within normal limits. He was placed on 2 L of oxygen and his labs were significant for leukocytosis of 16, lactic acidosis of 2.5, and his blood sugars in the 260s. His hemoglobin was within normal limits. His chest x-ray was notable for mild consolidation in the left lung while CT head without contrast did not show any acute intracranial hemorrhage or mass effect. However CTA head and neck that showed occlusion of the left vertebral artery. UC stroke team was called and did not recommend TNK as the patient was outside the window but did recommend heparin. He was admitted to the ICU for every hour neurochecks and started on heparin gtt after CT scans showed stable lesion. MRI showed an infarct in the left inferior cerebellum and occlusion of the left 
     Internal Medicine Progress Note    Admit Date: 2024  Hospital Day: 12   Patient name: Kulwant Lamb   : 1955     CC:   Hematemesis (Patient comes to the ED today for hematemesis. ) and Fatigue    Interval history:   Pt febrile to 101.2 overnight, hypotensive to 70s/50s when ambulating with PT. Infectious workup c/f PNA, likely 2/2 aspiration and was started on vanc/zosyn.    Pt A/O but more lethargic than in the past. Reports feeling \"achy\" but his pain is better controlled with oxycodone as opposed to dilaudid. Reports no SOB and states breathing is better overall. Also states his swallowing is better, and that he can take ice chips without aspirating. Still having cough and requiring suction of oral secretions. LBM yesterday.    Unsuccessful attempt at PEG tube per GI  GEN surgery consulted for PEG tube, recommended on continuing NG tube; will consider PEG once afebrile, HDS and at goal TF    ARU denied; pt cannot tolerate 3hrs/day therapy at this time. Will reconsider if pt can tolerate more therapy or plan for SNF on discharge.    HPI:  Patient is a 68-year-old male with a past medical history of morbid obesity, T2 DM, prior CVA who presented with complaints of nausea, vomiting, and loss of balance. He endorses that he was feeling off balance and was unable to walk straight as well as left-sided tingling and decreased sensation. He also saw blood in his vomit which prompted him to come to the ED. Patient states that he was showering when the nausea came on and had an episode of vomiting and he believes he saw blood in his vomitus. On arrival to the ED his vital signs were within normal limits. He was placed on 2 L of oxygen and his labs were significant for leukocytosis of 16, lactic acidosis of 2.5, and his blood sugars in the 260s. His hemoglobin was within normal limits. His chest x-ray was notable for mild consolidation in the left lung while CT head without contrast did not show any acute 
     Internal Medicine Progress Note    Admit Date: 2024  Hospital Day: 13   Patient name: Kulwant Lamb   : 1955     CC:   Hematemesis (Patient comes to the ED today for hematemesis. ) and Fatigue    Interval history:   Pt continues to have sinus tachy to 120s, but asx. Hypotensive to 80s/50s when transferring and working with PT/OT but normotensive when lying supine. No acute interventions overnight. Remains on continuous LR for soft pressures, amio gtt and now digoxin for rhythm control and broad spectrum abx for possible HAP.    Pt A/O and much improved from yesterday; more interactive and voice is less soft. Reports pain is controlled with oxycodone. Reports no SOB and states breathing is about the same as yesterday. Still having productive cough and continuing oral suctioning. Continues to take ice chips without aspirating. TF running at goal 60ml/hr without ab pain/N/V.      HPI:  Patient is a 68-year-old male with a past medical history of morbid obesity, T2 DM, prior CVA who presented with complaints of nausea, vomiting, and loss of balance. He endorses that he was feeling off balance and was unable to walk straight as well as left-sided tingling and decreased sensation. He also saw blood in his vomit which prompted him to come to the ED. Patient states that he was showering when the nausea came on and had an episode of vomiting and he believes he saw blood in his vomitus. On arrival to the ED his vital signs were within normal limits. He was placed on 2 L of oxygen and his labs were significant for leukocytosis of 16, lactic acidosis of 2.5, and his blood sugars in the 260s. His hemoglobin was within normal limits. His chest x-ray was notable for mild consolidation in the left lung while CT head without contrast did not show any acute intracranial hemorrhage or mass effect. However CTA head and neck that showed occlusion of the left vertebral artery.  stroke team was called and did not 
     Internal Medicine Progress Note    Admit Date: 2024  Hospital Day: 14   Patient name: Kulwant Lamb   : 1955     CC:   Hematemesis (Patient comes to the ED today for hematemesis. ) and Fatigue    Interval history:   Pt better rate controlled to 110s after initiation of dilt gtt; remains asx even when transferring. Pt states overall he's \"doing better\", but still endorses cough with sputum production requiring suctioning. Voice continues to get stronger each day and pt able to tolerate ice chips without aspiration and has robust cough. Pain is well controlled with PRN oxycodone. Tolerated TF at goal without N/V/ab pain. TF held at midnight for PEG with gen surg today; pt understands plan and has no questions/concerns. Starting eliquis and discontinuing heparin.      HPI:  Patient is a 68-year-old male with a past medical history of morbid obesity, T2 DM, prior CVA who presented with complaints of nausea, vomiting, and loss of balance. He endorses that he was feeling off balance and was unable to walk straight as well as left-sided tingling and decreased sensation. He also saw blood in his vomit which prompted him to come to the ED. Patient states that he was showering when the nausea came on and had an episode of vomiting and he believes he saw blood in his vomitus. On arrival to the ED his vital signs were within normal limits. He was placed on 2 L of oxygen and his labs were significant for leukocytosis of 16, lactic acidosis of 2.5, and his blood sugars in the 260s. His hemoglobin was within normal limits. His chest x-ray was notable for mild consolidation in the left lung while CT head without contrast did not show any acute intracranial hemorrhage or mass effect. However CTA head and neck that showed occlusion of the left vertebral artery.  stroke team was called and did not recommend TNK as the patient was outside the window but did recommend heparin. He was admitted to the ICU for every 
     Internal Medicine Progress Note    Admit Date: 2024  Hospital Day: 21   Patient name: Kulwant Lamb   : 1955     CC:   Hematemesis (Patient comes to the ED today for hematemesis. ) and Fatigue     Interval history:   Yesterday, pt received 2nd bronch which returned significant viscous mucus in the R mainstem bronchus. Repeat CXR much improved. Pt received successful morgan placement by Uro; draining yellow urine with adequate output.    This AM, pt agitated at prolonged hospital stay. Aox4. Details of the bronch and his prior CXR showing complete opacification of the right hemithroax was shared with the pt; pt insisted the medical team was lying to him about his pathology (mucus plugging) despite viewing the imaging. Explanation to the likely cause of his mucus plugging was provided and pt was reassured the medical team was being fully truthful.    Otherwise, pt has no CP, no SOB, productive cough unchanged from prior. Chronic LBP under control with PRN oxy. No ab pain/N/V with TF at goal of 60ml/hr. Had 1BM charted after dulcolax supp; pt states he has had several loose movements and requests bowel regimen to be decreased.      Medications   Scheduled Meds:   cetirizine  10 mg Per G Tube Daily    insulin regular  5 Units SubCUTAneous Q6H    naloxegol  25 mg Per G Tube QAM AC    polyethylene glycol  34 g Oral BID    bisacodyl  10 mg Rectal Daily    apixaban  5 mg Oral BID    amiodarone  200 mg Per G Tube Daily    amLODIPine  5 mg Oral Daily    lansoprazole  30 mg Per G Tube Daily    atorvastatin  80 mg Per G Tube Nightly    gabapentin  600 mg Per G Tube TID    losartan  100 mg Per G Tube Daily    senna  1 tablet Per G Tube Nightly    aspirin  81 mg Per G Tube Daily    insulin glargine  40 Units SubCUTAneous Nightly    lidocaine  1 patch TransDERmal Daily    cloNIDine  0.1 mg Oral BID    insulin lispro  0-16 Units SubCUTAneous Q6H    albuterol  2.5 mg Nebulization TID    sodium chloride (Inhalant)  4 
     Internal Medicine Progress Note    Admit Date: 2024  Hospital Day: 7   Patient name: Kulwant Lamb   : 1955     CC:   Hematemesis (Patient comes to the ED today for hematemesis. ) and Fatigue    Interval history:   No acute events overnight. Denies CP, reports no SOB at this time; still requiring oral suctioning at times. No dysuria, LBM 3 days. No new sources of pain and no worsening of LUE/LLE weakness.   Awaiting on ENT recs for left vocal cord paralysis. He did state that this is new since right before this admission.  ARU referral pending  Will continue heparin while pt having swallowing issues    HPI:  Patient is a 68-year-old male with a past medical history of morbid obesity, T2 DM, prior CVA who presented with complaints of nausea, vomiting, and loss of balance. He endorses that he was feeling off balance and was unable to walk straight as well as left-sided tingling and decreased sensation. He also saw blood in his vomit which prompted him to come to the ED. Patient states that he was showering when the nausea came on and had an episode of vomiting and he believes he saw blood in his vomitus. On arrival to the ED his vital signs were within normal limits. He was placed on 2 L of oxygen and his labs were significant for leukocytosis of 16, lactic acidosis of 2.5, and his blood sugars in the 260s. His hemoglobin was within normal limits. His chest x-ray was notable for mild consolidation in the left lung while CT head without contrast did not show any acute intracranial hemorrhage or mass effect. However CTA head and neck that showed occlusion of the left vertebral artery. UC stroke team was called and did not recommend TNK as the patient was outside the window but did recommend heparin. He was admitted to the ICU for every hour neurochecks and started on heparin gtt after CT scans showed stable lesion. MRI showed an infarct in the left inferior cerebellum and occlusion of the left 
     Urology Attending Progress Note      Subjective: Called yesterday regarding resistance met with straight catheter after patient failed a voiding trial. Staff eventually able to place coude. No current  complaints    Vitals:  /78   Pulse (!) 103   Temp 98.9 °F (37.2 °C) (Axillary)   Resp 18   Ht 1.829 m (6')   Wt (!) 175.8 kg (387 lb 9.1 oz)   SpO2 97%   BMI 52.56 kg/m²   Temp  Av.3 °F (36.8 °C)  Min: 97.6 °F (36.4 °C)  Max: 99 °F (37.2 °C)    Intake/Output Summary (Last 24 hours) at 2024 0937  Last data filed at 2024 0441  Gross per 24 hour   Intake 1494 ml   Output 1575 ml   Net -81 ml       Exam: Urine clear in morgan    Labs:  WBC:    Lab Results   Component Value Date/Time    WBC 14.0 2024 04:48 AM     Hemoglobin/Hematocrit:    Lab Results   Component Value Date/Time    HGB 11.6 2024 04:48 AM    HCT 35.0 2024 04:48 AM     BMP:    Lab Results   Component Value Date/Time     2024 04:48 AM    K 4.4 2024 04:48 AM    K 4.5 2024 08:33 PM    CL 97 2024 04:48 AM    CO2 32 2024 04:48 AM    BUN 14 2024 04:48 AM    CREATININE 0.7 2024 04:48 AM    CALCIUM 8.7 2024 04:48 AM    GFRAA >60 2022 10:42 AM    GFRAA >60 2012 11:32 AM    LABGLOM >90 2024 04:48 AM     PT/INR:    Lab Results   Component Value Date/Time    PROTIME 14.0 2024 03:00 PM    PROTIME 17.6 2010 11:23 AM    INR 1.06 2024 03:00 PM     PTT:    Lab Results   Component Value Date/Time    APTT 38.2 2024 03:00 PM   [APTT  Impression/Plan:  69 yo M admitted with vertebral artery occlusion. We were consulted earlier in the month for AUR. He failed a voiding trial and coude replaced yesterday.     -No current  complaints  -He is reportedly going to rehab facility possibly today. Keep catheter in at discharge. He can have another voiding trial attempted in 1-2 weeks when is he more mobile  -Please call with any 
     Urology Attending Progress Note      Subjective: Sitting up in bed. No distress    Vitals:  /83   Pulse 78   Temp 96.9 °F (36.1 °C) (Temporal)   Resp 19   Ht 1.829 m (6')   Wt (!) 153.5 kg (338 lb 6.5 oz)   SpO2 96%   BMI 45.90 kg/m²   Temp  Av.7 °F (36.5 °C)  Min: 96.9 °F (36.1 °C)  Max: 98.4 °F (36.9 °C)    Intake/Output Summary (Last 24 hours) at 2024 0836  Last data filed at 2024 0749  Gross per 24 hour   Intake 1070.09 ml   Output 1070 ml   Net 0.09 ml         Exam: morgan in place with yellow urine    Labs:  WBC:    Lab Results   Component Value Date/Time    WBC 16.6 2024 04:18 AM     Hemoglobin/Hematocrit:    Lab Results   Component Value Date/Time    HGB 16.3 2024 04:18 AM    HCT 47.3 2024 04:18 AM     BMP:    Lab Results   Component Value Date/Time     2024 04:18 AM    K 3.5 2024 04:18 AM    K 4.0 2024 10:21 AM    CL 95 2024 04:18 AM    CO2 28 2024 04:18 AM    BUN 38 2024 04:18 AM    LABALBU 4.4 2024 10:21 AM    CREATININE 0.9 2024 04:18 AM    CALCIUM 8.6 2024 04:18 AM    GFRAA >60 2022 10:42 AM    GFRAA >60 2012 11:32 AM    LABGLOM >90 2024 04:18 AM     PT/INR:    Lab Results   Component Value Date/Time    PROTIME 15.1 2024 11:29 PM    PROTIME 17.6 2010 11:23 AM    INR 1.16 2024 11:29 PM     PTT:    Lab Results   Component Value Date/Time    APTT 33.9 2024 11:29 PM   [APTT      Impression/Plan: urinary retention  Keep morgan in place for now. Please contact us if patient is nearing transfer out of ICU and we can consider VT  Flomoax 0.4mg po daily when able    RANDY Mishra  
     Urology Attending Progress Note      Subjective: patient is alert but not talking this morning    Vitals:  BP (!) 153/91   Pulse 84   Temp 98 °F (36.7 °C) (Temporal)   Resp 20   Ht 1.829 m (6')   Wt (!) 160.8 kg (354 lb 9.6 oz)   SpO2 94%   BMI 48.09 kg/m²   Temp  Av.1 °F (36.7 °C)  Min: 98 °F (36.7 °C)  Max: 98.4 °F (36.9 °C)    Intake/Output Summary (Last 24 hours) at 2024 0915  Last data filed at 2024 0553  Gross per 24 hour   Intake 58.67 ml   Output 2040 ml   Net -1981.33 ml       Exam: morgan in place with yellow urine    Labs:  WBC:    Lab Results   Component Value Date/Time    WBC 16.1 2024 05:45 AM     Hemoglobin/Hematocrit:    Lab Results   Component Value Date/Time    HGB 17.0 2024 05:45 AM    HCT 49.3 2024 05:45 AM     BMP:    Lab Results   Component Value Date/Time     2024 05:45 AM    K 4.8 2024 05:45 AM    K 4.0 2024 10:21 AM    CL 88 2024 05:45 AM    CO2 24 2024 05:45 AM    BUN 26 2024 05:45 AM    LABALBU 4.4 2024 10:21 AM    CREATININE 0.8 2024 05:45 AM    CALCIUM 9.3 2024 05:45 AM    GFRAA >60 2022 10:42 AM    GFRAA >60 2012 11:32 AM    LABGLOM >90 2024 05:45 AM     PT/INR:    Lab Results   Component Value Date/Time    PROTIME 15.1 2024 11:29 PM    PROTIME 17.6 2010 11:23 AM    INR 1.16 2024 11:29 PM     PTT:    Lab Results   Component Value Date/Time    APTT 33.9 2024 11:29 PM   [APTT      Impression/Plan: urinary retention  Will keep morgan until pt ambulatory  Flomoax 0.4mg po daily when able    Honorio Roca MD  
    Neurocritical Care Progress Note    Patient: Kulwant Lamb MRN: [id number]    YOB: 1955  Age: 68 y.o.  Sex: male   Unit: [facility] ICU TOWER Room/Bed: 4505/4505-01 Location: [facility]    Today's Date: 4/9/2024  Date of Admission: 4/4/2024  9:51 AM  Admitting Physician: DIONICIO DRUMMOND    Primary Care Physician: Margoth Webber MD          LOS: 5 days      ASSESSMENT & RECOMMENDATIONS     Assessment  68M with PMH of L thalamic stroke with residual right sided numbness and tingling, who presented to ER with ataxia, n/v and left hand numbness. Initial CT showed completed occlusion in L V3-V4 and bilateral PCA stenosis. MRI showed acute L inferior cerebellum and lateral medulla stroke.   Repeat MRI showed increased stroke hep gtt, Neuroendovascular recommended hep gtt and ASA   Therapeutic hep gtt CT stable with HT   ECHO:WNL without clots   Repeat CT/CTA showed stable ischemia stroke, CTA showed resolution of blood clot. Discussed with RADHA, who recommend DOAC and ASA for 3 months and DC DOAC after 3 months   Clinically improved today with better strength in the left     Recommendations  Neuro q4  SBP < 160   Given the ongoing swallowing issue, patient may/may not need PEG. Consider to switch Hep gtt to either  BID vs Eliquis. Defer to the primary team   Continue ASA 81   Follow up with Dr Joshi and Neurologist in 3 months after discharge  Holter monitor referral     NCC will sign off     SUBJECTIVE     Chart reviewed, events noted, pt seen & examined. No acute events overnight. Afebrile.     Review of Systems  No changes since pt last seen other than those noted above.    PFSH  No change since the original history and note.     OBJECTIVE     Patient Vitals for the past 24 hrs:   BP Temp Temp src Pulse Resp SpO2 Weight   04/09/24 0732 -- 97.1 °F (36.2 °C) Temporal -- -- -- --   04/09/24 0605 (!) 142/79 -- -- 72 15 96 % --   04/09/24 0500 (!) 162/86 -- -- 69 12 97 % --   04/09/24 0444 
    Neurocritical Care Progress Note    Patient: Kulwant Lamb MRN: [id number]    YOB: 1955  Age: 68 y.o.  Sex: male   Unit: [facility] ICU TOWER Room/Bed: 4505/4505-01 Location: [facility]'s Date: 4/8/2024  Date of Admission: 4/4/2024  9:51 AM  Admitting Physician: DIONICIO DRUMMOND    Primary Care Physician: Margoth Webber MD          LOS: 4 days      ASSESSMENT & RECOMMENDATIONS     Assessment  68M with PMH of L thalamic stroke with residual right sided numbness and tingling, who presented to ER with ataxia, n/v and left hand numbness. Initial CT showed completed occlusion in L V3-V4 and bilateral PCA stenosis. MRI showed acute L inferior cerebellum and lateral medulla stroke.   Repeat MRI showed increased stroke hep gtt, Neuroendovascular recommended hep gtt and ASA   Therapeutic hep gtt CT stable with HT   Patient has increased supplemental oxygen requiremed, hypophonic, concern of aspiration     Recommendations  Neuro q4  ECHO ordered  Will stay in the ICU for airway watch. He is high risk for aspiration due to the location of stroke   SBP < 160       SUBJECTIVE     Chart reviewed, events noted, pt seen & examined. No acute events overnight. Afebrile.     Review of Systems  No changes since pt last seen other than those noted above.    PFSH  No change since the original history and note.     OBJECTIVE     Patient Vitals for the past 24 hrs:   BP Temp Temp src Pulse Resp SpO2 Weight   04/08/24 0933 -- -- -- 71 12 98 % --   04/08/24 0749 117/83 96.9 °F (36.1 °C) Temporal -- -- 96 % --   04/08/24 0700 117/83 -- -- 78 19 97 % --   04/08/24 0656 -- -- -- -- 18 -- --   04/08/24 0626 -- -- -- -- 15 -- --   04/08/24 0600 119/63 -- -- 78 16 96 % (!) 153.5 kg (338 lb 6.5 oz)   04/08/24 0500 127/82 -- -- 70 13 96 % --   04/08/24 0400 124/80 -- -- 76 22 96 % --   04/08/24 0300 (!) 142/86 -- -- 73 13 96 % --   04/08/24 0226 -- -- -- -- 13 -- --   04/08/24 0200 133/89 -- -- 75 14 94 % -- 
   04/05/24 1542   Encounter Summary   Encounter Overview/Reason  Initial Encounter   Service Provided For: Patient and family together   Referral/Consult From: Rounding   Support System Family members;Children   Last Encounter  04/05/24  (rmb)   Complexity of Encounter Low   Begin Time 1530   End Time  1535   Total Time Calculated 5 min   Assessment/Intervention/Outcome   Assessment Calm;Coping   Intervention Active listening;Empowerment;Nurtured Hope;Sustaining Presence/Ministry of presence   Outcome Comfort;Encouraged;Expressed Gratitude;Jerry Jainlain Intern  
  Comprehensive Nutrition Assessment    RECOMMENDATIONS:  Continue current TF  Glucerna (1440 mL TV) @ 60 mL/hr  Flushes: 80 mL q4h  Nutrition Education: Education not indicated     NUTRITION ASSESSMENT:   Nutritional summary & status: TF restarted last night, at goal and tolerating well. Plan to discuss w/ cards if pt is stable enough for PEG now and then reconsult GI. Pt continues to be hyperglycemia, lantus increased to 40 units. Having regular BMs, no abdominal distention. Nausea appears to have resolved as well.   Admission // PMH: Vertebral artery occlusion//stroke, T2DM, ataxia, hematemesis w/ nausea, pneumonia, HTN, dyslipidemia    MALNUTRITION ASSESSMENT  Context of Malnutrition: Acute Illness   Malnutrition Status: At risk for malnutrition (Comment)  Findings of the 6 clinical characteristics of malnutrition (Minimum of 2 out of 6 clinical characteristics is required to make the diagnosis of moderate or severe Protein Calorie Malnutrition based on AND/ASPEN Guidelines):  Energy Intake:  50% or less of estimated energy requirements for 5 or more days  Weight Loss:  No significant weight loss     Body Fat Loss:  No significant body fat loss     Muscle Mass Loss:  No significant muscle mass loss    Fluid Accumulation:  No significant fluid accumulation      NUTRITION DIAGNOSIS   Inadequate oral intake related to swallowing difficulty as evidenced by swallow study results    Nutrition Monitoring and Evaluation:   Food/Nutrient Intake Outcomes:  Enteral Nutrition Intake/Tolerance  Physical Signs/Symptoms Outcomes:  Biochemical Data, Nutrition Focused Physical Findings, Chewing or Swallowing, Weight     OBJECTIVE DATA: Significant to nutrition assessment  Nutrition Related Findings: . -7L. +BM 4/15.  Wounds: None  Nutrition Goals: Tolerate nutrition support at goal rate     CURRENT NUTRITION THERAPIES  Current Tube Feeding (TF) Orders:  Feeding Route: Nasogastric  Formula: Diabetic  Schedule: 
  Comprehensive Nutrition Assessment    RECOMMENDATIONS:  Continue current TF  Glucerna 1.5 @ 60 mL/hr (1440 mL TV)  Decrease flushes: 30 mL q4h  Nutrition Education: Education not appropriate     NUTRITION ASSESSMENT:   Nutritional summary & status: Pt remains intubated, no pressor requirements. Sedated on propofol, providing 599 kcal/day. PEG placed yesterday and TF restarted, no s/s of intolerance. Last BM 4/15, bowel regimen on board. BG better controlled, but remains >180. SSI in place along w/ lantus. Plan to wean propofol today for SBT, will not recalculate TF at this time to account for propofol calories. EMR shows 25 kg wt gain, likely bedscale error, will continue to use previous wt for estimated needs.   Admission // PMH: Vertebral artery occlusion//stroke, T2DM, ataxia, hematemesis w/ nausea, pneumonia, HTN, dyslipidemia    MALNUTRITION ASSESSMENT  Context of Malnutrition: Acute Illness   Malnutrition Status: At risk for malnutrition (Comment)  Findings of the 6 clinical characteristics of malnutrition (Minimum of 2 out of 6 clinical characteristics is required to make the diagnosis of moderate or severe Protein Calorie Malnutrition based on AND/ASPEN Guidelines):  Energy Intake:  50% or less of estimated energy requirements for 5 or more days  Weight Loss:  No significant weight loss     Body Fat Loss:  No significant body fat loss     Muscle Mass Loss:  No significant muscle mass loss    Fluid Accumulation:  No significant fluid accumulation      NUTRITION DIAGNOSIS   Inadequate oral intake related to swallowing difficulty as evidenced by swallow study results    Nutrition Monitoring and Evaluation:   Food/Nutrient Intake Outcomes:  Enteral Nutrition Intake/Tolerance  Physical Signs/Symptoms Outcomes:  Biochemical Data, Nutrition Focused Physical Findings, Chewing or Swallowing, Weight     OBJECTIVE DATA: Significant to nutrition assessment  Nutrition Related Findings: Na 134. . +BM 4/15. 
  Comprehensive Nutrition Assessment    RECOMMENDATIONS:  Continue current TF  Glucerna 1.5 @ 60 mL/hr (1440 mL TV)  Flushes: 30 mL q4h  Obtain KUB and increase bowel regimen  Recommend senna-s (2 tablets) BID & glycolax BID  Nutrition Education: Education not appropriate     NUTRITION ASSESSMENT:   Nutritional summary & status: Pt now extubated, w/ no sedation requirements. Continues on TF, passing gas but last BM 4/15. KUB ordered for today and then plan to increase bowel regimen. BG remains elevated but now averaging <200.   Admission // PMH: Vertebral artery occlusion//stroke, T2DM, ataxia, hematemesis w/ nausea, pneumonia, HTN, dyslipidemia    MALNUTRITION ASSESSMENT  Context of Malnutrition: Acute Illness   Malnutrition Status: At risk for malnutrition (Comment)  Findings of the 6 clinical characteristics of malnutrition (Minimum of 2 out of 6 clinical characteristics is required to make the diagnosis of moderate or severe Protein Calorie Malnutrition based on AND/ASPEN Guidelines):  Energy Intake:  50% or less of estimated energy requirements for 5 or more days  Weight Loss:  No significant weight loss     Body Fat Loss:  No significant body fat loss     Muscle Mass Loss:  No significant muscle mass loss    Fluid Accumulation:  No significant fluid accumulation      NUTRITION DIAGNOSIS   Inadequate oral intake related to swallowing difficulty as evidenced by swallow study results    Nutrition Monitoring and Evaluation:   Food/Nutrient Intake Outcomes:  Enteral Nutrition Intake/Tolerance  Physical Signs/Symptoms Outcomes:  Biochemical Data, Nutrition Focused Physical Findings, Weight, Constipation, Hemodynamic Status     OBJECTIVE DATA: Significant to nutrition assessment  Nutrition Related Findings: . +1.4L. +BM 4/15.  Wounds: None  Nutrition Goals: Tolerate nutrition support at goal rate     CURRENT NUTRITION THERAPIES  Current Tube Feeding (TF) Orders:  Feeding Route: PEG  Formula: Diabetic  Schedule: 
  Comprehensive Nutrition Assessment    RECOMMENDATIONS:  Resume TF after PEG placement  Glucerna 1.5 @ 60 mL/hr  Flushes: 80 mL q4h  Nutrition Education: Education not indicated     NUTRITION ASSESSMENT:   Nutritional summary & status: TF held at midnight for PEG placement today. Pt has been tolerating TF well, reported that he was feeling nauseous but felt better after receiving medication. States he had x2 BMs 2 days ago, bowel regimen on board, no abdominal pain. Remains hyperglycemic, lantus increased to home dose of 25 units yesterday, continues on steroids as well. Will reorder TF once pt returns from procedure.   Admission // PMH: Vertebral artery occlusion//stroke, T2DM, ataxia, hematemesis w/ nausea, pneumonia, HTN, dyslipidemia    MALNUTRITION ASSESSMENT  Context of Malnutrition: Acute Illness   Malnutrition Status: At risk for malnutrition (Comment)  Findings of the 6 clinical characteristics of malnutrition (Minimum of 2 out of 6 clinical characteristics is required to make the diagnosis of moderate or severe Protein Calorie Malnutrition based on AND/ASPEN Guidelines):  Energy Intake:  50% or less of estimated energy requirements for 5 or more days  Weight Loss:  No significant weight loss     Body Fat Loss:  No significant body fat loss     Muscle Mass Loss:  No significant muscle mass loss    Fluid Accumulation:  No significant fluid accumulation      NUTRITION DIAGNOSIS   Inadequate oral intake related to swallowing difficulty as evidenced by swallow study results    Nutrition Monitoring and Evaluation:   Food/Nutrient Intake Outcomes:  Enteral Nutrition Intake/Tolerance  Physical Signs/Symptoms Outcomes:  Biochemical Data, Nutrition Focused Physical Findings, Weight     OBJECTIVE DATA: Significant to nutrition assessment  Nutrition Related Findings: . Mg 2.6. +BM 4/10.  Wounds: None  Nutrition Goals: Initiate nutrition support     CURRENT NUTRITION THERAPIES  Current Tube Feeding (TF) 
  Current NIHSS 3    Nursing Core Measures for Stroke:   [x]   Education template documentation (STROKE/TIA). Please select only risk factors that are applicable to patient when selecting risk factors.  [x]   Care Plan template documentation (Physiologic Instability - Neurosensory). Selecting this will add care plan rows to the flowsheet under the Neuro section of Head to Toe.  [x]   Verified Swallow Screen completed prior to PO intake of food, drink, medications  [x]   VTE Prophylaxis: SCDs ordered/addressed; SCDs: N/A Eliquis           (As a reminder, ASA, Plavix, and TPA/TNK are not VTE prophylaxis.)    Reviewed the Following Education with Patient and/or Family:   - Personalized risk factors for patient, along with changes, modifications that will help prevent stroke.  - Signs and Symptoms of Stroke: (Facial droop, weakness/numbness especially on one side, speech difficulty, sudden confusion, sudden loss of vision, sudden severe headache, sudden loss of balance or having difficulty walking, syncope, or seizure)  - How to activate EMS (911)   - Importance of Follow Up Appointments at Discharge   - Importance of Compliance with Medications Prescribed at Discharge  - Available community resources and stroke advocacy groups if needed    Patient and/or family member: verbalized understanding.     Stroke Education booklet given to patient/family (or verified, if given already), which reviews above information. yes         Electronically signed by Connor Thurston RN on 4/23/2024 at 6:21 AM     
  Current NIHSS 3    Nursing Core Measures for Stroke:   [x]   Education template documentation (STROKE/TIA). Please select only risk factors that are applicable to patient when selecting risk factors.  [x]   Care Plan template documentation (Physiologic Instability - Neurosensory). Selecting this will add care plan rows to the flowsheet under the Neuro section of Head to Toe.  [x]   Verified Swallow Screen completed prior to PO intake of food, drink, medications>          Please verify correct medication route prior to administration for intubated patients, patients who can not swallow or have alternative routes of intake (NG, OG, WA), etc  [x]   VTE Prophylaxis: SCDs ordered/addressed; SCDs: N/A Heparin           (As a reminder, ASA, Plavix, and TPA/TNK are not VTE prophylaxis.)    Reviewed the Following Education with Patient and/or Family:   - Personalized risk factors for patient, along with changes, modifications that will help prevent stroke.  - Signs and Symptoms of Stroke: (Facial droop, weakness/numbness especially on one side, speech difficulty, sudden confusion, sudden loss of vision, sudden severe headache, sudden loss of balance or having difficulty walking, syncope, or seizure)  - How to activate EMS (911)   - Importance of Follow Up Appointments at Discharge   - Importance of Compliance with Medications Prescribed at Discharge  - Available community resources and stroke advocacy groups if needed    Patient and/or family member: verbalized understanding.     Stroke Education booklet given to patient/family (or verified, if given already), which reviews above information. yes         Electronically signed by Eleanor Valenzuela RN on 4/9/2024 at 11:31 PM    
  Current NIHSS 4    Nursing Core Measures for Stroke:   [x]   Education template documentation (STROKE/TIA). Please select only risk factors that are applicable to patient when selecting risk factors.  [x]   Care Plan template documentation (Physiologic Instability - Neurosensory). Selecting this will add care plan rows to the flowsheet under the Neuro section of Head to Toe.  [x]   Verified Swallow Screen completed prior to PO intake of food, drink, medications>          Please verify correct medication route prior to administration for intubated patients, patients who can not swallow or have alternative routes of intake (NG, OG, CT), etc  [x]   VTE Prophylaxis: SCDs ordered/addressed; SCDs: On           (As a reminder, ASA, Plavix, and TPA/TNK are not VTE prophylaxis.)    Reviewed the Following Education with Patient and/or Family:   - Personalized risk factors for patient, along with changes, modifications that will help prevent stroke.  - Signs and Symptoms of Stroke: (Facial droop, weakness/numbness especially on one side, speech difficulty, sudden confusion, sudden loss of vision, sudden severe headache, sudden loss of balance or having difficulty walking, syncope, or seizure)  - How to activate EMS (911)   - Importance of Follow Up Appointments at Discharge   - Importance of Compliance with Medications Prescribed at Discharge  - Available community resources and stroke advocacy groups if needed    Patient and/or family member: verbalized understanding.     Stroke Education booklet given to patient/family (or verified, if given already), which reviews above information. yes         Electronically signed by Sadie Trevino RN on 4/10/2024 at 8:00 PM   
  Current NIHSS 5    Nursing Core Measures for Stroke:   [x]   Education template documentation (STROKE/TIA). Please select only risk factors that are applicable to patient when selecting risk factors.  [x]   Care Plan template documentation (Physiologic Instability - Neurosensory). Selecting this will add care plan rows to the flowsheet under the Neuro section of Head to Toe.  [x]   Verified Swallow Screen completed prior to PO intake of food, drink, medications>          Please verify correct medication route prior to administration for intubated patients, patients who can not swallow or have alternative routes of intake (NG, OG, OH), etc  [x]   VTE Prophylaxis: SCDs ordered/addressed; SCDs: N/A Heparin           (As a reminder, ASA, Plavix, and TPA/TNK are not VTE prophylaxis.)    Reviewed the Following Education with Patient and/or Family:   - Personalized risk factors for patient, along with changes, modifications that will help prevent stroke.  - Signs and Symptoms of Stroke: (Facial droop, weakness/numbness especially on one side, speech difficulty, sudden confusion, sudden loss of vision, sudden severe headache, sudden loss of balance or having difficulty walking, syncope, or seizure)  - How to activate EMS (911)   - Importance of Follow Up Appointments at Discharge   - Importance of Compliance with Medications Prescribed at Discharge  - Available community resources and stroke advocacy groups if needed    Patient and/or family member: verbalized understanding.     Stroke Education booklet given to patient/family (or verified, if given already), which reviews above information. yes         Electronically signed by Eleanor Valenzuela RN on 4/7/2024 at 7:26 PM   
  Current NIHSS 5    Nursing Core Measures for Stroke:   [x]   Education template documentation (STROKE/TIA). Please select only risk factors that are applicable to patient when selecting risk factors.  [x]   Care Plan template documentation (Physiologic Instability - Neurosensory). Selecting this will add care plan rows to the flowsheet under the Neuro section of Head to Toe.  [x]   Verified Swallow Screen completed prior to PO intake of food, drink, medications>          Please verify correct medication route prior to administration for intubated patients, patients who can not swallow or have alternative routes of intake (NG, OG, [place]), etc  [x]   VTE Prophylaxis: SCDs ordered/addressed; SCDs: N/A Heparin           (As a reminder, ASA, Plavix, and TPA/TNK are not VTE prophylaxis.)    Reviewed the Following Education with Patient and/or Family:   - Personalized risk factors for patient, along with changes, modifications that will help prevent stroke.  - Signs and Symptoms of Stroke: (Facial droop, weakness/numbness especially on one side, speech difficulty, sudden confusion, sudden loss of vision, sudden severe headache, sudden loss of balance or having difficulty walking, syncope, or seizure)  - How to activate EMS (911)   - Importance of Follow Up Appointments at Discharge   - Importance of Compliance with Medications Prescribed at Discharge  - Available community resources and stroke advocacy groups if needed    Patient and/or family member: verbalized understanding.     Stroke Education booklet given to patient/family (or verified, if given already), which reviews above information. yes         Electronically signed by Eleanor Valenzuela RN on 4/9/2024 at 2:36 AM   
  Current NIHSS 7    Nursing Core Measures for Stroke:   []   Education template documentation (STROKE/TIA). Please select only risk factors that are applicable to patient when selecting risk factors.  []   Care Plan template documentation (Physiologic Instability - Neurosensory). Selecting this will add care plan rows to the flowsheet under the Neuro section of Head to Toe.  []   Verified Swallow Screen completed prior to PO intake of food, drink, medications>          Please verify correct medication route prior to administration for intubated patients, patients who can not swallow or have alternative routes of intake (NG, OG, NV), etc  []   VTE Prophylaxis: SCDs ordered/addressed; SCDs: N/A Heparin           (As a reminder, ASA, Plavix, and TPA/TNK are not VTE prophylaxis.)    Reviewed the Following Education with Patient and/or Family:   - Personalized risk factors for patient, along with changes, modifications that will help prevent stroke.  - Signs and Symptoms of Stroke: (Facial droop, weakness/numbness especially on one side, speech difficulty, sudden confusion, sudden loss of vision, sudden severe headache, sudden loss of balance or having difficulty walking, syncope, or seizure)  - How to activate EMS (911)   - Importance of Follow Up Appointments at Discharge   - Importance of Compliance with Medications Prescribed at Discharge  - Available community resources and stroke advocacy groups if needed    Patient and/or family member: education needs reinforcement.     Stroke Education booklet given to patient/family (or verified, if given already), which reviews above information. yes         Electronically signed by Esteban Espinoza RN on 4/16/2024 at 8:08 PM    
  Current NIHSS 7    Nursing Core Measures for Stroke:   [x]   Education template documentation (STROKE/TIA). Please select only risk factors that are applicable to patient when selecting risk factors.  [x]   Care Plan template documentation (Physiologic Instability - Neurosensory). Selecting this will add care plan rows to the flowsheet under the Neuro section of Head to Toe.  [x]   Verified Swallow Screen completed prior to PO intake of food, drink, medications>          Please verify correct medication route prior to administration for intubated patients, patients who can not swallow or have alternative routes of intake (NG, OG, NC), etc  [x]   VTE Prophylaxis: SCDs ordered/addressed; SCDs: On           (As a reminder, ASA, Plavix, and TPA/TNK are not VTE prophylaxis.)    Reviewed the Following Education with Patient and/or Family:   - Personalized risk factors for patient, along with changes, modifications that will help prevent stroke.  - Signs and Symptoms of Stroke: (Facial droop, weakness/numbness especially on one side, speech difficulty, sudden confusion, sudden loss of vision, sudden severe headache, sudden loss of balance or having difficulty walking, syncope, or seizure)  - How to activate EMS (911)   - Importance of Follow Up Appointments at Discharge   - Importance of Compliance with Medications Prescribed at Discharge  - Available community resources and stroke advocacy groups if needed    Patient and/or family member: demonstrates understanding.     Stroke Education booklet given to patient/family (or verified, if given already), which reviews above information. yes         Electronically signed by Esteban Espinoza RN on 4/17/2024 at 5:15 PM    
  Physician Progress Note      PATIENT:               SOLO BRINK  CSN #:                  [id number]  :                       1955  ADMIT DATE:       2024 9:51 AM  DISCH DATE:  RESPONDING  PROVIDER #:        Fabricio Vega MD          QUERY TEXT:    Pt admitted with CVA, pneumonia.  Pt noted to have new oxygen requirement and   increased work of breathing. If possible, please document in the progress   notes and discharge summary if you are evaluating and/or treating any of the   following:    The medical record reflects the following:  Risk Factors: 67 yo morbidly obese male S/P CVA, with pneumonia  Clinical Indicators: Per PN : Respiratory: Increased work of breathing,   Patient continues to be dyspneic. Sats 87%, RR 17-29.  Treatment: up to 5L NC, VBG, respiratory therapy, CXR  Options provided:  -- Acute respiratory failure with hypoxia  -- Acute respiratory failure with hypercapnia  -- Acute respiratory failure with hypoxia and hypercapnia  -- Other - I will add my own diagnosis  -- Disagree - Not applicable / Not valid  -- Disagree - Clinically unable to determine / Unknown  -- Refer to Clinical Documentation Reviewer    PROVIDER RESPONSE TEXT:    This patient is in acute respiratory failure with hypoxia.    Query created by: Augustina Light on 2024 8:54 AM      Electronically signed by:  Fabricio Vega MD 2024 3:42 PM          
 Latest Reference Range & Units 04/07/24 06:16   Anti-XA Unfrac Heparin 0.30 - 0.70 IU/mL 0.43        Therapeutic x2. No rate change to gtt.     Electronically signed by Agus Torres RN on 4/7/2024 at 6:44 AM    
 Latest Reference Range & Units 04/11/24 22:31   Anti-XA Unfrac Heparin 0.30 - 0.70 IU/mL 0.39   Therapeutic x2. Lab switched to daily.     Electronically signed by Agus Torres RN on 4/11/2024 at 11:52 PM    
 Latest Reference Range & Units 04/19/24 00:38   Anti-XA Unfrac Heparin 0.30 - 0.70 IU/mL 0.18 (L)   (L): Data is abnormally low    Heparin gtt titrated accordingly.   
-[facility]-  -ICU to [facility] Transfer Note-  HPI from H&P  Patient is a 68-year-old male with a past medical history of morbid obesity, T2 DM, prior CVA who presented with complaints of nausea, vomiting, and loss of balance. He endorses that he was feeling off balance and was unable to walk straight as well as left-sided tingling and decreased sensation. He also saw blood in his vomit which prompted him to come to the ED. Patient states that he was showering when the nausea came on and had an episode of vomiting and he believes he saw blood in his vomitus. On arrival to the ED his vital signs were within normal limits. He was placed on 2 L of oxygen and his labs were significant for leukocytosis of 16, lactic acidosis of 2.5, and his blood sugars in the 260s. His hemoglobin was within normal limits. His chest x-ray was notable for mild consolidation in the left lung while CT head without contrast did not show any acute intracranial hemorrhage or mass effect. However CTA head and neck that showed occlusion of the left vertebral artery. UC stroke team was called and did not recommend TNK as the patient was outside the window but did recommend heparin. He was admitted to the ICU for every hour neurochecks and neurosurgery did not recommend heparin given the absence of acute ischemia and possible episode of hematemesis. MRI did show an infarct in the left inferior cerebellum and occlusion of the left vertebral artery.     I  ICU Admission Reason & Brief ICU Course:   The patient is alert and oriented, with left vertebral artery occlusion and an infarct in the left inferior cerebellum. Symptoms included nausea, vomiting, loss of balance, and left-sided tingling. Physical exam did not reveal focal neurological deficits. Treatment plan includes Q4 neurochecks, continuing aspirin 81 mg daily and atorvastatin 80 mg nightly, maintaining systolic blood pressure below 160, transitioning from heparin to 
04/06/24 23:42  Anti-XA Unfrac Heparin: 0.32     Anti Xa therapeutic x1. No rate change.       
1500 neuro exam:    Pt. Asleep, however wakes easily to voice    Pt. Strong in bilateral upper extremities    Left arm remains ataxic    Pt. Strong in bilateral lower extremities and able to lift legs off of bed easily      Respiratory:    Pt. Responded well to lasix and steroid.    Ease of breathing improved, SOB improved per pt.   
4 Eyes Admission Assessment     I agree as the admission nurse that 2 RN's have performed a thorough Head to Toe Skin Assessment on the patient. ALL assessment sites listed below have been assessed on admission.       Areas assessed by both nurses:   [x]   Head, Face, and Ears   [x]   Shoulders, Back, and Chest  [x]   Arms, Elbows, and Hands   [x]   Coccyx, Sacrum, and Ischium  [x]   Legs, Feet, and Heels    Wound to LLE, rosy/purple discoloration to BLE, Blanching redness to sacrum, Bilateral heels, scattered abrasions and bruising, LUE edema        Does the Patient have Skin Breakdown?  Yes a wound was noted on the Admission Assessment and an LDA was Initiated documentation include the Brook-wound, Wound Assessment, Measurements, Dressing Treatment, Drainage, and Color\",         Gilberto Prevention initiated:  Yes   Wound Care Orders initiated:  No      C nurse consulted for Pressure Injury (Stage 3,4, Unstageable, DTI, NWPT, and Complex wounds) or Gilberto score 18 or lower:  No      Nurse 1 eSignature: Electronically signed by Rosa Maria Hdz RN on 4/17/24 at 10:24 PM EDT    **SHARE this note so that the co-signing nurse is able to place an eSignature**    Nurse 2 eSignature: Electronically signed by Brian Loja RN on 4/17/24 at 11:18 PM EDT   
4 Eyes Skin Assessment     NAME:  Kulwant Lamb  YOB: 1955  MEDICAL RECORD NUMBER:  [id number]    The patient is being assessed for  Transfer to New Unit    I agree that at least one RN has performed a thorough Head to Toe Skin Assessment on the patient. ALL assessment sites listed below have been assessed.      Areas assessed by both nurses:    Head, Face, Ears, Shoulders, Back, Chest, Arms, Elbows, Hands, Sacrum. Buttock, Coccyx, Ischium, and Legs. Feet and Heels        Does the Patient have a Wound? No noted wound(s)    - blanchable redness to bilateral heels and bunions  - blanchable redness to sacrum, new mepilex dressing applied       Gilberto Prevention initiated by RN: Yes  Wound Care Orders initiated by RN: No    Pressure Injury (Stage 3,4, Unstageable, DTI, NWPT, and Complex wounds) if present, place Wound referral order by RN under : No    New Ostomies, if present place, Ostomy referral order under : No     Nurse 1 eSignature: Electronically signed by Esteban Espinoza RN on 4/16/24 at 1:15 PM EDT    **SHARE this note so that the co-signing nurse can place an eSignature**    Nurse 2 eSignature: Electronically signed by Candi Puentes RN on 4/17/24 at 4:26 PM EDT  
4 Eyes Skin Assessment     NAME:  Kulwant Lamb  YOB: 1955  MEDICAL RECORD NUMBER:  [id number]    The patient is being assessed for  Admission    I agree that at least one RN has performed a thorough Head to Toe Skin Assessment on the patient. ALL assessment sites listed below have been assessed.      Areas assessed by both nurses:    Head, Face, Ears, Shoulders, Back, Chest, Arms, Elbows, Hands, Sacrum. Buttock, Coccyx, Ischium, Legs. Feet and Heels, and Under Medical Devices         Does the Patient have a Wound? No noted wound(s)       Gilberto Prevention initiated by RN: Yes  Wound Care Orders initiated by RN: No    Pressure Injury (Stage 3,4, Unstageable, DTI, NWPT, and Complex wounds) if present, place Wound referral order by RN under : No    New Ostomies, if present place, Ostomy referral order under : No     Nurse 1 eSignature: Electronically signed by Hyacinth Ta RN on 4/4/24 at 2:43 PM EDT    **SHARE this note so that the co-signing nurse can place an eSignature**    Nurse 2 eSignature: Electronically signed by Yvette Daniels RN on 4/4/24 at 2:55 PM EDT   
7 beats of v-tach on monitor while working with physical therapy. ICU resident notified.  
A-fib RVR with 's-160's.  Low-grade temp 100.1.  Stat EKG ordered.    EKG showed A-fib RVR.  Perfect served medical residents.  Resident to bedside.   2013: /89, .  5 ml IV metoprolol administered.   2120: BP 69/40, .  LR bolus ordered and administered.  Patient lethargic, c/o  lightheadedness.  2133: Amio bolus and amio gtt ordered.  Stat labs and CXR ordered. TF stopped per resident.  2228: New PIV placed.  Amio gtt infusing.   2311:  Temp 101.2.  BP 69/47,   2350: 2nd liter LR ordered, IV antibiotics ordered per sepsis protocol  2332: BP 76/59, rectal tylenol given  0000: BP 97/52, , A-fib.   0112:  BP 87/67, 500 ml LR bolus ordered and infusing.  0226: 3rd liter LR ordered per sepsis protocol  0324:  Temp 97.9, /67, , A-fib. SpO2 91% 4 L NC. , humalog held r/t NPO  0625: Oxygen dropping 86%, oxygen increased to 7 L with sats 90%.  Patient able to cough and suction mucus, sats slowly increasing to 92%, oxygen titrated back to 4 L.                   
Admission: Patient received to room 4503 from OR.   Patient admitted with Dx Vertebral artery occlusion.    Patient intubated, sedated, RASS -2 upon arrival.   Tele monitor applied, rate and rhythm verified with monitor reader.  VSS.   Patient/family oriented to room, staff, and call system.   Educated on fall protocol and hourly rounding.   Assessment as documented.   Admission navigator complete.   Propofol gtt infusing @ 10. PIV in RUE, Midline in LUE.  Bed locked in low position.   Patient/family informed to utilize call light with any needs. Call light within reach.      
Anti-Xa greater than 1. Heparin gtt paused for 1 hour; to be restarted at 11 units at 0610.     Placing order for lab to re-draw Anti-Xa dt large increase in value.   
BP >160 systolic this AM.  Messaged ICU resident for PRN IV medication. Awaiting response.   
BP systolic 200's. Dr. Timmons made aware, metoprolol given as well as PRN pain medication as pt states they are in 10/10 pain. Plan of care ongoing.   
Broncoscope not done today. Pt doing a fine job of coughing up sputum per ICU attending.  A+Ox4. Follows commands.  Echo and FEES completed today.  Remains ice chips only.    
CXR results discussed with ICU resident. Pt will need a bronch in the AM. Incentive spirometry ordered at this time. Pt is still satting 93% on 5L   
Called to room d/t vent alarming high peak pressure and low tidal volume, pt in distress. Lavaged for copious amount of thick yellow secretions. VT and PIP back to normal.   
[facility]   Cardiology  Note   Pt of Dr Masha MENDEZ, FACC   Rosa Maria Richards RN, FNP APRN  Date: 4/19/2024    Admit Date: 4/4/2024       CC:Hematemesis     Following cardiology today for: afib RVR with morbid obesity, previous L thalamic stroke (2018) with residual R lower face/hand deficits, type 2 DM     Interval Hx /  Subjective:Today, he is resting in bed, VSS planning extubation this am   HR  70  afib / afebrile / WBC 19.5 > 18.5 K/uL   No new complaints today. No major events overnight.   G  tube placement  4/17/2024   TTE  is unremarkable & his EKG reveals no acute ischemia without anginal c/o       TTE 4/2024 Normal left ventricle size, moderately increased wall thickness, and normal systolic function with an EF 55- 60%. No obvious regional wall motion abnormalities are seen. Grade I diastolic dysfunction with normal LV filling pressures. No LV apical clot.     Medical Decision Making:  Discussion of patient care with other providers  Patient seen and examined. Clinical notes reviewed. Telemetry reviewed / Pertinent labs, diagnostic, device, and imaging results reviewed as a part of this visit  I spent a total of 50 minutes and greater than 50% of the time was spent counseling with patient  coordinating care regarding her diagnosis, treatments and plan of care    Scheduled Meds:   polyethylene glycol  17 g Per G Tube Daily    [START ON 4/20/2024] lansoprazole  30 mg Per G Tube Daily    lidocaine 1 % injection  5 mL IntraDERmal Once    sodium chloride flush  5-40 mL IntraVENous 2 times per day    amiodarone  200 mg Per G Tube BID    atorvastatin  80 mg Per G Tube Nightly    dilTIAZem  60 mg Per G Tube 4x daily    gabapentin  600 mg Per G Tube TID    losartan  100 mg Per G Tube Daily    naloxegol  12.5 mg Per G Tube QAM AC    senna  1 tablet Per G Tube Nightly    piperacillin-tazobactam  4,500 mg IntraVENous Q8H    aspirin  81 mg Per G Tube Daily    insulin glargine  40 Units SubCUTAneous Nightly    
Clinical Pharmacy Progress Note  Medication History     Admit Date: 4/4/2024    List of of current medications patient is taking is complete. Home Medication list in EPIC updated to reflect changes noted below.    Source of information: interview with patient at bedside, pharmacy fills    [facility]'s home [facility]: [facility]     Changes made to medication list:   Medications added:   Tizanidine prn  Medication doses adjusted:   Metolazone - changed from daily to prn for leg swelling.  Per patient, he hasn't needed in a long time.  Other notes:   Clonidine patch - patient states he is out of supply, but should still be taking this.          Current Outpatient Medications   Medication Instructions    amLODIPine (NORVASC) 5 mg, Oral, DAILY    atorvastatin (LIPITOR) 40 MG tablet TAKE 1 TABLET BY MOUTH EVERY NIGHT AT BEDTIME    cloNIDine (CATAPRES) 0.2 MG/24HR 1 patch, TransDERmal, WEEKLY    ferrous sulfate (IRON 325) 325 mg, Oral, DAILY WITH BREAKFAST    gabapentin (NEURONTIN) 600 mg, Oral, 3 TIMES DAILY    insulin NPH (NOVOLIN N) 100 UNIT/ML injection vial ADMINISTER 10 TO 40 UNITS UNDER THE SKIN THREE TIMES DAILY PER SLIDING SCALE    Insulin Syringe-Needle U-100 (BD INSULIN SYRINGE U/F) 31G X 5/16\" 0.5 ML MISC USE THREE TIMES DAILY AS DIRCTED    metOLazone (ZAROXOLYN) 2.5 mg, Oral, DAILY PRN    olmesartan (BENICAR) 40 mg, Oral, DAILY    ONETOUCH ULTRA strip USE THREE TIMES DAILY AND AS NEEDED FOR SYMPTOMS OF IRREGULAR GLCOSE    oxyCODONE-acetaminophen (PERCOCET)  MG per tablet 1 tablet, Oral, EVERY 6 HOURS PRN    potassium chloride (KLOR-CON) 10 MEQ extended release tablet TAKE 2 TABLETS BY MOUTH 6 TIMES DAILY    tiZANidine (ZANAFLEX) 4 mg, Oral, EVERY 8 HOURS PRN    vitamin E 400 Units, Oral, DAILY       Please call with any questions.  Lynette Awad PharmD., BCPS   4/8/2024 11:22 AM  Wireless: [phone]        
Corepak approved to us.  Administer medication. Order placed for dietary consult to begin NG nutrition per verbal orders.   
During midnight assessment, observed patient to be resting in bed w eyes closed. Collected blood sugar which was 256. Pt. would not respond to verbal stimuli, would move to some pain stimuli, after a few minutes of failed attempts to get patient to awaken to voice and physical stimuli I called for assistance to bedside, we still could not get pt. To respond. Notified wards team and tested arterial blood gas while awaiting their arrival to examine the patient. Observed pupils to be unequal but reactive. When wards arrived pt. Became more alert, answered questions appropriately and followed commands appropriately in all extremities as has done previously. When asking the patient questions about why he was not able to respond to me he said he was dreaming and stated \"I saw you, I just didn't know if you were real\". CT head was ordered for possible bleed.  After CT was completed, pt. Remains oriented x4 and able to follow commands.  
EGD with PEG tube placement planned for tomorrow 4/12.    1500 blood glucose was 376, received order for another 6 u of IV reg insulin and gave that.  
Encounter: Pt was communicating with difficulty and expressed that he made his peace with God and does not consider suicide despite his medical condition. Pt expressed the importance of getting better for his family and that despite his health problems he has found peace through their support.    Coping: Pt mentioned the importance of his family in the healing process and is coping well, despite his current state. Pt welcomed visit from the      Plan: Continue to visit as needed, f/u when family present       04/24/24 1121   Encounter Summary   Encounter Overview/Reason  Follow-up   Service Provided For: Patient   Referral/Consult From: [facility]   Support System Family members   Last Encounter  04/24/24  (rmb- pt was alert and communiating with difficulty)   Complexity of Encounter Low   Begin Time 1009   End Time  1021   Total Time Calculated 12 min   Assessment/Intervention/Outcome   Assessment Calm;Coping;Hopeful   Intervention Active listening;Confronted/Challenged;Discussed belief system/Moravian practices/yvette;Discussed death, afterlife;Discussed illness injury and it’s impact;Discussed relationship with God;Empowerment;Sustaining Presence/Ministry of presence;Nurtured Hope   Outcome Comfort;Acceptance;Connection/Belonging;Encouraged;Expressed feelings of Nicole, Peace and/or Love;Expressed feelings, needs, and concerns;Engaged in conversation     Rabbi Shantel Martinez,  Intern      
General Surgery   Daily Progress Note  Patient: Kulwant Lamb      CC: feeding tube    SUBJECTIVE/Interval:   Denies abdominal pain. Denies nausea or vomiting. Denies reflux symptoms. Having bowel movements.     ROS:   A 14 point review of systems was conducted, significant findings as noted above. All other systems negative.    OBJECTIVE:    PHYSICAL EXAM:    Vitals:    04/16/24 1956 04/16/24 2112 04/17/24 0020 04/17/24 0402   BP:  109/78 (!) 152/93 (!) 159/81   Pulse: 94 (!) 110 95 (!) 115   Resp: 20 17 16 17   Temp:  99.1 °F (37.3 °C) 98.5 °F (36.9 °C) 99 °F (37.2 °C)   TempSrc:  Oral Oral Oral   SpO2: 94% 95% 92% 94%   Weight:       Height:           General appearance: Alert, no acute distress, aphonia  Eyes: No scleral icterus, EOM grossly intact  Neck: Trachea midline, no JVD  Chest/Lungs: normal effort with no accessory muscle use on 4L NC, mild rhoncorous cough  Cardiovascular: tachycardic, well perfused  Abdomen: Obese, non-tender, no rebound, guarding, or rigidity. Well-healed scars. Corpak in place, TF currently held.  Skin: Warm and dry, no rashes  Extremities: No edema, no cyanosis. RUE tremors; residual weakness LUE      LABS:   Recent Labs     04/16/24  0505 04/17/24  0541   WBC 15.3* 19.5*   HGB 14.2 13.9   HCT 42.8 41.9   MCV 92.6 92.0    231          Recent Labs     04/16/24  0505 04/17/24  0541    139   K 4.1 4.0    100   CO2 34* 32   PHOS 3.2 2.6   BUN 27* 18   CREATININE 1.1 0.8          Recent Labs     04/15/24  0854   AST 24   ALT 17   BILIDIR <0.2   BILITOT 1.3*   ALKPHOS 104      No results for input(s): \"LIPASE\", \"AMYLASE\" in the last 72 hours.     Recent Labs     04/15/24  0854 04/17/24  0541   PROT 6.5  --    INR  --  1.08      No results for input(s): \"CKTOTAL\", \"CKMB\", \"CKMBINDEX\", \"TROPONINI\" in the last 72 hours.      ASSESSMENT & PLAN:   This is a 68 y.o. male with Hx of morbid obesity, previous L thalamic stroke (2018) with residual R lower face/hand deficits, 
General Surgery   Daily Progress Note  Patient: Kulwant Lamb      CC: feeding tube    SUBJECTIVE/Interval:   Extubated yesterday. Tube feeds running at 50 ml/hr with minimal residual. Heparin drip still running. Was supratherapeutic yesterday but now subtherapeutic.     ROS:   Completed and positive as above.    OBJECTIVE:    PHYSICAL EXAM:    Vitals:    04/20/24 0600 04/20/24 0618 04/20/24 0841 04/20/24 0901   BP: (!) 170/98  (!) 172/96    Pulse: 84   76   Resp: 20 19 14   Temp:       TempSrc:       SpO2:    95%   Weight:       Height:           General appearance: chronically ill   Neck: Trachea midline, no JVD  Chest/Lungs: on 3L NC. Wet cough, getting nebulizer  Cardiovascular: regular rate  Abdomen: Obese, appropriately tender, G tube at 7 cm at the bumper. Skin intact around g tube.  Insertion site non-remarkable.  Midline incision with prineo in place.   Skin: Warm and dry, no rashes  Extremities: No edema, no cyanosis.     LABS:   Recent Labs     04/19/24  0509 04/20/24  0444   WBC 18.5* 13.9*   HGB 12.2* 12.1*   HCT 36.6* 36.6*   MCV 91.8 92.4    177          Recent Labs     04/19/24  0509 04/20/24  0444   * 138  137   K 4.2 3.7  3.8   CL 98* 98*  98*   CO2 28 28  30   PHOS 3.7 3.5   BUN 17 15  15   CREATININE 0.9 0.7*  0.7*          Recent Labs     04/18/24  1500   INR 1.06   APTT 38.2*        No results for input(s): \"CKTOTAL\", \"CKMB\", \"CKMBINDEX\", \"TROPONINI\" in the last 72 hours.      ASSESSMENT & PLAN:   This is a 68 y.o. 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 He has new onset aphonia and moderate pharyngeal dysphagia for which GI was 
General Surgery   Daily Progress Note  Patient: Kulwant Lamb      CC: feeding tube    SUBJECTIVE/Interval:   Remained intubated after the OR. RT lavaged for copious amount of thick yellow secretions. G tube to gravity overnight.    ROS:   A 14 point review of systems was conducted, significant findings as noted above. All other systems negative.    OBJECTIVE:    PHYSICAL EXAM:    Vitals:    04/18/24 0530 04/18/24 0545 04/18/24 0600 04/18/24 0615   BP: 117/83 106/86 117/89 104/85   Pulse: (!) 112 94 (!) 104 (!) 101   Resp: 13 19 15 14   Temp:       TempSrc:       SpO2: 98% 98% 99% 99%   Weight:       Height:           General appearance: intubated, sedated  Neck: Trachea midline, no JVD  Chest/Lungs: on AC/PC - 14/650/60/10  Cardiovascular: tachycardic, well perfused  Abdomen: Obese, non-tender, G tube to gravity, 7 cm at the bumper. Midline incision with prineo in place.   Skin: Warm and dry, no rashes  Extremities: No edema, no cyanosis.     LABS:   Recent Labs     04/17/24  0541 04/18/24  0425   WBC 19.5* 19.1*   HGB 13.9 13.0*   HCT 41.9 39.0*   MCV 92.0 92.2    190          Recent Labs     04/16/24  0505 04/17/24  0541 04/18/24  0425    139 138   K 4.1 4.0 4.9    100 99   CO2 34* 32 31   PHOS 3.2 2.6  --    BUN 27* 18 16   CREATININE 1.1 0.8 0.9          Recent Labs     04/15/24  0854   AST 24   ALT 17   BILIDIR <0.2   BILITOT 1.3*   ALKPHOS 104        No results for input(s): \"LIPASE\", \"AMYLASE\" in the last 72 hours.     Recent Labs     04/15/24  0854 04/17/24  0541   PROT 6.5  --    INR  --  1.08        No results for input(s): \"CKTOTAL\", \"CKMB\", \"CKMBINDEX\", \"TROPONINI\" in the last 72 hours.      ASSESSMENT & PLAN:   This is a 68 y.o. male with Hx of morbid obesity, previous L thalamic stroke (2018) with residual R lower face/hand deficits, type 2 DM who presented to [facility] with imbalance, nausea/vomiting and L hand numbness for which he was found to have complete occlusion of L vertebral 
General Surgery   Daily Progress Note  Patient: Kulwant Lamb      CC: feeding tube    SUBJECTIVE/Interval:   Tube feeds running at 60 ml/hr without issue. Patient without recorded bowel movement since 4/14. Currently getting daily regimen of senna, movantik, and miralax. Patient does state that he is passing gas.     ROS:   A 14 point review of systems was conducted, significant findings as noted above. All other systems negative.     OBJECTIVE:    PHYSICAL EXAM:    Vitals:    04/22/24 0430 04/22/24 0445 04/22/24 0500 04/22/24 0631   BP:   (!) 137/113    Pulse: 96 89 88    Resp: 17 17 21 21   Temp:       TempSrc:       SpO2: 94% 93% 93%    Weight:       Height:           General appearance: NAD   Neck: Trachea midline, no JVD  Chest/Lungs: on 4L NC   Cardiovascular: regular rate  Abdomen: Obese, appropriately tender, G tube at 7 cm at the bumper. Skin intact around g tube.  Insertion site non-remarkable.  Midline incision with prineo in place.   Rectal: no stool in rectal vault. No masses. Tone intact. No blood or stool on glove.   Skin: Warm and dry, no rashes  Extremities: No edema, no cyanosis.     LABS:   Recent Labs     04/21/24  0436 04/22/24  0434   WBC 11.1* 11.3*   HGB 11.0* 11.6*   HCT 33.1* 35.1*   MCV 92.8 92.0    202          Recent Labs     04/21/24  0436 04/22/24  0434   * 139   K 3.5 4.0   CL 94* 98*   CO2 35* 34*   PHOS 2.7 2.8   BUN 12 10   CREATININE 0.8 0.6*          No results for input(s): \"PROT\", \"INR\", \"APTT\" in the last 72 hours.     No results for input(s): \"CKTOTAL\", \"CKMB\", \"CKMBINDEX\", \"TROPONINI\" in the last 72 hours.      ASSESSMENT & PLAN:   This is a 68 y.o. 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 
General Surgery  Post-operative Note    POST-OP DIAGNOSIS: need for enteral access     PROCEDURE(S): open gastrostomy tube placement     SUBJECTIVE:   Intubated, sedated. No acute issues per nursing. Resting comfortably.     OBJECTIVE:  Anesthesia type: General     Physical Exam:  Vitals:   Vitals:    04/17/24 1537 04/17/24 1549 04/17/24 1619 04/17/24 2015   BP: 124/83   129/79   Pulse: (!) 101   85   Resp: 16 19 17 18   Temp: 98.1 °F (36.7 °C)      TempSrc: Axillary      SpO2: 97%   95%   Weight:       Height:           General Appearance: intubated, chronically ill appearing  Neuro: sedated  Chest/Lungs: mechanically ventilated   Cardiovascular: Regular rate,  Abdomen: Soft, g tube in place, 6 cm at the bumper-moved to 7 cm, draining bilious output to morgan bag, midline incision with prineo in place.   : morgan in place       ASSESSMENT/PLAN:  This is a 68 y.o. year old male status post open gastrostomy tube placement secondary to need for enteral access. POD0.    Analgesia: Propofol drip per primary team  Cardiovascular: on PO Amiodarone and Diltiazem. Discussed with primary team inability to give meds and advised to discuss conversion to IV. Also on IV digoxin  Respiratory: Intubated. Vent management per primary team  FEN:  Fluids: LR @100 per primary team, Diet: G tube to gravity drainage. Anticipate ok to use morning of 4/19  : Morgan remains in place with adequate urine output   Wound: Local care, avoid tension on g tube, abdominal binder to prevent accidental pulling  Ambulation: limited by illness and intubation  VTE PPx: SCDs  Anticoagulation: Would prefer to hold heparin drip 6-8 hours post-operatively or longer if risk of holding AC does not outweigh risk of post-operative bleeding.. Discussed with ICU residents to discuss urgency of anticoagulation with their team or neuro. Would not restart Eliquis in the immediate post-operative period.       
Heparin gtt started at 6 units/kg/hr.   
Heparin gtt stopped per order.  
I have seen, examined and evaluated the patient as did the resident physician, on 4/23/24. We have discussed the plan of care and decisions made during that discussion were incorporated into this note. I have reviewed the resident physician's note and agree with the assessment and plan of care as documented.    Exam:  Ill-appearing, sleeping but arrousable, keeps mouth open, intermittently opens his eyes, moves his left upper extremity gives a thumbs up able to squeeze, bilateral able to wiggle his toes.  Able to stick his tongue out  Able to tell that is Birthday is [birth year]  On auscultation decreased air entry on the right compared to the left in all lung zones  Midline incision present from open surgery from PEG placement.  PEG in appropriate position, no surrounding edema or tenderness.   Bilateral lower extremity edema present    XR 04/22 with complete opacities in the right hemithorax, underwent bronchoscopy with noted to have mucoid impaction of the bronchi, required saline lavage and suctioning.  Secretions were cleared from the airway, concern for reimpaction.  Will continue pulmonary toilet, on MetaNebs Acapella.  May need to consider vest therapy as well.    Monitor x 24 hours    Kenton Handy MD  Hospitalist  Attending Physician  
ICU Progress Note    Admit Date: 4/4/2024  Day: 2  Vent Day: None  IV Access:Peripheral  IV Fluids:None  Vasopressors:None                Antibiotics: None  Diet: Diet NPO    CC: Hematemesis     Interval history:   No acute events overnight.  Patient's vitals are stable.  Oxygen has now come down to 1 L.  Sugars still elevated to the 270s, increased insulin regimen.  Repeat CT and MRI of brain show evolving left cerebellar and medullary stroke.  Neuro has started patient on low-dose heparin.  Will repeat imaging in 72 hours.  Patient appears to have improved breathing and respiratory status.    HPI:   Patient is a 68-year-old male with a past medical history of morbid obesity, type 2 diabetes, prior CVA who presented with nausea, vomiting and loss of balance.     Endorses feeling a loss of balance stating that he is unable to walk straight as well as a left sided tingling and decreased sensation. Seeing blood in his vomitus is what prompted him to come to the ED. Patient states he was showering when he felt nauseated and vomited. He thought he saw blood in his vomitus.     In the ED, patient's vitals were stable and afebrile.  He was placed on 2 L of oxygen. Labs notable for leukocytosis of 16, lactic acid of 2.5 and his blood sugars in the 260s.  Hemoglobin stable. CXR notable for mild consolidation in the left lung.  CT head without contrast did not show any acute intracranial hemorrhage or mass effect but CTA head and neck did show an occlusion of the left vertebral artery.      stroke team was called and did not recommend TNK as patient fell outside the window but did recommend heparin.  Patient was admitted to the ICU for every hour neurochecks. Neurosurgery did not recommend heparin given absence of acute ischemia at this time and concern for possible hematemesis.     MRI brain did show an infarct in the left inferior cerebellum and occlusion of the left vertebral artery.    Medications:     Scheduled 
ICU Progress Note    Admit Date: 4/4/2024  Day: 2  Vent Day: None  IV Access:Peripheral  IV Fluids:None  Vasopressors:None                Antibiotics: None  Diet: Diet NPO    CC: Nausea, vomiting and loss of balance    Interval history:   No acute events overnight.  Patient's vitals are stable and afebrile.  He is off oxygen. Labs today are notable for white count of 17 [chronically elevated]. Has had increased sputum production with green/yellow phlegm color. Urine output 2.2 L. CT head this morning shows evolving infarct in posterior fossa. Possible concern for pneumonia given chest x-ray findings.  Patient denies any new symptoms.    HPI:   Patient is a 68-year-old male with a past medical history of morbid obesity, type 2 diabetes, prior CVA who presented with nausea, vomiting and loss of balance.    Endorses feeling a loss of balance stating that he is unable to walk straight as well as a left sided tingling and decreased sensation. Seeing blood in his vomitus is what prompted him to come to the ED. Patient states he was showering when he felt nauseated and vomited. He thought he saw blood in his vomitus.    In the ED, patient's vitals were stable and afebrile.  He was placed on 2 L of oxygen. Labs notable for leukocytosis of 16, lactic acid of 2.5 and his blood sugars in the 260s.  Hemoglobin stable. CXR notable for mild consolidation in the left lung.  CT head without contrast did not show any acute intracranial hemorrhage or mass effect but CTA head and neck did show an occlusion of the left vertebral artery.     stroke team was called and did not recommend TNK as patient fell outside the window but did recommend heparin.  Patient was admitted to the ICU for every hour neurochecks. Neurosurgery did not recommend heparin given absence of acute ischemia at this time and concern for possible hematemesis.    MRI brain did show an infarct in the left inferior cerebellum and occlusion of the left vertebral 
Inconsistencies with cuff pressure. MD notified. Pt reports midline draw from PICC RN \"hurts too much\" and refuses all future blood draws from midline.   
Informed by patient's nurse that patient was having worsening double vision, and was hitting call light incorrectly frequently.  I went to assess the patient, patient endorses that he has had double vision since 4/4/2024 when he was diagnosed with acute CVA.  He endorses periods of double vision have decreased in frequency over time.  Suspect that the double vision is a residual deficit related to his recent CVA.  I performed a simple visual acuity test at bedside, patient did not have any observable visual deficits and stated that his double vision had resolved by the time of my assessment.  No further workup at this time.  Will inform patient's primary team for more detailed evaluation.    Donovan Villar, PGY-3      
[facility]   Cardiology  Note   Pt of Dr Masha MENDEZ, FACC   Rosa Maria Richards RN, FNP APRN  Date: 4/23/2024    Admit Date: 4/4/2024       CC:Hematemesis     Following cardiology today for: afib RVR with morbid obesity, previous L thalamic stroke (2018) with residual R lower face/hand deficits, type 2 DM     Interval Hx /  Subjective:Today, he is resting in bed, VSS   Extubated 4/19/2024 on n/c 02  3 l SV02 82%    afib / temp 99.0 / WBC  improving    On Eliquis &  amiodarone   No new complaints today. No major events overnight.   G  tube placement  4/17/2024   TTE  is unremarkable & his EKG reveals no acute ischemia without anginal c/o  bronchoscopy today for mucus plugging     TTE 4/2024 Normal left ventricle size, moderately increased wall thickness, and normal systolic function with an EF 55- 60%. No obvious regional wall motion abnormalities are seen. Grade I diastolic dysfunction with normal LV filling pressures. No LV apical clot.     Medical Decision Making:  Discussion of patient care with other providers  Patient seen and examined. Clinical notes reviewed. Telemetry reviewed / Pertinent labs, diagnostic, device, and imaging results reviewed as a part of this visit  I spent a total of 50 minutes and greater than 50% of the time was spent counseling with patient  coordinating care regarding her diagnosis, treatments and plan of care    Scheduled Meds:   cetirizine  10 mg Per G Tube Daily    insulin regular  5 Units SubCUTAneous Q6H    polyethylene glycol  17 g Oral BID    apixaban  5 mg Oral BID    [START ON 4/24/2024] amiodarone  200 mg Per G Tube Daily    amLODIPine  5 mg Oral Daily    lansoprazole  30 mg Per G Tube Daily    amiodarone  200 mg Per G Tube BID    atorvastatin  80 mg Per G Tube Nightly    gabapentin  600 mg Per G Tube TID    losartan  100 mg Per G Tube Daily    naloxegol  12.5 mg Per G Tube QAM AC    senna  1 tablet Per G Tube Nightly    aspirin  81 mg Per G Tube Daily    insulin 
L. Arm weaker on 1300 exam. NCC notified.   
Lab called to come draw new lab orders.     Electronically signed by Agus Torres RN on 4/5/2024 at 11:16 PM       
MECHANICAL VENTILATION WEANING PROTOCOL    PRE-TRIAL PATIENT ASSESSMENT - COMPLETED     Ventilatory Assessment:    PARAMETER CRITERIA FOR WEANING   Spontaneous Cough:  Yes    Sputum Characteristics:  Sputum Amount: Moderate  Tenacity: Thick  Sputum Color: Tan SPONTANEOUS COUGH With small to moderate  Amount of secretions   FiO2 : 40 % FIO2 less than or equal to 50%     PEEP less than or equal to 8   Progressive Mobility Protocol  No     ABG:  Lab Results   Component Value Date/Time    PHART 7.502 04/17/2024 09:30 PM    ZVC4SUD 40.5 04/17/2024 09:30 PM    PO2ART 83.5 04/17/2024 09:30 PM    O7VALTUN 96 04/17/2024 09:30 PM    XTU8WLR 32 04/17/2024 09:30 PM    BEART 7.8 04/17/2024 09:30 PM     HGB/WBC:  Lab Results   Component Value Date/Time    HGB 12.2 04/19/2024 05:09 AM    WBC 18.5 04/19/2024 05:09 AM        Vital Signs:    PARAMETER CRITERIA FOR WEANING Meets Criteria   Pulse: 76 Within patient's normal limits / stable Yes   Respirations: 10 Less than or equal to 30 Yes   BP: 116/78 Within patient's normal limits / minimal pressors (Hemodynamically Stable) Yes   SpO2: 97 % Greater than or equal to 90% Yes   End Tidal CO2/tcpCO2: 400 (%) Within patient's normal limits Yes   Temp: 97.9 °F (36.6 °C) Less than 38.5oC / 101.3oF Yes     [x]    Based on this assessment and the  Ventilator Weaning Protocol, this patient  IS being placed on a Spontaneous Breathing Trial (SBT) at this time.  []    Based on this assessment and the  Ventilator Weaning Protocol, this patient  IS NOT being placed on a Spontaneous Breathing Trial (SBT) at this time.  []    Patient  IS NOT being placed on a Spontaneous Breathing Trial (SBT) at this time because of factors not previously addressed.  Those factors include      Vital Capacity (VC) = 390    Maximum Inspiratory Force (MIF) = -9    SBT - Initiated at  820    Ventilator Settings:  CPAP - 5 cmH2O, PS - 8 cmH2O(if using settings other than CPAP 5/PS 8, please explain reasons for 
NAME:  Kulwant Lamb  YOB: 1955  MEDICAL RECORD NUMBER:  [id number]  TODAYS DATE:  4/4/2024    Discussed personal risk factors for Stroke/TIA with patient/family, and ways to reduce the risk for a recurrent stroke. Patient's personal risk factors which were identified are:     [] Alcohol Abuse: check with your physician before any alcohol consumption.   [] Atrial fibrillation: may cause blood clots.  [] Drug Abuse: Seek help, talk with your doctor  [] Clotting Disorder  [x] Diabetes  [] Family history of stroke or heart disease  [x] High Blood Pressure/Hypertension: work with your physician.  [x] High cholesterol: monitor cholesterol levels with your physician.   [x] Overweight/Obesity: work with your physician for your ideal body weight.  [x] Physical Inactivity: get regular exercise as directed by your physician.   [x] Personal history of previous TIA or stroke  [x] Poor Diet; decrease salt (sodium) in your diet, follow diet directed by physician.   [] Smoking: Cigarette/Cigar: stop smoking.         Advised pt. that you can reduce your risk for stroke/TIA by modifying/controlling the risk factors that you have. Pt.advised to take the medications as prescribed, which will be detailed in the discharge instructions, and to not stop taking them without consulting their physician. In addition, pt. advised to maintain a healthy diet, exercise regularly and to not smoke.      [facility]'s Stroke treatment and prevention, Managing your recovery  notebook  provided and/or reviewed  with patient/family.  The notebook includes, but not limited to, sections addressing warning signs & symptoms of a stroke, which are: sudden numbness or weakness especially on one side of the body, sudden confusion, difficulty speaking or understanding, sudden changes in vision, sudden dizziness or loss of balance/ coordination, sudden severe headache, syncope and seizure.  The need to call EMS (911) immediately if signs & 
NIHSS updated to reflect appropriate number. Pt. Has had no neuro change.  
[facility]   Cardiology  Note   Pt of Dr Masha MENDEZ, FACC   Rosa Maria Richards RN, FNP APRN  Date: 4/22/2024    Admit Date: 4/4/2024       CC:Hematemesis     Following cardiology today for: afib RVR with morbid obesity, previous L thalamic stroke (2018) with residual R lower face/hand deficits, type 2 DM     Interval Hx /  Subjective:Today, he is resting in bed, VSS   Extubated 4/19/2024 on n/c 02  3 l SV02 82%   HR 99  afib / temp 99.5 / WBC  improving    On Eliquis &  amiodarone   No new complaints today. No major events overnight.   G  tube placement  4/17/2024   TTE  is unremarkable & his EKG reveals no acute ischemia without anginal c/o    TTE 4/2024 Normal left ventricle size, moderately increased wall thickness, and normal systolic function with an EF 55- 60%. No obvious regional wall motion abnormalities are seen. Grade I diastolic dysfunction with normal LV filling pressures. No LV apical clot.     Medical Decision Making:  Discussion of patient care with other providers  Patient seen and examined. Clinical notes reviewed. Telemetry reviewed / Pertinent labs, diagnostic, device, and imaging results reviewed as a part of this visit  I spent a total of 50 minutes and greater than 50% of the time was spent counseling with patient  coordinating care regarding her diagnosis, treatments and plan of care    Scheduled Meds:   polyethylene glycol  17 g Oral BID    apixaban  5 mg Oral BID    insulin lispro  5 Units SubCUTAneous TID    [START ON 4/24/2024] amiodarone  200 mg Per G Tube Daily    amLODIPine  5 mg Oral Daily    lansoprazole  30 mg Per G Tube Daily    amiodarone  200 mg Per G Tube BID    atorvastatin  80 mg Per G Tube Nightly    gabapentin  600 mg Per G Tube TID    losartan  100 mg Per G Tube Daily    naloxegol  12.5 mg Per G Tube QAM AC    senna  1 tablet Per G Tube Nightly    aspirin  81 mg Per G Tube Daily    insulin glargine  40 Units SubCUTAneous Nightly    lidocaine  1 patch TransDERmal 
Noticed pt. Does not have any lab orders today, notified [facility] resident. No response.  
O2 removed, pt. spO2 93 on RA  
Occupational Therapy  Daily Treatment Note  Patient Name: Kulwant Lamb  MRN: [id number]    Assessment: Transferred pt to chair via allen today as he will benefit from increasing time spent out of bed and in a more upright position. He tolerated this well, participating in exercises and ADL in fully upright position for about 15-20 minutes. BP soft to begin with and did drop to 70s/50s so pt was returned to a more reclined position and recovered well. Recommend pt continue to get up to chair daily with nursing staff and PT/OT will continue to progress mobility as tolerated.       Discharge Recommendations: ARU v. SNF pending continued improvement in activity tolerance  Equipment Needs:  No  Ampa Score: 10    Chart Reviewed: Yes     Other position/activity restrictions: up as tolerated   Additional Pertinent Hx: Patient is a 68-year-old male with a past medical history of morbid obesity, type 2 diabetes, prior CVA who presented with nausea, vomiting and loss of balance.. 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 MRI showed: Left inferior cerebellum infarct    Diagnosis: vertebral artery occlusion  Treatment Diagnosis: Decreased activity tolerance, impaired ADLs and mobility    Subjective: Pt in bed on entry. Pleasant and cooperative. Hopeful to reposition due to back pain then agreeable to more mobility. Able to communicate in soft whisper vs. Use of his tablet.     Pain: Yes, unrated, back, flank, shoulder - improved some with repositioning, RN aware    Objective:    Cognition/Orientation: WFL    Bed mobility   Scooting: Min assist x2 (to hold BLEs in flexed position to allow pt to scoot himself toward HOB)    Functional Mobility   Bed to chair: miles Reid  Sitting: Pt transitioned slowly from fully reclined to upright position with dangling legs in recliner chair, 
Occupational Therapy  Facility/Department: [facility] ICU  Occupational Therapy Initial Assessment/Tx Note    Name: Kulwant Lamb  : 1955  MRN: [id number]  Date of Service: 2024    Assessment: Pt has residual R sided deficits from previous stroke but is independent using walker at home. He now presents with L shoulder and trunk weakness, L sided ataxia worse than R, L inattention, and severe vertigo in sitting (only mild in supine). Pt only able to tolerate brief time in sitting today due to vertigo. Expect good progress as vertigo symptoms improve. Recommend ARU at d/c pending progress and activity tolerance.    Discharge Recommendations:  IP Rehab    OT Equipment Recommendations  Equipment Needed: No      Treatment Diagnosis: Decreased activity tolerance, impaired ADLs and mobility      Assessment   Performance deficits / Impairments: Decreased functional mobility ;Decreased ADL status;Decreased strength;Decreased endurance;Decreased balance;Decreased vision/visual deficit;Decreased coordination;Decreased fine motor control;Decreased posture;Decreased ROM  Treatment Diagnosis: Decreased activity tolerance, impaired ADLs and mobility  Decision Making: Medium Complexity  REQUIRES OT FOLLOW-UP: Yes  Activity Tolerance  Activity Tolerance: Patient Tolerated treatment well  Activity Tolerance Comments: Increased dizziness and headache in sitting. Tolerated only 2-3 min sitting before needing to return to supine        Plan   Occupational Therapy Plan  Times Per Week: 5-7x  Times Per Day: Once a day  Current Treatment Recommendations: Strengthening, Balance training, ROM, Functional mobility training, Endurance training, Gait training, Neuromuscular re-education, Safety education & training, Patient/Caregiver education & training, Equipment evaluation, education, & procurement, Self-Care / ADL, Positioning, Coordination training, Cognitive/Perceptual training     Restrictions  Position Activity Restriction  Other 
Occupational Therapy  Facility/Department: [facility] ICU  Daily Treatment Note  NAME: Kulwant Lamb  : 1955  MRN: [id number]    Date of Service: 2024    Discharge Recommendations:  IP Rehab  OT Equipment Recommendations  Equipment Needed: No    At baseline this patient was IND with functional mobility & ADL's. The current hospitalization has resulted in the patient now being limited with all aspects of mobility, negatively impacting the patient's functional independence, ability to safely access their home environment, and ability to complete valued daily tasks and life roles. Kulwant Lamb is motivated for recovery and demonstrates the ability to tolerate inpatient rehabilitation 3 hours/day, 5 days/week for optimal return to functional independence, quality of life, and decreased caregiver burden.      Patient Diagnosis(es): The primary encounter diagnosis was Vertebral artery occlusion, left. Diagnoses of Nausea and vomiting, unspecified vomiting type, Hematemesis with nausea, and Acute nonintractable headache, unspecified headache type were also pertinent to this visit.     Assessment        Assessment: Pt still limited by dizziness this session, and syncope symptoms of decrease attention, and alertness. BP was high throughout (See vitals section). Still demo weakness in LUE and overall deconditioning and weakness, and requires assist of 2 for safety for transfers and funct mob. Would benefit from inpt OT to work on ADLs, funct mob and transfers.Cont with POC      Activity Tolerance: Treatment limited secondary to medical complications  Discharge Recommendations: IP Rehab  Equipment Needed: No      Plan   Occupational Therapy Plan  Times Per Week: 5-7x     Restrictions  Position Activity Restriction  Other position/activity restrictions: up as tolerated    Subjective   Subjective  Subjective: Pt in bed upon arrival. Agreeable to treatment  Pain: 3/10 pain in RUE (shoulder) and lower back  Pain: pt 
Occupational Therapy  Facility/Department: [facility] ICU  Occupational Therapy Treatment     Name: Kulwant Lamb  : 1955  MRN: [id number]  Date of Service: 2024    Discharge Recommendations:  IP Rehab  OT Equipment Recommendations  Equipment Needed: No  Other: defer    At baseline this patient was INDependent with functional mobility & ADL's. The current hospitalization has resulted in the patient now being limited with all aspects of mobility, negatively impacting the patient's functional independence, ability to safely access their home environment, and ability to complete valued daily tasks and life roles. Kulwant Lamb is motivated for recovery and demonstrates the ability to tolerate inpatient rehabilitation 3 hours/day, 5 days/week for optimal return to functional independence, quality of life, and decreased caregiver burden.       Patient Diagnosis(es): The primary encounter diagnosis was Vertebral artery occlusion, left. Diagnoses of Nausea and vomiting, unspecified vomiting type, Hematemesis with nausea, and Acute nonintractable headache, unspecified headache type were also pertinent to this visit.  Past Medical History:  has a past medical history of Acute gallstone pancreatitis, Arthritis, Cerebral artery occlusion with cerebral infarction (HCC), Chronic back pain, DVT, Gallstone pancreatitis, Hearing loss, Hx of blood clots, Hypertension, Type II or unspecified type diabetes mellitus without mention of complication, not stated as uncontrolled, and Urinary retention.  Past Surgical History:  has a past surgical history that includes Tonsillectomy and Cholecystectomy, laparoscopic (2018).    Treatment Diagnosis: Decreased activity tolerance, impaired ADLs and mobility      Assessment   Performance deficits / Impairments: Decreased functional mobility ;Decreased ADL status;Decreased strength;Decreased endurance;Decreased balance;Decreased vision/visual deficit;Decreased coordination;Decreased 
Occupational Therapy  Facility/Department: [facility] ICU  Occupational Therapy Treatment    Name: Kulwant Lamb  : 1955  MRN: [id number]  Date of Service: 2024    Discharge Recommendations:  [facility]  OT Equipment Recommendations  Equipment Needed: No  Other: defer    At baseline this patient was INDependent with functional mobility & ADL's. The current hospitalization has resulted in the patient now being limited with all aspects of mobility, negatively impacting the patient's functional independence, ability to safely access their home environment, and ability to complete valued daily tasks and life roles. Kulwant Lamb is motivated for recovery and demonstrates the ability to tolerate inpatient rehabilitation 3 hours/day, 5 days/week for optimal return to functional independence, quality of life, and decreased caregiver burden.    Patient Diagnosis(es): The primary encounter diagnosis was Vertebral artery occlusion, left. Diagnoses of Nausea and vomiting, unspecified vomiting type, Hematemesis with nausea, and Acute nonintractable headache, unspecified headache type were also pertinent to this visit.  Past Medical History:  has a past medical history of Acute gallstone pancreatitis, Arthritis, Cerebral artery occlusion with cerebral infarction (HCC), Chronic back pain, DVT, Gallstone pancreatitis, Hearing loss, Hx of blood clots, Hypertension, Type II or unspecified type diabetes mellitus without mention of complication, not stated as uncontrolled, and Urinary retention.  Past Surgical History:  has a past surgical history that includes Tonsillectomy and Cholecystectomy, laparoscopic (2018).    Treatment Diagnosis: Decreased activity tolerance, impaired ADLs and mobility      Assessment   Performance deficits / Impairments: Decreased functional mobility ;Decreased ADL status;Decreased strength;Decreased endurance;Decreased balance;Decreased vision/visual 
Occupational Therapy  Facility/Department: [facility] 5T ORTHO/NEURO  Daily Treatment Note  NAME: Kulwant Lamb  : 1955  MRN: [id number]    Date of Service: 2024    Discharge Recommendations:  IP Rehab  OT Equipment Recommendations  Other: defer      Patient Diagnosis(es): The primary encounter diagnosis was Vertebral artery occlusion, left. Diagnoses of Nausea and vomiting, unspecified vomiting type, Hematemesis with nausea, and Acute nonintractable headache, unspecified headache type were also pertinent to this visit.     Assessment    Assessment: Pt limited by general weakness, R shoulder pain and RUE weakness. Eric lift required for safe transfer from recliner to bed. Two unsuccessful(no rear end clearance) stand attempts made from chair to epifanio stedy with Max A x2 and assist for RUE stabilization on epifanio stedy bar. Pt dependent for scooting up supine. Pt would benefit from intensive inpt OT. Continue Per POC.  Activity Tolerance: Patient limited by pain;Patient limited by fatigue;Patient limited by endurance  Discharge Recommendations: IP Rehab  Other: defer           Discharge Recommendations: ARU v. SNF pending continued improvement in activity tolerance      Equipment Needs:  No  Ampac Score: 10     Plan   Occupational Therapy Plan  Times Per Week: 5-7x  Current Treatment Recommendations: Strengthening;Balance training;ROM;Functional mobility training;Endurance training;Gait training;Neuromuscular re-education;Safety education & training;Patient/Caregiver education & training;Equipment evaluation, education, & procurement;Self-Care / ADL;Positioning;Coordination training;Cognitive/Perceptual training          Subjective   Subjective  Subjective: Pt seated in chair and agreeable to OT session. No pain noted however pt stating that he feels \"woozy\" and continues to see double sometimes. BP monitored at 137/79.  Pain: Pt reporting pain in L shoulder with movement  Orientation  Overall Orientation Status: 
Occupational Therapy/Physical Therapy  HOLD    Received okay to work with pt this afternoon. Pt slumped to R - assistance provided to reposition. \"I'm hurting real bad\" rating back pain 8/10 - nurse informed. Also, noted that HR sustaining in 140s and BP 71/55 (in reclined position). Will HOLD PT and OT and continue per POC.    Kristan Olson OTR/L #[id number]  [place] [place] [id number]    
Occupational/Physical Therapy    Pt off the floor for procedure. Will f/u later today vs. 4/24 as appropriate    Allison Rodriguez, PT, DPT  [id number]   Trixie Jones, OTR/L, [id number]    
Oral care complete and pt given one ice chip. Wet sounding cough. Pt desatting to 78% on 4L NC. Placed on non re-breather and sat returned to 97%.     After recovery, Pt placed on 6L NC. Saturation in the low 90s. Ice chips removed from bedside table. Plan of care ongoing.   
Order for PICC line for blood draws. Pt has LUE midline placed 4/15. Draws blood easily at this time, arm is swollen with no evidence of DVT per 4/17 venous report. Pt also agitated and said he would refuse all sticks. Not a PICC candidate at this time. Ordering physician and bedside RN updated. Will d/c PICC order. Should midline stop drawing blood PICC team can assist with blood draws using ultrasound. Call with further questions.    
PACU Transfer Note    Vitals:    04/23/24 0920   BP: (!) 143/112   Pulse: (!) 104   Resp: 25   Temp: 98 °F (36.7 °C)   SpO2: 92%       In: 210 [I.V.:210]  Out: 0     Pain assessment:  none  Pain Level: 6    Report given to Receiving unit RN. MD aware of HR and BP. Pt ken Seaman called and made aware pt transferring to room.     4/23/2024 9:27 AM      
PRE-OP NOTE  Department of Surgery      Chief Complaint or Reason for Surgery: Need for enteral access     Procedure: open gastrostomy tube placement  Expected time: 4/17 time: likely afternoon    Plan  1. Diet: Npo @ MN, hold TF  2. IVF: per primary  3. Antibiotics: scheduled Zosyn, Vanc  4. Labs to be drawn: Type and Screen, INR  5. Anesthesia: to see patient  6. Consent: signed by patient. Alert and oriented x4. Witnessed by nurse Esteban Espinoza   7. Pulmonary: CXR: last 4/14, being treated for hospital acquired pneumonia   8. Cardiac: pending cardiology clearance for OR, amio drip currently for a fib  9. Anticoagulation: needs to be held minimum 6 hours prior to OR. Projected for no earlier than noon on 4/17      Candi Lyn MD  04/16/24  4:14 PM    
Patient PEG placement unsuccessful. [facility] resident notified.  
Patient admitted to PACU # [id number] from OR at 0831 post mucoid impaction  per Dr. Timmons.  Attached to PACU monitoring system and report received from anesthesia provider. Pt arrived to pacu on nonbrebreather mask and was tried to wean to NC. O2 saturation dropped to 80s. Pt placed back on nonrebreather mask at 15 L and currently is at 10L. Pt with productive cough and was given yaunker for suction. Pt taking off nonbreather mask multiple times. Pt demanding to lay down and for pain medication. Pt is a fib RVR on monitor. Dr. John called and made aware of Afib along with pain. Dr. John ordered toradol, esmolol and robaxin. Will give once verified.   
Patient down to CT. Tolerated well. Placed on bariatric bed on arrival back to room. Bathed and complete linen change.  
Patient had large BM. Soft/watery. VSS. Some complaints of back and Left shoulder pain. PRNs given.   
Patient has been successfully weaned from Mechanical Ventilation.  RSBI before extubation was 22 with EtCO2 of 40 and SpO2 of 98 on 40% FiO2.  Patient extubated and placed on 4 liters/min via nasal cannula.  Post extubation SpO2 is 98% with HR  82 bpm and RR 21 breaths/min.  Patient had strong cough that was productive of white sputum.  Extubation Well tolerated by patient..   
Patient is alert and oriented x4, elevated blood pressure and heart rate, is on 02 4 litre per nasal cannula.   Pt was on NG tube feeding, feeding tube held after Midnight, orally NPO. Heparin gtt held at 5 AM thi morning.  Amiodarone and Cardizem gtt CON.   Is on con IV fluids.   Frequent repositioning and turning provided.  All fall precaution is in place and call light is with in the reach.     
Patient is oriented x4. Intermittently drowsy. Patient has endorsed pain to his lower back and bilateral shoulders managed well per MAR and non-pharm measures. Patient remained NPO. NG tube in place. Turned every 2 hours to prevent pressure injury. Bladder scanned this shift: 621mL. Straight catheterized: 550mL. Patient updated on plan of care. Currently off floor for surgery.    
Patient resting in bed. VSS. Complaints of 8/10 back pain. PRNs given. Stool softeners given. Pt continues to have productive cough. TF restarted yesterday and will be at goal at 1200. Heparin gtt therapeutic. Plan for PEG tube placement when OR available, not currently on schedule.   
Patient up to chair with PT/OT via [facility]. Tolerated okay. VSS.   
Patient woke up and accidentally pulled out corepak.  Stopped TF.  Request for new order to place TF.  
Patient: Kulwant Lamb  [id number]  Date: 4/18/2024      Chief Complaint: CVA    History of Present Illness/Hospital Course:  Kulwant Lamb is a 68 year old male with a past medical history significant for morbid obesity, DM2, pancreatitis, CVA, and DVT not on AC who presented to [facility] on 4/4/24 with weakness, light headedness, and dizziness following episode of hematemesis. In the ED he was found to be hypoxic with leukocytosis, elevated lactic acid, and hyponatremia. CT head was negative for acute process. CT AP was negative for acute process. CTA head and neck revealed left vertebral artery occlusion. Neurology and Neurosurgery were consulted. MRI brain revealed acute ischemic infarction involving the left inferior cerebellum, cerebellar tonsil and lateral margin of the medulla. Repeat MRI brain showed increased stroke. He is on a heparin gtt and asa. Urology was consulted for urinary retention. His course has been notable for acute hypoxic respiratory failure. Today he had a FEES and is NPO. He continues to have functional deficits below his baseline. Today Kulwant is seen in his room without family present. He reports difficulty speaking due to recently having a procedure. He notes chronic low back pain, but is currently having left shoulder pain. He is interested in ARU.         Interval history: INTUBATED today post peg placement. Will sign off.      has a past medical history of Acute gallstone pancreatitis, Arthritis, Cerebral artery occlusion with cerebral infarction (HCC), Chronic back pain, DVT, Gallstone pancreatitis, Hearing loss, Hx of blood clots, Hypertension, Type II or unspecified type diabetes mellitus without mention of complication, not stated as uncontrolled, and Urinary retention.     has a past surgical history that includes Tonsillectomy; Cholecystectomy, laparoscopic (03/09/2018); Upper gastrointestinal endoscopy (N/A, 4/12/2024); and Gastrostomy tube placement (N/A, 4/17/2024).     reports that 
Patient: Kulwant Lamb  [id number]  Date: 4/9/2024      Chief Complaint: CVA    History of Present Illness/Hospital Course:  Kulwant Lamb is a 68 year old male with a past medical history significant for morbid obesity, DM2, pancreatitis, CVA, and DVT not on AC who presented to [facility] on 4/4/24 with weakness, light headedness, and dizziness following episode of hematemesis. In the ED he was found to be hypoxic with leukocytosis, elevated lactic acid, and hyponatremia. CT head was negative for acute process. CT AP was negative for acute process. CTA head and neck revealed left vertebral artery occlusion. Neurology and Neurosurgery were consulted. MRI brain revealed acute ischemic infarction involving the left inferior cerebellum, cerebellar tonsil and lateral margin of the medulla. Repeat MRI brain showed increased stroke. Urology was consulted for urinary retention. His course has been notable for acute hypoxic respiratory failure. He had a FEES on 4/8 and is NPO. He continues to have functional deficits below his baseline.     Interval history:  ENT is consulted for left vocal cord weakness. He is NPO with tube feeds via NG.He remains on a heparin gtt. Today he is seen without family present. He reports continued difficulty swallowing. Discussed that our team is following.       has a past medical history of Acute gallstone pancreatitis, Arthritis, Cerebral artery occlusion with cerebral infarction (HCC), Chronic back pain, DVT, Gallstone pancreatitis, Hearing loss, Hx of blood clots, Hypertension, Type II or unspecified type diabetes mellitus without mention of complication, not stated as uncontrolled, and Urinary retention.     has a past surgical history that includes Tonsillectomy and Cholecystectomy, laparoscopic (03/09/2018).     reports that he quit smoking about 46 years ago. His smoking use included cigarettes. He started smoking about 49 years ago. He has a 1.5 pack-year smoking history. He has never used 
Patient: Kulwant Lamb  [id number]  Date: 4/11/2024      Chief Complaint: CVA    History of Present Illness/Hospital Course:  Kulwant Lamb is a 68 year old male with a past medical history significant for morbid obesity, DM2, pancreatitis, CVA, and DVT not on AC who presented to [facility] on 4/4/24 with weakness, light headedness, and dizziness following episode of hematemesis. In the ED he was found to be hypoxic with leukocytosis, elevated lactic acid, and hyponatremia. CT head was negative for acute process. CT AP was negative for acute process. CTA head and neck revealed left vertebral artery occlusion. Neurology and Neurosurgery were consulted. MRI brain revealed acute ischemic infarction involving the left inferior cerebellum, cerebellar tonsil and lateral margin of the medulla. Repeat MRI brain showed increased stroke. Urology was consulted for urinary retention. His course has been notable for acute hypoxic respiratory failure. He had a FEES on 4/8 and is NPO. He continues to have functional deficits below his baseline.     Interval history:  ENT is consulted for left vocal cord weakness. He is NPO with tube feeds via NG.He remains on a heparin gtt. GI is consulted and planning on EGD with PEG tube placement tomorrow. Today Kulwant is seen in his room with nursing present. He appears ill, but denies acute complaints at this time.     has a past medical history of Acute gallstone pancreatitis, Arthritis, Cerebral artery occlusion with cerebral infarction (HCC), Chronic back pain, DVT, Gallstone pancreatitis, Hearing loss, Hx of blood clots, Hypertension, Type II or unspecified type diabetes mellitus without mention of complication, not stated as uncontrolled, and Urinary retention.     has a past surgical history that includes Tonsillectomy and Cholecystectomy, laparoscopic (03/09/2018).     reports that he quit smoking about 46 years ago. His smoking use included cigarettes. He started smoking about 49 years ago. He 
Phlebotomist attempted to draw AM labs and was unsuccessful. Will send a different phlebotomist to attempt.  
Phlebotomist unable to draw morning labs and Anti-Xa. Another phlebotomist will be back to attempt. Plan of care ongoing.   
Physical Therapy  Daily Treatment Note    Discharge Recommendation:  Inpatient Rehab (Pending progress)  Equipment Needs:  Defer to next level of care    Assessment:  Pt with good participation for therapy--seems motivated for rehabilitation. EOB limited by dizziness. Needing heavy assist x 2 for supine <> sit and assist x 1 for sitting balance. Ataxic movements noted (worse L UE/LE compared to R UE/LE.) Pt from home with son. He is currently functioning well below his baseline. Limited by weakness, ataxia, dizziness when sitting upright. Would benefit from continued IP PT at D/C prior to returning home.     At baseline this patient was Independent with functional mobility & ADL's. The current hospitalization has resulted in the patient now being limited with all aspects of mobility, negatively impacting the patient's functional independence, ability to safely access their home environment, and ability to complete valued daily tasks and life roles. Kulwant Lamb is motivated for recovery and demonstrates the ability to tolerate inpatient rehabilitation 3 hours/day, 5 days/week for optimal return to functional independence, quality of life, and decreased caregiver burden.    Chart Reviewed: Yes     Other position/activity restrictions: up as tolerated   Additional Pertinent Hx: Patient is a 68-year-old male with a past medical history of morbid obesity, type 2 diabetes, prior CVA who presented with nausea, vomiting and loss of balance.. 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 MRI showed: Left inferior cerebellum infarct      Diagnosis: vertebral artery occlusion, cerebellar infarct   Treatment Diagnosis: decreased functional mobilty    Subjective: Pt in bed initially. Agreeable to working with PT.   Alert and oriented. Voice weak.     Pain: c/o chronic back discomfort. Not 
Physical Therapy  Daily Treatment Note    Discharge Recommendation:  Inpatient Rehab vs SNF pending continued improvement in activity tolerance  Equipment Needs:  Defer to next level of care    Assessment:  Mobility slow, weak and effortful. Good participation and seems motivated. Limited by fatigue, discomfort, significant weakness. Pt from home with family. He is currently functioning well below his baseline. Would benefit from continued IP PT at D/C prior to returning home.     Chart Reviewed: Yes     Other position/activity restrictions: up as tolerated   Additional Pertinent Hx: Patient is a 68-year-old male with a past medical history of morbid obesity, type 2 diabetes, prior CVA who presented with nausea, vomiting and loss of balance.. 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 MRI showed: Left inferior cerebellum infarct      Diagnosis: vertebral artery occlusion, cerebellar infarct   Treatment Diagnosis: decreased functional mobilty    Subjective: Pt in chair initially. Agreeable to working with therapy.   Voice weak and often difficult to understand.     Pain: c/o L shoulder discomfort (not new). Not rated. Positioned for comfort at end of session (UE supported by pillow in bed).     Objective:    Exercises  Reclined in chair:  10 reps B ankle pumps (dorsiflexion to ~neutral on R, no active dorsiflexion noted on L), hip abd/add  2 sets x 10 reps B SAQ  5 reps B gentle heel cord stretch    Transfers  Sit to stand: Attempted sit to stand from recliner to Linsey Stedy x 2 trials, but unsuccessful. Pt unable to clear bottom from chair.   Other: Pt needing Mod assist for trunk anterior weight shift towards Linsey Stedy. Pt also needing assist to maintain L  on Linsey Stedy bar (pt able to  initially, but unable to maintain for more than a few seconds.)  Chair > bed: 
Physical Therapy  Facility/Department: [facility] ICU  Physical Therapy Treatment    Name: Kulwant Lamb  : 1955  MRN: [id number]  Date of Service: 2024    Discharge Recommendations:  IP Rehab   PT Equipment Recommendations  Other: defer    At baseline this patient was independent with functional mobility & ADL's. The current hospitalization has resulted in the patient now being limited with all aspects of mobility, negatively impacting the patient's functional independence, ability to safely access their home environment, and ability to complete valued daily tasks and life roles. Kulwant Lamb is motivated for recovery and demonstrates the ability to tolerate inpatient rehabilitation 3 hours/day, 5 days/week for optimal return to functional independence, quality of life, and decreased caregiver burden.        Patient Diagnosis(es): The primary encounter diagnosis was Vertebral artery occlusion, left. Diagnoses of Nausea and vomiting, unspecified vomiting type, Hematemesis with nausea, and Acute nonintractable headache, unspecified headache type were also pertinent to this visit.  Past Medical History:  has a past medical history of Acute gallstone pancreatitis, Arthritis, Cerebral artery occlusion with cerebral infarction (HCC), Chronic back pain, DVT, Gallstone pancreatitis, Hearing loss, Hx of blood clots, Hypertension, Type II or unspecified type diabetes mellitus without mention of complication, not stated as uncontrolled, and Urinary retention.  Past Surgical History:  has a past surgical history that includes Tonsillectomy and Cholecystectomy, laparoscopic (2018).    Assessment   Body Structures, Functions, Activity Limitations Requiring Skilled Therapeutic Intervention: Decreased functional mobility ;Decreased strength;Decreased endurance;Decreased balance;Increased pain  Assessment: Pt continues to require assist of two skilled therapists to maximize safety and outcomes.  Pt contines to be 
Physical Therapy  Facility/Department: [facility] ICU  Physical Therapy Initial Assessment and treatment    Name: Kulwant Lamb  : 1955  MRN: [id number]  Date of Service: 2024    Discharge Recommendations:  Subacute/Skilled Nursing Facility   PT Equipment Recommendations  Equipment Needed: No  Other: defer      Patient Diagnosis(es): The primary encounter diagnosis was Vertebral artery occlusion, left. Diagnoses of Nausea and vomiting, unspecified vomiting type, Hematemesis with nausea, and Acute nonintractable headache, unspecified headache type were also pertinent to this visit.  Past Medical History:  has a past medical history of Acute gallstone pancreatitis, Arthritis, Cerebral artery occlusion with cerebral infarction (HCC), Chronic back pain, DVT, Gallstone pancreatitis, Hearing loss, Hx of blood clots, Hypertension, Type II or unspecified type diabetes mellitus without mention of complication, not stated as uncontrolled, and Urinary retention.  Past Surgical History:  has a past surgical history that includes Tonsillectomy; Cholecystectomy, laparoscopic (2018); Upper gastrointestinal endoscopy (N/A, 2024); and Gastrostomy tube placement (N/A, 2024).    Assessment   Assessment: Pt lives with spouse and is normally independent with gait with rollator and ADL.  Currently dep x 2-3 for bed mobility and sitting balance. Pt with significant gross weakness, impaired activity tolerance, impaired sitting balance and unable to stand at this time. He would benefit from further IP PT to improve his independence and safety with mobility  Treatment Diagnosis: decreased functional mobilty  Therapy Prognosis: Good;Fair  Decision Making: Medium Complexity  Requires PT Follow-Up: Yes  Activity Tolerance  Activity Tolerance: Patient limited by pain;Patient limited by fatigue;Patient limited by endurance     Plan   Physical Therapy Plan  General Plan: 5-7 times per week  Current Treatment 
Physical Therapy  Facility/Department: [facility] ICU  Physical Therapy Initial Assessment/Treatment    Name: Kulwant Lamb  : 1955  MRN: [id number]  Date of Service: 2024    Discharge Recommendations:  IP Rehab   PT Equipment Recommendations  Equipment Needed: No (defer for now)      At baseline this patient was independent with functional mobility & ADL's. The current hospitalization has resulted in the patient now being limited with all aspects of mobility, negatively impacting the patient's functional independence, ability to safely access their home environment, and ability to complete valued daily tasks and life roles. Kulwant Lamb is motivated for recovery and demonstrates the ability to tolerate inpatient rehabilitation 3 hours/day, 5 days/week for optimal return to functional independence, quality of life, and decreased caregiver burden.     Patient Diagnosis(es): The primary encounter diagnosis was Vertebral artery occlusion, left. Diagnoses of Nausea and vomiting, unspecified vomiting type, Hematemesis with nausea, and Acute nonintractable headache, unspecified headache type were also pertinent to this visit.  Past Medical History:  has a past medical history of Acute gallstone pancreatitis, Arthritis, Cerebral artery occlusion with cerebral infarction (HCC), Chronic back pain, DVT, Gallstone pancreatitis, Hearing loss, Hx of blood clots, Hypertension, Movement disorder, Obesity, Type II or unspecified type diabetes mellitus without mention of complication, not stated as uncontrolled, and Urinary retention.  Past Surgical History:  has a past surgical history that includes Tonsillectomy and Cholecystectomy, laparoscopic (2018).    Assessment   Body Structures, Functions, Activity Limitations Requiring Skilled Therapeutic Intervention: Decreased functional mobility   Assessment: Pt functioning well below baseline at this time. Requires heavy assistance of 2 people for bed mobility & is unable 
Physical Therapy  Facility/Department: [facility] ICU  Daily Treatment Note  NAME: Kulwant Lamb  : 1955  MRN: [id number]    Date of Service: 2024    Discharge Recommendations:  IP Rehab   PT Equipment Recommendations  Equipment Needed: No  Other: defer    At baseline this patient was independent with functional mobility & ADLs. The current hospitalization has resulted in the patient now being limited with all aspects of mobility, negatively impacting the patient's functional independence, ability to safely access their home environment, and ability to complete valued daily tasks and life roles. Kulwant Lamb is motivated for recovery and demonstrates the ability to tolerate inpatient rehabilitation 3 hours/day, 5 days/week for optimal return to functional independence, quality of life, and decreased caregiver burden.     Patient Diagnosis(es): The primary encounter diagnosis was Vertebral artery occlusion, left. Diagnoses of Nausea and vomiting, unspecified vomiting type, Hematemesis with nausea, and Acute nonintractable headache, unspecified headache type were also pertinent to this visit.    Assessment   Assessment: Pt slowly progressing with mobility & overall activity tolerance. Able to perform partial stand from chair to epifanio stedy, though required heavy assistance of 2 people. Pt remains well below baseline. Recommend further inpt PT upon D/C. Will follow per plan of care.  Activity Tolerance: Patient tolerated treatment well;Patient limited by pain;Patient limited by fatigue;Patient limited by endurance  Equipment Needed: No  Other: defer     Plan    Physical Therapy Plan  General Plan: 5-7 times per week  Current Treatment Recommendations: Strengthening;Functional mobility training;Transfer training;Endurance training;Gait training;Safety education & training;Therapeutic activities     Restrictions  Position Activity Restriction  Other position/activity restrictions: up as tolerated     Subjective  
Physical Therapy  Facility/Department: [facility] ICU  Daily Treatment Note  NAME: Kulwant Lamb  : 1955  MRN: [id number]    Date of Service: 2024    Discharge Recommendations:  IP Rehab   PT Equipment Recommendations  Equipment Needed: No  Other: defer    Patient Diagnosis(es): The primary encounter diagnosis was Vertebral artery occlusion, left. Diagnoses of Nausea and vomiting, unspecified vomiting type, Hematemesis with nausea, and Acute nonintractable headache, unspecified headache type were also pertinent to this visit.    Assessment   Assessment: Transferred pt to chair via allen today as he will benefit from increasing time spent out of bed and in a more upright position. He tolerated this well, participating in exercises and ADL in fully upright position for about 15-20 minutes. Pt hypotensive during mobility so pt was returned to a more reclined position and recovered well. Recommend pt continue to get up to chair daily with nursing staff and PT will continue to progress mobility as tolerated.  Activity Tolerance: Patient limited by pain;Patient limited by fatigue;Patient limited by endurance  Equipment Needed: No  Other: defer     Plan    Physical Therapy Plan  General Plan: 5-7 times per week  Current Treatment Recommendations: Strengthening;Functional mobility training;Transfer training;Endurance training;Gait training;Safety education & training;Therapeutic activities     Restrictions  Position Activity Restriction  Other position/activity restrictions: up as tolerated     Subjective    Subjective  Subjective: pt supine in bed upon arrival of PT. Pt agreeable to therapy session.  Pain: pt did not rate back pain. RN aware  Orientation  Overall Orientation Status: Within Normal Limits  Cognition  Overall Cognitive Status: WNL     Objective   Vitals  Comment: upon arrival of PT, pt's /83. After transfer from bed > chair; BP 78/54 (62). Pt reporting lightheadedness. After approximately 3 
Physical Therapy  Facility/Department: [facility] ICU  Physical Therapy Treatment    Name: Kulwant Lamb  : 1955  MRN: [id number]  Date of Service: 2024    Discharge Recommendations:  IP Rehab   PT Equipment Recommendations  Equipment Needed:  (defer to next LOC)      Patient Diagnosis(es): The primary encounter diagnosis was Vertebral artery occlusion, left. Diagnoses of Nausea and vomiting, unspecified vomiting type, Hematemesis with nausea, and Acute nonintractable headache, unspecified headache type were also pertinent to this visit.  Past Medical History:  has a past medical history of Acute gallstone pancreatitis, Arthritis, Cerebral artery occlusion with cerebral infarction (HCC), Chronic back pain, DVT, Gallstone pancreatitis, Hearing loss, Hx of blood clots, Hypertension, Type II or unspecified type diabetes mellitus without mention of complication, not stated as uncontrolled, and Urinary retention.  Past Surgical History:  has a past surgical history that includes Tonsillectomy and Cholecystectomy, laparoscopic (2018).    Assessment   Assessment: Pt lives with spouse and is normally independent with gait with rollator and ADL.  Currently needs Max x 2 for bed mobility and sitting balance.  Recommend continued IP therapies to maximize mobility, safety and independence  Activity Tolerance  Activity Tolerance: Patient tolerated treatment well     Plan   Physical Therapy Plan  General Plan: 5-7 times per week  Current Treatment Recommendations: Strengthening, Functional mobility training, Transfer training, Endurance training, Gait training, Safety education & training, Therapeutic activities  Safety Devices  Type of Devices: Bed alarm in place, Call light within reach, Left in bed, Nurse notified     Restrictions  Position Activity Restriction  Other position/activity restrictions: up as tolerated     Subjective   Subjective  Subjective: Pt in bed upon PT entry, agreeable to working with PT.  No 
Physical Therapy/Occupational Therapy  Discharge note    Pt txfer to ICU and intubated. Will sign off and await orders when pt able to participate in PT and OT.       Iesha Monaco, PT   Poly Siu  MS, OTR/L #[id number]         
Placed corepak per order to allow for medication administration and nutrition.  KUB ordered to verify placement.   
Pompa catheter place, pt. With 1650 ml out with insertion.   
Pt A+Ox4.  Follows commands.  Very hard to understand, speaks in whisper.  Pt using oral suction frequently to remove thick mucous.  Vascular discoloration on legs, reddness in groin area, Rt. Big toe abrasion, left heel is red but blanchable.  Possible bronc today due to suspected mucus plugs.  Q6H Anti XA next draw due @1130.   
Pt L arm swollen, tight, and warm. ICU team notified, to bedside to assess, Venous doppler ordered for AM. L midline saline locked. VSS, pt following commands, RASS -1.   
Pt SBP's dropped to 60's-80's while in chair. Maxisky back to bed. Pt increasingly lethargic/confused throughout day. Back pain not well controlled with gabapentin and robaxin - floor residents aware. No orders for pain meds at this time.   
Pt extubated, sedation turned off. Pt tolerated well. O2 94% on 4L NC. Will continue to wean. Pt alert/oriented x4, able to follow commands in all extremities. ICU team aware of lack of ability to draw labs this am, heparin gtt remains unchanged.   
Pt is dyspenic and appears uncomfortable in bed. RT to bedside for treatment. ICU residents notified. Fluids stopped and CXR ordered. Pt satting 93% on 5L with moderate oral secretions.   
Pt is not available.  
Pt refusing labs at this time. ICU team made aware.   
Pt returned from MRI. Transport uneventful.     Electronically signed by Agus Torres RN on 4/5/2024 at 10:49 PM    
Pt to ENDO for PEG placement at this time, pt transferred via stretcher with transport. Will continue to monitor.  
Pt to MRI at this time.     Electronically signed by Agus Torres RN on 4/5/2024 at 9:51 PM    
Pt w/ Gtube, discussed w/ surgery resident Dr. Epstein, cleared to restart TF. Order placed, RN aware.     Tessy Stein RD, LD   [place]: [phone]  Office:  [phone]    
Pt. Bladder scanned. Over 1000mls in bladder.   
Pt. Has concerns about the current blood glucose management regimen ordered. Did not want to take the long acting because he says he's never taken it. Also says he only takes fast acting and wants to go by the sliding scale he uses at home. Tried explaining to the patient the combination of insulin being given to manage the blood sugar which was 253 around the time of 2100, however pt refused to go by what was ordered, so I held off on giving the insulin initially and mentioned patients concerns to residents. Resident was able to discuss concerns with patient briefly before being pulled away due to emergency. When I returned to the room patient says he was not satisfied with the conversation he had with resident about the insulin but ultimately agreed to allow me to administer the ordered doses.   
Pt. To STAT head CT accompanied by NCC NP. Pt. Answering questions, however more lethargic and weaker/ataxic on the left side.     IV lasix given. Pt. States his breathing feels somewhat easier.   
Pt. With increased sputum production (green mucous expectorated) and cough. Increase in white count noted. No increase in O2 requirements, however, pt. On RA at home and 2L NC since admission.   
RN called lab to draw am anti-xa, lab to send someone to draw.     Addendum: 3 phlebotomists attempted to draw labs on this pt. ICU team aware. No change to heparin gtt due to inability to draw labs.   
Respiratory Therapy Bronchoscopy Assist      Name:  Kulwant Lamb  Medical Record Number:  [id number]  Age: 68 y.o.   Gender: male  : 1955  Today's Date:  2024  Room:  [address] [place] [address]      BAL cath samples obtained from right lung  during bronchoscopy assist with patient on 100%.  Sterile field maintained throughout procedure.  Patient was pre-sedated.  250 mL of saline instilled.  Pt tolerated procedure well.  Samples sent to lab for testing.  Patient SpO2 within acceptable range throughout bronchscopy procedure.  Physician assisted with bronch without complications.     Patient/caregiver was educated on the proper method of use:  Yes      Level of patient/caregiver understanding able to:   [] Verbalize understanding   [] Demonstrate understanding       [] Teach back        [] Needs reinforcement       []  No available caregiver               []  Other:         Electronically signed by Johann Small RCP on 2024 at 2:42 PM   
[facility]   Cardiology  Note   Pt of Dr Masha MENDEZ, FACC   Rosa Maria Richards RN, FNP APRN  Date: 4/20/2024    Admit Date: 4/4/2024       CC:Hematemesis     Following cardiology today for: afib RVR with morbid obesity, previous L thalamic stroke (2018) with residual R lower face/hand deficits, type 2 DM     Interval Hx /  Subjective:Today, he is resting in bed, VSS   Extubated 4/19/2024 on n/c 02 SV02 95% b/p on high side   HR  70  afib / afebrile / WBC  improving    No new complaints today. No major events overnight.   G  tube placement  4/17/2024   TTE  is unremarkable & his EKG reveals no acute ischemia without anginal c/o    TTE 4/2024 Normal left ventricle size, moderately increased wall thickness, and normal systolic function with an EF 55- 60%. No obvious regional wall motion abnormalities are seen. Grade I diastolic dysfunction with normal LV filling pressures. No LV apical clot.     Medical Decision Making:  Discussion of patient care with other providers  Patient seen and examined. Clinical notes reviewed. Telemetry reviewed / Pertinent labs, diagnostic, device, and imaging results reviewed as a part of this visit  I spent a total of 50 minutes and greater than 50% of the time was spent counseling with patient  coordinating care regarding her diagnosis, treatments and plan of care    Scheduled Meds:   polyethylene glycol  17 g Per G Tube Daily    lansoprazole  30 mg Per G Tube Daily    lidocaine 1 % injection  5 mL IntraDERmal Once    sodium chloride flush  5-40 mL IntraVENous 2 times per day    amiodarone  200 mg Per G Tube BID    atorvastatin  80 mg Per G Tube Nightly    dilTIAZem  60 mg Per G Tube 4x daily    gabapentin  600 mg Per G Tube TID    losartan  100 mg Per G Tube Daily    naloxegol  12.5 mg Per G Tube QAM AC    senna  1 tablet Per G Tube Nightly    piperacillin-tazobactam  4,500 mg IntraVENous Q8H    aspirin  81 mg Per G Tube Daily    insulin glargine  40 Units SubCUTAneous Nightly    
Scheduled Zosyn held until after bronchoscopy completed per verbal orders of Dr. Timmons.    
[facility] Daily Progress Note      Admit Date:  4/4/2024    CC:  follow up AF RVR    Subjective:  Mr. Lamb has more controlled AF    Objective:   /78   Pulse (!) 102   Temp 97.7 °F (36.5 °C) (Oral)   Resp 16   Ht 1.829 m (6')   Wt (!) 151.2 kg (333 lb 5.4 oz)   SpO2 93%   BMI 45.21 kg/m²     Intake/Output Summary (Last 24 hours) at 4/16/2024 1839  Last data filed at 4/16/2024 1640  Gross per 24 hour   Intake 2121 ml   Output 3400 ml   Net -1279 ml       TELEMETRY:Atrial fibrillation    Physical Exam:  General:  Awake, alert, NAD  Eyes:  EOMI PERRL anicteric  Skin:  Warm and dry.   Neck:  JVP normal  Chest:  Diminshed.  No Rales.  Cardiovascular:  Irregularly irregular  Normal S1S2.  No Murmur.  No Rub.  No Gallops.  Abdomen:  Soft NT  + bs  Extremities:  + edema  Neuro:  CN II-XII intact.  No focal deficits.  No weakness.  No lateralizing findings.  Psych:  Normal thought and affect.    MSK:  No Cyanosis nor Clubbing.  Symmetrical strength upper and lower extremities    Medications:    vancomycin  1,250 mg IntraVENous Q12H    [START ON 4/17/2024] aspirin  81 mg Per G Tube Daily    digoxin  250 mcg IntraVENous Daily    insulin glargine  40 Units SubCUTAneous Nightly    piperacillin-tazobactam  4,500 mg IntraVENous Q8H    digoxin  250 mcg IntraVENous Daily    lidocaine  1 patch TransDERmal Daily    senna  1 tablet Oral Nightly    losartan  100 mg Oral Daily    [Held by provider] cloNIDine  0.1 mg Oral BID    naloxegol  12.5 mg Oral QAM AC    insulin lispro  0-16 Units SubCUTAneous Q6H    albuterol  2.5 mg Nebulization TID    sodium chloride (Inhalant)  4 mL Nebulization TID    pantoprazole  40 mg IntraVENous Daily    [Held by provider] methylPREDNISolone  40 mg IntraVENous Daily    sodium chloride flush  5-40 mL IntraVENous 2 times per day    atorvastatin  80 mg Oral Nightly    gabapentin  600 mg Oral TID      dilTIAZem      amiodarone 0.5 mg/min (04/16/24 0107)    lactated ringers IV soln 
[facility] [place] Daily Progress Note      Admit Date:  4/4/2024    CC:  AF with RVR.  Post op G tube.    Subjective:  Mr. Lamb is somnolent    Objective:   /76   Pulse 93   Temp 98 °F (36.7 °C) (Axillary)   Resp 16   Ht 1.829 m (6')   Wt (!) 175.8 kg (387 lb 9.1 oz)   SpO2 99%   BMI 52.56 kg/m²     Intake/Output Summary (Last 24 hours) at 4/18/2024 1718  Last data filed at 4/18/2024 1707  Gross per 24 hour   Intake 4277 ml   Output 1715 ml   Net 2562 ml       TELEMETRY: Atrial fibrillation    Physical Exam:  General:  Awake, alert, NAD  Eyes:  EOMI PERRL anicteric  Skin:  Warm and dry.    Neck:  JVP normal  Chest:  Diminshed.  No Rales.  Cardiovascular:  Irregularly irregular Normal S1S2.  No Murmur.  No Rub.  No Gallops.  Abdomen:  Soft NT  post op  Extremities:  No edema  Neuro:  CN II-XII intact.  No focal deficits.  No weakness.  No lateralizing findings.  Psych:  Normal thought and affect.    MSK:  No Cyanosis nor Clubbing.  Symmetrical strength upper and lower extremities    Medications:    amiodarone  200 mg Per G Tube BID    atorvastatin  80 mg Per G Tube Nightly    dilTIAZem  60 mg Per G Tube 4x daily    gabapentin  600 mg Per G Tube TID    [START ON 4/19/2024] losartan  100 mg Per G Tube Daily    [START ON 4/19/2024] naloxegol  12.5 mg Per G Tube QAM AC    senna  1 tablet Per G Tube Nightly    vancomycin  1,500 mg IntraVENous Q12H    piperacillin-tazobactam  4,500 mg IntraVENous Q8H    aspirin  81 mg Per G Tube Daily    insulin glargine  40 Units SubCUTAneous Nightly    digoxin  250 mcg IntraVENous Daily    lidocaine  1 patch TransDERmal Daily    [Held by provider] cloNIDine  0.1 mg Oral BID    insulin lispro  0-16 Units SubCUTAneous Q6H    albuterol  2.5 mg Nebulization TID    sodium chloride (Inhalant)  4 mL Nebulization TID    pantoprazole  40 mg IntraVENous Daily    [Held by provider] methylPREDNISolone  40 mg IntraVENous Daily    sodium chloride flush  5-40 mL IntraVENous 2 times 
Speech Language Pathology  Attempt/Hold Note    Per chart review and discussion with RN, pt is NPO with plans for G-tube later this afternoon. SLP will hold this date and re-attempt tomorrow. RN in agreement.     Electronically signed by:  Dana Giles M.A., CCC-SLP  [id number]  Speech-Language Pathologist  Pg #: [phone]  
Speech pathology  Dc    Chart reviewed, pt currently intubated. Please re-refer s/p extubation as appropriate      NUHA CARVALHO M.S./CCC-SLP #[id number]  Pg. # [phone]      
Speech pathology  Hold    Chart reviewed, d/w JONATHAN Marley.  Pt with possible bronch today.  Will follow up with instrumental exam as appropriate and able.      NUHA CARVALHO M.S./CCC-SLP #[id number]  Pg. # [phone]      
Speech pathology  Hold    Chart reviewed, d/w RN. Pt is NPO for PEG today. Will follow up as pt able and schedule allows        NUHA CARVALHO M.S./CCC-SLP #[id number]  Pg. # [phone]      
Spoke w/ SLP. Pt unable to advance diet.  Possible Peg tube required. ENT consulted.    
Spoke w/ lab. Sending up to get Anti XA   
TF changed from Jevity 1.5 to Glucerna per dietician Tessy.  Feed rate remains the same @ 10mL/Hr w/ 30mL flush q4h.    
TF restarted at 20mL/hr - working back up tp goal of 60mL/hr. Increasing by 20mL q4 until goal.  
The [facility] -  Clinical Pharmacy Note    Vancomycin - Management by Pharmacy    Consult Date(s): 04/14/2024  Consulting Provider(s): Dr. Carolee Rodriguez    Assessment / Plan  Sepsis - Vancomycin  Concurrent Antimicrobials:  Zosyn  Per Renal Dose Policy, will adjust to 4.5g IV q8h EI for BMI > 40  Day of Vanc Therapy / Ordered Duration: Day 1 of 7  Current Dosing Method: Intermittent Dosing by Levels  Therapeutic Goal: Trough ~15 mg/L  Current Dose / Plan:   Patient's SCr has been fluctuating, 0.9 yesterday --> 1.4 today.  UOP overall remains stable, charted as ~0.4 mL/kg/hr in the past 24h.  Will continue to dose via levels for now.  Placeholder entered.    Patient received a loading dose of 2500mg IV x1 early this AM.  No further dose today.    Plan to obtain a random level 4/16 AM and reassess.    Will continue to monitor clinical condition and make adjustments to regimen as appropriate.    Thank you for consulting pharmacy,    Lynette NagyD., [facility]   4/15/2024 10:06 AM  Wireless: [phone]        Interval update:  Tmax 101.2 degF last evening; WBC continue to increase.  Remains on amio gtt; HR in 120s today.  SCr fluctuating.          Subjective/Objective:   Kulwant Lamb is a 68 y.o. male with a PMHx significant for morbid obesity, HTN, T2D, pancreatitis, L thalamic stroke with residual deficits, hx of DVT, gallstone pancreatitis, and chronic back pain,  who is admitted with vertebral artery occlusion.  Course complicated by afib with RVR, fever, and low BP concerning for sepsis.      Pharmacy is consulted to dose Vancomycin.    Ht Readings from Last 1 Encounters:   04/04/24 1.829 m (6')     Wt Readings from Last 1 Encounters:   04/15/24 (!) 151.2 kg (333 lb 5.4 oz)     Current & Prior Antimicrobial Regimen(s):  Zosyn (4/15-current)  Vancomycin - Pharmacy to dose  Intermittent via levels (4/15-current)    Date Vanc Level Vanc Dose   4/15  2500mg   4/16 Ordered               Vancomycin Level(s) / 
The [facility] -  Clinical Pharmacy Note    Vancomycin - Management by Pharmacy    Consult Date(s): 04/14/2024  Consulting Provider(s): Dr. Carolee Rodriguez    Assessment / Plan  Sepsis - likely HAP vs aspiration - Vancomycin  Concurrent Antimicrobials:  Zosyn  Day of Vanc Therapy / Ordered Duration: #3 of 7  Current Dosing Method: Bayesian Guided AUC Dosing  Therapeutic Goal: -600 mg/L*hr   Current Dose / Plan:  Renal function was fluctuating, but SCr now steadily decreasing (SCr 1.4?1.1?0.8)  On 1250mg IV q12h.    Regimen now predicts a steady-state AUC of 401 mg/L*hr with trough 7.4 mg/L.  Given AUC on low end of goal range with low probability of achieving AUC target, will increase dose to 1500mg IV q12h.      Regimen predicts steady-state AUC of 481 mg/L*h.  Level ordered for tomorrow AM, Thurs 4/18 to confirm kinetic estimates.  Will continue to monitor clinical condition and make adjustments to regimen as appropriate.    Please call with questions--  Radha Anderson, PharmD, BCPS  Wireless: [phone]   4/17/2024 8:10 AM          Interval update:  Surgery continues to follow - awaiting cardiology input for surgical G-tube placement. Remains on amiodarone gtt.; diltiazem gtt started overnight.      Subjective/Objective:   Kulwant Lamb is a 68 y.o. male with a PMHx significant for morbid obesity, HTN, T2D, pancreatitis, L thalamic stroke with residual deficits, hx of DVT, gallstone pancreatitis, and chronic back pain,  who is admitted with vertebral artery occlusion.  Course complicated by afib with RVR, fever, and low BP concerning for sepsis.      Pharmacy is consulted to dose Vancomycin.    Ht Readings from Last 1 Encounters:   04/04/24 1.829 m (6')     Wt Readings from Last 1 Encounters:   04/15/24 (!) 151.2 kg (333 lb 5.4 oz)     Current & Prior Antimicrobial Regimen(s):  Zosyn (4/15-current)  Vancomycin - Pharmacy to dose   (4/15-4/16)  1250mg IV q12h (4/16)  1500mg IV q12h 
The [facility] - Acute Rehab Unit   After review, this patient is felt to be:       []  Dr. Cagle states this patient is appropriate for rehab.     []  Not appropriate for Acute Inpatient Rehab    [x]  Referral received and ARU reviewing patient     Pt can not tolerate 3 hrs of therapy at this time. Pt is not medically stable. PMR signing off for now. If patient becomes medically stable please re consult.   Will notify CM/SW with further updates. Thank you for the referral.    Jaylene DE LUNA, OTR/L  Clinical Liaison- [facility]   (P): [phone]  (F): [phone]   
The [facility] -  Clinical Pharmacy Note    Vancomycin - Management by Pharmacy    Consult Date(s): 04/14/2024  Consulting Provider(s): Dr. Carolee Rodriguez    Assessment / Plan  Sepsis - likely HAP vs aspiration - Vancomycin  Concurrent Antimicrobials:  Zosyn  Day of Vanc Therapy / Ordered Duration: Day 2 of 7  Current Dosing Method: Intermittent --> Bayesian Guided AUC Dosing  Therapeutic Goal: Trough --> -600 mg/L*hr   Current Dose / Plan:   Patient's SCr has been fluctuating, 0.9 -->1.4-->1.1 today.  UOP over the prior 24h much improved, ~1 mL/kg/hr out.  Will change to scheduled dosing today.    Level today = 5.3 mg/L.   Will begin 1250mg IV q12h.    Regimen predicts an AUC of 545 mg/L*hr with trough 12.7 mg/L.      Plan to repeat a level in ~2-3 days to confirm kinetics, or sooner if clinically indicated.    MRSA nares ordered but not collected.  Will discuss with RN.      Will continue to monitor clinical condition and make adjustments to regimen as appropriate.    Thank you for consulting pharmacy,    Lynette Awad PharmD., BCPS   4/16/2024 7:08 AM  Wireless: [phone]        Interval update:  Patient afebrile for past 24h.  Remains HDS on 4L NC.  Cardiology following for afib; patient received more digoxin last evening.  Continues on amiodarone infusion. Antibiotics continue.        Subjective/Objective:   Kulwant Lamb is a 68 y.o. male with a PMHx significant for morbid obesity, HTN, T2D, pancreatitis, L thalamic stroke with residual deficits, hx of DVT, gallstone pancreatitis, and chronic back pain,  who is admitted with vertebral artery occlusion.  Course complicated by afib with RVR, fever, and low BP concerning for sepsis.      Pharmacy is consulted to dose Vancomycin.    Ht Readings from Last 1 Encounters:   04/04/24 1.829 m (6')     Wt Readings from Last 1 Encounters:   04/15/24 (!) 151.2 kg (333 lb 5.4 oz)     Current & Prior Antimicrobial Regimen(s):  Zosyn (4/15-current)  Vancomycin 
The [facility] - Acute Rehab Unit   After review, this patient is felt to be:       []  Dr. Cagle states this patient is appropriate for rehab.     [x]  Not appropriate for Acute Inpatient Rehab    []  Referral received and ARU reviewing patient      Limited in therapy right- not able to participate in 3 hours of therapy a day. He was unable to perform one sit to stand.   Recommend lower level of care. Signing off.    Will notify CM/SW with further updates. Thank you for the referral.    Jaylene DE LUNA OTR/L  Clinical Liaison- [facility]   (P): [phone]  (F): [phone]   
The [facility] -  Clinical Pharmacy Note    Vancomycin - Management by Pharmacy    Consult Date(s): 04/14/2024  Consulting Provider(s): Dr. Carolee Rodriguez    Assessment / Plan  Sepsis - Pneumonia - Vancomycin  Concurrent Antimicrobials:  Zosyn  Day of Vanc Therapy / Ordered Duration: #4 of 7  Current Dosing Method: Bayesian Guided AUC Dosing  Therapeutic Goal: -600 mg/L*hr   Current Dose / Plan:  Renal function had been fluctuating, but SCr 0.8 --> 0.9 today.   Currently on 1500mg IV q12h. Regimen empirically increased yesterday from 1250mg IV q12h given improvement in SCr.    Level today = 21.8 mg/L, drawn ~5h after 2nd dose on new regimen.    Calculated AUC = 506 mg/L*hr with trough 10.9 mg/L.  Will continue with current regimen for now.  Plan to repeat a level in ~2 days, or sooner if clinically indicated.    Will continue to monitor clinical condition and make adjustments to regimen as appropriate.    Please call with questions--  Radha Anderson, PharmD, [facility]  Wireless: [phone]   4/18/2024 7:01 AM          Interval update:  Patient now s/p open PEG tube placement; remains intubated post-op and sedated with propofol.   Thick secretions noted.  Per Surgery, okay to resume heparin gtt today.        Subjective/Objective:   Kulwant Lamb is a 68 y.o. male with a PMHx significant for morbid obesity, HTN, T2D, pancreatitis, L thalamic stroke with residual deficits, hx of DVT, gallstone pancreatitis, and chronic back pain, who is admitted with vertebral artery occlusion.  Course complicated by afib with RVR, fever, and low BP concerning for sepsis.      Pharmacy is consulted to dose Vancomycin.    Ht Readings from Last 1 Encounters:   04/04/24 1.829 m (6')     Wt Readings from Last 1 Encounters:   04/18/24 (!) 175.8 kg (387 lb 9.1 oz)     Current & Prior Antimicrobial Regimen(s):  Zosyn (4/15-current)  Vancomycin - Pharmacy to dose   (4/15-4/16)   (4/16)  1500mg IV q12h (4/17-current)    Vancomycin 
The [facility] - Acute Rehab Unit   After review, this patient is felt to be:       []  Appropriate for Acute Inpatient Rehab    []  Appropriate for Acute Inpatient Rehab Pending Insurance Authorization    []  Not appropriate for Acute Inpatient Rehab    [x]  Referral received and ARU reviewing patient; Evaluation ongoing.     Pt would need a form of nutrition prior to admission and is noted to have neuro status change this date. Will follow.  Will notify DCP with further updates. Thank you for the referral.    Carito Mike M.A., [facility]   Clinical Liaison- [facility]   (P): [phone]  (F): [phone]    
[facility]   Cardiology  Note   Pt of Dr Masha MENDEZ, FACC   Rosa Maria Richards RN, FNP APRN  Date: 4/21/2024    Admit Date: 4/4/2024       CC:Hematemesis     Following cardiology today for: afib RVR with morbid obesity, previous L thalamic stroke (2018) with residual R lower face/hand deficits, type 2 DM     Interval Hx /  Subjective:Today, he is resting in bed, VSS   Extubated 4/19/2024 on n/c 02  3 l SV02 92%   HR  79  afib / afebrile / WBC  improving    No new complaints today. No major events overnight.   G  tube placement  4/17/2024   TTE  is unremarkable & his EKG reveals no acute ischemia without anginal c/o    TTE 4/2024 Normal left ventricle size, moderately increased wall thickness, and normal systolic function with an EF 55- 60%. No obvious regional wall motion abnormalities are seen. Grade I diastolic dysfunction with normal LV filling pressures. No LV apical clot.     Medical Decision Making:  Discussion of patient care with other providers  Patient seen and examined. Clinical notes reviewed. Telemetry reviewed / Pertinent labs, diagnostic, device, and imaging results reviewed as a part of this visit  I spent a total of 50 minutes and greater than 50% of the time was spent counseling with patient  coordinating care regarding her diagnosis, treatments and plan of care    Scheduled Meds:   apixaban  5 mg Oral BID    insulin lispro  5 Units SubCUTAneous TID    [START ON 4/24/2024] amiodarone  200 mg Per G Tube Daily    amLODIPine  5 mg Oral Daily    polyethylene glycol  17 g Per G Tube Daily    lansoprazole  30 mg Per G Tube Daily    lidocaine 1 % injection  5 mL IntraDERmal Once    sodium chloride flush  5-40 mL IntraVENous 2 times per day    amiodarone  200 mg Per G Tube BID    atorvastatin  80 mg Per G Tube Nightly    gabapentin  600 mg Per G Tube TID    losartan  100 mg Per G Tube Daily    naloxegol  12.5 mg Per G Tube QAM AC    senna  1 tablet Per G Tube Nightly    piperacillin-tazobactam  
VSS. On 5L NC satting >90%. SBP around 100s, MAP >65.     Glucose sustaining >200s. Received modified order for Lantus 25 units nightly.    Pt using yankeur to clear secretions, 175 mL out so far. Frequent dry non productive cough.     TF at goal at 1000. GI consult ordered for possible PEG tube placement.    Pompa in, 160 mL out so far.     Face washed.   
Vss sinus rhythm. Pt on 3 liters O2 via NC has strong productive cough. Received robaxin X1 & oxycodone x2 for chronic back pain with relief. Zofran given for nausea this am. Total UO this shift per morgan 1200 ml. No BM. Anti-xa >1.10 heparin infusion held for 1 hour, rate lowered from 9 to 6 unit/kg/hr per protocol, next level at 1200.    
Wean pt from vent currently. Propofol 15mcg, pt tolerating well.   
 Cervical back: Normal range of motion and neck supple.      Right lower leg: Edema present.      Left lower leg: Edema present.      Comments: Left arm swelling   Skin:     General: Skin is warm.      Capillary Refill: Capillary refill takes less than 2 seconds.      Coloration: Skin is not jaundiced.      Findings: No bruising or erythema.   Neurological:      General: No focal deficit present.      Mental Status: He is alert and oriented to person, place, and time. Mental status is at baseline.   Psychiatric:         Mood and Affect: Mood normal.         Behavior: Behavior normal.         Thought Content: Thought content normal.         Judgment: Judgment normal.                 Updates to Assessment & Plan at this time:  Extubated 4/19.  Peg tube (4/17/2024-)  DOAC when stable from neuro and surgical standpoint per Cardio recs      Hospitalist assigned: Dr. Laz Castillo MD  
04/05/2024          Scheduled Meds:   vancomycin  1,500 mg IntraVENous Q12H    dilTIAZem  240 mg Oral Daily    amiodarone  200 mg Oral BID    aspirin  81 mg Per G Tube Daily    insulin glargine  40 Units SubCUTAneous Nightly    piperacillin-tazobactam  4,500 mg IntraVENous Q8H    digoxin  250 mcg IntraVENous Daily    lidocaine  1 patch TransDERmal Daily    senna  1 tablet Oral Nightly    losartan  100 mg Oral Daily    [Held by provider] cloNIDine  0.1 mg Oral BID    naloxegol  12.5 mg Oral QAM AC    insulin lispro  0-16 Units SubCUTAneous Q6H    albuterol  2.5 mg Nebulization TID    sodium chloride (Inhalant)  4 mL Nebulization TID    pantoprazole  40 mg IntraVENous Daily    [Held by provider] methylPREDNISolone  40 mg IntraVENous Daily    sodium chloride flush  5-40 mL IntraVENous 2 times per day    atorvastatin  80 mg Oral Nightly    gabapentin  600 mg Oral TID     Vitals:    04/17/24 0922   BP:    Pulse: (!) 102   Resp: 20   Temp:    SpO2: 93%      In: 30 [P.O.:30]  Out: 1600    Wt Readings from Last 3 Encounters:   04/15/24 (!) 151.2 kg (333 lb 5.4 oz)   04/04/24 (!) 160.8 kg (354 lb 8 oz)   01/20/22 (!) 152 kg (335 lb)       Intake/Output Summary (Last 24 hours) at 4/17/2024 1043  Last data filed at 4/17/2024 0500  Gross per 24 hour   Intake 30 ml   Output 1600 ml   Net -1570 ml       Telemetry: Personally Reviewed    Constitutional: Cooperative and in no apparent distress,  HEENT: Symmetric and normocephalic.Cardiovascular: irregular rate and rhythm.  Respiratory: Respirations symmetric and unlabored. Lungs clear to auscultation bilaterally, no wheezing, crackles, or rhonchi   Gastrointestinal: Abdomen soft and round. Bowel sounds normoactive without tenderness or masses.  Musculoskeletal: Bilateral upper and lower extremity strength 5/5 with full ROM  Neurologic/Psych: Awake and orientated to person, place and time. Calm affect, appropriate mood    Patient Active Problem List    Diagnosis Date Noted    
APRN - CNP   Neurocritical Care   4/6/2024 5:13 PM  [facility]: [facility] Neurocritical Care  HISTORY   Interval History: as noted above    PMH Past Medical History:   Diagnosis Date    Acute gallstone pancreatitis 02/09/2018    Arthritis     Cerebral artery occlusion with cerebral infarction (HCC)     Chronic back pain     DVT     Gallstone pancreatitis 02/09/2018    Hearing loss     Hx of blood clots     dvt    Hypertension     Type II or unspecified type diabetes mellitus without mention of complication, not stated as uncontrolled     Urinary retention 04/05/2024      Allergies Allergies   Allergen Reactions    Adhesive Tape Other (See Comments)     Unknown reaction    Corticosteroids Other (See Comments)     \"Pt states he can not tolerate steroid injection\"      Diet Diet NPO   Isolation No active isolations     CURRENT SCHEDULED MEDICATIONS   Inpatient Medications     insulin lispro, 10 Units, SubCUTAneous, TID WC    albuterol, 2.5 mg, Nebulization, TID RT    insulin glargine, 15 Units, SubCUTAneous, Nightly    insulin lispro, 0-4 Units, SubCUTAneous, Nightly    levofloxacin, 750 mg, IntraVENous, Q24H    methylPREDNISolone, 40 mg, IntraVENous, Q12H    insulin lispro, 0-16 Units, SubCUTAneous, TID WC    sodium chloride flush, 5-40 mL, IntraVENous, 2 times per day    [Held by provider] atorvastatin, 80 mg, Oral, Nightly    aspirin, 81 mg, Oral, Daily    [Held by provider] gabapentin, 600 mg, Oral, TID    [Held by provider] amLODIPine, 5 mg, Oral, Daily    [Held by provider] cloNIDine, 0.1 mg, Oral, BID   Infusions    heparin (PORCINE) Infusion 10 Units/kg/hr (04/06/24 1310)    sodium chloride      dextrose        Antibiotics   Recent Abx Admin                     levoFLOXacin (LEVAQUIN) 750 MG/150ML infusion 750 mg (mg) 750 mg New Bag 04/06/24 0850                      LABS   Metabolic Panel Recent Labs     04/04/24  1021 04/05/24  0906 04/05/24  1022 04/06/24  0545   * 129*  --  130*   K 4.0 4.6  --  4.8   CL 
bed  Restraints  Restraints Initially in Place: No     Restrictions  Position Activity Restriction  Other position/activity restrictions: ambulate patient;     Subjective   Subjective  Subjective: Pt in bed upon PT entry, agreeable to working with PT.  No specific complaints of pain noted.  Pt postiioned for comfort at end of session with all needs in reach.         Social/Functional History  Social/Functional History  Lives With: Spouse, Son (wife works, son has been the patient's caregiver at home)  Type of Home: House  Home Layout: Two level, Able to Live on Main level with bedroom/bathroom  Home Access: Stairs to enter without rails  Entrance Stairs - Number of Steps: 1 small + 1 small  Bathroom Shower/Tub: Walk-in shower  Bathroom Toilet: Handicap height  Bathroom Equipment: Grab bars in shower, Shower chair, Grab bars around toilet  Bathroom Accessibility: Accessible  Home Equipment: Walker, rolling, Cane (adjustable bed)  Has the patient had two or more falls in the past year or any fall with injury in the past year?: No  ADL Assistance: Independent  Homemaking Assistance: Needs assistance  Homemaking Responsibilities: No (wife and son do all)  Ambulation Assistance: Independent (with RW)  Transfer Assistance: Independent  Active : No (wife and son drive)  Occupation: Retired  Type of Occupation:   Additional Comments: wife works, son typically home when she's at work, not on O2 at home  Vision/Hearing       Cognition         Objective   Temp: 99.5 °F (37.5 °C)  Pulse: 91  Heart Rate Source: Monitor  Respirations: 24  SpO2: 98 %  O2 Device: Nasal cannula  BP: 104/65  MAP (Calculated): 78  BP Location: Right upper arm  BP Method: Automatic      Bed mobility  Rolling to Left: Maximum assistance;Moderate assistance;2 Person assistance (max x 1, mod x 1)  Rolling to Right: Maximum assistance;Moderate assistance;2 Person assistance (max x 1, mod x 1)  Scootin Person assistance;Maximal assistance 
blood in his vomitus is what prompted him to come to the ED. Patient states he was showering when he felt nauseated and vomited. He thought he saw blood in his vomitus.  In the ED, patient's vitals were stable and afebrile.  He was placed on 2 L of oxygen. Labs notable for leukocytosis of 16, lactic acid of 2.5 and his blood sugars in the 260s.  Hemoglobin stable. CXR notable for mild consolidation in the left lung.  CT head without contrast did not show any acute intracranial hemorrhage or mass effect but CTA head and neck did show an occlusion of the left vertebral artery.   stroke team was called and did not recommend TNK as patient fell outside the window but did recommend heparin.  Patient was admitted to the ICU for every hour neurochecks. Neurosurgery did not recommend heparin given absence of acute ischemia at this time and concern for possible hematemesis   MRI brain did show an infarct in the left inferior cerebellum and occlusion of the left vertebral artery.  \"    MRI brain: 04/05/2024  1.  Evolving left PICA territory infarct involving the left inferior cerebellum and lateral medulla. No acute hemorrhage or mass effect.  2.  Unchanged size and configuration of the ventricles.    CT head: 04/05/2024  1. Late acute left cerebellar hemisphere ischemic infarct, further demarcation  noted compared to prior study with no hemorrhagic transformation  2. Periventricular microangiopathic ischemic changes, chronic small vessel  disease  3. No evidence of acute intracranial hemorrhage    CXR: 04/04/2024  Mild patchy consolidation left perihilar lung.   Elevated right hemidiaphragm with mild right basilar atelectasis.   Cardiomegaly.    Date of onset: 4/4/24    Current Diet:  ADULT TUBE FEEDING VOLUME BASED; Nasogastric; Diabetic; 80; Continuous; 1,440; 24    Treatment Diagnosis: dysphagia    Pain:  denies    Prior Level of Function- lives with family who are responsible for most of homemaking, shopping etc. Pt 
mobility    Subjective: Pt in bed on entry. Pleasant and cooperative. Watching a live feed of the eclipse on his ipad. Feels his dizziness is getting a little better but his L arm is a little worse.     Pain: Yes, back and L shoulder pain, ice pack applied to L shoulder to try rather than heat; RN had medicated pt prior to session      Objective:    Cognition/Orientation: [facility]    Bed mobility   Rolling: Mod assist (to R and L)  Supine to sit: Max assist x2, very effortful  Sit to Supine: Max assist x2  Scooting: Dependent x2 in sitting, max assist x2 in supine  Sitting: EOB 8 min with varying levels of assist due to pushing L through R hand, R rotated trunk; improves from max assist x2 initially to periods of min assist with hand placed in lap and cues for midline posture. Dizziness remained \"tolerable\" and initially was improving slightly then pt began to feel \"sick\" and had to lay down.     LUE  Having radiating pain from shoulder down. Had been trying warm pack with some relief. Ice pack placed at end of session to trial. At rest, due to weakness of shoulder flexors and internal rotators, arm falls back into extension and external rotations. Positioned in neutral with pillows for better support.   Strength - 5/5  but with difficulty sustaining; elbow flex/ext 3+/5, shoulder abduction 3/5, shoulder flexion 1/5  Sensation - reports increased numbness    Activity Tolerance: Tolerated session well overall with adverse events - tolerated increased time in sitting, but still limited by dizziness    Patient Education: progress, goals, positioning, pain management, plan of care - verb understanding    Safety Devices in Place: left in bed, 4 rails up due to narrow bed width and ataxic LEs, needs in reach, RN aware        Goals:  Short Term Goals  Time Frame for Short Term Goals: by D/C  Short Term Goal 1: Increase sitting tolerance to 15 min with SBA - Not met  Short Term Goal 2: Tolerate stance at walker to progress 
stating that he is unable to walk straight as well as a left sided tingling and decreased sensation. Seeing blood in his vomitus is what prompted him to come to the ED. Patient states he was showering when he felt nauseated and vomited. He thought he saw blood in his vomitus.  In the ED, patient's vitals were stable and afebrile.  He was placed on 2 L of oxygen. Labs notable for leukocytosis of 16, lactic acid of 2.5 and his blood sugars in the 260s.  Hemoglobin stable. CXR notable for mild consolidation in the left lung.  CT head without contrast did not show any acute intracranial hemorrhage or mass effect but CTA head and neck did show an occlusion of the left vertebral artery.   stroke team was called and did not recommend TNK as patient fell outside the window but did recommend heparin.  Patient was admitted to the ICU for every hour neurochecks. Neurosurgery did not recommend heparin given absence of acute ischemia at this time and concern for possible hematemesis   MRI brain did show an infarct in the left inferior cerebellum and occlusion of the left vertebral artery.  \"    Imaging  CT HEAD WO CONTRAST   Final Result   1. Left posterior fossa infarct with expected decreasing density compatible with   expected evolution of infarct. No significant mass effect or evidence of   hemorrhagic conversion.      MRI BRAIN WO CONTRAST   Final Result      1. Acute ischemic infarction 4 cm involving left inferior cerebellum, cerebellar   tonsil and lateral margin of medulla (posterior inferior cerebellar artery   territory)      2. 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 No intracranial hemorrhage or mass effect      4. Mild to moderate chronic small vessel ischemia               CT HEAD WO CONTRAST   Final Result      1.  No acute intracranial hemorrhage or mass effect.   2.  Mild burden nonspecific white matter disease 
thalamic stroke (2018) with residual R lower face/hand deficits, type 2 DM who presented to [facility] with imbalance, nausea/vomiting and L hand numbness for which he was found to have complete occlusion of L vertebral artery V3 and V4 with high-grade stenosis of bilateral posterior cerebral artery and L posterior communicating artery on April 4th. He has new onset aphonia and moderate pharyngeal dysphagia for which GI was unsuccessful placing a PEG for nutrition. Therefore, general surgery was consulted for PEG placement.     -defer surgical feeding tube placement until patient is tolerating tube feeds at goal and optimization from medical standpoint  -patient should be afebrile, HDS prior to placement  -remainder of care per primary      Rosa Maria Epstein MD  PGY1, General Surgery  04/15/24   6:56 AM   [employer]  [phone]    
unsuccessful placing a PEG for nutrition. Therefore, general surgery was consulted for PEG placement.     -Seems to be tolerating heparin gtt     -vent weaning per ICU  -okay for medications via G tube  -please check residuals x one from g tube.  Notify surgery if greater than 250 ml  -nursing to document with vitals that tube remains in correct position  -remainder of care per primary    NANCY Felix - CNP    
No  Referring Practitioner: Kirk Burk DO  Diagnosis: vertebral artery occlusion  Subjective  Subjective: In bed agreeable to session, \"I need 15 min to let the pain meds kick in then I am good\"  General Comment  Comments: Pt particular about sequence of tasks and movement; very sensitive to therapist touch / support / movement of LLE. Pt benefits from explanation prior to task / movement.     Social/Functional History  Social/Functional History  Lives With: Spouse, Son (wife works, son has been the patient's caregiver at home)  Type of Home: House  Home Layout: Two level, Able to Live on Main level with bedroom/bathroom  Home Access: Stairs to enter without rails  Entrance Stairs - Number of Steps: 1 small + 1 small  Bathroom Shower/Tub: Walk-in shower  Bathroom Toilet: Handicap height  Bathroom Equipment: Grab bars in shower, Shower chair, Grab bars around toilet  Bathroom Accessibility: Accessible  Home Equipment: Walker, rolling, Cane (adjustable bed)  Has the patient had two or more falls in the past year or any fall with injury in the past year?: No  ADL Assistance: Independent  Homemaking Assistance: Needs assistance  Homemaking Responsibilities: No (wife and son do all)  Ambulation Assistance: Independent (with RW)  Transfer Assistance: Independent  Active : No (wife and son drive)  Occupation: Retired  Type of Occupation:   Additional Comments: wife works, son typically home when she's at work, not on O2 at home       Objective                Safety Devices  Type of Devices: Call light within reach;Nurse notified;Bed alarm in place;Left in bed  Bed Mobility Training  Bed Mobility Training: Yes  Rolling: Maximum assistance;Assist X2;Moderate assistance  Supine to Sit: Total assistance (MaxA2)  Sit to Supine: Total assistance (MaxAx2-3)  Scooting: Total assistance (x3)  Balance  Sitting: Impaired (Max to MinAx2)  Sitting - Static: Poor (constant support)  Standing:  (unable, unsafe to 
Raw Score : 8  AM-PAC Inpatient T-Scale Score : 28.52  Mobility Inpatient CMS 0-100% Score: 86.62  Mobility Inpatient CMS G-Code Modifier : CM         Therapy Time   Individual Concurrent Group Co-treatment   Time In 1137         Time Out 1215         Minutes 38                 Kathy Domínguez, PT           
images reveal midline ventricles.      Ventricular size and cortical sulci are  prominent compatible with atrophic  changes.      No intra-axial/extra-axial masses or fluid collections.      Demarcated ischemic infarct the left cerebellar hemisphere  Diffuse periventricular white matter hypodensity lesions consistent with  microangiopathic small vessel ischemic changes.      No evidence of acute cranial hemorrhage.      Remainder of the posterior fossa is unremarkable     Paranasal sinuses: unremarkable  Orbits: None  Mastoids and Temporal Bones: None  Skull base and Bony calvarium remains intact, no destructive lesions.    CTA of Head / Neck w/ Contrast:4/5/2024  1. There is improved visualization of the distal vertebral artery however, there  is persistent thrombus in the distal left vertebral artery, V4 segment.  2. Further evolution, demarcation of the left late acute cerebellar ischemic  infarct. No evidence of hemorrhagic transformation on prior noncontrast CT brain  3. Normal right vertebral artery.  4. Stable appearance of left posterior cerebral artery proximal P1 and proximal  P2 segment stenosis or thromboembolus.  5. Otherwise no significant interval changes.    MRI Brain w/o Contrast:      MIDLINE STRUCTURES: The sella turcica and pituitary gland are normal.  Craniovertebral alignment and cerebellar tonsils are normal.     VENTRICLES: Normal size and configuration for patient age.     INTRACRANIAL HEMORRHAGE: None.     BRAIN PARENCHYMA: There is patchy FLAIR hyperintensity with matching acute  diffusion restriction 4 cm involving the left inferior cerebellum, left  cerebellar tonsil and the lateral margin of the left medulla.     There is a mild to moderate amount of FLAIR hyperintensity involving cerebral  periventricular white matter, corona radiata and centrum semiovale. There are  chronic infarctions involving the left thalamus and posterior aspect of left  putamen and corona radiata.     No 
voice, difficult to hear. EOS reported pain in mid back section    Social/Functional History  Social/Functional History  Lives With: Spouse, Son  Type of Home: House  Home Layout: Two level, Able to Live on Main level with bedroom/bathroom  Home Access: Stairs to enter without rails  Entrance Stairs - Number of Steps: 1 small + 1 small  Bathroom Shower/Tub: Walk-in shower  Bathroom Toilet: Handicap height  Bathroom Equipment: Grab bars in shower, Shower chair, Grab bars around toilet  Bathroom Accessibility: Accessible  Home Equipment: Walker, rolling, Cane  Has the patient had two or more falls in the past year or any fall with injury in the past year?: No  ADL Assistance: Independent  Homemaking Responsibilities: No (wife and son do all)  Ambulation Assistance: Independent (with rolling walker)  Transfer Assistance: Independent  Active : No (has a license but hasn't been driving; wife and son drive)  Occupation: Retired  Type of Occupation:   Additional Comments: wife works, son typically home when she's at work    Objective     Safety Devices  Type of Devices: Bed alarm in place;Call light within reach;Left in bed;Nurse notified;All fall risk precautions in place    Balance  Sitting:  (Pt has a L posterior lean required Min to Mod to readjust. Once pt had R hand on bed rail, he required CGA)  Sitting - Static:  (Able to sit ~12 min with complaints of mild dizziness. Once pt hit the 10 minute luis seated EOB pt reported he was nauseous and going to throw up and needed to lie back down. Pt appearing to have a pre-syncopal episode seated EOB)    Bed mobility  Rolling to Left: Minimal assistance  Rolling to Right: Contact guard assistance  Supine to Sit: Maximum assistance (HOB elevated, assist with arm)  Sit to Supine: 2 Person assistance;Maximum assistance (HOB flat, leg and shoulder support)  Scooting: Dependent/Total (in trendelenburg x2)    Transfers  Transfer Comments: unable to transfer due to 
Factors: Pt DEP x2 supine<>EOB. DEP x2 @EOB. Pt w/ limited tolerance EOB d/t vertigo, able to tolerate appx 3min EOB. Pt DEP 2-3 for rolling w/ bed flat.    Vision  Vision: Impaired  Vision Exceptions: Wears glasses for reading  Hearing  Hearing: Exceptions to WFL (pt notes R ear Solomon)  Orientation  Overall Orientation Status: Within Functional Limits     Education Given To: Patient  Education Provided: Role of Therapy;Plan of Care;ADL Adaptive Strategies;Transfer Training;Energy Conservation  Education Method: Verbal  Barriers to Learning: None  Education Outcome: Continued education needed  LUE AROM (degrees)  LUE General AROM: LUE pt stating unable to move- no active movement noted in session. ROM not formally assessed    AM-PAC - ADL  AM-PAC Daily Activity - Inpatient   How much help is needed for putting on and taking off regular lower body clothing?: Total  How much help is needed for bathing (which includes washing, rinsing, drying)?: Total  How much help is needed for toileting (which includes using toilet, bedpan, or urinal)?: Total  How much help is needed for putting on and taking off regular upper body clothing?: Total  How much help is needed for taking care of personal grooming?: Total  How much help for eating meals?: Total  AM-PAC Inpatient Daily Activity Raw Score: 6  AM-PAC Inpatient ADL T-Scale Score : 17.07  ADL Inpatient CMS 0-100% Score: 100  ADL Inpatient CMS G-Code Modifier : CN    Goals  Short Term Goals  Time Frame for Short Term Goals: by D/C  Short Term Goal 1: Pt will participate in grooming task seated (EOB or chair) w/ ModA  Short Term Goal 2: Tolerate stance at walker to progress mobility - ongoing  Short Term Goal 3: Incorporate LUE into ADLs 75% of the time without cues - Not met       Therapy Time   Individual Concurrent Group Co-treatment   Time In 0855         Time Out 0936         Minutes 41         Timed Code Treatment Minutes: 26 Minutes       Shawnee Flores OT     
anterior circulation steno-occlusive disease.     CT Head WO contrast 4/4/24  1.  No acute intracranial hemorrhage or mass effect.  2.  Mild burden nonspecific white matter disease compatible with chronic small  vessel ischemia. Chronic left thalamus lacunar infarct.    Assessment:  1. Acute ischemic infarction left inferior cerebellum, cerebellar tonsil and lateral margin of medulla  2. Complete occlusion of the left vertebral artery  - on heparin gtt  3. Dysphagia, currently NPO   4. Possible CAP v aspiration pneumonia  5. DM2 with neuropathy  6. HTN  7. Chronic low back pain  8. Morbid obesity     Recommendations:  Will follow medical course and progress with therapies.     Thank you for this consult. Please contact me with any questions or concerns.   Ysabel Bhatia MD 4/10/2024, 5:20 PM  
chips per RN.  Educated to recommendation for probable MBS study and rationale.Pt stated comprehension and agreeable with recommendations  Cont goal   4/6: Reviewed swallow function, aspiration concerns, and instrumental study recommendation/types (FEES vs MBS); pt stated good comprehension, and stated preference for MBS. Planned for today.  Cont goal  4/6 addendum: MBS originally planned for today, however was cancelled this afternoon after d/w RN, as pt presenting with increased lethargy and nausea.    Goal 2: Patient will participate in instrumental assessment of swallow function as appropriate    Goal 3: Patient will participate in repeat bedside swallowing assessment.  4/6: Per d/w RN, pt had cough productive of green sputum; received steroids earlier, and appeared to improve. Bedside, received pt awake, alert, pleasant, on 1L O2 via NC. Frequent coughing/use of suction; pt reports this is normal for him in the mornings. Voice mostly aphonic/very dysphonic; per pt, it was better but now is a little worse. Positioned him upright and assessed ice chips, thins via tsp/straw, puree. Pt with positive oral acceptance/containment, positive swallow movement, good oral clearance, inconsistent cough. Recommend proceed with instrumental assessment to better assess swallow function.  Goal met    Speech Goals:  The pt will be seen to address the following goals:   1- pt will participate in further eval of sp/lang/cognitive skills    Education  Please see above.  Total treatment time: 15 dys tx    Plan: Continue per POC  Recommended Diet: NPO  Aggressive oral care / small amounts of ice chips for the comfort of pt, health and hydration of oropharyngeal mucosa and swallow stimulation  Re-assessment bedside next session    Medication administration: through alternate means    Strategies:   N/a    Discharge Recommendation: most likely will require ongoing treatment  Discussed with Hyacinth CASTILLO  Needs met prior to leaving room, 
the last 72 hours.    Invalid input(s): \"LDLCALCU\", \"TRIGLYCERIDE\"  ABGs:  No results for input(s): \"PHART\", \"MZM3IMQ\", \"PO2ART\", \"DTF5FGQ\", \"BEART\", \"THGBART\", \"L3AXVNSX\", \"GBU2GUF\" in the last 72 hours.    INR:   Recent Labs     04/05/24  2329   INR 1.16*     Lactate: No results for input(s): \"LACTATE\" in the last 72 hours.  Cultures:  -----------------------------------------------------------------  RAD:   XR CHEST PORTABLE   Final Result      Right lower lobe and possibly also the middle lobe collapse. This most likely is due to mucous plugging.      Electronically signed by Markel Caballero DO      CT HEAD WO CONTRAST   Final Result   1. Stable appearance of left PICA territory infarct involving the left cerebellum and left posterior lateral medulla. No hemorrhagic transformation is identified.      Electronically signed by Baudilio Rutledge MD      MRI BRAIN WO CONTRAST   Final Result      1.  Evolving left PICA territory infarct involving the left inferior cerebellum and lateral medulla. No acute hemorrhage or mass effect.   2.  Unchanged size and configuration of the ventricles.      Electronically signed by Anoop Hong MD      CTA HEAD NECK W CONTRAST   Final Result   1. There is improved visualization of the distal vertebral artery however, there   is persistent thrombus in the distal left vertebral artery, V4 segment.   2. Further evolution, demarcation of the left late acute cerebellar ischemic   infarct. No evidence of hemorrhagic transformation on prior noncontrast CT brain   3. Normal right vertebral artery.   4. Stable appearance of left posterior cerebral artery proximal P1 and proximal   P2 segment stenosis or thromboembolus.   5. Otherwise no significant interval changes.      Critical results reported to Dr. Fabricio Vega at 4/5/2024 at 1:49 p.m. by   telephone.            CT HEAD WO CONTRAST   Final Result   1. Late acute left cerebellar hemisphere ischemic infarct, further demarcation   noted 
     CT HEAD WO CONTRAST   Final Result   1. Late acute left cerebellar hemisphere ischemic infarct, further demarcation   noted compared to prior study with no hemorrhagic transformation   2. Periventricular microangiopathic ischemic changes, chronic small vessel   disease   3. No evidence of acute intracranial hemorrhage      CT HEAD WO CONTRAST   Final Result   1. Left posterior fossa infarct with expected decreasing density compatible with   expected evolution of infarct. No significant mass effect or evidence of   hemorrhagic conversion.      MRI BRAIN WO CONTRAST   Final Result      1. Acute ischemic infarction 4 cm involving left inferior cerebellum, cerebellar   tonsil and lateral margin of medulla (posterior inferior cerebellar artery   territory)      2. 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 No intracranial hemorrhage or mass effect      4. Mild to moderate chronic small vessel ischemia               CT HEAD WO CONTRAST   Final Result      1.  No acute intracranial hemorrhage or mass effect.   2.  Mild burden nonspecific white matter disease compatible with chronic small   vessel ischemia. Chronic left thalamus lacunar infarct.      CTA HEAD NECK W CONTRAST   Final Result      1.  Complete occlusion of the left vertebral artery V3 and V4 segments new from   prior CTA and likely acute occlusion.   2.  New high-grade stenosis without complete occlusion within the bilateral   posterior cerebral artery proximal segments. New high-grade stenosis or   occlusion of the left posterior communicating artery PCA insertion.   3.  No carotid artery or other anterior circulation steno-occlusive disease.          Critical result of vertebral artery occlusion reported to the ED resident Dr. Bullock on 11:47 a.m. 4/4/2024.      CT ABDOMEN PELVIS W IV CONTRAST Additional Contrast? None   Final Result      1.  Motion 
compared to prior study with no hemorrhagic transformation   2. Periventricular microangiopathic ischemic changes, chronic small vessel   disease   3. No evidence of acute intracranial hemorrhage      CT HEAD WO CONTRAST   Final Result   1. Left posterior fossa infarct with expected decreasing density compatible with   expected evolution of infarct. No significant mass effect or evidence of   hemorrhagic conversion.      MRI BRAIN WO CONTRAST   Final Result      1. Acute ischemic infarction 4 cm involving left inferior cerebellum, cerebellar   tonsil and lateral margin of medulla (posterior inferior cerebellar artery   territory)      2. 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 No intracranial hemorrhage or mass effect      4. Mild to moderate chronic small vessel ischemia               CT HEAD WO CONTRAST   Final Result      1.  No acute intracranial hemorrhage or mass effect.   2.  Mild burden nonspecific white matter disease compatible with chronic small   vessel ischemia. Chronic left thalamus lacunar infarct.      CTA HEAD NECK W CONTRAST   Final Result      1.  Complete occlusion of the left vertebral artery V3 and V4 segments new from   prior CTA and likely acute occlusion.   2.  New high-grade stenosis without complete occlusion within the bilateral   posterior cerebral artery proximal segments. New high-grade stenosis or   occlusion of the left posterior communicating artery PCA insertion.   3.  No carotid artery or other anterior circulation steno-occlusive disease.          Critical result of vertebral artery occlusion reported to the ED resident Dr. Bullock on 11:47 a.m. 4/4/2024.      CT ABDOMEN PELVIS W IV CONTRAST Additional Contrast? None   Final Result      1.  Motion compromised exam without definite acute abnormality.   2.  Severely distended bladder.   3.  Nonobstructing calculi in the right 
instrumental later this week/early next week. In the meantime, pt may benefit from nutrition and medications via alternative means. Pt also may benefit from ENT consult to assess changes to voice and L sided asymmetry. Also, pt may benefit from PPI due to reflux observed during procedure.   Recommend NPO with oral care with ice chips     Speech, Language, Cognitive Evaluation 4/5:  Speech is dysarthric, intelligibility is fairly good with careful listening.  Pt reports this is his baseline from prior stroke. Comprehension appears intact- pt followed commands and answered yes/no questions accurately.   Cognition informally appears intact. Pt able to state reason for hospitalization, specific information regarding this, medications, etc. Will cont to assess during follow up treatment sessions    Treatment:  Dysphagia Goals:  The pt will be seen  3-5 x to address the following goals:  Goal 1: Patient/caregiver will demonstrate understanding of dysphagia recommendations/concerns.  4/5: ...please see prior notes...  4/8: Reviewed images from FEES with pt, educated to swallowing anatomy and physiology. Pt was able to teach back swallowing recommendations at this time and stated comprehension. Cont goal   4/9- pt educated to the results of the FEES, anatomy and physiology of the swallow, role the vocal cords play in airway protection, importance of oral care with suction, recommendation for ENT eval, swallowing exercises and plan to continue to assess swallowing function for readiness for repeat instrumental assessment. Pt indicated comprehension and had no further questions. Con't goal   4/10: pt able to explain rationale for ice chips, exercises and recommendation for ENT consult.  Pt demonstrating good comprehension of all information  Goal met, cont to ensure consistency  4/11:  d/w pt recommendation for ongoing NPO with oral care, ice chips and effortful swallows.  Explained again indication for repeat of MBS will be 
with no vascular stenosis or acute vascular   abnormality in the neck otherwise identified.      Head      1. Persistent severe stenosis in the left and right posterior cerebral artery   with maintained distal vascular opacification.   2. Evolving left PICA infarct. No hemorrhage or acute complication identified.   3. No new intracranial vascular abnormality. No large vessel occlusion   identified.      CT HEAD WO CONTRAST   Final Result      Stable appearing large left cerebellar and left posterior lateral medulla and cerebellar vermis low-density infarction with slight mass effect on the fourth ventricle but no sign of any hydrocephalus.      No sign of any hemorrhagic significant transformation.      Electronically signed by Paco Judge,       XR CHEST PORTABLE   Final Result      Right lower lobe and possibly also the middle lobe collapse. This most likely is due to mucous plugging.      Electronically signed by Markel Caballero DO      CT HEAD WO CONTRAST   Final Result   1. Stable appearance of left PICA territory infarct involving the left cerebellum and left posterior lateral medulla. No hemorrhagic transformation is identified.      Electronically signed by Baudilio Rutledge MD      MRI BRAIN WO CONTRAST   Final Result      1.  Evolving left PICA territory infarct involving the left inferior cerebellum and lateral medulla. No acute hemorrhage or mass effect.   2.  Unchanged size and configuration of the ventricles.      Electronically signed by Anoop Hong MD      CTA HEAD NECK W CONTRAST   Final Result   1. There is improved visualization of the distal vertebral artery however, there   is persistent thrombus in the distal left vertebral artery, V4 segment.   2. Further evolution, demarcation of the left late acute cerebellar ischemic   infarct. No evidence of hemorrhagic transformation on prior noncontrast CT brain   3. Normal right vertebral artery.   4. Stable appearance of left posterior cerebral 
high-grade stenosis without complete occlusion within the bilateral   posterior cerebral artery proximal segments. New high-grade stenosis or   occlusion of the left posterior communicating artery PCA insertion.   3.  No carotid artery or other anterior circulation steno-occlusive disease.          Critical result of vertebral artery occlusion reported to the ED resident Dr. Bullock on 11:47 a.m. 4/4/2024.      CT ABDOMEN PELVIS W IV CONTRAST Additional Contrast? None   Final Result      1.  Motion compromised exam without definite acute abnormality.   2.  Severely distended bladder.   3.  Nonobstructing calculi in the right kidney.      XR CHEST PORTABLE   Final Result      Mild patchy consolidation left perihilar lung.      Elevated right hemidiaphragm with mild right basilar atelectasis.      Cardiomegaly.          Assessment and Plan   Kulwant Lamb is a 68 y.o. male w a PMHx of T2DM, prior CVA, morbid obesity who presented with N/V and loss of balance. Placed in ICU s/p G tube placement, remaining intubated.      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  CTA revealed left vertebral artery occlusion.  MRI revealed left inferior cerebellar infarct and left vertebral occlusion.  Repeat scans were done 4/9/2024 which revealed the resolution of the left vertebral artery occlusion.  Repeat CT head showed a stable left cerebellar and left lateral medulla infarct.  -As per neuro continue aspirin 81 mg daily  -Continue with atorvastatin 80 mg nightly  -Transitioned heparin gtt to eliquis (4/21/24)  - Q4 neurochecks     Pulmonary  Dysfunctional right diaphragm  Mucous plug- ongoing issue  CXR revealed new right opacity, left main pulm noted, right not seen-consistent with mucous plug. Prior CXR revealed interstitial opacity in RLL.  - Bronch 4/18- removed large 
to pt and he was agreeable to plan for FEES on Monday 4/8 (resident contacted for orders, awaiting orders). Continue NPO status at this time with ice chips for comfort.  4/8: Reviewed images from FEES with pt, educated to swallowing anatomy and physiology. Pt was able to teach back swallowing recommendations at this time and stated comprehension. Cont goal   4/9- pt educated to the results of the FEES, anatomy and physiology of the swallow, role the vocal cords play in airway protection, importance of oral care with suction, recommendation for ENT eval, swallowing exercises and plan to continue to assess swallowing function for readiness for repeat instrumental assessment. Pt indicated comprehension and had no further questions. Con't goal   4/10: pt able to explain rationale for ice chips, exercises and recommendation for ENT consult.  Pt demonstrating good comprehension of all information  Goal met, cont to ensure consistency  4/11:  d/w pt recommendation for ongoing NPO with oral care, ice chips and effortful swallows.  Explained again indication for repeat of MBS will be as voice improves and decreased coughing/suctioning of secretions and when consuming ice chips. Pt stated comprehension  Cont goal    Goal 2: Patient will participate in instrumental assessment of swallow function as appropriate  4/8: Goal met today; recommend repeat instrumental at end of this week/early next week.  Modify goal to- -. Pt will participate in repeat instrumental assessment of swallow function when showing signs of improvement and when deemed appropriate by SLP.  4/9-pt not yet appropriate for repeat instrumental assessment. Pt continues with aphonic voice and inability to manage his own secretions. Con't goal     Goal 3: Patient will participate in repeat bedside swallowing assessment.  4/6: Per d/w RN, pt had cough productive of green sputum; received steroids earlier, and appeared to improve. Bedside, received pt awake, alert, 
without complete occlusion within the bilateral   posterior cerebral artery proximal segments. New high-grade stenosis or   occlusion of the left posterior communicating artery PCA insertion.   3.  No carotid artery or other anterior circulation steno-occlusive disease.          Critical result of vertebral artery occlusion reported to the ED resident Dr. Bullock on 11:47 a.m. 4/4/2024.      CT ABDOMEN PELVIS W IV CONTRAST Additional Contrast? None   Final Result      1.  Motion compromised exam without definite acute abnormality.   2.  Severely distended bladder.   3.  Nonobstructing calculi in the right kidney.      XR CHEST PORTABLE   Final Result      Mild patchy consolidation left perihilar lung.      Elevated right hemidiaphragm with mild right basilar atelectasis.      Cardiomegaly.          Assessment and Plan   Patient is a 68-year-old male with a past medical history of morbid obesity, T2 DM, prior CVA who presented with complaints of nausea, vomiting, and loss of balance found to have a L verterbral artery occlusion and L cerebellar stroke.    Left vertebral artery occlusion (resolved)  Left inferior cerebellum infarct  Presented with nausea, vomiting, loss of balance and left-sided tingling. Physical exam not notable for any focal neurological deficits. CTA showed left vertebral artery occlusion.  MRI showed infarct of the left inferior cerebellum and occlusion of the left vertebral artery. Repeat CTA showed resolution of L vertebral occlusion. Physical exam stable thus far and no new deficits noted.  - Q4 neurochecks  - Continue aspirin 81 mg daily  - Atorvastatin 80 mg nightly  - methylprednisolone 1g daily; consider stopping given stable scan  - SBP goal<160; PRNs in place  - Can transition heparin gtt to  BID vs Eliquis as per neuro  - Will prefer DOAC as can be cont as outpatient for 3mo, but given dysphagia will keep on heparin gtt for now   - Consider hep gtt -> eliquis 5mg BID; crushed 
  Critical result of vertebral artery occlusion reported to the ED resident Dr. Bullock on 11:47 a.m. 4/4/2024.      CT ABDOMEN PELVIS W IV CONTRAST Additional Contrast? None   Final Result      1.  Motion compromised exam without definite acute abnormality.   2.  Severely distended bladder.   3.  Nonobstructing calculi in the right kidney.      XR CHEST PORTABLE   Final Result      Mild patchy consolidation left perihilar lung.      Elevated right hemidiaphragm with mild right basilar atelectasis.      Cardiomegaly.            Assessment/Plan:   Kulwant Lamb is a 68 y.o. male w a PMHx of T2DM, prior CVA, morbid obesity who presented with N/V and loss of balance. Placed in ICU s/p G tube placement, remaining intubated.     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  CTA revealed left vertebral artery occlusion.  MRI revealed left inferior cerebellar infarct and left vertebral occlusion.  Repeat scans were done 4/9/2024 which revealed the resolution of the left vertebral artery occlusion.  Repeat CT head showed a stable left cerebellar and left lateral medulla infarct.  -As per neuro continue aspirin 81 mg daily  -Continue with atorvastatin 80 mg nightly  -Continue heparin gtt, ok as per surgery   -Anti Xa - 0.16  - Q4 neurochecks    Pulmonary  Dysfunctional right diaphragm  Mucous plug, continuous  CXR revealed new right opacity, left main pulm noted, right not seen-consistent with mucous plug. Prior CXR revealed interstitial opacity in RLL.  - Bronch 4/18- removed large mucous plug  - neg flu and COVID  - Resp cultures- no organisms seen  - strep, legionella pending- pending  - continue vanc and zosyn (on day 6)  - awaiting blood cultures- pending  - albuterol TID    Dysphagia 2/2 pharyngeal swallow impairments   Concern for aspiration pneumonia. SLP 
albuterol TID     Dysphagia 2/2 pharyngeal swallow impairments   Concern for aspiration pneumonia. SLP eval/FEES suggestive of pharyngeal impairments  - ENT consulted with concerns for vocal cord impairment; no intervention necessary  - PEG placed 4/17/2024     Cardiovascular  New onset A fib with RVR, asymptomatic  Since 4/15, was started on amiodarone  gtt and diltizem gtt, asymptomatic. S/p 3 L fluids  - on oral amio   - d/c oral dilt   - Off cardizem now that his HR is stable        Infectious   Sepsis, resolving  - on zosyn 3375 mg q8h- day 7     Chronic Conditions  - T2DM: 40u lantus nightly, HDSSI, hypoglycemia protocol in place. Added scheduled lisinopril 5 units TID  - HTN: losartan 100mg QD, hold for SBP < 130, continue clonidine 0.1mg BID, norvasc 5 mg   - polyneuropathy 2/2 DM: gabapentin 600mg TID  - chronic back pain: percocet 10-325mg Q6H PRN, robaxin 500mg 4 times daily PRN      Code Status: Full Code   FEN: Diet NPO  ADULT TUBE FEEDING VOLUME BASED; Gastrostomy; Diabetic; 30; Continuous; 1,440; 24   PPX: Eliquis  DISPO: TBD    Rodney Castillo MD, PGY-1  Internal Medicine   04/21/24  9:06 AM    This patient will be staffed and discussed with attending physician Laz Cardenas MD    Addendum to Resident Progress Notes  Pt seen,examined and evaluated. I have reviewed the current history, physical findings, labs and assessment and plan and agree with note as documented by resident MD Laz Cardenas MD  Attending Physician  Hospitalist Service     
elevated to 16 on admission, downtrending to ~12.  CXR notable for mild consolidation of left lung. With hx of vomiting and possible aspiration, suspect aspiration > PNA. Concern for pt for possible blood in vomitus at home. CTA/P negative, Hb stable throughout admission, no further episodes of bleeding noted. Low suspicion for GIB at this time.  - S/p 5 days of Levaquin  - Procal WNL at 0.06  - wean O2 as able currently on 4-5L NC    Chronic Conditions  DM2  - holding home lantus 25u   - restarted at 25u nightly given BG's to 300+  - DC methylpred so will CTM  - hypoglycemia protocol in place  - High-dose sliding scale insulin     HTN  Softer pressures on 4/12; SBPs 100s to 110s. Holding parameters placed on antihypertensives  - amlodipine 5 MG daily  - clonidine 0.1 BID  - losartan 100mg daily     Polyneuropathy 2/2 to DM  - gabapentin      Chronic low back pain  - percocet  MG q6h PRN; held  - dilaudid 0.25-0.5mg q4 PRN   - will transition to IR oxy 10mg PRN after PEG  - continue movantik    Code Status: Full Code   FEN: Diet NPO Exceptions are: Sips of Water with Meds, Ice Chips   PPX: heparin gtt  DISPO: floor    Danny Smith, MELINDA  04/12/24  10:04 AM    This patient will be staffed and discussed with attending physician Fabricio Vega MD.     Addendum to Resident H& P/Progress note:  I have personally seen,examined and evaluated the patient. I have reviewed the current history, physical findings, labs and assessment and plan and agree with note as documented by MS 4, Danny Smith, and resident MD ( )    Accucheck Glucoses:   Recent Labs     04/11/24  1536 04/11/24  2116 04/12/24  0330 04/12/24  0803 04/12/24  1201   POCGLU 376* 298* 235* 262* 309*     Insulin regimen and anti-hypertensive meds were adjusted ( hypotension and TASHA).    Fabricio Vega MD, FACP    
many days; only about 2-300mL of free water with TF's. Cr 1.4 from 1.1 on 4/12    -Creatinine 1.2 this morning  -Discontinue LR 75ml/hr given restarted on tube feeds    Constipation  No bowel movement since past 6 days  Urine patient was on opioids and started on Movantik 12.5 Mg on 4/10/2024  - Ordered senna today  - Ordered tapwater enema  - PT OT  - Ambulate patient        Possible CAP vs Aspiration Pneumonia (resolving)  C/f upper GIB (resolved)  WBC elevated to 16 on admission, downtrending to ~12.  CXR notable for mild consolidation of left lung. With hx of vomiting and possible aspiration, suspect aspiration > PNA. Concern for pt for possible blood in vomitus at home. CTA/P negative, Hb stable throughout admission, no further episodes of bleeding noted. Low suspicion for GIB at this time.  - S/p 5 days of Levaquin  - Procal WNL at 0.06  - wean O2 as able currently on 4-5L NC    Chronic Conditions  DM2     - restarted home Lantus at 25u nightly given BG's to 300+  - DC methylpred so will CTM  - hypoglycemia protocol in place  - High-dose sliding scale insulin     HTN  Softer pressures on 4/12; SBPs 100s to 110s. Holding parameters placed on antihypertensives  - amlodipine 5 MG daily  - clonidine 0.1 BID  - losartan 100mg daily     Polyneuropathy 2/2 to DM  - gabapentin      Chronic low back pain  - percocet  MG q6h PRN; held  - dilaudid 0.25-0.5mg q4 PRN  - continue movantik    Code Status: Full Code   FEN: ADULT TUBE FEEDING VOLUME BASED; PEG; Diabetic; 80; Continuous; 1,440; 24   PPX: heparin gtt  DISPO: floor    Parthvi MD Bossman  04/13/24  8:32 AM    This patient will be staffed and discussed with attending physician aFbricio Vega MD.     Addendum to Resident H& P/Progress note:  I have personally seen,examined and evaluated the patient. I have reviewed the current history, physical findings, labs and assessment and plan and agree with note as documented by resident MD ( )      Fabricio Vega, 
in suspected long length of rehab  - continue SLP therapy     Cardiovascular  New onset A fib with RVR, asymptomatic  Since 4/15, was started on amiodarone  gtt and diltizem gtt, asymptomatic. S/p 3 L fluids  - on oral amio and dilt medications  - trending digoxin levels      GI  Diet: Diet NPO  ADULT TUBE FEEDING VOLUME BASED; Gastrostomy; Diabetic; 80; Continuous; 1,440; 24  GI ppx: none  Ok to use G tube for medications, not feeds    Infectious   Sepsis, resolving  - on zosyn and vanc  - not trending LA as last value was 0.9    Chronic Conditions  - T2DM: 40u lantus nightly, HDSSI, hypoglycemia protocol in place  - HTN: losartan 100mg QD, hold for SBP < 130, holding clonidine 0.1mg BID  - polyneuropathy 2/2 DM: gabapentin 600mg TID  - chronic back pain: percocet 10-325mg Q6H PRN, robaxin 500mg 4 times daily PRN    Code Status: Full Code   PPX:   DISPO:     Kirk Burk DO, PGY-1  Internal Medicine Resident  Contact via [email or web address]  04/19/24  7:19 AM  Patient examined, findings as discussed with Dr. Burk.  Agree with assessment and plan.  Post bronchoscopy to clear mucoid impaction of right-sided airways, he has significantly improved airflow and right upper lobe aeration on chest x-ray.  He is coughing secretions.  On spontaneous breathing trial, he demonstrates excellent ventilation and gas exchange and is consequently extubated.  No respiratory difficulty postextubation.  He has adequate voice.  He is completing empiric antibiotic therapy.  Nutrition provided per enteral feeding via PEG tube.  Blood pressure tiffanie significantly off of propofol infusion.  He is requiring additional antihypertensive therapy to regain control.  He needs to continue efforts to rehabilitate neuromuscular function.  
lengthy    Strategies: (with ice chips only)  Upright  one ice chip at a time  effortful swallow with ice chips and secretions.     Discharge Recommendation:will require ongoing treatment  Discussed with Eli CASTILLO  Needs met prior to leaving room, call light within reach    Gladys Caballero, SLP, [id number]  Ph. # [phone]    This document will serve as a dc summary if pt dc prior to next visit

## 2024-04-24 NOTE — PLAN OF CARE
I have spoken with the referring physician from [facility] ED regarding this patient and have reviewed the chart and images. 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 There is fairly severe proximal L PCA stenosis without occlusion, but this would not explain his symptoms.  Last prior imaging was in 2018 and showed patent L VA.  Briefly, patient is appropriate for admission for vertigo / dizziness and to rule out stroke.      The immediate plan of care will involve admission for observation. I have recommended MRI to see if there is any posterior circulation ischemia. I do NOT recommend starting heparin in the absence of confirmed acute ischemia given his recent possible hematemesis as his balance symptoms may not be ischemic and the risk of anticoagulation is not worthwhile.  If acute ischemia is confirmed, consider Neuro protocol heparin gtt (no bolus, low therapeutic target).    
  Problem: Chronic Conditions and Co-morbidities  Goal: Patient's chronic conditions and co-morbidity symptoms are monitored and maintained or improved  4/23/2024 2200 by Liana Johnson RN  Outcome: Progressing   TODAY'S DATE:  4/23/2024      Current NIHSS 3      Discussed personal risk factors for Stroke/TIA with patient/family, and ways to reduce the risk for a recurrent stroke.     Patient's personal risk factors which were identified are:   []   Alcohol Abuse: check with your physician before any alcohol consumption.   [x]   Atrial fibrillation: may cause blood clots  []   Drug Abuse: Seek help, talk with your doctor  []   Clotting Disorder  [x]   Diabetes  [x]   Family history of stroke or heart disease  [x]   High Blood Pressure/Hypertension: work with your physician  [x]   High cholesterol: monitor cholesterol levels with your physician  [x]   Overweight/Obesity: work with your physician for your ideal body weight  [x]   Physical Inactivity: get regular exercise as directed by your physician  [x]   Personal history of previous TIA or stroke  []   Poor Diet: decrease salt (sodium) in your diet, follow diet directed by physician  []   Smoking: stop smoking      Reviewed the Following Education with Patient and/or Family:   - Signs and Symptoms of Stroke  - Personal risk factors   - How to activate EMS (911)   - Importance of Follow Up Appointments at Discharge   - Importance of Compliance with Medications Prescribed at Discharge  - Available community resources and stroke advocacy groups if needed    Patient and/or family member: education needs reinforcement.     Stroke Education booklet given to patient/family (or verified, if given already), which reviews above information. N/A         Electronically signed by Liana Johnson RN on 4/23/2024 at 10:01 PM       
  Problem: Discharge Planning  Goal: Discharge to home or other facility with appropriate resources  4/17/2024 1129 by Esteban Espinoza, RN  Outcome: Progressing  Note: Internal medicine following for management of vertebral artery occlusion. Planning for gastrostomy tube placement today. Patient updated and educated on barriers to discharge and plan of care.     Problem: Pain  Goal: Verbalizes/displays adequate comfort level or baseline comfort level  4/17/2024 1129 by Esteban Espinoza, RN  Outcome: Progressing  Note: Patient currently endorsing pain to his left arm and back rated at 9/10. Stated tolerable pain goal is 5-7. Pain currently managed per MAR and non-pharm measures such as rest and repositioning to reduce pain and promote comfort.     
  Problem: Neurosensory - Adult  Goal: Achieves maximal functionality and self care  Outcome: Not Progressing     Problem: Neurosensory - Adult  Goal: Achieves maximal functionality and self care  Outcome: Not Progressing     
  Problem: Neurosensory - Adult  Goal: Achieves stable or improved neurological status  4/10/2024 1959 by Ayaka Hester RN  Outcome: Progressing  4/10/2024 1921 by Sadie Trevino RN  Outcome: Progressing  Flowsheets  Taken 4/10/2024 1200 by Ayaka Hester RN  Achieves stable or improved neurological status: Assess for and report changes in neurological status  Taken 4/10/2024 0737 by Ayaka Hester RN  Achieves stable or improved neurological status: Assess for and report changes in neurological status  Goal: Achieves maximal functionality and self care  4/10/2024 1959 by Ayaka Hester RN  Outcome: Progressing  4/10/2024 1921 by Sadie Trevino RN  Outcome: Progressing  Flowsheets  Taken 4/10/2024 1200 by Ayaka Hester RN  Achieves maximal functionality and self care: Monitor swallowing and airway patency with patient fatigue and changes in neurological status  Taken 4/10/2024 0737 by Ayaka Hester RN  Achieves maximal functionality and self care: Monitor swallowing and airway patency with patient fatigue and changes in neurological status     Problem: Discharge Planning  Goal: Discharge to home or other facility with appropriate resources  4/10/2024 1959 by Ayaka Hester RN  Outcome: Progressing  4/10/2024 1921 by Sadie Trevino RN  Outcome: Progressing  Flowsheets (Taken 4/10/2024 0737 by Ayaka Hester RN)  Discharge to home or other facility with appropriate resources: Identify barriers to discharge with patient and caregiver     Problem: Pain  Goal: Verbalizes/displays adequate comfort level or baseline comfort level  4/10/2024 1959 by Ayaka Hester RN  Outcome: Progressing  4/10/2024 1921 by Sadie Trevino RN  Outcome: Progressing  Flowsheets  Taken 4/10/2024 1600 by Ayaka Hester RN  Verbalizes/displays adequate comfort level or baseline comfort level: Encourage patient to monitor pain and request assistance  Taken 4/10/2024 1225 by Ayaka Hester RN  Verbalizes/displays adequate 
  Problem: Neurosensory - Adult  Goal: Achieves stable or improved neurological status  4/12/2024 2327 by Rayne Andrews, RN  Outcome: Progressing     Problem: Neurosensory - Adult  Goal: Achieves maximal functionality and self care  Outcome: Progressing     Problem: Discharge Planning  Goal: Discharge to home or other facility with appropriate resources  4/12/2024 2327 by Rayne Andrews, RN  Outcome: Progressing     Problem: Pain  Goal: Verbalizes/displays adequate comfort level or baseline comfort level  Outcome: Progressing     Problem: Safety - Adult  Goal: Free from fall injury  Outcome: Progressing     Problem: Skin/Tissue Integrity  Goal: Absence of new skin breakdown  Description: 1.  Monitor for areas of redness and/or skin breakdown  2.  Assess vascular access sites hourly  3.  Every 4-6 hours minimum:  Change oxygen saturation probe site  4.  Every 4-6 hours:  If on nasal continuous positive airway pressure, respiratory therapy assess nares and determine need for appliance change or resting period.  Outcome: Progressing     
  Problem: Neurosensory - Adult  Goal: Achieves stable or improved neurological status  4/13/2024 1143 by Eli Turner RN  Outcome: Progressing  Flowsheets (Taken 4/13/2024 0800)  Achieves stable or improved neurological status:   Assess for and report changes in neurological status   Initiate measures to prevent increased intracranial pressure   Maintain blood pressure and fluid volume within ordered parameters to optimize cerebral perfusion and minimize risk of hemorrhage     Problem: Neurosensory - Adult  Goal: Achieves maximal functionality and self care  4/13/2024 1143 by Eli Turner RN  Outcome: Progressing  Flowsheets (Taken 4/13/2024 0800)  Achieves maximal functionality and self care:   Monitor swallowing and airway patency with patient fatigue and changes in neurological status   Encourage and assist patient to increase activity and self care with guidance from physical therapy/occupational therapy     Problem: Discharge Planning  Goal: Discharge to home or other facility with appropriate resources  4/13/2024 1143 by Eli Turner RN  Outcome: Progressing  Flowsheets (Taken 4/13/2024 0800)  Discharge to home or other facility with appropriate resources:   Identify barriers to discharge with patient and caregiver   Identify discharge learning needs (meds, wound care, etc)   Arrange for needed discharge resources and transportation as appropriate     Problem: Pain  Goal: Verbalizes/displays adequate comfort level or baseline comfort level  4/13/2024 1143 by Eli Turner RN  Outcome: Progressing  Flowsheets (Taken 4/13/2024 0800)  Verbalizes/displays adequate comfort level or baseline comfort level:   Encourage patient to monitor pain and request assistance   Assess pain using appropriate pain scale   Administer analgesics based on type and severity of pain and evaluate response     Problem: Safety - Adult  Goal: Free from fall injury  4/13/2024 1143 by Eli Turner, JONATHAN  Outcome: 
  Problem: Neurosensory - Adult  Goal: Achieves stable or improved neurological status  4/15/2024 1741 by Linsey Lindsey RN  Outcome: Not Progressing  4/15/2024 0806 by Veronica Langford RN  Outcome: Progressing  Flowsheets (Taken 4/15/2024 0800 by Linsey Lindsey RN)  Achieves stable or improved neurological status:   Assess for and report changes in neurological status   Initiate measures to prevent increased intracranial pressure  Goal: Achieves maximal functionality and self care  Outcome: Not Progressing  Flowsheets (Taken 4/15/2024 0800)  Achieves maximal functionality and self care: Monitor swallowing and airway patency with patient fatigue and changes in neurological status     Problem: Cardiovascular - Adult  Goal: Maintains optimal cardiac output and hemodynamic stability  4/15/2024 1741 by Linsey Lindsey RN  Outcome: Not Progressing  4/15/2024 0806 by Veronica Langford RN  Outcome: Progressing  Goal: Absence of cardiac dysrhythmias or at baseline  Outcome: Not Progressing     Problem: Infection - Adult  Goal: Absence of infection during hospitalization  Outcome: Not Progressing     Problem: Neurosensory - Adult  Goal: Achieves stable or improved neurological status  4/15/2024 1741 by Linsey Lindsey RN  Outcome: Not Progressing  4/15/2024 0806 by Veronica Langford RN  Outcome: Progressing  Flowsheets (Taken 4/15/2024 0800 by Linsey Lindsey RN)  Achieves stable or improved neurological status:   Assess for and report changes in neurological status   Initiate measures to prevent increased intracranial pressure  Goal: Achieves maximal functionality and self care  Outcome: Not Progressing  Flowsheets (Taken 4/15/2024 0800)  Achieves maximal functionality and self care: Monitor swallowing and airway patency with patient fatigue and changes in neurological status     Problem: Cardiovascular - Adult  Goal: Maintains optimal cardiac output and hemodynamic stability  4/15/2024 1741 by Linsey Lindsey 
  Problem: Neurosensory - Adult  Goal: Achieves stable or improved neurological status  4/20/2024 0412 by Connor Thurston, RN  Outcome: Progressing  Flowsheets (Taken 4/19/2024 2000)  Achieves stable or improved neurological status:   Assess for and report changes in neurological status   Initiate measures to prevent increased intracranial pressure   Maintain blood pressure and fluid volume within ordered parameters to optimize cerebral perfusion and minimize risk of hemorrhage   Monitor temperature, glucose, and sodium. Initiate appropriate interventions as ordered  4/19/2024 1658 by Niru Arora, RN  Outcome: Progressing  Flowsheets  Taken 4/19/2024 1200  Achieves stable or improved neurological status:   Initiate measures to prevent increased intracranial pressure   Maintain blood pressure and fluid volume within ordered parameters to optimize cerebral perfusion and minimize risk of hemorrhage   Monitor temperature, glucose, and sodium. Initiate appropriate interventions as ordered  Taken 4/19/2024 0800  Achieves stable or improved neurological status:   Assess for and report changes in neurological status   Initiate measures to prevent increased intracranial pressure   Maintain blood pressure and fluid volume within ordered parameters to optimize cerebral perfusion and minimize risk of hemorrhage  Goal: Achieves maximal functionality and self care  4/20/2024 0412 by Connor Thurston, RN  Outcome: Progressing  Flowsheets (Taken 4/19/2024 2000)  Achieves maximal functionality and self care:   Monitor swallowing and airway patency with patient fatigue and changes in neurological status   Encourage and assist patient to increase activity and self care with guidance from physical therapy/occupational therapy   Encourage visually impaired, hearing impaired and aphasic patients to use assistive/communication devices  4/19/2024 1658 by Niru Arora, RN  Outcome: Progressing  Flowsheets  Taken 4/19/2024 
  Problem: Neurosensory - Adult  Goal: Achieves stable or improved neurological status  4/21/2024 2244 by Connor Thurston RN  Outcome: Progressing  Flowsheets (Taken 4/21/2024 2000)  Achieves stable or improved neurological status:   Assess for and report changes in neurological status   Initiate measures to prevent increased intracranial pressure   Maintain blood pressure and fluid volume within ordered parameters to optimize cerebral perfusion and minimize risk of hemorrhage   Monitor temperature, glucose, and sodium. Initiate appropriate interventions as ordered  4/21/2024 1251 by Fariha Arora RN  Outcome: Progressing  Flowsheets (Taken 4/21/2024 0800)  Achieves stable or improved neurological status:   Initiate measures to prevent increased intracranial pressure   Assess for and report changes in neurological status   Maintain blood pressure and fluid volume within ordered parameters to optimize cerebral perfusion and minimize risk of hemorrhage   Monitor temperature, glucose, and sodium. Initiate appropriate interventions as ordered  Goal: Achieves maximal functionality and self care  4/21/2024 2244 by Connor Thurston RN  Outcome: Progressing  Flowsheets (Taken 4/21/2024 2000)  Achieves maximal functionality and self care:   Monitor swallowing and airway patency with patient fatigue and changes in neurological status   Encourage and assist patient to increase activity and self care with guidance from physical therapy/occupational therapy   Encourage visually impaired, hearing impaired and aphasic patients to use assistive/communication devices  4/21/2024 1251 by Fariha Arora, RN  Outcome: Progressing  Flowsheets (Taken 4/21/2024 0800)  Achieves maximal functionality and self care:   Encourage and assist patient to increase activity and self care with guidance from physical therapy/occupational therapy   Monitor swallowing and airway patency with patient fatigue and changes in neurological 
  Problem: Neurosensory - Adult  Goal: Achieves stable or improved neurological status  4/22/2024 1017 by Fariha Arora RN  Outcome: Progressing  Flowsheets (Taken 4/22/2024 0800)  Achieves stable or improved neurological status:   Initiate measures to prevent increased intracranial pressure   Assess for and report changes in neurological status   Maintain blood pressure and fluid volume within ordered parameters to optimize cerebral perfusion and minimize risk of hemorrhage   Monitor temperature, glucose, and sodium. Initiate appropriate interventions as ordered  4/21/2024 2244 by Connor Thurston, RN  Outcome: Progressing  Flowsheets (Taken 4/21/2024 2000)  Achieves stable or improved neurological status:   Assess for and report changes in neurological status   Initiate measures to prevent increased intracranial pressure   Maintain blood pressure and fluid volume within ordered parameters to optimize cerebral perfusion and minimize risk of hemorrhage   Monitor temperature, glucose, and sodium. Initiate appropriate interventions as ordered  Goal: Achieves maximal functionality and self care  4/22/2024 1017 by Fariha Arora RN  Outcome: Progressing  Flowsheets (Taken 4/22/2024 0800)  Achieves maximal functionality and self care:   Encourage and assist patient to increase activity and self care with guidance from physical therapy/occupational therapy   Monitor swallowing and airway patency with patient fatigue and changes in neurological status   Encourage visually impaired, hearing impaired and aphasic patients to use assistive/communication devices  4/21/2024 2244 by Connor Thurston, RN  Outcome: Progressing  Flowsheets (Taken 4/21/2024 2000)  Achieves maximal functionality and self care:   Monitor swallowing and airway patency with patient fatigue and changes in neurological status   Encourage and assist patient to increase activity and self care with guidance from physical therapy/occupational 
  Problem: Neurosensory - Adult  Goal: Achieves stable or improved neurological status  4/7/2024 1934 by Keenan Still RN  Outcome: Progressing     Problem: Discharge Planning  Goal: Discharge to home or other facility with appropriate resources  4/7/2024 1934 by Keenan Still RN  Outcome: Progressing     Problem: Safety - Adult  Goal: Free from fall injury  4/7/2024 1934 by Keenan Still RN  Outcome: Progressing     Problem: Skin/Tissue Integrity  Goal: Absence of new skin breakdown  Description: 1.  Monitor for areas of redness and/or skin breakdown  2.  Assess vascular access sites hourly  3.  Every 4-6 hours minimum:  Change oxygen saturation probe site  4.  Every 4-6 hours:  If on nasal continuous positive airway pressure, respiratory therapy assess nares and determine need for appliance change or resting period.  4/7/2024 1934 by Keenan Still RN  Outcome: Progressing     Problem: ABCDS Injury Assessment  Goal: Absence of physical injury  4/7/2024 1934 by Keenan Still RN  Outcome: Progressing     
  Problem: Neurosensory - Adult  Goal: Achieves stable or improved neurological status  4/8/2024 2117 by Eleanor Valenzuela RN  Outcome: Progressing  Flowsheets  Taken 4/8/2024 2000 by Eleanor Valenzuela RN  Achieves stable or improved neurological status:   Assess for and report changes in neurological status   Initiate measures to prevent increased intracranial pressure   Monitor temperature, glucose, and sodium. Initiate appropriate interventions as ordered   Maintain blood pressure and fluid volume within ordered parameters to optimize cerebral perfusion and minimize risk of hemorrhage       Problem: Neurosensory - Adult  Goal: Achieves maximal functionality and self care  4/8/2024 2117 by Eleanor Valenzuela RN  Outcome: Progressing  Flowsheets  Taken 4/8/2024 2000 by Eleanor Valenzuela RN  Achieves maximal functionality and self care: Monitor swallowing and airway patency with patient fatigue and changes in neurological status       Problem: Discharge Planning  Goal: Discharge to home or other facility with appropriate resources  4/8/2024 2117 by Eleanor Valenzuela RN  Outcome: Progressing  Flowsheets  Taken 4/8/2024 2000 by Eleanor Valenzuela RN  Discharge to home or other facility with appropriate resources:   Identify barriers to discharge with patient and caregiver   Arrange for needed discharge resources and transportation as appropriate   Identify discharge learning needs (meds, wound care, etc)       Problem: Pain  Goal: Verbalizes/displays adequate comfort level or baseline comfort level  4/8/2024 2117 by Eleanor Valenzuela RN  Outcome: Progressing       Problem: Safety - Adult  Goal: Free from fall injury  4/8/2024 2117 by Eleanor Valenzuela RN  Outcome: Progressing  Flowsheets (Taken 4/8/2024 2116)  Free From Fall Injury: Instruct family/caregiver on patient safety     Problem: Skin/Tissue Integrity  Goal: Absence of new skin breakdown  Description: 1.  Monitor for areas of redness and/or skin 
  Problem: Neurosensory - Adult  Goal: Achieves stable or improved neurological status  4/9/2024 2214 by Eleanor Valenzuela RN  Outcome: Progressing  Flowsheets  Taken 4/9/2024 2000 by Eleanor Valenzuela RN  Achieves stable or improved neurological status:   Assess for and report changes in neurological status   Initiate measures to prevent increased intracranial pressure   Maintain blood pressure and fluid volume within ordered parameters to optimize cerebral perfusion and minimize risk of hemorrhage   Monitor temperature, glucose, and sodium. Initiate appropriate interventions as ordered  Taken 4/9/2024 1800 by Ayaka Hester RN  Achieves stable or improved neurological status: Assess for and report changes in neurological status     Problem: Neurosensory - Adult  Goal: Achieves maximal functionality and self care  4/9/2024 2214 by Eleanor Valenzuela RN  Outcome: Not Progressing  Flowsheets  Taken 4/9/2024 2000 by Eleanor Valenzuela RN  Achieves maximal functionality and self care:   Monitor swallowing and airway patency with patient fatigue and changes in neurological status   Encourage and assist patient to increase activity and self care with guidance from physical therapy/occupational therapy   Encourage visually impaired, hearing impaired and aphasic patients to use assistive/communication devices  Taken 4/9/2024 1800 by Ayaka Hester RN  Achieves maximal functionality and self care: Monitor swallowing and airway patency with patient fatigue and changes in neurological status     Problem: Discharge Planning  Goal: Discharge to home or other facility with appropriate resources  4/9/2024 2214 by Eleanor Valenzuela RN  Outcome: Progressing  Flowsheets (Taken 4/9/2024 2000)  Discharge to home or other facility with appropriate resources:   Identify barriers to discharge with patient and caregiver   Arrange for needed discharge resources and transportation as appropriate   Identify discharge learning needs 
  Problem: Neurosensory - Adult  Goal: Achieves stable or improved neurological status  Outcome: Progressing     Problem: Discharge Planning  Goal: Discharge to home or other facility with appropriate resources  Outcome: Progressing  Flowsheets (Taken 4/12/2024 0800)  Discharge to home or other facility with appropriate resources: Identify barriers to discharge with patient and caregiver     
  Problem: Neurosensory - Adult  Goal: Achieves stable or improved neurological status  Outcome: Progressing     Problem: Neurosensory - Adult  Goal: Achieves maximal functionality and self care  Outcome: Progressing     Problem: Respiratory - Adult  Goal: Achieves optimal ventilation and oxygenation  Outcome: Progressing     Problem: Cardiovascular - Adult  Goal: Maintains optimal cardiac output and hemodynamic stability  Outcome: Progressing     Problem: Cardiovascular - Adult  Goal: Absence of cardiac dysrhythmias or at baseline  Outcome: Progressing     Problem: Infection - Adult  Goal: Absence of infection during hospitalization  Outcome: Progressing     Problem: Discharge Planning  Goal: Discharge to home or other facility with appropriate resources  Outcome: Progressing     Problem: Pain  Goal: Verbalizes/displays adequate comfort level or baseline comfort level  Outcome: Progressing     Problem: Safety - Adult  Goal: Free from fall injury  Outcome: Progressing     Problem: Skin/Tissue Integrity  Goal: Absence of new skin breakdown  Description: 1.  Monitor for areas of redness and/or skin breakdown  2.  Assess vascular access sites hourly  3.  Every 4-6 hours minimum:  Change oxygen saturation probe site  4.  Every 4-6 hours:  If on nasal continuous positive airway pressure, respiratory therapy assess nares and determine need for appliance change or resting period.  Outcome: Progressing     Problem: ABCDS Injury Assessment  Goal: Absence of physical injury  Outcome: Progressing     Problem: Chronic Conditions and Co-morbidities  Goal: Patient's chronic conditions and co-morbidity symptoms are monitored and maintained or improved  Outcome: Progressing     Problem: Nutrition Deficit:  Goal: Optimize nutritional status  Outcome: Progressing     
  Problem: Neurosensory - Adult  Goal: Achieves stable or improved neurological status  Outcome: Progressing   Alert and oriented x 4.  Lethargic overnight.  Lethargy resolved by morning.    Problem: Respiratory - Adult  Goal: Achieves optimal ventilation and oxygenation  Outcome: Progressing   Occasional desat with attempting to cough up mucus, 86%; otherwise, Sats 92-95% 4 L NC     Problem: Cardiovascular - Adult  Goal: Maintains optimal cardiac output and hemodynamic stability  Outcome: Progressing   A-fib RVR overnight with BP's 60's.  BP improved with LR bolus's.     Problem: Discharge Planning  Goal: Discharge to home or other facility with appropriate resources  Outcome: Progressing     Problem: Pain  Goal: Verbalizes/displays adequate comfort level or baseline comfort level  Outcome: Progressing   C/o lower back pain, medicated with robaxin and roxicodone.    Problem: Safety - Adult  Goal: Free from fall injury  Outcome: Progressing   Fall precautions in place.  Bed alarm activated, low position, wheels locked.  Call light and belongings within reach.  Continue to monitor safety.    Problem: Skin/Tissue Integrity  Goal: Absence of new skin breakdown  Description: 1.  Monitor for areas of redness and/or skin breakdown  2.  Assess vascular access sites hourly  3.  Every 4-6 hours minimum:  Change oxygen saturation probe site  4.  Every 4-6 hours:  If on nasal continuous positive airway pressure, respiratory therapy assess nares and determine need for appliance change or resting period.  Outcome: Progressing   Scattered bruising.  Vascular discoloration to BLE.  Redness to buttocks, sacral heart intact.  Patient repositioned with pillow support, heels elevated.  Specialty mattress.      
  Problem: Neurosensory - Adult  Goal: Achieves stable or improved neurological status  Outcome: Progressing  Flowsheets  Taken 4/10/2024 1200 by Ayaka Hester RN  Achieves stable or improved neurological status: Assess for and report changes in neurological status  Taken 4/10/2024 0737 by Ayaka Hester RN  Achieves stable or improved neurological status: Assess for and report changes in neurological status  Goal: Achieves maximal functionality and self care  Outcome: Progressing  Flowsheets  Taken 4/10/2024 1200 by Ayaka Hester RN  Achieves maximal functionality and self care: Monitor swallowing and airway patency with patient fatigue and changes in neurological status  Taken 4/10/2024 0737 by Ayaka Hester RN  Achieves maximal functionality and self care: Monitor swallowing and airway patency with patient fatigue and changes in neurological status     Problem: Discharge Planning  Goal: Discharge to home or other facility with appropriate resources  Outcome: Progressing  Flowsheets (Taken 4/10/2024 0737 by Ayaka Hester RN)  Discharge to home or other facility with appropriate resources: Identify barriers to discharge with patient and caregiver     Problem: Pain  Goal: Verbalizes/displays adequate comfort level or baseline comfort level  Outcome: Progressing  Flowsheets  Taken 4/10/2024 1600 by Ayaka Hester RN  Verbalizes/displays adequate comfort level or baseline comfort level: Encourage patient to monitor pain and request assistance  Taken 4/10/2024 1225 by Ayaka Hester RN  Verbalizes/displays adequate comfort level or baseline comfort level: Encourage patient to monitor pain and request assistance  Taken 4/10/2024 1200 by Ayaka Hester RN  Verbalizes/displays adequate comfort level or baseline comfort level: Encourage patient to monitor pain and request assistance  Taken 4/10/2024 1000 by Ayaka Hseter RN  Verbalizes/displays adequate comfort level or baseline comfort level: Encourage 
  Problem: Neurosensory - Adult  Goal: Achieves stable or improved neurological status  Outcome: Progressing  Flowsheets  Taken 4/19/2024 1200  Achieves stable or improved neurological status:   Initiate measures to prevent increased intracranial pressure   Maintain blood pressure and fluid volume within ordered parameters to optimize cerebral perfusion and minimize risk of hemorrhage   Monitor temperature, glucose, and sodium. Initiate appropriate interventions as ordered  Taken 4/19/2024 0800  Achieves stable or improved neurological status:   Assess for and report changes in neurological status   Initiate measures to prevent increased intracranial pressure   Maintain blood pressure and fluid volume within ordered parameters to optimize cerebral perfusion and minimize risk of hemorrhage  Goal: Achieves maximal functionality and self care  Outcome: Progressing  Flowsheets  Taken 4/19/2024 1200  Achieves maximal functionality and self care:   Monitor swallowing and airway patency with patient fatigue and changes in neurological status   Encourage and assist patient to increase activity and self care with guidance from physical therapy/occupational therapy  Taken 4/19/2024 0800  Achieves maximal functionality and self care:   Encourage and assist patient to increase activity and self care with guidance from physical therapy/occupational therapy   Encourage visually impaired, hearing impaired and aphasic patients to use assistive/communication devices   Monitor swallowing and airway patency with patient fatigue and changes in neurological status     Problem: Discharge Planning  Goal: Discharge to home or other facility with appropriate resources  Outcome: Progressing     Problem: Pain  Goal: Verbalizes/displays adequate comfort level or baseline comfort level  Outcome: Progressing  Flowsheets  Taken 4/19/2024 1042  Verbalizes/displays adequate comfort level or baseline comfort level:   Assess pain using appropriate 
  Problem: Neurosensory - Adult  Goal: Achieves stable or improved neurological status  Outcome: Progressing  Flowsheets  Taken 4/8/2024 1200  Achieves stable or improved neurological status: Assess for and report changes in neurological status  Taken 4/8/2024 0749  Achieves stable or improved neurological status: Assess for and report changes in neurological status  Goal: Achieves maximal functionality and self care  Outcome: Progressing  Flowsheets  Taken 4/8/2024 1200  Achieves maximal functionality and self care: Monitor swallowing and airway patency with patient fatigue and changes in neurological status  Taken 4/8/2024 0749  Achieves maximal functionality and self care: Monitor swallowing and airway patency with patient fatigue and changes in neurological status     Problem: Discharge Planning  Goal: Discharge to home or other facility with appropriate resources  Outcome: Progressing  Flowsheets  Taken 4/8/2024 1200  Discharge to home or other facility with appropriate resources: Identify barriers to discharge with patient and caregiver  Taken 4/8/2024 0749  Discharge to home or other facility with appropriate resources: Identify barriers to discharge with patient and caregiver     Problem: Pain  Goal: Verbalizes/displays adequate comfort level or baseline comfort level  Outcome: Progressing  Flowsheets  Taken 4/8/2024 1200  Verbalizes/displays adequate comfort level or baseline comfort level: Encourage patient to monitor pain and request assistance  Taken 4/8/2024 1000  Verbalizes/displays adequate comfort level or baseline comfort level: Encourage patient to monitor pain and request assistance  Taken 4/8/2024 0749  Verbalizes/displays adequate comfort level or baseline comfort level: Encourage patient to monitor pain and request assistance     Problem: Safety - Adult  Goal: Free from fall injury  Outcome: Progressing  Flowsheets (Taken 4/8/2024 1017)  Free From Fall Injury: Instruct family/caregiver on 
  Problem: Neurosensory - Adult  Goal: Achieves stable or improved neurological status  Outcome: Progressing  Flowsheets (Taken 4/11/2024 0800)  Achieves stable or improved neurological status:   Assess for and report changes in neurological status   Monitor temperature, glucose, and sodium. Initiate appropriate interventions as ordered  Goal: Achieves maximal functionality and self care  Outcome: Progressing  Flowsheets (Taken 4/11/2024 0800)  Achieves maximal functionality and self care:   Monitor swallowing and airway patency with patient fatigue and changes in neurological status   Encourage and assist patient to increase activity and self care with guidance from physical therapy/occupational therapy     Problem: Discharge Planning  Goal: Discharge to home or other facility with appropriate resources  Outcome: Progressing     Problem: Pain  Goal: Verbalizes/displays adequate comfort level or baseline comfort level  Outcome: Progressing  Flowsheets (Taken 4/11/2024 0800)  Verbalizes/displays adequate comfort level or baseline comfort level:   Assess pain using appropriate pain scale   Administer analgesics based on type and severity of pain and evaluate response     Problem: Safety - Adult  Goal: Free from fall injury  Outcome: Progressing  Flowsheets (Taken 4/11/2024 0800)  Free From Fall Injury: Based on caregiver fall risk screen, instruct family/caregiver to ask for assistance with transferring infant if caregiver noted to have fall risk factors     Problem: Skin/Tissue Integrity  Goal: Absence of new skin breakdown  Description: 1.  Monitor for areas of redness and/or skin breakdown  2.  Assess vascular access sites hourly  3.  Every 4-6 hours minimum:  Change oxygen saturation probe site  4.  Every 4-6 hours:  If on nasal continuous positive airway pressure, respiratory therapy assess nares and determine need for appliance change or resting period.  Outcome: Progressing     Problem: ABCDS Injury 
  Problem: Neurosensory - Adult  Goal: Achieves stable or improved neurological status  Outcome: Progressing  Flowsheets (Taken 4/17/2024 2015)  Achieves stable or improved neurological status:   Assess for and report changes in neurological status   Initiate measures to prevent increased intracranial pressure   Maintain blood pressure and fluid volume within ordered parameters to optimize cerebral perfusion and minimize risk of hemorrhage   Monitor temperature, glucose, and sodium. Initiate appropriate interventions as ordered  Goal: Achieves maximal functionality and self care  Outcome: Progressing  Flowsheets (Taken 4/17/2024 2015)  Achieves maximal functionality and self care: Monitor swallowing and airway patency with patient fatigue and changes in neurological status     Problem: Discharge Planning  Goal: Discharge to home or other facility with appropriate resources  Outcome: Progressing  Flowsheets (Taken 4/17/2024 2015)  Discharge to home or other facility with appropriate resources:   Identify barriers to discharge with patient and caregiver   Identify discharge learning needs (meds, wound care, etc)     Problem: Pain  Goal: Verbalizes/displays adequate comfort level or baseline comfort level  Outcome: Progressing  Flowsheets (Taken 4/17/2024 2015)  Verbalizes/displays adequate comfort level or baseline comfort level: Assess pain using appropriate pain scale     Problem: Safety - Adult  Goal: Free from fall injury  Outcome: Progressing     Problem: Skin/Tissue Integrity  Goal: Absence of new skin breakdown  Description: 1.  Monitor for areas of redness and/or skin breakdown  2.  Assess vascular access sites hourly  3.  Every 4-6 hours minimum:  Change oxygen saturation probe site  4.  Every 4-6 hours:  If on nasal continuous positive airway pressure, respiratory therapy assess nares and determine need for appliance change or resting period.  Outcome: Progressing     Problem: ABCDS Injury Assessment  Goal: 
  Problem: Neurosensory - Adult  Goal: Achieves stable or improved neurological status  Outcome: Progressing  Flowsheets (Taken 4/22/2024 2000)  Achieves stable or improved neurological status:   Assess for and report changes in neurological status   Initiate measures to prevent increased intracranial pressure   Maintain blood pressure and fluid volume within ordered parameters to optimize cerebral perfusion and minimize risk of hemorrhage   Monitor temperature, glucose, and sodium. Initiate appropriate interventions as ordered  Goal: Achieves maximal functionality and self care  Outcome: Progressing  Flowsheets (Taken 4/22/2024 2000)  Achieves maximal functionality and self care:   Monitor swallowing and airway patency with patient fatigue and changes in neurological status   Encourage and assist patient to increase activity and self care with guidance from physical therapy/occupational therapy   Encourage visually impaired, hearing impaired and aphasic patients to use assistive/communication devices     Problem: Respiratory - Adult  Goal: Achieves optimal ventilation and oxygenation  Outcome: Progressing  Flowsheets (Taken 4/22/2024 2000)  Achieves optimal ventilation and oxygenation:   Assess for changes in respiratory status   Assess for changes in mentation and behavior   Position to facilitate oxygenation and minimize respiratory effort   Oxygen supplementation based on oxygen saturation or arterial blood gases   Initiate smoking cessation protocol as indicated     Problem: Cardiovascular - Adult  Goal: Maintains optimal cardiac output and hemodynamic stability  Outcome: Progressing  Flowsheets (Taken 4/22/2024 2000)  Maintains optimal cardiac output and hemodynamic stability:   Monitor blood pressure and heart rate   Monitor urine output and notify Licensed Independent Practitioner for values outside of normal range   Assess for signs of decreased cardiac output   Administer fluid and/or volume expanders as 
  Problem: Neurosensory - Adult  Goal: Achieves stable or improved neurological status  Outcome: Progressing  Flowsheets (Taken 4/7/2024 1924)  Achieves stable or improved neurological status:   Assess for and report changes in neurological status   Initiate measures to prevent increased intracranial pressure   Maintain blood pressure and fluid volume within ordered parameters to optimize cerebral perfusion and minimize risk of hemorrhage   Monitor temperature, glucose, and sodium. Initiate appropriate interventions as ordered     Problem: Neurosensory - Adult  Goal: Achieves maximal functionality and self care  Outcome: Progressing  Flowsheets (Taken 4/7/2024 1924)  Achieves maximal functionality and self care:   Monitor swallowing and airway patency with patient fatigue and changes in neurological status   Encourage and assist patient to increase activity and self care with guidance from physical therapy/occupational therapy     Problem: Discharge Planning  Goal: Discharge to home or other facility with appropriate resources  Outcome: Progressing  Flowsheets (Taken 4/7/2024 1924)  Discharge to home or other facility with appropriate resources:   Identify barriers to discharge with patient and caregiver   Arrange for needed discharge resources and transportation as appropriate   Identify discharge learning needs (meds, wound care, etc)   Refer to discharge planning if patient needs post-hospital services based on physician order or complex needs related to functional status, cognitive ability or social support system     Problem: Pain  Goal: Verbalizes/displays adequate comfort level or baseline comfort level  Outcome: Progressing  Flowsheets  Taken 4/7/2024 1924  Verbalizes/displays adequate comfort level or baseline comfort level:   Encourage patient to monitor pain and request assistance   Assess pain using appropriate pain scale   Administer analgesics based on type and severity of pain and evaluate response   
  Problem: Neurosensory - Adult  Goal: Achieves stable or improved neurological status  Outcome: Progressing  Goal: Achieves maximal functionality and self care  Outcome: Progressing     Problem: Discharge Planning  Goal: Discharge to home or other facility with appropriate resources  Outcome: Progressing     Problem: Pain  Goal: Verbalizes/displays adequate comfort level or baseline comfort level  Outcome: Progressing     Problem: Safety - Adult  Goal: Free from fall injury  Outcome: Progressing     Problem: ABCDS Injury Assessment  Goal: Absence of physical injury  Outcome: Progressing     
  Problem: Neurosensory - Adult  Goal: Achieves stable or improved neurological status  Outcome: Progressing  Goal: Achieves maximal functionality and self care  Outcome: Progressing     Problem: Discharge Planning  Goal: Discharge to home or other facility with appropriate resources  Outcome: Progressing     Problem: Pain  Goal: Verbalizes/displays adequate comfort level or baseline comfort level  Outcome: Progressing     Problem: Safety - Adult  Goal: Free from fall injury  Outcome: Progressing     Problem: Skin/Tissue Integrity  Goal: Absence of new skin breakdown  Description: 1.  Monitor for areas of redness and/or skin breakdown  2.  Assess vascular access sites hourly  3.  Every 4-6 hours minimum:  Change oxygen saturation probe site  4.  Every 4-6 hours:  If on nasal continuous positive airway pressure, respiratory therapy assess nares and determine need for appliance change or resting period.  Outcome: Progressing     Problem: ABCDS Injury Assessment  Goal: Absence of physical injury  Outcome: Progressing     
  Problem: Neurosensory - Adult  Goal: Achieves stable or improved neurological status  Outcome: Progressing  Goal: Achieves maximal functionality and self care  Outcome: Progressing     Problem: Discharge Planning  Goal: Discharge to home or other facility with appropriate resources  Outcome: Progressing     Problem: Pain  Goal: Verbalizes/displays adequate comfort level or baseline comfort level  Outcome: Progressing     Problem: Safety - Adult  Goal: Free from fall injury  Outcome: Progressing     Problem: Skin/Tissue Integrity  Goal: Absence of new skin breakdown  Description: 1.  Monitor for areas of redness and/or skin breakdown  2.  Assess vascular access sites hourly  3.  Every 4-6 hours minimum:  Change oxygen saturation probe site  4.  Every 4-6 hours:  If on nasal continuous positive airway pressure, respiratory therapy assess nares and determine need for appliance change or resting period.  Outcome: Progressing     Problem: ABCDS Injury Assessment  Goal: Absence of physical injury  Outcome: Progressing     Problem: SLP Adult - Impaired Swallowing  Goal: By Discharge: Advance to least restrictive diet without signs or symptoms of aspiration for planned discharge setting.  See evaluation for individualized goals.  4/5/2024 1203 by Xu Becerril, SLP  Outcome: Progressing     Problem: SLP Adult - Impaired Communication  Goal: By Discharge: Demonstrates communication skills at highest level of function for planned discharge setting.  See evaluation for individualized goals.  4/5/2024 1203 by Xu Becerril, SLP  Outcome: Progressing     
  Problem: Neurosensory - Adult  Goal: Achieves stable or improved neurological status  Outcome: Progressing  Goal: Achieves maximal functionality and self care  Outcome: Progressing     Problem: Discharge Planning  Goal: Discharge to home or other facility with appropriate resources  Outcome: Progressing     Problem: Pain  Goal: Verbalizes/displays adequate comfort level or baseline comfort level  Outcome: Progressing     Problem: Safety - Adult  Goal: Free from fall injury  Outcome: Progressing     Problem: Skin/Tissue Integrity  Goal: Absence of new skin breakdown  Description: 1.  Monitor for areas of redness and/or skin breakdown  2.  Assess vascular access sites hourly  3.  Every 4-6 hours minimum:  Change oxygen saturation probe site  4.  Every 4-6 hours:  If on nasal continuous positive airway pressure, respiratory therapy assess nares and determine need for appliance change or resting period.  Outcome: Progressing     Problem: ABCDS Injury Assessment  Goal: Absence of physical injury  Outcome: Progressing  Flowsheets (Taken 4/24/2024 0920)  Absence of Physical Injury: Implement safety measures based on patient assessment     Problem: Chronic Conditions and Co-morbidities  Goal: Patient's chronic conditions and co-morbidity symptoms are monitored and maintained or improved  4/24/2024 0923 by Sahara Lowe, RN  Outcome: Progressing  4/23/2024 2200 by Liana Johnson, RN  Outcome: Progressing     Problem: Nutrition Deficit:  Goal: Optimize nutritional status  Outcome: Progressing     Problem: Respiratory - Adult  Goal: Achieves optimal ventilation and oxygenation  Outcome: Progressing     Problem: Hematologic - Adult  Goal: Maintains hematologic stability  Outcome: Progressing     Problem: Cardiovascular - Adult  Goal: Maintains optimal cardiac output and hemodynamic stability  Outcome: Progressing  Goal: Absence of cardiac dysrhythmias or at baseline  Outcome: Progressing     Problem: Infection - 
  Problem: SLP Adult - Impaired Communication  Goal: By Discharge: Demonstrates communication skills at highest level of function for planned discharge setting.  See evaluation for individualized goals.  Outcome: Progressing     Problem: SLP Adult - Impaired Swallowing  Goal: By Discharge: Advance to least restrictive diet without signs or symptoms of aspiration for planned discharge setting.  See evaluation for individualized goals.  Outcome: Progressing     
Brief Stroke Team Plan of Care Note    Discussed patient with ED provider but did not examen on tele. In brief, Mr Lamb is a 67yo male who woke-up at 0500 feeling dizzy and vomiting. He also has a headache. No FND. CT head was without hemorrhage or acute infarct. 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 His basilar is patent. He has high-grade intracranial stenosis, especially in the L P1.    Given the patient's dizziness/vomiting, there's a concern for acute posterior circulation stroke. The patient is out of the window for pursuing wake-up MRI (woke at 0500; call at 1200) and thus is not a TNK candidate. As his basilar/PCAs are patent, he is not a candidate for EVT.     Recommend monitoring patient in the ICU for q1h neuro checks given possible clot progression. From a stroke perspective, would also favor starting heparin drip without bolus.      
Echo showing EF 55-60% w/ no WMA, G1DD. No LV apical clot. Left atrium not well visualized.   Satting 93% on 4L/NC.   Currently denies any headache.  States he gets dizzy with sitting up.  States he feels short of breath still with talking.  Denies any shortness of breath at rest.  No chest pain or palpitations.      PHYSICAL EXAM:  General: Alert, no distress, well-nourished  Neurologic  Mental status:   orientation to person, place, time, situation   Attention intact as able to attend well to the exam     Language hypophonic but able to speak full sentences with some dyspnea noted. No dysarthria or aphasia.    Comprehension intact; follows simple commands    Cranial nerves:   CN2: Visual fields full w/o extinction on confrontational testing  CN 3,4,6: Pupils 3 mm > 2 mm equal and reactive to light, extraocular muscles intact, no nystagmus  CN5: Facial sensation symmetric   CN7: left facial weakness  CN8: Hearing symmetric to spoken voice  CN9: Palate elevated symmetrically  CN11: Mild left pronator drift with arm extension.   CN12: Tongue midline with protrusion    Motor Exam:  Mild pronator drift with LUE extension, exam somewhat limited secondary to left shoulder pain.     R  L    Deltoid 5  4   Biceps 5 4   Triceps 5 4   Wrist extension  5 4   Interossei 5 4+      R  L    Hip flexion  5 4   Hip extension  5 4   Knee flexion  5 4   Knee extension  5 4   Ankle dorsiflexion  5 2    Ankle plantar flexion  5 4       Sensory: Decreased sensation to light touch in RUE (describes numbness in right hand which he states has been present since a previous stroke). Reports left sided tingling to light touch and states it feels his sensation to light touch is less compared to the right. Full sensation to light touch in the RLE.   Cerebellar/coordination: LUE ataxia with FNF. Dysmetria with FNF in RUE.    Tone: normal in all 4 extremities  Gait: Deferred for safety  
Patient continues to have a L facial droop with LUE weakness and ataxia. Speech has improved and is more clear. Stat MRI still pending. No changes to plan of care at this time. Please call with any questions or exam changes.    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 No acute hemorrhage or mass effect. Will start low-dose, no bolus neuro heparin gtt. Will repeat CTA head/neck 72h after therapeutic anti-Xa. Will restart 81mg daily ASA.    Case discussed with Dr. Caldwell and Dr. Joshi.          
Patient remains alert and oriented x4. 5/5 strength with no drift. Mild LUE ataxia noted. Plan for repeat CT head in AM to evaluate cerebellar stroke. Plan for anticoagulation tomorrow. No other changes to plan of care at this time. Please call with any exam changes or questions.    NANCY Shore - CNP   Neurology & Neurocritical Care   4/4/2024 8:28 PM    ICU Patients:   Neurocritical Care Line: [phone]  [facility]: [facility]    
Point of Care Note:  Pulmonology  Kulwant Lamb    2:40 PM  4/22/2024    CXR ordered 2/2 decreased BS on the right. Shows severe opacification of right lung, concerning for mucoid impaction. Patient will need repeat bronchoscopy. Plan for endoscopy, can do early tomorrow morning. Potentially, if patient recovers as expected he could discharge to facility later in the day.    Plan:  -Bronch tomorrow, NPO/hold tube feeds at midnight  -Do not discharge today    Discussed with Dr. Shanelle Tyler DO  PGY2, Internal Medicine  04/22/24  2:43 PM    
Satting 90% on 3L/NC  C/o h/a, some dizziness, but no diplopia or nausea.  States he only feels SOB with talking but does not have any at rest. Reports he is able to cough and clear his airway and at times can feel sensations of choking while taking in ice chips. No chest pain.         PHYSICAL EXAM:  General: Alert, no distress, well-nourished  Neurologic  Mental status:   orientation to person, place, time, situation   Attention intact as able to attend well to the exam     Language fluent in conversation, hypophonic   Comprehension intact; follows simple commands    Cranial nerves:   CN2: Visual fields full w/o extinction on confrontational testing  CN 3,4,6: Pupils 3 mm > 2 mm equal and reactive to light, extraocular muscles intact, no nystagmus  CN5: Facial sensation symmetric   CN7: left facial weakness  CN8: Hearing symmetric to spoken voice  CN9: Palate elevated symmetrically  CN11: Mild left pronator drift.   CN12: Tongue midline with protrusion    Motor Exam:  Mild pronator drift with LUE extension. Left deltoid exam limited secondary to pain.    R  L    Deltoid 5  4   Biceps 5 4   Triceps 5 4   Wrist extension  5 4   Interossei 5 4      R  L    Hip flexion  4  4   Hip extension  4 4   Knee flexion  5 5   Knee extension  5 5   Ankle dorsiflexion  4 2    Ankle plantar flexion  4 4       Sensory: Decreased sensation to light touch in RUE (describes numbness in right hand which he states has been present since a previous stroke). Reports decreased sensation of the left side and reports left sided tingling. Full sensation to light touch in the RLE.   Cerebellar/coordination: LUE ataxia with FNF. Dysmetria with FNF in RUE.    Tone: normal in all 4 extremities  Gait: Deferred for safety      OTHER SYSTEMS:  Cardiovascular: Warm, appears well perfused   Respiratory: Easy, non-labored respiratory pattern   Abdominal: Abdomen is without distention   Extremities: Upper and lower extremities are atraumatic in 
Satting 96% on 5L/NC.   No headache. Does report some mild intermittent dizziness. Reports occasional nausea but currently has none at time of exam. States he only feels SOB with talking but does not have any at rest. Reports he is able to cough and clear his airway and at times can feel sensations of choking while taking in ice chips. No chest pain.         PHYSICAL EXAM:  General: Alert, no distress, well-nourished  Neurologic  Mental status:   orientation to person, place, time, situation   Attention intact as able to attend well to the exam     Language fluent in conversation, hypophonic   Comprehension intact; follows simple commands    Cranial nerves:   CN2: Visual fields full w/o extinction on confrontational testing  CN 3,4,6: Pupils 3 mm > 2 mm equal and reactive to light, extraocular muscles intact, no nystagmus  CN5: Facial sensation symmetric   CN7: left facial weakness  CN8: Hearing symmetric to spoken voice  CN9: Palate elevated symmetrically  CN11: Mild left pronator drift.   CN12: Tongue midline with protrusion    Motor Exam:  Mild pronator drift with LUE extension. Left deltoid exam limited secondary to pain.    R  L    Deltoid 5  4   Biceps 5 4   Triceps 5 4   Wrist extension  5 4   Interossei 5 4+      R  L    Hip flexion  4  4   Hip extension  4 4   Knee flexion  5 5   Knee extension  5 5   Ankle dorsiflexion  4 2    Ankle plantar flexion  4 4       Sensory: Decreased sensation to light touch in RUE (describes numbness in right hand which he states has been present since a previous stroke). Reports decreased sensation of the left side and reports left sided tingling. Full sensation to light touch in the RLE.   Cerebellar/coordination: LUE ataxia with FNF. Dysmetria with FNF in RUE.    Tone: normal in all 4 extremities  Gait: Deferred for safety      OTHER SYSTEMS:  Cardiovascular: Warm, appears well perfused   Respiratory: Easy, non-labored respiratory pattern   Abdominal: Abdomen is without 
conditions and co-morbidity symptoms are monitored and maintained or improved  Outcome: Progressing  Flowsheets (Taken 4/13/2024 2000)  Care Plan - Patient's Chronic Conditions and Co-Morbidity Symptoms are Monitored and Maintained or Improved: Monitor and assess patient's chronic conditions and comorbid symptoms for stability, deterioration, or improvement     Problem: Nutrition Deficit:  Goal: Optimize nutritional status  Outcome: Progressing     
injury  4/11/2024 2324 by Nabil Wright, RN  Outcome: Progressing  Flowsheets  Taken 4/11/2024 2324  Free From Fall Injury: Instruct family/caregiver on patient safety  Taken 4/11/2024 2203  Free From Fall Injury: Instruct family/caregiver on patient safety     Problem: Skin/Tissue Integrity  Goal: Absence of new skin breakdown  Description: 1.  Monitor for areas of redness and/or skin breakdown  2.  Assess vascular access sites hourly  3.  Every 4-6 hours minimum:  Change oxygen saturation probe site  4.  Every 4-6 hours:  If on nasal continuous positive airway pressure, respiratory therapy assess nares and determine need for appliance change or resting period.  4/11/2024 2324 by Nabil Wright RN  Outcome: Progressing     Problem: ABCDS Injury Assessment  Goal: Absence of physical injury  4/11/2024 2324 by Nabil Wright, RN  Outcome: Progressing  Flowsheets (Taken 4/11/2024 2203)  Absence of Physical Injury: Implement safety measures based on patient assessment     Problem: Chronic Conditions and Co-morbidities  Goal: Patient's chronic conditions and co-morbidity symptoms are monitored and maintained or improved  4/11/2024 2324 by Nabil Wright, RN  Outcome: Progressing  Flowsheets  Taken 4/11/2024 2324  Care Plan - Patient's Chronic Conditions and Co-Morbidity Symptoms are Monitored and Maintained or Improved: Monitor and assess patient's chronic conditions and comorbid symptoms for stability, deterioration, or improvement  Taken 4/11/2024 2100  Care Plan - Patient's Chronic Conditions and Co-Morbidity Symptoms are Monitored and Maintained or Improved: Monitor and assess patient's chronic conditions and comorbid symptoms for stability, deterioration, or improvement     Problem: Nutrition Deficit:  Goal: Optimize nutritional status  4/11/2024 2324 by Nabil Wright, RN  Outcome: Progressing  Flowsheets (Taken 4/11/2024 2324)  Nutrient intake appropriate for improving, restoring, or maintaining nutritional 
therapy   Encourage visually impaired, hearing impaired and aphasic patients to use assistive/communication devices     Problem: Respiratory - Adult  Goal: Achieves optimal ventilation and oxygenation  4/20/2024 2307 by Connor Thurston, RN  Outcome: Progressing  Flowsheets (Taken 4/20/2024 2000)  Achieves optimal ventilation and oxygenation:   Assess for changes in respiratory status   Assess for changes in mentation and behavior   Position to facilitate oxygenation and minimize respiratory effort   Oxygen supplementation based on oxygen saturation or arterial blood gases   Initiate smoking cessation protocol as indicated   Encourage broncho-pulmonary hygiene including cough, deep breathe, incentive spirometry   Assess the need for suctioning and aspirate as needed  4/20/2024 0946 by Fariha Arora RN  Outcome: Progressing  Flowsheets (Taken 4/20/2024 0800)  Achieves optimal ventilation and oxygenation:   Position to facilitate oxygenation and minimize respiratory effort   Assess for changes in respiratory status   Assess for changes in mentation and behavior   Oxygen supplementation based on oxygen saturation or arterial blood gases   Initiate smoking cessation protocol as indicated   Encourage broncho-pulmonary hygiene including cough, deep breathe, incentive spirometry   Assess the need for suctioning and aspirate as needed   Assess and instruct to report shortness of breath or any respiratory difficulty   Respiratory therapy support as indicated     Problem: Cardiovascular - Adult  Goal: Maintains optimal cardiac output and hemodynamic stability  4/20/2024 2307 by Connor Thruston, RN  Outcome: Progressing  Flowsheets (Taken 4/20/2024 2000)  Maintains optimal cardiac output and hemodynamic stability:   Monitor blood pressure and heart rate   Monitor urine output and notify Licensed Independent Practitioner for values outside of normal range   Assess for signs of decreased cardiac output   Administer fluid 
Evaluate the patient's condition at the time of restraint application   Every 2 hours: Monitor safety, psychosocial status, comfort, nutrition and hydration   Inform patient/family regarding the reason for restraint  Taken 4/18/2024 1600 by Miya Roque RN  Remains free of injury from restraints (restraint for interference with medical device):   Determine that other, less restrictive measures have been tried or would not be effective before applying the restraint   Every 2 hours: Monitor safety, psychosocial status, comfort, nutrition and hydration  Taken 4/18/2024 1400 by Miya Roque RN  Remains free of injury from restraints (restraint for interference with medical device):   Determine that other, less restrictive measures have been tried or would not be effective before applying the restraint   Every 2 hours: Monitor safety, psychosocial status, comfort, nutrition and hydration  Taken 4/18/2024 1200 by Miya Roque RN  Remains free of injury from restraints (restraint for interference with medical device):   Determine that other, less restrictive measures have been tried or would not be effective before applying the restraint   Every 2 hours: Monitor safety, psychosocial status, comfort, nutrition and hydration  Taken 4/18/2024 1000 by Miya Roque RN  Remains free of injury from restraints (restraint for interference with medical device):   Determine that other, less restrictive measures have been tried or would not be effective before applying the restraint   Every 2 hours: Monitor safety, psychosocial status, comfort, nutrition and hydration  Taken 4/18/2024 0800 by Miya Roque RN  Remains free of injury from restraints (restraint for interference with medical device):   Determine that other, less restrictive measures have been tried or would not be effective before applying the restraint   Every 2 hours: Monitor safety, psychosocial status, comfort, nutrition and hydration   
Progressing  4/23/2024 0622 by Connor Thurston, RN  Outcome: Progressing  Flowsheets (Taken 4/22/2024 2000)  Absence of cardiac dysrhythmias or at baseline:   Monitor cardiac rate and rhythm   Assess for signs of decreased cardiac output   Administer antiarrhythmia medication and electrolyte replacement as ordered     Problem: Infection - Adult  Goal: Absence of infection during hospitalization  4/23/2024 1444 by Sahara Lowe, RN  Outcome: Progressing  4/23/2024 0622 by Connor Thurston, RN  Outcome: Progressing  Flowsheets  Taken 4/22/2024 2146  Absence of infection during hospitalization:   Assess and monitor for signs and symptoms of infection   Monitor lab/diagnostic results   Monitor all insertion sites i.e., indwelling lines, tubes and drains   Monitor endotracheal (as able) and nasal secretions for changes in amount and color  Taken 4/22/2024 2000  Absence of infection during hospitalization:   Assess and monitor for signs and symptoms of infection   Monitor lab/diagnostic results   Monitor endotracheal (as able) and nasal secretions for changes in amount and color   Monitor all insertion sites i.e., indwelling lines, tubes and drains   [place] appropriate cooling/warming therapies per order   Administer medications as ordered     Problem: Safety - Medical Restraint  Goal: Remains free of injury from restraints (Restraint for Interference with Medical Device)  Description: INTERVENTIONS:  1. Determine that other, less restrictive measures have been tried or would not be effective before applying the restraint  2. Evaluate the patient's condition at the time of restraint application  3. Inform patient/family regarding the reason for restraint  4. Q2H: Monitor safety, psychosocial status, comfort, nutrition and hydration  4/23/2024 1444 by Sahara Lowe, RN  Outcome: Progressing  4/23/2024 0622 by Connor Thurston, RN  Outcome: Progressing  Flowsheets (Taken 4/19/2024 1000 by Niru Arora 
Recommend, monitor, and adjust tube feedings and TPN/PPN based on assessed needs     Problem: Safety - Medical Restraint  Goal: Remains free of injury from restraints (Restraint for Interference with Medical Device)  Description: INTERVENTIONS:  1. Determine that other, less restrictive measures have been tried or would not be effective before applying the restraint  2. Evaluate the patient's condition at the time of restraint application  3. Inform patient/family regarding the reason for restraint  4. Q2H: Monitor safety, psychosocial status, comfort, nutrition and hydration  4/20/2024 0946 by Fariha Arora, RN  Outcome: Progressing  4/20/2024 0412 by Connor Thurston, RN  Outcome: Progressing  Flowsheets (Taken 4/19/2024 1000 by Niru Arora, RN)  Remains free of injury from restraints (restraint for interference with medical device):   Inform patient/family regarding the reason for restraint   Evaluate the patient's condition at the time of restraint application   Determine that other, less restrictive measures have been tried or would not be effective before applying the restraint     
assess patient's chronic conditions and comorbid symptoms for stability, deterioration, or improvement  4/20/2024 2307 by Connor Thurston RN  Outcome: Progressing  Flowsheets (Taken 4/20/2024 2000)  Care Plan - Patient's Chronic Conditions and Co-Morbidity Symptoms are Monitored and Maintained or Improved: Monitor and assess patient's chronic conditions and comorbid symptoms for stability, deterioration, or improvement     Problem: Nutrition Deficit:  Goal: Optimize nutritional status  4/21/2024 1251 by Fariha Arora RN  Outcome: Progressing  4/20/2024 2307 by Connor Thurston RN  Outcome: Progressing  Flowsheets (Taken 4/19/2024 0856 by Tessy Stein RD)  Nutrient intake appropriate for improving, restoring, or maintaining nutritional needs: Recommend, monitor, and adjust tube feedings and TPN/PPN based on assessed needs     Problem: Safety - Medical Restraint  Goal: Remains free of injury from restraints (Restraint for Interference with Medical Device)  Description: INTERVENTIONS:  1. Determine that other, less restrictive measures have been tried or would not be effective before applying the restraint  2. Evaluate the patient's condition at the time of restraint application  3. Inform patient/family regarding the reason for restraint  4. Q2H: Monitor safety, psychosocial status, comfort, nutrition and hydration  4/21/2024 1251 by Fariha Arora RN  Outcome: Progressing  4/20/2024 2307 by Connor Thurston RN  Outcome: Progressing  Flowsheets (Taken 4/19/2024 1000 by Niru Arora RN)  Remains free of injury from restraints (restraint for interference with medical device):   Inform patient/family regarding the reason for restraint   Evaluate the patient's condition at the time of restraint application   Determine that other, less restrictive measures have been tried or would not be effective before applying the restraint

## 2024-05-28 RX ORDER — AMLODIPINE BESYLATE 5 MG/1
5 TABLET ORAL DAILY
Qty: 90 TABLET | Refills: 1 | Status: SHIPPED | OUTPATIENT
Start: 2024-05-28

## (undated) DEVICE — SUTURE VICRYL + SZ 0 L27IN ABSRB WHT CT-2 1/2 CIR TAPERPOINT VCP270H

## (undated) DEVICE — LIQUIBAND RAPID ADHESIVE 36/CS 0.8ML: Brand: MEDLINE

## (undated) DEVICE — Device

## (undated) DEVICE — ENDOVIVE SFT PEG KIT PULL WENFIT 20F BX2

## (undated) DEVICE — CANNULA SAMP CO2 AD GRN 7FT CO2 AND 7FT O2 TBNG UNIV CONN

## (undated) DEVICE — GARMENT,MEDLINE,DVT,INT,CALF,MED, GEN2: Brand: MEDLINE

## (undated) DEVICE — GENERAL: Brand: MEDLINE INDUSTRIES, INC.

## (undated) DEVICE — PEG KIT STD 20 FR PUL W/ ENFIT ENDOVIVE

## (undated) DEVICE — GLOVE SURG SZ 7 L12IN FNGR THK75MIL WHT LTX POLYMER BEAD

## (undated) DEVICE — BLADE ES ELASTOMERIC COAT INSUL DURABLE BEND UPTO 90DEG

## (undated) DEVICE — DRAPE,LAP,CHOLE,W/TROUGHS,STERILE: Brand: MEDLINE

## (undated) DEVICE — BINDER ABD H12IN FOR 62-74IN WAIST UNIV 4 PNL PREM DSGN E

## (undated) DEVICE — SUTURE MONOCRYL + SZ 4-0 L27IN ABSRB UD L19MM PS-2 3/8 CIR MCP426H

## (undated) DEVICE — CLEANER,CAUTERY TIP,2X2",STERILE: Brand: MEDLINE

## (undated) DEVICE — SYRINGE MED 10ML LUERLOCK TIP W/O SFTY DISP

## (undated) DEVICE — SEALER ONE-STEP 23CM LIGASURE MARYLAND XP

## (undated) DEVICE — TOWEL,STOP FLAG GOLD N-W: Brand: MEDLINE

## (undated) DEVICE — SUTURE ETHILON SZ 2-0 L18IN NONABSORBABLE BLK L26MM FS 3/8 664G

## (undated) DEVICE — COVER,LIGHT HANDLE,FLX,1/PK: Brand: MEDLINE INDUSTRIES, INC.

## (undated) DEVICE — Z DISCONTINUED USE 2749457 TUBING SAMP AD W12.5XH8.4IN D9.1IN NSL ORAL SMRT CAPNOLINE

## (undated) DEVICE — SUTURE PDS + SZ 0 L27IN ABSRB VLT L26MM CT 2 1 2 CIR PDP334H

## (undated) DEVICE — SUTURE PERMAHAND SZ 3-0 L18IN NONABSORBABLE BLK L26MM SH C013D

## (undated) DEVICE — NEEDLE,22GX1.5",REG,BEVEL: Brand: MEDLINE

## (undated) DEVICE — SOLUTION SURG PREP 26 CC PURPREP

## (undated) DEVICE — SUTURE VICRYL + SZ 3-0 L18IN ABSRB UD SH 1/2 CIR TAPERCUT NDL VCP864D

## (undated) DEVICE — TUBE GASTROSTMY FEED 20 FR 7-10 CC ENFIT CONN RECESSED MIC